# Patient Record
Sex: MALE | Race: WHITE | NOT HISPANIC OR LATINO | Employment: FULL TIME | URBAN - METROPOLITAN AREA
[De-identification: names, ages, dates, MRNs, and addresses within clinical notes are randomized per-mention and may not be internally consistent; named-entity substitution may affect disease eponyms.]

---

## 2021-05-29 ENCOUNTER — RECORDS - HEALTHEAST (OUTPATIENT)
Dept: ADMINISTRATIVE | Facility: CLINIC | Age: 66
End: 2021-05-29

## 2021-07-13 ENCOUNTER — TRANSCRIBE ORDERS (OUTPATIENT)
Dept: OTHER | Age: 66
End: 2021-07-13

## 2021-07-13 ENCOUNTER — DOCUMENTATION ONLY (OUTPATIENT)
Dept: ONCOLOGY | Facility: CLINIC | Age: 66
End: 2021-07-13

## 2021-07-13 DIAGNOSIS — C61 PROSTATE CANCER (H): Primary | ICD-10-CM

## 2021-07-13 NOTE — PROGRESS NOTES
Action    Action Taken 7/13/2021 1:20pm KEJULIÁN    Internal referral for prostate cancer - biopsy report available in Epic (7/1/2021)

## 2021-07-19 NOTE — TELEPHONE ENCOUNTER
RECORDS STATUS - ALL OTHER DIAGNOSIS      RECORDS RECEIVED FROM: [employer]   DATE RECEIVED:    NOTES STATUS DETAILS   OFFICE NOTE from referring provider  -  Dr. Surinder Lee: 7/8/21   OFFICE NOTE from medical oncologist     DISCHARGE SUMMARY from hospital NA    DISCHARGE REPORT from the ER NA    OPERATIVE REPORT [facility] 7/1/21: Prostate Bx  7/23/20: Colonoscopy  12/10/18, 11/17/16: EUS   MEDICATION LIST [facility]   CLINICAL TRIAL TREATMENTS TO DATE     LABS     PATHOLOGY REPORTS [facility], Report in CE, Slides received 7/22 7/1/21: [id number]   ANYTHING RELATED TO DIAGNOSIS Epic/CE 3/11/21   GENONOMIC TESTING     TYPE:     IMAGING (NEED IMAGES & REPORT)     CT SCANS     MRI PACS 4/13/21: HP   MAMMO     ULTRASOUND PACS 12/22/15:    PET

## 2021-07-22 ENCOUNTER — LAB (OUTPATIENT)
Dept: LAB | Facility: CLINIC | Age: 66
End: 2021-07-22
Payer: COMMERCIAL

## 2021-07-22 DIAGNOSIS — C61 PROSTATE CANCER (H): Primary | ICD-10-CM

## 2021-07-22 PROCEDURE — 88321 CONSLTJ&REPRT SLD PREP ELSWR: CPT | Performed by: PATHOLOGY

## 2021-07-28 LAB
PATH REPORT.COMMENTS IMP SPEC: NORMAL
PATH REPORT.FINAL DX SPEC: NORMAL
PATH REPORT.GROSS SPEC: NORMAL
PATH REPORT.MICROSCOPIC SPEC OTHER STN: NORMAL
PATH REPORT.RELEVANT HX SPEC: NORMAL
PATH REPORT.RELEVANT HX SPEC: NORMAL
PATH REPORT.SITE OF ORIGIN SPEC: NORMAL

## 2021-08-09 ENCOUNTER — PRE VISIT (OUTPATIENT)
Dept: ONCOLOGY | Facility: CLINIC | Age: 66
End: 2021-08-09

## 2021-08-09 ENCOUNTER — ONCOLOGY VISIT (OUTPATIENT)
Dept: ONCOLOGY | Facility: CLINIC | Age: 66
End: 2021-08-09
Attending: INTERNAL MEDICINE
Payer: COMMERCIAL

## 2021-08-09 VITALS
WEIGHT: 178 LBS | HEART RATE: 87 BPM | HEIGHT: 67 IN | OXYGEN SATURATION: 97 % | TEMPERATURE: 98.1 F | RESPIRATION RATE: 14 BRPM | SYSTOLIC BLOOD PRESSURE: 155 MMHG | BODY MASS INDEX: 27.94 KG/M2 | DIASTOLIC BLOOD PRESSURE: 79 MMHG

## 2021-08-09 DIAGNOSIS — C61 PROSTATE CANCER (H): ICD-10-CM

## 2021-08-09 DIAGNOSIS — C61 MALIGNANT NEOPLASM OF PROSTATE (H): Primary | ICD-10-CM

## 2021-08-09 PROCEDURE — G0463 HOSPITAL OUTPT CLINIC VISIT: HCPCS

## 2021-08-09 PROCEDURE — 99204 OFFICE O/P NEW MOD 45 MIN: CPT | Mod: GC | Performed by: INTERNAL MEDICINE

## 2021-08-09 ASSESSMENT — PAIN SCALES - GENERAL: PAINLEVEL: NO PAIN (0)

## 2021-08-09 ASSESSMENT — MIFFLIN-ST. JEOR: SCORE: 1546.03

## 2021-08-09 NOTE — PATIENT INSTRUCTIONS
Eloy,   It was a pleasure to meet you today.  As we discussed, your 3+3 vs 4+3 prostate cancer are still appropriate for active surveillance with your urologist at [facility].  Please follow-up with Dr. Lee in November with a PSA at [facility].  Please forward or send Dr. Zavala the result in [employer].  We will see you in 1 year with a PSA prior unless our thoughts on your plan would change based on your November PSA level.      Dr. Rogel and Dr. Zavala.

## 2021-08-09 NOTE — NURSING NOTE
"Oncology Rooming Note    August 9, 2021 9:18 AM   Eloy Fletcher is a 65 year old male who presents for:    Chief Complaint   Patient presents with     Oncology Clinic Visit     [facility] NEW - PROSTATE CANCER     Initial Vitals: BP (!) 155/79 (BP Location: Right arm, Patient Position: Chair, Cuff Size: Adult Regular)   Pulse 87   Temp 98.1  F (36.7  C)   Resp 14   Ht 1.702 m (5' 7\")   Wt 80.7 kg (178 lb)   SpO2 97%   BMI 27.88 kg/m   Estimated body mass index is 27.88 kg/m  as calculated from the following:    Height as of this encounter: 1.702 m (5' 7\").    Weight as of this encounter: 80.7 kg (178 lb). Body surface area is 1.95 meters squared.  No Pain (0) Comment: Data Unavailable   No LMP for male patient.  Allergies reviewed: Yes  Medications reviewed: Yes    Medications: Medication refills not needed today.  Pharmacy name entered into [employer]: [facility] - [place], [place] - [address]    Clinical concerns: No new concerns. Lucas was notified.      Jean Fernandez LPN            "

## 2021-08-09 NOTE — LETTER
8/9/2021         RE: Eloy Fletcher  [address]  [place] [address]        Dear Colleague,    Thank you for referring your patient, Eloy Fletcher, to the [facility]. Please see a copy of my visit note below.      [facility] Medical Oncology New Patient Consultation Note       Date of visit: August 9, 2021    CC:  Low risk prostate cancer referral from Dr. Lee at [facility].     HPI:  Previously had elevated PSA in 2007. due to prostate infection, found to have small areas of acinar proliferation only on biopsy at that time.  Followed with PSA by PCP with rising PSA in last few months.  He is otherwise having minimal urinary symptoms with 1-2 episodes of nocturia at night.  Stream is still stable, although not as strong as previously.  Biopsy from [facility] was done and he was expecting to have to have prostatectomy and was surprised with recommendation for active surveillance.  He has no fevers, chills, night sweats, CP, or SOB.  Remains active walking on treadmill 3 days a week and lifting weights 3-4 times per week without issues.  Appetite is stable.  Weight is stable.  No swelling of groin or lower extremities.      ONCOLOGY HISTORY:  -Hodgkins Lymphoma-late 1970s.  Treated with radiation and MOPP chemotherapy.      -L orchiectomy for testicular cancer, unknown subtype.    -Testicular cancer with abdominal LAD at recurrence, 1983.  Treated with radiation and likely BEP at [facility].  In remission since.      -Prostate cancer Diagnosed 4/2021  -MR with 49g prostate, category 4 lesion in R mid medial peripheral zone   -Biopsy 7/1/21-[facility]  -3/14 cores with prostate adenocarcinoma   -3+3=6 Elly with Grade Group 1, 10% involvement   -1 additional biopsy with small acinar proliferation    -Overread of pathology slides at [facility] notable for 1 site (2/3 cores) with 4+3 histology (Grade group 3)  "involving 50-70% of core.     -3 additional cores 3+3   -3 cores with atypical small acinar proliferation.     -8 biopsy sites with benign prostate tissue.      -Plan per primary urologist, Dr. Lee for active surveillance.      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   Rare EtOH  No recreational drugs.   No Herb/supplements.      MEDS    ASA 81  plavix   Pravastatin  ezetimbe  Albuterol  Metoprolol  Omeprazole     ROS  Remainder of 12 point ROS reviewed and negative except as in HPI.      PHYSICAL EXAM  Vital signs:  Temp: 98.1  F (36.7  C)   BP: (!) 155/79 Pulse: 87   Resp: 14 SpO2: 97 %     Height: 170.2 cm (5' 7\") Weight: 80.7 kg (178 lb)  Estimated body mass index is 27.88 kg/m  as calculated from the following:    Height as of this encounter: 1.702 m (5' 7\").    Weight as of this encounter: 80.7 kg (178 lb).      Exam:  Constitutional: healthy, alert and no distress  Head: Normocephalic. No masses, lesions, tenderness or abnormalities  Neck: Neck supple. Trachea midline   ENT: MMM  Cardiovascular: RRR, no m/r/g appreciated   Respiratory: CTAB, normal WOB on RA  Gastrointestinal: Abdomen soft, non-tender. BS normal.  : Deferred  Musculoskeletal: extremities normal- no gross deformities noted  Skin: no suspicious lesions or rashes on exposed areas of skin   Neurologic: Gait normal. CNII-XII grossly intact, normal speech  Psychiatric: mentation appears normal and affect normal/bright    LABS AND IMAGES    Prior PSA at [facility]  Prostatic Specific Antigen (ng/mL)   Date Value   03/11/2021 5.4 (H)   10/27/2020 4.0   02/11/2020 5.2 (H)   12/31/2019 4.4 (H)   11/05/2019 " 5.1 (H)   04/05/2018 3.8   11/30/2017 3.1   04/17/2017 3.4   12/29/2015 2.51   09/25/2014 2.96     Biopsy 7/1/21 [facility]  FINAL DIAGNOSIS     A.  Prostate, left apex, A, needle core biopsy:    Prostate tissue with no diagnostic abnormality     B.  Prostate, left apex lateral, B, needle core biopsy:    Prostate tissue with no diagnostic abnormality     C.  Prostate, left middle, C, needle core biopsy:    Prostate tissue with no diagnostic abnormality     D.  Prostate, left middle lateral, D, needle core biopsy:    Prostate tissue with no diagnostic abnormality     E.  Prostate, left base, E, needle core biopsy:    Prostate tissue with no diagnostic abnormality     F.  Prostate, left base lateral, F, needle core biopsy:    Atypical small acinar proliferation (ASAP)     G.  Prostate, right apex, G, needle core biopsy:    Prostate tissue with no diagnostic abnormality     H.  Prostate, right apex lateral, H, needle core biopsy:    Prostate tissue with no diagnostic abnormality     I.  Prostate, right middle, I, needle core biopsy:    Atypical small acinar proliferation (ASAP)     J.  Prostate, right middle lateral, J, needle core biopsy:    Prostatic adenocarcinoma, GRADE GROUP 1, [place] grade 3+3 (score 6)    1 of 1 needle cores are positive    10 % tissue involvement     K.  Prostate, right base, K, needle core biopsy:    Prostate tissue with no diagnostic abnormality     L.  Prostate, right base lateral, L, needle core biopsy:    Prostate tissue with no diagnostic abnormality     M.  Prostate, M.  Lesion 1 RIght Mid Medial Peripheral Zone , needle core biopsies:    Prostatic adenocarcinoma, GRADE GROUP 1, Elly grade 3+3 (score 6)    2 of 2 needle cores are positive    10 % tissue involvement     N.  Prostate, N.  Lesion 2 Left Base to Mid Peripheral Zone , needle core biopsies:    Prostatic adenocarcinoma, GRADE GROUP 1, [place] grade 3+3 (score 6)    2 of 3 needle cores are positive    10 % tissue  involvement        [facility] Overread-Pathology samples 7/22/21  Final Diagnosis   CASE FROM [place], [place]  ([id number], OBTAINED 07/01/2021):  F. Prostate, left base lateral, biopsy:  -Atypical small acinar proliferation  G. Prostate, right apex, biopsy:  -Atypical small acinar proliferation  I. Prostate, right middle, biopsy:  -Atypical small acinar proliferation  J. Prostate, right middle lateral, biopsy:  -Prostate adenocarcinoma, acinar type  -[place] score 6 (3+3)  -Grade group 1  -Extent: Involves 1/1 core (1 mm, 20%)  M. Prostate, right mid medial peripheral zone, lesion #1, biopsy:  -Prostate adenocarcinoma, acinar type  -Elly score 7 (4+3)  -Grade group 3  -Extent: Involves 2/3 cores (5 mm, 50% and 3 mm, 70%)  N. Prostate, left base to mid peripheral zone, lesion #2, biopsy:  -Prostate adenocarcinoma, acinar type  -Elly score 6 (3+3)  -Grade group 1  -Extent: Involves 2/3 cores (2 mm, 20% and 2 mm, 40%)  Prostate specimens A, B, C, D, E, H, K & L show benign prostatic tissue.       MR Prostate 4/9/21 [facility]  FINDINGS:   PERIPHERAL ZONE:   Lesion 1.   Right mid medial peripheral zone measuring 0.8 x 0.7 x 0.6 cm   T2 appearance: Circumscribed, homogenous hypointense focus/mass less than 1.5 cm.   Diffusion-Weighted Imaging: Focal markedly hypointense on ADC and markedly hyperintense on DWI.   Enhancement: Positive.   Prostate margin: No involvement.     Diffusion abnormality meets PI-RADS 4.   Region of interest created for biopsy and/or follow-up.     Lesion 2.   Left mid posterior peripheral zone and left base lateral peripheral zone measuring 1.9 x 0.6 x 0.9 cm   T2 appearance: Linear or wedge-shaped hypointensity.   Diffusion-Weighted Imaging: Linear/wedge shaped hypointense on ADC and/or linear/wedge shaped hyperintense on DWI.   Enhancement: Positive.   Prostate margin: No involvement.     Diffusion abnormality meets PI-RADS 2.   Region of interest created for  biopsy and/or follow-up.     TRANSITIONAL ZONE: No suspicious foci.     No seminal vesicle invasion.   No pelvic lymphadenopathy.   No lesions in the visualized bones.     PROSTATE GLAND VOLUME: 49 cc     IMPRESSION:   1.  Lesion 1 in the right mid medial peripheral zone is assigned PI-RADS CATEGORY 4: High. Clinically significant cancer is likely to be present.   2.  Lesion 2 in the left base to mid peripheral zone is assigned PI-RADS CATEGORY 2: Low. Clinically significant cancer is unlikely to be present.    IMPRESSION AND PLAN    Eloy Almanza is a 64 y/o M with PMH of Hodgkins Lymphoma (in remission), L sided testicular cancer (in remission), treated with chest and retroperitoneal radiation in the 1970/80s presenting for second opinion of newly diagnosed low risk prostate adenocarcinoma.      Intermediate risk, localized prostate adenocarcinoma.  Initially diagnosed 7/2021 with prostate biopsy. He is followed locally at [facility].  Plan was to initiate active surveillance with next scheduled PSA planned for 11/2021 (last 3/2021), which we agree with.  This initial decision was based on 3+3 [place] score, but noted to be 4+3 on [facility] pathologist review.  We had a lengthy discussion with Eloy and his wife regarding the role of active surveillance versus radical prostatectomy with his pathology results.  We discussed that there is no clear-cut number that directs treatment choice with his results at this time and it is reasonable to follow-up with a PSA level in November with Dr. Lee and then send Dr. Zavala the results as well.  He will likely need a RP at some point, however, waiting until repeat PSA in November is extremely unlikely to change his long-term outcomes or curability of his prostate cancer, while RP may have significant  side effects.      He and his wife had several questions which were answered during our visit.  They were agreeable to follow-up with Dr. Zavala in  1 year and [facility] in addition to sending PSA results to Dr. Zavala as well when they result in November.      -PSA as scheduled with Dr. Lee in November.    -Forward result to Dr. Zavala.   -RTC with Dr. Zavala in 1 year with PSA at that time.     Patient seen and discussed with Dr. Zavala.     Martín Rogel MD, PhD  Hematology/Oncology Fellow  Pager: [phone]          Attestation signed by Eugene Zavala MD at 8/11/2021  3:45 PM:  Physician Attestation   I, Eugene Zavala MD, saw this patient and agree with the findings and plan of care as documented in the note.      Items personally reviewed/procedural attestation: vitals, labs, and plan for active surveillance and local followup.    Eugene Zavala MD       Again, thank you for allowing me to participate in the care of your patient.        Sincerely,        Eugene Zavala MD

## 2021-08-09 NOTE — PROGRESS NOTES
[facility] Medical Oncology New Patient Consultation Note       Date of visit: August 9, 2021    CC:  Low risk prostate cancer referral from Dr. Lee at [facility].     HPI:  Previously had elevated PSA in 2007. due to prostate infection, found to have small areas of acinar proliferation only on biopsy at that time.  Followed with PSA by PCP with rising PSA in last few months.  He is otherwise having minimal urinary symptoms with 1-2 episodes of nocturia at night.  Stream is still stable, although not as strong as previously.  Biopsy from [facility] was done and he was expecting to have to have prostatectomy and was surprised with recommendation for active surveillance.  He has no fevers, chills, night sweats, CP, or SOB.  Remains active walking on treadmill 3 days a week and lifting weights 3-4 times per week without issues.  Appetite is stable.  Weight is stable.  No swelling of groin or lower extremities.      ONCOLOGY HISTORY:  -Hodgkins Lymphoma-late 1970s.  Treated with radiation and MOPP chemotherapy.      -L orchiectomy for testicular cancer, unknown subtype.    -Testicular cancer with abdominal LAD at recurrence, 1983.  Treated with radiation and likely BEP at [facility].  In remission since.      -Prostate cancer Diagnosed 4/2021  -MR with 49g prostate, category 4 lesion in R mid medial peripheral zone   -Biopsy 7/1/21-[facility]  -3/14 cores with prostate adenocarcinoma   -3+3=6 Elly with Grade Group 1, 10% involvement   -1 additional biopsy with small acinar proliferation    -Overread of pathology slides at [facility] notable for 1 site (2/3 cores) with 4+3 histology (Grade group 3) involving 50-70% of core.     -3 additional cores 3+3   -3 cores with atypical small acinar proliferation.     -8 biopsy sites with benign prostate tissue.      -Plan per primary urologist, Dr. Lee for active surveillance.      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   Rare EtOH  No recreational drugs.   No Herb/supplements.      MEDS    ASA 81  plavix   Pravastatin  ezetimbe  Albuterol  Metoprolol  Omeprazole     ROS  Remainder of 12 point ROS reviewed and negative except as in HPI.      PHYSICAL EXAM  Vital signs:  Temp: 98.1  F (36.7  C)   BP: (!) 155/79 Pulse: 87   Resp: 14 SpO2: 97 %     Height: 170.2 cm (5' 7\") Weight: 80.7 kg (178 lb)  Estimated body mass index is 27.88 kg/m  as calculated from the following:    Height as of this encounter: 1.702 m (5' 7\").    Weight as of this encounter: 80.7 kg (178 lb).      Exam:  Constitutional: healthy, alert and no distress  Head: Normocephalic. No masses, lesions, tenderness or abnormalities  Neck: Neck supple. Trachea midline   ENT: MMM  Cardiovascular: RRR, no m/r/g appreciated   Respiratory: CTAB, normal WOB on RA  Gastrointestinal: Abdomen soft, non-tender. BS normal.  : Deferred  Musculoskeletal: extremities normal- no gross deformities noted  Skin: no suspicious lesions or rashes on exposed areas of skin   Neurologic: Gait normal. CNII-XII grossly intact, normal speech  Psychiatric: mentation appears normal and affect normal/bright    LABS AND IMAGES    Prior PSA at [facility]  Prostatic Specific Antigen (ng/mL)   Date Value   03/11/2021 5.4 (H)   10/27/2020 4.0   02/11/2020 5.2 (H)   12/31/2019 4.4 (H)   11/05/2019 5.1 (H)   04/05/2018 3.8   11/30/2017 3.1   04/17/2017 3.4   12/29/2015 2.51   09/25/2014 2.96     Biopsy 7/1/21 [facility]  FINAL DIAGNOSIS     A.  Prostate, left apex, A, needle core biopsy:    Prostate tissue with no diagnostic abnormality     B.  Prostate, " left apex lateral, B, needle core biopsy:    Prostate tissue with no diagnostic abnormality     C.  Prostate, left middle, C, needle core biopsy:    Prostate tissue with no diagnostic abnormality     D.  Prostate, left middle lateral, D, needle core biopsy:    Prostate tissue with no diagnostic abnormality     E.  Prostate, left base, E, needle core biopsy:    Prostate tissue with no diagnostic abnormality     F.  Prostate, left base lateral, F, needle core biopsy:    Atypical small acinar proliferation (ASAP)     G.  Prostate, right apex, G, needle core biopsy:    Prostate tissue with no diagnostic abnormality     H.  Prostate, right apex lateral, H, needle core biopsy:    Prostate tissue with no diagnostic abnormality     I.  Prostate, right middle, I, needle core biopsy:    Atypical small acinar proliferation (ASAP)     J.  Prostate, right middle lateral, J, needle core biopsy:    Prostatic adenocarcinoma, GRADE GROUP 1, [place] grade 3+3 (score 6)    1 of 1 needle cores are positive    10 % tissue involvement     K.  Prostate, right base, K, needle core biopsy:    Prostate tissue with no diagnostic abnormality     L.  Prostate, right base lateral, L, needle core biopsy:    Prostate tissue with no diagnostic abnormality     M.  Prostate, M.  Lesion 1 RIght Mid Medial Peripheral Zone , needle core biopsies:    Prostatic adenocarcinoma, GRADE GROUP 1, [place] grade 3+3 (score 6)    2 of 2 needle cores are positive    10 % tissue involvement     N.  Prostate, N.  Lesion 2 Left Base to Mid Peripheral Zone , needle core biopsies:    Prostatic adenocarcinoma, GRADE GROUP 1, Elly grade 3+3 (score 6)    2 of 3 needle cores are positive    10 % tissue involvement        [facility] Overread-Pathology samples 7/22/21  Final Diagnosis   CASE FROM [place], [place]  ([id number], OBTAINED 07/01/2021):  F. Prostate, left base lateral, biopsy:  -Atypical small acinar proliferation  G. Prostate, right  apex, biopsy:  -Atypical small acinar proliferation  I. Prostate, right middle, biopsy:  -Atypical small acinar proliferation  J. Prostate, right middle lateral, biopsy:  -Prostate adenocarcinoma, acinar type  -[place] score 6 (3+3)  -Grade group 1  -Extent: Involves 1/1 core (1 mm, 20%)  M. Prostate, right mid medial peripheral zone, lesion #1, biopsy:  -Prostate adenocarcinoma, acinar type  -Elly score 7 (4+3)  -Grade group 3  -Extent: Involves 2/3 cores (5 mm, 50% and 3 mm, 70%)  N. Prostate, left base to mid peripheral zone, lesion #2, biopsy:  -Prostate adenocarcinoma, acinar type  -Elly score 6 (3+3)  -Grade group 1  -Extent: Involves 2/3 cores (2 mm, 20% and 2 mm, 40%)  Prostate specimens A, B, C, D, E, H, K & L show benign prostatic tissue.       MR Prostate 4/9/21 [facility]  FINDINGS:   PERIPHERAL ZONE:   Lesion 1.   Right mid medial peripheral zone measuring 0.8 x 0.7 x 0.6 cm   T2 appearance: Circumscribed, homogenous hypointense focus/mass less than 1.5 cm.   Diffusion-Weighted Imaging: Focal markedly hypointense on ADC and markedly hyperintense on DWI.   Enhancement: Positive.   Prostate margin: No involvement.     Diffusion abnormality meets PI-RADS 4.   Region of interest created for biopsy and/or follow-up.     Lesion 2.   Left mid posterior peripheral zone and left base lateral peripheral zone measuring 1.9 x 0.6 x 0.9 cm   T2 appearance: Linear or wedge-shaped hypointensity.   Diffusion-Weighted Imaging: Linear/wedge shaped hypointense on ADC and/or linear/wedge shaped hyperintense on DWI.   Enhancement: Positive.   Prostate margin: No involvement.     Diffusion abnormality meets PI-RADS 2.   Region of interest created for biopsy and/or follow-up.     TRANSITIONAL ZONE: No suspicious foci.     No seminal vesicle invasion.   No pelvic lymphadenopathy.   No lesions in the visualized bones.     PROSTATE GLAND VOLUME: 49 cc     IMPRESSION:   1.  Lesion 1 in the right mid medial peripheral zone is  assigned PI-RADS CATEGORY 4: High. Clinically significant cancer is likely to be present.   2.  Lesion 2 in the left base to mid peripheral zone is assigned PI-RADS CATEGORY 2: Low. Clinically significant cancer is unlikely to be present.    IMPRESSION AND PLAN    Eloy Almanza is a 66 y/o M with PMH of Hodgkins Lymphoma (in remission), L sided testicular cancer (in remission), treated with chest and retroperitoneal radiation in the 1970/80s presenting for second opinion of newly diagnosed low risk prostate adenocarcinoma.      Intermediate risk, localized prostate adenocarcinoma.  Initially diagnosed 7/2021 with prostate biopsy. He is followed locally at [facility].  Plan was to initiate active surveillance with next scheduled PSA planned for 11/2021 (last 3/2021), which we agree with.  This initial decision was based on 3+3 [place] score, but noted to be 4+3 on [facility] pathologist review.  We had a lengthy discussion with Eloy and his wife regarding the role of active surveillance versus radical prostatectomy with his pathology results.  We discussed that there is no clear-cut number that directs treatment choice with his results at this time and it is reasonable to follow-up with a PSA level in November with Dr. Lee and then send Dr. Zavala the results as well.  He will likely need a RP at some point, however, waiting until repeat PSA in November is extremely unlikely to change his long-term outcomes or curability of his prostate cancer, while RP may have significant  side effects.      He and his wife had several questions which were answered during our visit.  They were agreeable to follow-up with Dr. Zavala in 1 year and following with urology at [facility] in the interval in addition to sending PSA results to Dr. Zavala as well when they result in November.      -PSA as scheduled with Dr. Lee in November.    -Forward result to Dr. Zavala.   -RTC with Dr. Zavala in 1 year  with PSA at that time.     Patient seen and discussed with Dr. Zavala.     Martín Rogel MD, PhD  Hematology/Oncology Fellow  Pager: [phone]

## 2021-08-10 ENCOUNTER — HOSPITAL ENCOUNTER (OUTPATIENT)
Dept: MRI IMAGING | Facility: CLINIC | Age: 66
Discharge: HOME | End: 2021-08-10
Attending: UROLOGY | Admitting: UROLOGY
Payer: COMMERCIAL

## 2021-08-10 DIAGNOSIS — M54.2 NECK PAIN: ICD-10-CM

## 2021-08-10 PROCEDURE — 72141 MRI NECK SPINE W/O DYE: CPT

## 2021-08-21 ENCOUNTER — HEALTH MAINTENANCE LETTER (OUTPATIENT)
Age: 66
End: 2021-08-21

## 2021-09-08 ENCOUNTER — PATIENT OUTREACH (OUTPATIENT)
Dept: ONCOLOGY | Facility: CLINIC | Age: 66
End: 2021-09-08

## 2021-09-08 NOTE — PROGRESS NOTES
TC to pt returning his call regarding getting more details from his visit with Dr. Zavala. Pt stated he was able to access the full visit notes in My Chart since the time he left message for writer. Pt stated he's concerned about the difference in path readings and that he may seek a third opinion. Told pt that Dr. Caba is starting a practice here and that this would be a great opportunity to get another opinion given that he is a prostate cancer specialist. Pt stated understanding of all and is agreeable to plan to have another PSA checked and a visit in 11/2021 but will call back to discuss getting scheduled with Dr. Caba.

## 2022-01-01 ENCOUNTER — APPOINTMENT (OUTPATIENT)
Dept: GENERAL RADIOLOGY | Facility: CLINIC | Age: 67
DRG: 314 | End: 2022-01-01
Attending: STUDENT IN AN ORGANIZED HEALTH CARE EDUCATION/TRAINING PROGRAM
Payer: COMMERCIAL

## 2022-01-01 ENCOUNTER — APPOINTMENT (OUTPATIENT)
Dept: EDUCATION SERVICES | Facility: CLINIC | Age: 67
DRG: 314 | End: 2022-01-01
Attending: INTERNAL MEDICINE
Payer: COMMERCIAL

## 2022-01-01 ENCOUNTER — APPOINTMENT (OUTPATIENT)
Dept: SPEECH THERAPY | Facility: CLINIC | Age: 67
DRG: 314 | End: 2022-01-01
Attending: INTERNAL MEDICINE
Payer: COMMERCIAL

## 2022-01-01 ENCOUNTER — APPOINTMENT (OUTPATIENT)
Dept: MRI IMAGING | Facility: CLINIC | Age: 67
DRG: 314 | End: 2022-01-01
Attending: STUDENT IN AN ORGANIZED HEALTH CARE EDUCATION/TRAINING PROGRAM
Payer: COMMERCIAL

## 2022-01-01 ENCOUNTER — APPOINTMENT (OUTPATIENT)
Dept: OCCUPATIONAL THERAPY | Facility: CLINIC | Age: 67
DRG: 314 | End: 2022-01-01
Attending: INTERNAL MEDICINE
Payer: COMMERCIAL

## 2022-01-01 ENCOUNTER — APPOINTMENT (OUTPATIENT)
Dept: CARDIOLOGY | Facility: CLINIC | Age: 67
DRG: 314 | End: 2022-01-01
Attending: STUDENT IN AN ORGANIZED HEALTH CARE EDUCATION/TRAINING PROGRAM
Payer: COMMERCIAL

## 2022-01-01 ENCOUNTER — ANESTHESIA EVENT (OUTPATIENT)
Dept: EMERGENCY MEDICINE | Facility: CLINIC | Age: 67
End: 2022-01-01
Payer: COMMERCIAL

## 2022-01-01 ENCOUNTER — APPOINTMENT (OUTPATIENT)
Dept: OCCUPATIONAL THERAPY | Facility: CLINIC | Age: 67
DRG: 314 | End: 2022-01-01
Payer: COMMERCIAL

## 2022-01-01 ENCOUNTER — APPOINTMENT (OUTPATIENT)
Dept: NEUROLOGY | Facility: CLINIC | Age: 67
DRG: 314 | End: 2022-01-01
Attending: NURSE PRACTITIONER
Payer: COMMERCIAL

## 2022-01-01 ENCOUNTER — APPOINTMENT (OUTPATIENT)
Dept: GENERAL RADIOLOGY | Facility: CLINIC | Age: 67
DRG: 314 | End: 2022-01-01
Attending: NURSE PRACTITIONER
Payer: COMMERCIAL

## 2022-01-01 ENCOUNTER — HEALTH MAINTENANCE LETTER (OUTPATIENT)
Age: 67
End: 2022-01-01

## 2022-01-01 ENCOUNTER — APPOINTMENT (OUTPATIENT)
Dept: CT IMAGING | Facility: CLINIC | Age: 67
DRG: 314 | End: 2022-01-01
Attending: STUDENT IN AN ORGANIZED HEALTH CARE EDUCATION/TRAINING PROGRAM
Payer: COMMERCIAL

## 2022-01-01 ENCOUNTER — APPOINTMENT (OUTPATIENT)
Dept: MRI IMAGING | Facility: CLINIC | Age: 67
End: 2022-01-01
Attending: STUDENT IN AN ORGANIZED HEALTH CARE EDUCATION/TRAINING PROGRAM
Payer: COMMERCIAL

## 2022-01-01 ENCOUNTER — APPOINTMENT (OUTPATIENT)
Dept: CARDIOLOGY | Facility: CLINIC | Age: 67
DRG: 314 | End: 2022-01-01
Payer: COMMERCIAL

## 2022-01-01 ENCOUNTER — APPOINTMENT (OUTPATIENT)
Dept: PHYSICAL THERAPY | Facility: CLINIC | Age: 67
DRG: 314 | End: 2022-01-01
Attending: INTERNAL MEDICINE
Payer: COMMERCIAL

## 2022-01-01 ENCOUNTER — APPOINTMENT (OUTPATIENT)
Dept: SPEECH THERAPY | Facility: CLINIC | Age: 67
DRG: 314 | End: 2022-01-01
Payer: COMMERCIAL

## 2022-01-01 ENCOUNTER — APPOINTMENT (OUTPATIENT)
Dept: CT IMAGING | Facility: CLINIC | Age: 67
DRG: 314 | End: 2022-01-01
Attending: INTERNAL MEDICINE
Payer: COMMERCIAL

## 2022-01-01 ENCOUNTER — APPOINTMENT (OUTPATIENT)
Dept: CARDIOLOGY | Facility: CLINIC | Age: 67
DRG: 314 | End: 2022-01-01
Attending: NURSE PRACTITIONER
Payer: COMMERCIAL

## 2022-01-01 ENCOUNTER — APPOINTMENT (OUTPATIENT)
Dept: GENERAL RADIOLOGY | Facility: CLINIC | Age: 67
DRG: 314 | End: 2022-01-01
Attending: INTERNAL MEDICINE
Payer: COMMERCIAL

## 2022-01-01 ENCOUNTER — APPOINTMENT (OUTPATIENT)
Dept: CT IMAGING | Facility: CLINIC | Age: 67
End: 2022-01-01
Attending: EMERGENCY MEDICINE
Payer: COMMERCIAL

## 2022-01-01 ENCOUNTER — ANESTHESIA EVENT (OUTPATIENT)
Dept: INTENSIVE CARE | Facility: CLINIC | Age: 67
DRG: 314 | End: 2022-01-01
Payer: COMMERCIAL

## 2022-01-01 ENCOUNTER — APPOINTMENT (OUTPATIENT)
Dept: ULTRASOUND IMAGING | Facility: CLINIC | Age: 67
DRG: 314 | End: 2022-01-01
Attending: STUDENT IN AN ORGANIZED HEALTH CARE EDUCATION/TRAINING PROGRAM
Payer: COMMERCIAL

## 2022-01-01 ENCOUNTER — APPOINTMENT (OUTPATIENT)
Dept: GENERAL RADIOLOGY | Facility: CLINIC | Age: 67
DRG: 314 | End: 2022-01-01
Payer: COMMERCIAL

## 2022-01-01 ENCOUNTER — HOSPITAL ENCOUNTER (INPATIENT)
Facility: CLINIC | Age: 67
LOS: 19 days | DRG: 314 | End: 2022-12-23
Attending: INTERNAL MEDICINE | Admitting: INTERNAL MEDICINE
Payer: COMMERCIAL

## 2022-01-01 ENCOUNTER — HOSPITAL ENCOUNTER (EMERGENCY)
Facility: CLINIC | Age: 67
Discharge: ANOTHER HOSPITAL | End: 2022-12-04
Attending: EMERGENCY MEDICINE | Admitting: EMERGENCY MEDICINE
Payer: COMMERCIAL

## 2022-01-01 ENCOUNTER — ANESTHESIA (OUTPATIENT)
Dept: INTENSIVE CARE | Facility: CLINIC | Age: 67
DRG: 314 | End: 2022-01-01
Payer: COMMERCIAL

## 2022-01-01 ENCOUNTER — ANESTHESIA (OUTPATIENT)
Dept: EMERGENCY MEDICINE | Facility: CLINIC | Age: 67
End: 2022-01-01
Payer: COMMERCIAL

## 2022-01-01 ENCOUNTER — APPOINTMENT (OUTPATIENT)
Dept: PHYSICAL THERAPY | Facility: CLINIC | Age: 67
DRG: 314 | End: 2022-01-01
Payer: COMMERCIAL

## 2022-01-01 VITALS
WEIGHT: 178 LBS | HEART RATE: 137 BPM | RESPIRATION RATE: 38 BRPM | BODY MASS INDEX: 26.98 KG/M2 | OXYGEN SATURATION: 93 % | HEIGHT: 68 IN | DIASTOLIC BLOOD PRESSURE: 72 MMHG | SYSTOLIC BLOOD PRESSURE: 113 MMHG | TEMPERATURE: 103.8 F

## 2022-01-01 VITALS
OXYGEN SATURATION: 93 % | SYSTOLIC BLOOD PRESSURE: 106 MMHG | BODY MASS INDEX: 30.34 KG/M2 | TEMPERATURE: 99.1 F | RESPIRATION RATE: 27 BRPM | HEART RATE: 114 BPM | DIASTOLIC BLOOD PRESSURE: 62 MMHG | WEIGHT: 199.52 LBS

## 2022-01-01 DIAGNOSIS — I5A MYOCARDIAL INJURY: ICD-10-CM

## 2022-01-01 DIAGNOSIS — G93.41 SEPTIC ENCEPHALOPATHY: ICD-10-CM

## 2022-01-01 DIAGNOSIS — Q89.01 ASPLENIA: ICD-10-CM

## 2022-01-01 DIAGNOSIS — E87.20 LACTIC ACIDOSIS: ICD-10-CM

## 2022-01-01 DIAGNOSIS — I63.89 CEREBROVASCULAR ACCIDENT (CVA) DUE TO OTHER MECHANISM (H): ICD-10-CM

## 2022-01-01 DIAGNOSIS — I63.40 CEREBROVASCULAR ACCIDENT (CVA) DUE TO EMBOLISM OF CEREBRAL ARTERY (H): Primary | ICD-10-CM

## 2022-01-01 DIAGNOSIS — J81.0 ACUTE PULMONARY EDEMA (H): ICD-10-CM

## 2022-01-01 LAB
1,3 BETA GLUCAN SER-MCNC: <31 PG/ML
ABO/RH(D): NORMAL
ALBUMIN MFR UR ELPH: 62 MG/DL
ALBUMIN SERPL BCG-MCNC: 2.1 G/DL (ref 3.5–5.2)
ALBUMIN SERPL BCG-MCNC: 2.2 G/DL (ref 3.5–5.2)
ALBUMIN SERPL BCG-MCNC: 2.3 G/DL (ref 3.5–5.2)
ALBUMIN SERPL BCG-MCNC: 2.4 G/DL (ref 3.5–5.2)
ALBUMIN SERPL BCG-MCNC: 2.4 G/DL (ref 3.5–5.2)
ALBUMIN SERPL BCG-MCNC: 2.5 G/DL (ref 3.5–5.2)
ALBUMIN SERPL BCG-MCNC: 2.6 G/DL (ref 3.5–5.2)
ALBUMIN SERPL BCG-MCNC: 2.7 G/DL (ref 3.5–5.2)
ALBUMIN SERPL BCG-MCNC: 2.8 G/DL (ref 3.5–5.2)
ALBUMIN SERPL BCG-MCNC: 2.9 G/DL (ref 3.5–5.2)
ALBUMIN SERPL BCG-MCNC: 3 G/DL (ref 3.5–5.2)
ALBUMIN UR-MCNC: 100 MG/DL
ALBUMIN UR-MCNC: 20 MG/DL
ALBUMIN UR-MCNC: 20 MG/DL
ALBUMIN UR-MCNC: 70 MG/DL
ALLEN'S TEST: ABNORMAL
ALLEN'S TEST: NORMAL
ALLEN'S TEST: YES
ALLEN'S TEST: YES
ALP SERPL-CCNC: 125 U/L (ref 40–129)
ALP SERPL-CCNC: 150 U/L (ref 40–129)
ALP SERPL-CCNC: 157 U/L (ref 40–129)
ALP SERPL-CCNC: 164 U/L (ref 40–129)
ALP SERPL-CCNC: 167 U/L (ref 40–129)
ALP SERPL-CCNC: 175 U/L (ref 40–129)
ALP SERPL-CCNC: 175 U/L (ref 40–129)
ALP SERPL-CCNC: 178 U/L (ref 40–129)
ALP SERPL-CCNC: 186 U/L (ref 40–129)
ALP SERPL-CCNC: 190 U/L (ref 40–129)
ALP SERPL-CCNC: 194 U/L (ref 40–129)
ALP SERPL-CCNC: 195 U/L (ref 40–129)
ALP SERPL-CCNC: 195 U/L (ref 40–129)
ALP SERPL-CCNC: 199 U/L (ref 40–129)
ALP SERPL-CCNC: 202 U/L (ref 40–129)
ALP SERPL-CCNC: 212 U/L (ref 40–129)
ALP SERPL-CCNC: 215 U/L (ref 40–129)
ALP SERPL-CCNC: 237 U/L (ref 40–129)
ALP SERPL-CCNC: 241 U/L (ref 40–129)
ALP SERPL-CCNC: 242 U/L (ref 40–129)
ALP SERPL-CCNC: 253 U/L (ref 40–129)
ALP SERPL-CCNC: 260 U/L (ref 40–129)
ALP SERPL-CCNC: 267 U/L (ref 40–129)
ALP SERPL-CCNC: 281 U/L (ref 40–129)
ALP SERPL-CCNC: 52 U/L (ref 40–129)
ALP SERPL-CCNC: 58 U/L (ref 40–129)
ALP SERPL-CCNC: 92 U/L (ref 40–129)
ALT SERPL W P-5'-P-CCNC: 1103 U/L (ref 10–50)
ALT SERPL W P-5'-P-CCNC: 12 U/L (ref 10–50)
ALT SERPL W P-5'-P-CCNC: 12 U/L (ref 10–50)
ALT SERPL W P-5'-P-CCNC: 1314 U/L (ref 10–50)
ALT SERPL W P-5'-P-CCNC: 1364 U/L (ref 10–50)
ALT SERPL W P-5'-P-CCNC: 15 U/L (ref 10–50)
ALT SERPL W P-5'-P-CCNC: 16 U/L (ref 10–50)
ALT SERPL W P-5'-P-CCNC: 1637 U/L (ref 10–50)
ALT SERPL W P-5'-P-CCNC: 1706 U/L (ref 10–50)
ALT SERPL W P-5'-P-CCNC: 18 U/L (ref 10–50)
ALT SERPL W P-5'-P-CCNC: 2090 U/L (ref 10–50)
ALT SERPL W P-5'-P-CCNC: 2172 U/L (ref 10–50)
ALT SERPL W P-5'-P-CCNC: 22 U/L (ref 10–50)
ALT SERPL W P-5'-P-CCNC: 2204 U/L (ref 10–50)
ALT SERPL W P-5'-P-CCNC: 2373 U/L (ref 10–50)
ALT SERPL W P-5'-P-CCNC: 2373 U/L (ref 10–50)
ALT SERPL W P-5'-P-CCNC: 2464 U/L (ref 10–50)
ALT SERPL W P-5'-P-CCNC: 26 U/L (ref 10–50)
ALT SERPL W P-5'-P-CCNC: 28 U/L (ref 10–50)
ALT SERPL W P-5'-P-CCNC: 2817 U/L (ref 10–50)
ALT SERPL W P-5'-P-CCNC: 2846 U/L (ref 10–50)
ALT SERPL W P-5'-P-CCNC: 34 U/L (ref 10–50)
ALT SERPL W P-5'-P-CCNC: 38 U/L (ref 10–50)
ALT SERPL W P-5'-P-CCNC: 502 U/L (ref 10–50)
ALT SERPL W P-5'-P-CCNC: 61 U/L (ref 10–50)
ALT SERPL W P-5'-P-CCNC: 78 U/L (ref 10–50)
ALT SERPL W P-5'-P-CCNC: >7000 U/L (ref 10–50)
AMMONIA PLAS-SCNC: 22 UMOL/L (ref 16–60)
ANION GAP SERPL CALCULATED.3IONS-SCNC: 12 MMOL/L (ref 7–15)
ANION GAP SERPL CALCULATED.3IONS-SCNC: 13 MMOL/L (ref 7–15)
ANION GAP SERPL CALCULATED.3IONS-SCNC: 15 MMOL/L (ref 7–15)
ANION GAP SERPL CALCULATED.3IONS-SCNC: 16 MMOL/L (ref 7–15)
ANION GAP SERPL CALCULATED.3IONS-SCNC: 17 MMOL/L (ref 7–15)
ANION GAP SERPL CALCULATED.3IONS-SCNC: 18 MMOL/L (ref 7–15)
ANION GAP SERPL CALCULATED.3IONS-SCNC: 19 MMOL/L (ref 7–15)
ANION GAP SERPL CALCULATED.3IONS-SCNC: 20 MMOL/L (ref 7–15)
ANION GAP SERPL CALCULATED.3IONS-SCNC: 21 MMOL/L (ref 7–15)
ANION GAP SERPL CALCULATED.3IONS-SCNC: 22 MMOL/L (ref 7–15)
ANION GAP SERPL CALCULATED.3IONS-SCNC: 23 MMOL/L (ref 7–15)
ANION GAP SERPL CALCULATED.3IONS-SCNC: 25 MMOL/L (ref 7–15)
ANION GAP SERPL CALCULATED.3IONS-SCNC: 25 MMOL/L (ref 7–15)
ANION GAP SERPL CALCULATED.3IONS-SCNC: 26 MMOL/L (ref 7–15)
ANION GAP SERPL CALCULATED.3IONS-SCNC: 28 MMOL/L (ref 7–15)
ANION GAP SERPL CALCULATED.3IONS-SCNC: 30 MMOL/L (ref 7–15)
ANION GAP SERPL CALCULATED.3IONS-SCNC: 30 MMOL/L (ref 7–15)
ANION GAP SERPL CALCULATED.3IONS-SCNC: 33 MMOL/L (ref 7–15)
ANION GAP SERPL CALCULATED.3IONS-SCNC: 33 MMOL/L (ref 7–15)
ANION GAP SERPL CALCULATED.3IONS-SCNC: 35 MMOL/L (ref 7–15)
ANION GAP SERPL CALCULATED.3IONS-SCNC: 9 MMOL/L (ref 7–15)
ANTIBODY SCREEN: NEGATIVE
APPEARANCE CSF: ABNORMAL
APPEARANCE UR: ABNORMAL
APPEARANCE UR: ABNORMAL
APPEARANCE UR: CLEAR
APPEARANCE UR: CLEAR
APTT PPP: 41 SECONDS (ref 22–38)
APTT PPP: 42 SECONDS (ref 22–38)
AST SERPL W P-5'-P-CCNC: 109 U/L (ref 10–50)
AST SERPL W P-5'-P-CCNC: 131 U/L (ref 10–50)
AST SERPL W P-5'-P-CCNC: 149 U/L (ref 10–50)
AST SERPL W P-5'-P-CCNC: 1847 U/L (ref 10–50)
AST SERPL W P-5'-P-CCNC: 3813 U/L (ref 10–50)
AST SERPL W P-5'-P-CCNC: 4607 U/L (ref 10–50)
AST SERPL W P-5'-P-CCNC: 4844 U/L (ref 10–50)
AST SERPL W P-5'-P-CCNC: 4879 U/L (ref 10–50)
AST SERPL W P-5'-P-CCNC: 5043 U/L (ref 10–50)
AST SERPL W P-5'-P-CCNC: 5809 U/L (ref 10–50)
AST SERPL W P-5'-P-CCNC: 6019 U/L (ref 10–50)
AST SERPL W P-5'-P-CCNC: 6311 U/L (ref 10–50)
AST SERPL W P-5'-P-CCNC: 67 U/L (ref 10–50)
AST SERPL W P-5'-P-CCNC: 68 U/L (ref 10–50)
AST SERPL W P-5'-P-CCNC: 69 U/L (ref 10–50)
AST SERPL W P-5'-P-CCNC: 6998 U/L (ref 10–50)
AST SERPL W P-5'-P-CCNC: 70 U/L (ref 10–50)
AST SERPL W P-5'-P-CCNC: 72 U/L (ref 10–50)
AST SERPL W P-5'-P-CCNC: 76 U/L (ref 10–50)
AST SERPL W P-5'-P-CCNC: 8042 U/L (ref 10–50)
AST SERPL W P-5'-P-CCNC: 82 U/L (ref 10–50)
AST SERPL W P-5'-P-CCNC: 9158 U/L (ref 10–50)
AST SERPL W P-5'-P-CCNC: 92 U/L (ref 10–50)
AST SERPL W P-5'-P-CCNC: 93 U/L (ref 10–50)
AST SERPL W P-5'-P-CCNC: 9763 U/L (ref 10–50)
AST SERPL W P-5'-P-CCNC: ABNORMAL U/L (ref 10–50)
AST SERPL W P-5'-P-CCNC: ABNORMAL U/L (ref 10–50)
ATRIAL RATE - MUSE: 101 BPM
ATRIAL RATE - MUSE: 102 BPM
ATRIAL RATE - MUSE: 102 BPM
ATRIAL RATE - MUSE: 115 BPM
ATRIAL RATE - MUSE: 127 BPM
ATRIAL RATE - MUSE: 258 BPM
ATRIAL RATE - MUSE: 62 BPM
BACTERIA #/AREA URNS HPF: ABNORMAL /HPF
BACTERIA ASPIRATE CULT: ABNORMAL
BACTERIA BLD CULT: ABNORMAL
BACTERIA BLD CULT: NO GROWTH
BACTERIA CSF CULT: ABNORMAL
BACTERIA CSF CULT: NORMAL
BACTERIA SPT CULT: ABNORMAL
BACTERIA SPT CULT: ABNORMAL
BASE EXCESS BLDA CALC-SCNC: -0.9 MMOL/L (ref -9–1.8)
BASE EXCESS BLDA CALC-SCNC: -10.3 MMOL/L (ref -9–1.8)
BASE EXCESS BLDA CALC-SCNC: -10.4 MMOL/L (ref -9–1.8)
BASE EXCESS BLDA CALC-SCNC: -13.7 MMOL/L (ref -9–1.8)
BASE EXCESS BLDA CALC-SCNC: -14.1 MMOL/L (ref -9–1.8)
BASE EXCESS BLDA CALC-SCNC: -15.1 MMOL/L (ref -9–1.8)
BASE EXCESS BLDA CALC-SCNC: -15.7 MMOL/L (ref -9–1.8)
BASE EXCESS BLDA CALC-SCNC: -2.5 MMOL/L (ref -9–1.8)
BASE EXCESS BLDA CALC-SCNC: -5.4 MMOL/L (ref -9–1.8)
BASE EXCESS BLDA CALC-SCNC: -9.2 MMOL/L (ref -9–1.8)
BASE EXCESS BLDA CALC-SCNC: 0.8 MMOL/L (ref -9–1.8)
BASE EXCESS BLDA CALC-SCNC: 4.2 MMOL/L (ref -9–1.8)
BASE EXCESS BLDA CALC-SCNC: 5.8 MMOL/L (ref -9–1.8)
BASE EXCESS BLDV CALC-SCNC: -0.9 MMOL/L (ref -7.7–1.9)
BASE EXCESS BLDV CALC-SCNC: -1.2 MMOL/L (ref -7.7–1.9)
BASE EXCESS BLDV CALC-SCNC: -13.6 MMOL/L (ref -7.7–1.9)
BASE EXCESS BLDV CALC-SCNC: -14.7 MMOL/L (ref -7.7–1.9)
BASE EXCESS BLDV CALC-SCNC: -14.8 MMOL/L (ref -7.7–1.9)
BASE EXCESS BLDV CALC-SCNC: -2.4 MMOL/L (ref -7.7–1.9)
BASE EXCESS BLDV CALC-SCNC: -2.7 MMOL/L (ref -7.7–1.9)
BASE EXCESS BLDV CALC-SCNC: -3.2 MMOL/L (ref -7.7–1.9)
BASE EXCESS BLDV CALC-SCNC: -3.9 MMOL/L (ref -7.7–1.9)
BASE EXCESS BLDV CALC-SCNC: -4.9 MMOL/L (ref -7.7–1.9)
BASE EXCESS BLDV CALC-SCNC: -5.7 MMOL/L (ref -7.7–1.9)
BASE EXCESS BLDV CALC-SCNC: -8.8 MMOL/L (ref -7.7–1.9)
BASE EXCESS BLDV CALC-SCNC: -9.4 MMOL/L (ref -7.7–1.9)
BASE EXCESS BLDV CALC-SCNC: 0.6 MMOL/L (ref -7.7–1.9)
BASE EXCESS BLDV CALC-SCNC: 0.6 MMOL/L (ref -7.7–1.9)
BASE EXCESS BLDV CALC-SCNC: 2.4 MMOL/L (ref -7.7–1.9)
BASE EXCESS BLDV CALC-SCNC: 3.2 MMOL/L (ref -7.7–1.9)
BASE EXCESS BLDV CALC-SCNC: 6.7 MMOL/L (ref -7.7–1.9)
BASOPHILS # BLD AUTO: 0 10E3/UL (ref 0–0.2)
BASOPHILS # BLD MANUAL: 0 10E3/UL (ref 0–0.2)
BASOPHILS # BLD MANUAL: 0.2 10E3/UL (ref 0–0.2)
BASOPHILS # BLD MANUAL: 0.2 10E3/UL (ref 0–0.2)
BASOPHILS NFR BLD AUTO: 0 %
BASOPHILS NFR BLD MANUAL: 0 %
BASOPHILS NFR BLD MANUAL: 1 %
BASOPHILS NFR BLD MANUAL: 1 %
BILIRUB DIRECT SERPL-MCNC: 1 MG/DL (ref 0–0.3)
BILIRUB DIRECT SERPL-MCNC: 1.37 MG/DL (ref 0–0.3)
BILIRUB DIRECT SERPL-MCNC: 3.48 MG/DL (ref 0–0.3)
BILIRUB DIRECT SERPL-MCNC: 3.66 MG/DL (ref 0–0.3)
BILIRUB DIRECT SERPL-MCNC: 3.95 MG/DL (ref 0–0.3)
BILIRUB SERPL-MCNC: 1.1 MG/DL
BILIRUB SERPL-MCNC: 1.1 MG/DL
BILIRUB SERPL-MCNC: 1.2 MG/DL
BILIRUB SERPL-MCNC: 1.3 MG/DL
BILIRUB SERPL-MCNC: 1.4 MG/DL
BILIRUB SERPL-MCNC: 1.5 MG/DL
BILIRUB SERPL-MCNC: 1.6 MG/DL
BILIRUB SERPL-MCNC: 1.6 MG/DL
BILIRUB SERPL-MCNC: 1.7 MG/DL
BILIRUB SERPL-MCNC: 1.7 MG/DL
BILIRUB SERPL-MCNC: 1.8 MG/DL
BILIRUB SERPL-MCNC: 1.9 MG/DL
BILIRUB SERPL-MCNC: 2 MG/DL
BILIRUB SERPL-MCNC: 2 MG/DL
BILIRUB SERPL-MCNC: 2.6 MG/DL
BILIRUB SERPL-MCNC: 2.9 MG/DL
BILIRUB SERPL-MCNC: 3.7 MG/DL
BILIRUB SERPL-MCNC: 4 MG/DL
BILIRUB SERPL-MCNC: 4.1 MG/DL
BILIRUB SERPL-MCNC: 4.2 MG/DL
BILIRUB UR QL STRIP: NEGATIVE
BLD PROD TYP BPU: NORMAL
BLOOD COMPONENT TYPE: NORMAL
BUN SERPL-MCNC: 101 MG/DL (ref 8–23)
BUN SERPL-MCNC: 102 MG/DL (ref 8–23)
BUN SERPL-MCNC: 106 MG/DL (ref 8–23)
BUN SERPL-MCNC: 108 MG/DL (ref 8–23)
BUN SERPL-MCNC: 111 MG/DL (ref 8–23)
BUN SERPL-MCNC: 111 MG/DL (ref 8–23)
BUN SERPL-MCNC: 112 MG/DL (ref 8–23)
BUN SERPL-MCNC: 112 MG/DL (ref 8–23)
BUN SERPL-MCNC: 114 MG/DL (ref 8–23)
BUN SERPL-MCNC: 117 MG/DL (ref 8–23)
BUN SERPL-MCNC: 117 MG/DL (ref 8–23)
BUN SERPL-MCNC: 118 MG/DL (ref 8–23)
BUN SERPL-MCNC: 118 MG/DL (ref 8–23)
BUN SERPL-MCNC: 119 MG/DL (ref 8–23)
BUN SERPL-MCNC: 121 MG/DL (ref 8–23)
BUN SERPL-MCNC: 121 MG/DL (ref 8–23)
BUN SERPL-MCNC: 123 MG/DL (ref 8–23)
BUN SERPL-MCNC: 124 MG/DL (ref 8–23)
BUN SERPL-MCNC: 130 MG/DL (ref 8–23)
BUN SERPL-MCNC: 131 MG/DL (ref 8–23)
BUN SERPL-MCNC: 133 MG/DL (ref 8–23)
BUN SERPL-MCNC: 29.5 MG/DL (ref 8–23)
BUN SERPL-MCNC: 33.4 MG/DL (ref 8–23)
BUN SERPL-MCNC: 35.5 MG/DL (ref 8–23)
BUN SERPL-MCNC: 49.9 MG/DL (ref 8–23)
BUN SERPL-MCNC: 68.9 MG/DL (ref 8–23)
BUN SERPL-MCNC: 83 MG/DL (ref 8–23)
BUN SERPL-MCNC: 87.8 MG/DL (ref 8–23)
BUN SERPL-MCNC: 93 MG/DL (ref 8–23)
BUN SERPL-MCNC: 93.1 MG/DL (ref 8–23)
BUN SERPL-MCNC: 93.5 MG/DL (ref 8–23)
BUN SERPL-MCNC: 94.5 MG/DL (ref 8–23)
BUN SERPL-MCNC: 97.4 MG/DL (ref 8–23)
BUN SERPL-MCNC: 97.5 MG/DL (ref 8–23)
BUN SERPL-MCNC: 99.1 MG/DL (ref 8–23)
BURR CELLS BLD QL SMEAR: ABNORMAL
BURR CELLS BLD QL SMEAR: SLIGHT
BURR CELLS BLD QL SMEAR: SLIGHT
C DIFF TOX B STL QL: NEGATIVE
C GATTII+NEOFOR DNA CSF QL NAA+NON-PROBE: NEGATIVE
C3 SERPL-MCNC: 125 MG/DL (ref 81–157)
C4 SERPL-MCNC: 26 MG/DL (ref 13–39)
CA-I BLD-MCNC: 3.4 MG/DL (ref 4.4–5.2)
CA-I BLD-MCNC: 3.6 MG/DL (ref 4.4–5.2)
CA-I BLD-MCNC: 3.6 MG/DL (ref 4.4–5.2)
CALCIUM SERPL-MCNC: 6.7 MG/DL (ref 8.8–10.2)
CALCIUM SERPL-MCNC: 6.7 MG/DL (ref 8.8–10.2)
CALCIUM SERPL-MCNC: 6.8 MG/DL (ref 8.8–10.2)
CALCIUM SERPL-MCNC: 7.1 MG/DL (ref 8.8–10.2)
CALCIUM SERPL-MCNC: 7.2 MG/DL (ref 8.8–10.2)
CALCIUM SERPL-MCNC: 7.5 MG/DL (ref 8.8–10.2)
CALCIUM SERPL-MCNC: 7.6 MG/DL (ref 8.8–10.2)
CALCIUM SERPL-MCNC: 7.7 MG/DL (ref 8.8–10.2)
CALCIUM SERPL-MCNC: 7.8 MG/DL (ref 8.8–10.2)
CALCIUM SERPL-MCNC: 7.9 MG/DL (ref 8.8–10.2)
CALCIUM SERPL-MCNC: 8 MG/DL (ref 8.8–10.2)
CALCIUM SERPL-MCNC: 8 MG/DL (ref 8.8–10.2)
CALCIUM SERPL-MCNC: 8.1 MG/DL (ref 8.8–10.2)
CALCIUM SERPL-MCNC: 8.2 MG/DL (ref 8.8–10.2)
CALCIUM SERPL-MCNC: 8.2 MG/DL (ref 8.8–10.2)
CALCIUM SERPL-MCNC: 8.3 MG/DL (ref 8.8–10.2)
CALCIUM SERPL-MCNC: 8.4 MG/DL (ref 8.8–10.2)
CALCIUM SERPL-MCNC: 8.4 MG/DL (ref 8.8–10.2)
CALCIUM SERPL-MCNC: 8.6 MG/DL (ref 8.8–10.2)
CALCIUM SERPL-MCNC: 8.6 MG/DL (ref 8.8–10.2)
CALCIUM SERPL-MCNC: 8.7 MG/DL (ref 8.8–10.2)
CALCIUM SERPL-MCNC: 9 MG/DL (ref 8.8–10.2)
CHLORIDE SERPL-SCNC: 100 MMOL/L (ref 98–107)
CHLORIDE SERPL-SCNC: 101 MMOL/L (ref 98–107)
CHLORIDE SERPL-SCNC: 102 MMOL/L (ref 98–107)
CHLORIDE SERPL-SCNC: 102 MMOL/L (ref 98–107)
CHLORIDE SERPL-SCNC: 103 MMOL/L (ref 98–107)
CHLORIDE SERPL-SCNC: 104 MMOL/L (ref 98–107)
CHLORIDE SERPL-SCNC: 106 MMOL/L (ref 98–107)
CHLORIDE SERPL-SCNC: 106 MMOL/L (ref 98–107)
CHLORIDE SERPL-SCNC: 108 MMOL/L (ref 98–107)
CHLORIDE SERPL-SCNC: 95 MMOL/L (ref 98–107)
CHLORIDE SERPL-SCNC: 96 MMOL/L (ref 98–107)
CHLORIDE SERPL-SCNC: 97 MMOL/L (ref 98–107)
CHLORIDE SERPL-SCNC: 97 MMOL/L (ref 98–107)
CHLORIDE SERPL-SCNC: 98 MMOL/L (ref 98–107)
CHLORIDE SERPL-SCNC: 99 MMOL/L (ref 98–107)
CHOLEST SERPL-MCNC: 101 MG/DL
CMV DNA CSF QL NAA+NON-PROBE: NEGATIVE
CODING SYSTEM: NORMAL
COLOR CSF: COLORLESS
COLOR UR AUTO: ABNORMAL
COLOR UR AUTO: ABNORMAL
COLOR UR AUTO: YELLOW
COLOR UR AUTO: YELLOW
CREAT SERPL-MCNC: 1.5 MG/DL (ref 0.67–1.17)
CREAT SERPL-MCNC: 1.68 MG/DL (ref 0.67–1.17)
CREAT SERPL-MCNC: 1.88 MG/DL (ref 0.67–1.17)
CREAT SERPL-MCNC: 1.88 MG/DL (ref 0.67–1.17)
CREAT SERPL-MCNC: 1.95 MG/DL (ref 0.67–1.17)
CREAT SERPL-MCNC: 1.98 MG/DL (ref 0.67–1.17)
CREAT SERPL-MCNC: 2.03 MG/DL (ref 0.67–1.17)
CREAT SERPL-MCNC: 2.1 MG/DL (ref 0.67–1.17)
CREAT SERPL-MCNC: 2.24 MG/DL (ref 0.67–1.17)
CREAT SERPL-MCNC: 2.41 MG/DL (ref 0.67–1.17)
CREAT SERPL-MCNC: 2.57 MG/DL (ref 0.67–1.17)
CREAT SERPL-MCNC: 2.63 MG/DL (ref 0.67–1.17)
CREAT SERPL-MCNC: 2.63 MG/DL (ref 0.67–1.17)
CREAT SERPL-MCNC: 2.67 MG/DL (ref 0.67–1.17)
CREAT SERPL-MCNC: 2.68 MG/DL (ref 0.67–1.17)
CREAT SERPL-MCNC: 2.72 MG/DL (ref 0.67–1.17)
CREAT SERPL-MCNC: 2.73 MG/DL (ref 0.67–1.17)
CREAT SERPL-MCNC: 2.76 MG/DL (ref 0.67–1.17)
CREAT SERPL-MCNC: 2.79 MG/DL (ref 0.67–1.17)
CREAT SERPL-MCNC: 2.83 MG/DL (ref 0.67–1.17)
CREAT SERPL-MCNC: 2.85 MG/DL (ref 0.67–1.17)
CREAT SERPL-MCNC: 2.87 MG/DL (ref 0.67–1.17)
CREAT SERPL-MCNC: 2.88 MG/DL (ref 0.67–1.17)
CREAT SERPL-MCNC: 2.93 MG/DL (ref 0.67–1.17)
CREAT SERPL-MCNC: 2.98 MG/DL (ref 0.67–1.17)
CREAT SERPL-MCNC: 3.17 MG/DL (ref 0.67–1.17)
CREAT SERPL-MCNC: 3.18 MG/DL (ref 0.67–1.17)
CREAT SERPL-MCNC: 3.43 MG/DL (ref 0.67–1.17)
CREAT SERPL-MCNC: 3.46 MG/DL (ref 0.67–1.17)
CREAT SERPL-MCNC: 3.5 MG/DL (ref 0.67–1.17)
CREAT SERPL-MCNC: 3.59 MG/DL (ref 0.67–1.17)
CREAT SERPL-MCNC: 3.73 MG/DL (ref 0.67–1.17)
CREAT SERPL-MCNC: 3.84 MG/DL (ref 0.67–1.17)
CREAT SERPL-MCNC: 3.85 MG/DL (ref 0.67–1.17)
CREAT SERPL-MCNC: 3.85 MG/DL (ref 0.67–1.17)
CREAT SERPL-MCNC: 4.16 MG/DL (ref 0.67–1.17)
CREAT UR-MCNC: 66.2 MG/DL
CREAT UR-MCNC: 66.2 MG/DL
CROSSMATCH: NORMAL
CRP SERPL-MCNC: 102 MG/L
CRP SERPL-MCNC: 139 MG/L
CRP SERPL-MCNC: 179 MG/L
CRP SERPL-MCNC: 219 MG/L
CRP SERPL-MCNC: 220 MG/L
CRP SERPL-MCNC: 225 MG/L
CRP SERPL-MCNC: 233 MG/L
CRP SERPL-MCNC: 253 MG/L
CRP SERPL-MCNC: 254 MG/L
CRP SERPL-MCNC: 292 MG/L
CRP SERPL-MCNC: 299 MG/L
CRP SERPL-MCNC: 387 MG/L
DEPRECATED HCO3 PLAS-SCNC: 10 MMOL/L (ref 22–29)
DEPRECATED HCO3 PLAS-SCNC: 11 MMOL/L (ref 22–29)
DEPRECATED HCO3 PLAS-SCNC: 12 MMOL/L (ref 22–29)
DEPRECATED HCO3 PLAS-SCNC: 13 MMOL/L (ref 22–29)
DEPRECATED HCO3 PLAS-SCNC: 14 MMOL/L (ref 22–29)
DEPRECATED HCO3 PLAS-SCNC: 15 MMOL/L (ref 22–29)
DEPRECATED HCO3 PLAS-SCNC: 15 MMOL/L (ref 22–29)
DEPRECATED HCO3 PLAS-SCNC: 16 MMOL/L (ref 22–29)
DEPRECATED HCO3 PLAS-SCNC: 17 MMOL/L (ref 22–29)
DEPRECATED HCO3 PLAS-SCNC: 18 MMOL/L (ref 22–29)
DEPRECATED HCO3 PLAS-SCNC: 19 MMOL/L (ref 22–29)
DEPRECATED HCO3 PLAS-SCNC: 20 MMOL/L (ref 22–29)
DEPRECATED HCO3 PLAS-SCNC: 21 MMOL/L (ref 22–29)
DEPRECATED HCO3 PLAS-SCNC: 22 MMOL/L (ref 22–29)
DEPRECATED HCO3 PLAS-SCNC: 23 MMOL/L (ref 22–29)
DEPRECATED HCO3 PLAS-SCNC: 23 MMOL/L (ref 22–29)
DEPRECATED HCO3 PLAS-SCNC: 24 MMOL/L (ref 22–29)
DEPRECATED HCO3 PLAS-SCNC: 26 MMOL/L (ref 22–29)
DEPRECATED HCO3 PLAS-SCNC: 28 MMOL/L (ref 22–29)
DEPRECATED HCO3 PLAS-SCNC: 29 MMOL/L (ref 22–29)
DIASTOLIC BLOOD PRESSURE - MUSE: NORMAL MMHG
E COLI K1 AG CSF QL: NEGATIVE
ELLIPTOCYTES BLD QL SMEAR: SLIGHT
ELLIPTOCYTES BLD QL SMEAR: SLIGHT
ENTEROCOCCUS FAECALIS: NOT DETECTED
ENTEROCOCCUS FAECALIS: NOT DETECTED
ENTEROCOCCUS FAECIUM: NOT DETECTED
ENTEROCOCCUS FAECIUM: NOT DETECTED
EOSINOPHIL # BLD AUTO: 0 10E3/UL (ref 0–0.7)
EOSINOPHIL # BLD AUTO: 0.1 10E3/UL (ref 0–0.7)
EOSINOPHIL # BLD MANUAL: 0 10E3/UL (ref 0–0.7)
EOSINOPHIL # BLD MANUAL: 0.6 10E3/UL (ref 0–0.7)
EOSINOPHIL NFR BLD AUTO: 0 %
EOSINOPHIL NFR BLD AUTO: 1 %
EOSINOPHIL NFR BLD MANUAL: 0 %
EOSINOPHIL NFR BLD MANUAL: 3 %
ERYTHROCYTE [DISTWIDTH] IN BLOOD BY AUTOMATED COUNT: 15.1 % (ref 10–15)
ERYTHROCYTE [DISTWIDTH] IN BLOOD BY AUTOMATED COUNT: 15.9 % (ref 10–15)
ERYTHROCYTE [DISTWIDTH] IN BLOOD BY AUTOMATED COUNT: 15.9 % (ref 10–15)
ERYTHROCYTE [DISTWIDTH] IN BLOOD BY AUTOMATED COUNT: 16 % (ref 10–15)
ERYTHROCYTE [DISTWIDTH] IN BLOOD BY AUTOMATED COUNT: 16 % (ref 10–15)
ERYTHROCYTE [DISTWIDTH] IN BLOOD BY AUTOMATED COUNT: 16.1 % (ref 10–15)
ERYTHROCYTE [DISTWIDTH] IN BLOOD BY AUTOMATED COUNT: 16.2 % (ref 10–15)
ERYTHROCYTE [DISTWIDTH] IN BLOOD BY AUTOMATED COUNT: 16.3 % (ref 10–15)
ERYTHROCYTE [DISTWIDTH] IN BLOOD BY AUTOMATED COUNT: 16.3 % (ref 10–15)
ERYTHROCYTE [DISTWIDTH] IN BLOOD BY AUTOMATED COUNT: 16.5 % (ref 10–15)
ERYTHROCYTE [DISTWIDTH] IN BLOOD BY AUTOMATED COUNT: 16.5 % (ref 10–15)
ERYTHROCYTE [DISTWIDTH] IN BLOOD BY AUTOMATED COUNT: 16.7 % (ref 10–15)
ERYTHROCYTE [DISTWIDTH] IN BLOOD BY AUTOMATED COUNT: 16.8 % (ref 10–15)
ERYTHROCYTE [DISTWIDTH] IN BLOOD BY AUTOMATED COUNT: 16.8 % (ref 10–15)
ERYTHROCYTE [DISTWIDTH] IN BLOOD BY AUTOMATED COUNT: 17.2 % (ref 10–15)
ERYTHROCYTE [DISTWIDTH] IN BLOOD BY AUTOMATED COUNT: 17.2 % (ref 10–15)
ERYTHROCYTE [DISTWIDTH] IN BLOOD BY AUTOMATED COUNT: 17.5 % (ref 10–15)
ERYTHROCYTE [DISTWIDTH] IN BLOOD BY AUTOMATED COUNT: 18.1 % (ref 10–15)
ERYTHROCYTE [DISTWIDTH] IN BLOOD BY AUTOMATED COUNT: 18.6 % (ref 10–15)
ERYTHROCYTE [DISTWIDTH] IN BLOOD BY AUTOMATED COUNT: 19.3 % (ref 10–15)
ERYTHROCYTE [DISTWIDTH] IN BLOOD BY AUTOMATED COUNT: 19.3 % (ref 10–15)
ERYTHROCYTE [DISTWIDTH] IN BLOOD BY AUTOMATED COUNT: 19.9 % (ref 10–15)
EV RNA SPEC QL NAA+PROBE: NEGATIVE
FIBRINOGEN PPP-MCNC: 524 MG/DL (ref 170–490)
FLUAV RNA SPEC QL NAA+PROBE: NEGATIVE
FLUBV RNA RESP QL NAA+PROBE: NEGATIVE
FRACT EXCRET NA UR+SERPL-RTO: 1.1 %
FRAGMENTS BLD QL SMEAR: SLIGHT
FRAGMENTS BLD QL SMEAR: SLIGHT
GENTAMICIN SERPL-MCNC: 0.4 UG/ML
GFR SERPL CREATININE-BSD FRML MDRD: 15 ML/MIN/1.73M2
GFR SERPL CREATININE-BSD FRML MDRD: 16 ML/MIN/1.73M2
GFR SERPL CREATININE-BSD FRML MDRD: 16 ML/MIN/1.73M2
GFR SERPL CREATININE-BSD FRML MDRD: 17 ML/MIN/1.73M2
GFR SERPL CREATININE-BSD FRML MDRD: 18 ML/MIN/1.73M2
GFR SERPL CREATININE-BSD FRML MDRD: 18 ML/MIN/1.73M2
GFR SERPL CREATININE-BSD FRML MDRD: 19 ML/MIN/1.73M2
GFR SERPL CREATININE-BSD FRML MDRD: 19 ML/MIN/1.73M2
GFR SERPL CREATININE-BSD FRML MDRD: 20 ML/MIN/1.73M2
GFR SERPL CREATININE-BSD FRML MDRD: 21 ML/MIN/1.73M2
GFR SERPL CREATININE-BSD FRML MDRD: 22 ML/MIN/1.73M2
GFR SERPL CREATININE-BSD FRML MDRD: 23 ML/MIN/1.73M2
GFR SERPL CREATININE-BSD FRML MDRD: 24 ML/MIN/1.73M2
GFR SERPL CREATININE-BSD FRML MDRD: 25 ML/MIN/1.73M2
GFR SERPL CREATININE-BSD FRML MDRD: 26 ML/MIN/1.73M2
GFR SERPL CREATININE-BSD FRML MDRD: 29 ML/MIN/1.73M2
GFR SERPL CREATININE-BSD FRML MDRD: 31 ML/MIN/1.73M2
GFR SERPL CREATININE-BSD FRML MDRD: 34 ML/MIN/1.73M2
GFR SERPL CREATININE-BSD FRML MDRD: 35 ML/MIN/1.73M2
GFR SERPL CREATININE-BSD FRML MDRD: 36 ML/MIN/1.73M2
GFR SERPL CREATININE-BSD FRML MDRD: 37 ML/MIN/1.73M2
GFR SERPL CREATININE-BSD FRML MDRD: 38 ML/MIN/1.73M2
GFR SERPL CREATININE-BSD FRML MDRD: 39 ML/MIN/1.73M2
GFR SERPL CREATININE-BSD FRML MDRD: 44 ML/MIN/1.73M2
GFR SERPL CREATININE-BSD FRML MDRD: 51 ML/MIN/1.73M2
GLUCOSE BLDC GLUCOMTR-MCNC: 100 MG/DL (ref 70–99)
GLUCOSE BLDC GLUCOMTR-MCNC: 101 MG/DL (ref 70–99)
GLUCOSE BLDC GLUCOMTR-MCNC: 102 MG/DL (ref 70–99)
GLUCOSE BLDC GLUCOMTR-MCNC: 102 MG/DL (ref 70–99)
GLUCOSE BLDC GLUCOMTR-MCNC: 105 MG/DL (ref 70–99)
GLUCOSE BLDC GLUCOMTR-MCNC: 105 MG/DL (ref 70–99)
GLUCOSE BLDC GLUCOMTR-MCNC: 107 MG/DL (ref 70–99)
GLUCOSE BLDC GLUCOMTR-MCNC: 112 MG/DL (ref 70–99)
GLUCOSE BLDC GLUCOMTR-MCNC: 113 MG/DL (ref 70–99)
GLUCOSE BLDC GLUCOMTR-MCNC: 114 MG/DL (ref 70–99)
GLUCOSE BLDC GLUCOMTR-MCNC: 115 MG/DL (ref 70–99)
GLUCOSE BLDC GLUCOMTR-MCNC: 117 MG/DL (ref 70–99)
GLUCOSE BLDC GLUCOMTR-MCNC: 118 MG/DL (ref 70–99)
GLUCOSE BLDC GLUCOMTR-MCNC: 121 MG/DL (ref 70–99)
GLUCOSE BLDC GLUCOMTR-MCNC: 123 MG/DL (ref 70–99)
GLUCOSE BLDC GLUCOMTR-MCNC: 123 MG/DL (ref 70–99)
GLUCOSE BLDC GLUCOMTR-MCNC: 125 MG/DL (ref 70–99)
GLUCOSE BLDC GLUCOMTR-MCNC: 128 MG/DL (ref 70–99)
GLUCOSE BLDC GLUCOMTR-MCNC: 129 MG/DL (ref 70–99)
GLUCOSE BLDC GLUCOMTR-MCNC: 130 MG/DL (ref 70–99)
GLUCOSE BLDC GLUCOMTR-MCNC: 131 MG/DL (ref 70–99)
GLUCOSE BLDC GLUCOMTR-MCNC: 133 MG/DL (ref 70–99)
GLUCOSE BLDC GLUCOMTR-MCNC: 134 MG/DL (ref 70–99)
GLUCOSE BLDC GLUCOMTR-MCNC: 136 MG/DL (ref 70–99)
GLUCOSE BLDC GLUCOMTR-MCNC: 140 MG/DL (ref 70–99)
GLUCOSE BLDC GLUCOMTR-MCNC: 140 MG/DL (ref 70–99)
GLUCOSE BLDC GLUCOMTR-MCNC: 142 MG/DL (ref 70–99)
GLUCOSE BLDC GLUCOMTR-MCNC: 142 MG/DL (ref 70–99)
GLUCOSE BLDC GLUCOMTR-MCNC: 147 MG/DL (ref 70–99)
GLUCOSE BLDC GLUCOMTR-MCNC: 148 MG/DL (ref 70–99)
GLUCOSE BLDC GLUCOMTR-MCNC: 151 MG/DL (ref 70–99)
GLUCOSE BLDC GLUCOMTR-MCNC: 157 MG/DL (ref 70–99)
GLUCOSE BLDC GLUCOMTR-MCNC: 166 MG/DL (ref 70–99)
GLUCOSE BLDC GLUCOMTR-MCNC: 182 MG/DL (ref 70–99)
GLUCOSE BLDC GLUCOMTR-MCNC: 196 MG/DL (ref 70–99)
GLUCOSE BLDC GLUCOMTR-MCNC: 200 MG/DL (ref 70–99)
GLUCOSE BLDC GLUCOMTR-MCNC: 209 MG/DL (ref 70–99)
GLUCOSE BLDC GLUCOMTR-MCNC: 211 MG/DL (ref 70–99)
GLUCOSE BLDC GLUCOMTR-MCNC: 217 MG/DL (ref 70–99)
GLUCOSE BLDC GLUCOMTR-MCNC: 218 MG/DL (ref 70–99)
GLUCOSE BLDC GLUCOMTR-MCNC: 219 MG/DL (ref 70–99)
GLUCOSE BLDC GLUCOMTR-MCNC: 227 MG/DL (ref 70–99)
GLUCOSE BLDC GLUCOMTR-MCNC: 229 MG/DL (ref 70–99)
GLUCOSE BLDC GLUCOMTR-MCNC: 236 MG/DL (ref 70–99)
GLUCOSE BLDC GLUCOMTR-MCNC: 244 MG/DL (ref 70–99)
GLUCOSE BLDC GLUCOMTR-MCNC: 252 MG/DL (ref 70–99)
GLUCOSE BLDC GLUCOMTR-MCNC: 261 MG/DL (ref 70–99)
GLUCOSE BLDC GLUCOMTR-MCNC: 264 MG/DL (ref 70–99)
GLUCOSE BLDC GLUCOMTR-MCNC: 278 MG/DL (ref 70–99)
GLUCOSE BLDC GLUCOMTR-MCNC: 279 MG/DL (ref 70–99)
GLUCOSE BLDC GLUCOMTR-MCNC: 310 MG/DL (ref 70–99)
GLUCOSE BLDC GLUCOMTR-MCNC: 344 MG/DL (ref 70–99)
GLUCOSE BLDC GLUCOMTR-MCNC: 59 MG/DL (ref 70–99)
GLUCOSE BLDC GLUCOMTR-MCNC: 61 MG/DL (ref 70–99)
GLUCOSE BLDC GLUCOMTR-MCNC: 91 MG/DL (ref 70–99)
GLUCOSE BLDC GLUCOMTR-MCNC: 93 MG/DL (ref 70–99)
GLUCOSE BLDC GLUCOMTR-MCNC: 95 MG/DL (ref 70–99)
GLUCOSE BLDC GLUCOMTR-MCNC: 96 MG/DL (ref 70–99)
GLUCOSE BLDC GLUCOMTR-MCNC: 96 MG/DL (ref 70–99)
GLUCOSE BLDC GLUCOMTR-MCNC: 98 MG/DL (ref 70–99)
GLUCOSE CSF-MCNC: 82 MG/DL (ref 40–70)
GLUCOSE SERPL-MCNC: 110 MG/DL (ref 70–99)
GLUCOSE SERPL-MCNC: 111 MG/DL (ref 70–99)
GLUCOSE SERPL-MCNC: 113 MG/DL (ref 70–99)
GLUCOSE SERPL-MCNC: 114 MG/DL (ref 70–99)
GLUCOSE SERPL-MCNC: 114 MG/DL (ref 70–99)
GLUCOSE SERPL-MCNC: 115 MG/DL (ref 70–99)
GLUCOSE SERPL-MCNC: 118 MG/DL (ref 70–99)
GLUCOSE SERPL-MCNC: 119 MG/DL (ref 70–99)
GLUCOSE SERPL-MCNC: 122 MG/DL (ref 70–99)
GLUCOSE SERPL-MCNC: 128 MG/DL (ref 70–99)
GLUCOSE SERPL-MCNC: 129 MG/DL (ref 70–99)
GLUCOSE SERPL-MCNC: 130 MG/DL (ref 70–99)
GLUCOSE SERPL-MCNC: 134 MG/DL (ref 70–99)
GLUCOSE SERPL-MCNC: 137 MG/DL (ref 70–99)
GLUCOSE SERPL-MCNC: 140 MG/DL (ref 70–99)
GLUCOSE SERPL-MCNC: 142 MG/DL (ref 70–99)
GLUCOSE SERPL-MCNC: 148 MG/DL (ref 70–99)
GLUCOSE SERPL-MCNC: 154 MG/DL (ref 70–99)
GLUCOSE SERPL-MCNC: 155 MG/DL (ref 70–99)
GLUCOSE SERPL-MCNC: 159 MG/DL (ref 70–99)
GLUCOSE SERPL-MCNC: 162 MG/DL (ref 70–99)
GLUCOSE SERPL-MCNC: 172 MG/DL (ref 70–99)
GLUCOSE SERPL-MCNC: 174 MG/DL (ref 70–99)
GLUCOSE SERPL-MCNC: 176 MG/DL (ref 70–99)
GLUCOSE SERPL-MCNC: 178 MG/DL (ref 70–99)
GLUCOSE SERPL-MCNC: 185 MG/DL (ref 70–99)
GLUCOSE SERPL-MCNC: 193 MG/DL (ref 70–99)
GLUCOSE SERPL-MCNC: 199 MG/DL (ref 70–99)
GLUCOSE SERPL-MCNC: 237 MG/DL (ref 70–99)
GLUCOSE SERPL-MCNC: 246 MG/DL (ref 70–99)
GLUCOSE SERPL-MCNC: 261 MG/DL (ref 70–99)
GLUCOSE SERPL-MCNC: 308 MG/DL (ref 70–99)
GLUCOSE SERPL-MCNC: 329 MG/DL (ref 70–99)
GLUCOSE SERPL-MCNC: 89 MG/DL (ref 70–99)
GLUCOSE SERPL-MCNC: 99 MG/DL (ref 70–99)
GLUCOSE UR STRIP-MCNC: 100 MG/DL
GLUCOSE UR STRIP-MCNC: NEGATIVE MG/DL
GP B STREP DNA CSF QL NAA+NON-PROBE: NEGATIVE
GRAM STAIN RESULT: ABNORMAL
HAEM INFLU DNA CSF QL NAA+NON-PROBE: NEGATIVE
HAV IGM SERPL QL IA: NONREACTIVE
HBA1C MFR BLD: 6.6 %
HBV SURFACE AB SERPL IA-ACNC: 0.53 M[IU]/ML
HBV SURFACE AB SERPL IA-ACNC: NONREACTIVE M[IU]/ML
HBV SURFACE AG SERPL QL IA: NONREACTIVE
HCO3 BLD-SCNC: 12 MMOL/L (ref 21–28)
HCO3 BLD-SCNC: 13 MMOL/L (ref 21–28)
HCO3 BLD-SCNC: 14 MMOL/L (ref 21–28)
HCO3 BLD-SCNC: 14 MMOL/L (ref 21–28)
HCO3 BLD-SCNC: 16 MMOL/L (ref 21–28)
HCO3 BLD-SCNC: 17 MMOL/L (ref 21–28)
HCO3 BLD-SCNC: 18 MMOL/L (ref 21–28)
HCO3 BLD-SCNC: 20 MMOL/L (ref 21–28)
HCO3 BLD-SCNC: 23 MMOL/L (ref 21–28)
HCO3 BLD-SCNC: 23 MMOL/L (ref 21–28)
HCO3 BLD-SCNC: 26 MMOL/L (ref 21–28)
HCO3 BLD-SCNC: 27 MMOL/L (ref 21–28)
HCO3 BLD-SCNC: 29 MMOL/L (ref 21–28)
HCO3 BLDV-SCNC: 14 MMOL/L (ref 21–28)
HCO3 BLDV-SCNC: 14 MMOL/L (ref 21–28)
HCO3 BLDV-SCNC: 15 MMOL/L (ref 21–28)
HCO3 BLDV-SCNC: 18 MMOL/L (ref 21–28)
HCO3 BLDV-SCNC: 19 MMOL/L (ref 21–28)
HCO3 BLDV-SCNC: 20 MMOL/L (ref 21–28)
HCO3 BLDV-SCNC: 21 MMOL/L (ref 21–28)
HCO3 BLDV-SCNC: 21 MMOL/L (ref 21–28)
HCO3 BLDV-SCNC: 22 MMOL/L (ref 21–28)
HCO3 BLDV-SCNC: 23 MMOL/L (ref 21–28)
HCO3 BLDV-SCNC: 24 MMOL/L (ref 21–28)
HCO3 BLDV-SCNC: 25 MMOL/L (ref 21–28)
HCO3 BLDV-SCNC: 25 MMOL/L (ref 21–28)
HCO3 BLDV-SCNC: 26 MMOL/L (ref 21–28)
HCO3 BLDV-SCNC: 27 MMOL/L (ref 21–28)
HCO3 BLDV-SCNC: 29 MMOL/L (ref 21–28)
HCO3 BLDV-SCNC: 30 MMOL/L (ref 21–28)
HCO3 BLDV-SCNC: 31 MMOL/L (ref 21–28)
HCT VFR BLD AUTO: 20.6 % (ref 40–53)
HCT VFR BLD AUTO: 20.7 % (ref 40–53)
HCT VFR BLD AUTO: 21.6 % (ref 40–53)
HCT VFR BLD AUTO: 22.5 % (ref 40–53)
HCT VFR BLD AUTO: 22.8 % (ref 40–53)
HCT VFR BLD AUTO: 22.9 % (ref 40–53)
HCT VFR BLD AUTO: 23.3 % (ref 40–53)
HCT VFR BLD AUTO: 23.8 % (ref 40–53)
HCT VFR BLD AUTO: 24.6 % (ref 40–53)
HCT VFR BLD AUTO: 25.1 % (ref 40–53)
HCT VFR BLD AUTO: 25.6 % (ref 40–53)
HCT VFR BLD AUTO: 26.2 % (ref 40–53)
HCT VFR BLD AUTO: 26.5 % (ref 40–53)
HCT VFR BLD AUTO: 26.5 % (ref 40–53)
HCT VFR BLD AUTO: 27 % (ref 40–53)
HCT VFR BLD AUTO: 27.2 % (ref 40–53)
HCT VFR BLD AUTO: 27.9 % (ref 40–53)
HCT VFR BLD AUTO: 27.9 % (ref 40–53)
HCT VFR BLD AUTO: 30.4 % (ref 40–53)
HCT VFR BLD AUTO: 30.4 % (ref 40–53)
HCT VFR BLD AUTO: 30.8 % (ref 40–53)
HCT VFR BLD AUTO: 30.8 % (ref 40–53)
HCT VFR BLD AUTO: 31.8 % (ref 40–53)
HCT VFR BLD AUTO: 32 % (ref 40–53)
HCT VFR BLD AUTO: 35.5 % (ref 40–53)
HCT VFR BLD AUTO: 38.8 % (ref 40–53)
HCT VFR BLD AUTO: 38.9 % (ref 40–53)
HCT VFR BLD AUTO: 41 % (ref 40–53)
HCV AB SERPL QL IA: NONREACTIVE
HDLC SERPL-MCNC: 14 MG/DL
HGB BLD-MCNC: 10.1 G/DL (ref 13.3–17.7)
HGB BLD-MCNC: 10.1 G/DL (ref 13.3–17.7)
HGB BLD-MCNC: 10.5 G/DL (ref 13.3–17.7)
HGB BLD-MCNC: 10.8 G/DL (ref 13.3–17.7)
HGB BLD-MCNC: 10.8 G/DL (ref 13.3–17.7)
HGB BLD-MCNC: 11 G/DL (ref 13.3–17.7)
HGB BLD-MCNC: 11.3 G/DL (ref 13.3–17.7)
HGB BLD-MCNC: 11.7 G/DL (ref 13.3–17.7)
HGB BLD-MCNC: 12.7 G/DL (ref 13.3–17.7)
HGB BLD-MCNC: 12.8 G/DL (ref 13.3–17.7)
HGB BLD-MCNC: 13 G/DL (ref 13.3–17.7)
HGB BLD-MCNC: 6.9 G/DL (ref 13.3–17.7)
HGB BLD-MCNC: 6.9 G/DL (ref 13.3–17.7)
HGB BLD-MCNC: 7 G/DL (ref 13.3–17.7)
HGB BLD-MCNC: 7.2 G/DL (ref 13.3–17.7)
HGB BLD-MCNC: 7.3 G/DL (ref 13.3–17.7)
HGB BLD-MCNC: 7.7 G/DL (ref 13.3–17.7)
HGB BLD-MCNC: 7.9 G/DL (ref 13.3–17.7)
HGB BLD-MCNC: 8.1 G/DL (ref 13.3–17.7)
HGB BLD-MCNC: 8.1 G/DL (ref 13.3–17.7)
HGB BLD-MCNC: 8.4 G/DL (ref 13.3–17.7)
HGB BLD-MCNC: 8.8 G/DL (ref 13.3–17.7)
HGB BLD-MCNC: 9.1 G/DL (ref 13.3–17.7)
HGB BLD-MCNC: 9.1 G/DL (ref 13.3–17.7)
HGB BLD-MCNC: 9.4 G/DL (ref 13.3–17.7)
HGB BLD-MCNC: 9.4 G/DL (ref 13.3–17.7)
HGB BLD-MCNC: 9.5 G/DL (ref 13.3–17.7)
HGB BLD-MCNC: 9.7 G/DL (ref 13.3–17.7)
HGB UR QL STRIP: ABNORMAL
HHV6 DNA CSF QL NAA+NON-PROBE: NEGATIVE
HIV 1+2 AB+HIV1 P24 AG SERPL QL IA: NONREACTIVE
HOLD SPECIMEN HIT: NORMAL
HOLD SPECIMEN: NORMAL
HOWELL-JOLLY BOD BLD QL SMEAR: PRESENT
HSV1 DNA CSF QL NAA+NON-PROBE: NEGATIVE
HSV1 DNA CSF QL NAA+PROBE: NOT DETECTED
HSV2 DNA CSF QL NAA+NON-PROBE: NEGATIVE
HSV2 DNA CSF QL NAA+PROBE: NOT DETECTED
IMM GRANULOCYTES # BLD: 0 10E3/UL
IMM GRANULOCYTES # BLD: 0.1 10E3/UL
IMM GRANULOCYTES # BLD: 0.1 10E3/UL
IMM GRANULOCYTES # BLD: 0.2 10E3/UL
IMM GRANULOCYTES # BLD: 0.3 10E3/UL
IMM GRANULOCYTES # BLD: 0.4 10E3/UL
IMM GRANULOCYTES NFR BLD: 0 %
IMM GRANULOCYTES NFR BLD: 1 %
IMM GRANULOCYTES NFR BLD: 1 %
IMM GRANULOCYTES NFR BLD: 2 %
IMM GRANULOCYTES NFR BLD: 2 %
IMM GRANULOCYTES NFR BLD: 3 %
INR PPP: 1.23 (ref 0.85–1.15)
INR PPP: 2.23 (ref 0.85–1.15)
INTERPRETATION ECG - MUSE: NORMAL
ISSUE DATE AND TIME: NORMAL
KETONES UR STRIP-MCNC: 15 MG/DL
KETONES UR STRIP-MCNC: NEGATIVE MG/DL
L MONOCYTOG DNA CSF QL NAA+NON-PROBE: NEGATIVE
LACTATE SERPL-SCNC: 1.4 MMOL/L (ref 0.7–2)
LACTATE SERPL-SCNC: 1.4 MMOL/L (ref 0.7–2)
LACTATE SERPL-SCNC: 1.7 MMOL/L (ref 0.7–2)
LACTATE SERPL-SCNC: 1.7 MMOL/L (ref 0.7–2)
LACTATE SERPL-SCNC: 1.8 MMOL/L (ref 0.7–2)
LACTATE SERPL-SCNC: 1.8 MMOL/L (ref 0.7–2)
LACTATE SERPL-SCNC: 1.9 MMOL/L (ref 0.7–2)
LACTATE SERPL-SCNC: 10.1 MMOL/L (ref 0.7–2)
LACTATE SERPL-SCNC: 10.5 MMOL/L (ref 0.7–2)
LACTATE SERPL-SCNC: 10.6 MMOL/L (ref 0.7–2)
LACTATE SERPL-SCNC: 10.6 MMOL/L (ref 0.7–2)
LACTATE SERPL-SCNC: 10.9 MMOL/L (ref 0.7–2)
LACTATE SERPL-SCNC: 11.9 MMOL/L (ref 0.7–2)
LACTATE SERPL-SCNC: 16 MMOL/L (ref 0.7–2)
LACTATE SERPL-SCNC: 17 MMOL/L (ref 0.7–2)
LACTATE SERPL-SCNC: 17 MMOL/L (ref 0.7–2)
LACTATE SERPL-SCNC: 18 MMOL/L (ref 0.7–2)
LACTATE SERPL-SCNC: 19 MMOL/L (ref 0.7–2)
LACTATE SERPL-SCNC: 2.1 MMOL/L (ref 0.7–2)
LACTATE SERPL-SCNC: 2.2 MMOL/L (ref 0.7–2)
LACTATE SERPL-SCNC: 2.2 MMOL/L (ref 0.7–2)
LACTATE SERPL-SCNC: 2.3 MMOL/L (ref 0.7–2)
LACTATE SERPL-SCNC: 2.3 MMOL/L (ref 0.7–2)
LACTATE SERPL-SCNC: 2.4 MMOL/L (ref 0.7–2)
LACTATE SERPL-SCNC: 2.5 MMOL/L (ref 0.7–2)
LACTATE SERPL-SCNC: 2.6 MMOL/L (ref 0.7–2)
LACTATE SERPL-SCNC: 2.7 MMOL/L (ref 0.7–2)
LACTATE SERPL-SCNC: 21 MMOL/L (ref 0.7–2)
LACTATE SERPL-SCNC: 3 MMOL/L (ref 0.7–2)
LACTATE SERPL-SCNC: 3.1 MMOL/L (ref 0.7–2)
LACTATE SERPL-SCNC: 3.3 MMOL/L (ref 0.7–2)
LACTATE SERPL-SCNC: 3.6 MMOL/L (ref 0.7–2)
LACTATE SERPL-SCNC: 3.9 MMOL/L (ref 0.7–2)
LACTATE SERPL-SCNC: 4.2 MMOL/L (ref 0.7–2)
LACTATE SERPL-SCNC: 4.4 MMOL/L (ref 0.7–2)
LACTATE SERPL-SCNC: 4.5 MMOL/L (ref 0.7–2)
LACTATE SERPL-SCNC: 4.6 MMOL/L (ref 0.7–2)
LACTATE SERPL-SCNC: 4.6 MMOL/L (ref 0.7–2)
LACTATE SERPL-SCNC: 4.8 MMOL/L (ref 0.7–2)
LACTATE SERPL-SCNC: 5.1 MMOL/L (ref 0.7–2)
LACTATE SERPL-SCNC: 5.1 MMOL/L (ref 0.7–2)
LACTATE SERPL-SCNC: 5.2 MMOL/L (ref 0.7–2)
LACTATE SERPL-SCNC: 6.1 MMOL/L (ref 0.7–2)
LACTATE SERPL-SCNC: 6.2 MMOL/L (ref 0.7–2)
LACTATE SERPL-SCNC: 6.5 MMOL/L (ref 0.7–2)
LACTATE SERPL-SCNC: 6.6 MMOL/L (ref 0.7–2)
LACTATE SERPL-SCNC: 7.7 MMOL/L (ref 0.7–2)
LACTATE SERPL-SCNC: 8.5 MMOL/L (ref 0.7–2)
LACTATE SERPL-SCNC: 9.9 MMOL/L (ref 0.7–2)
LDH SERPL L TO P-CCNC: 468 U/L (ref 0–250)
LDLC SERPL CALC-MCNC: 35 MG/DL
LEUKOCYTE ESTERASE UR QL STRIP: ABNORMAL
LEUKOCYTE ESTERASE UR QL STRIP: ABNORMAL
LEUKOCYTE ESTERASE UR QL STRIP: NEGATIVE
LEUKOCYTE ESTERASE UR QL STRIP: NEGATIVE
LISTERIA SPECIES (DETECTED/NOT DETECTED): NOT DETECTED
LISTERIA SPECIES (DETECTED/NOT DETECTED): NOT DETECTED
LVEF ECHO: NORMAL
LYMPH ABN NFR CSF MANUAL: 1 %
LYMPHOCYTES # BLD AUTO: 0.5 10E3/UL (ref 0.8–5.3)
LYMPHOCYTES # BLD AUTO: 0.5 10E3/UL (ref 0.8–5.3)
LYMPHOCYTES # BLD AUTO: 0.7 10E3/UL (ref 0.8–5.3)
LYMPHOCYTES # BLD AUTO: 0.8 10E3/UL (ref 0.8–5.3)
LYMPHOCYTES # BLD AUTO: 0.9 10E3/UL (ref 0.8–5.3)
LYMPHOCYTES # BLD AUTO: 1.1 10E3/UL (ref 0.8–5.3)
LYMPHOCYTES # BLD MANUAL: 0.3 10E3/UL (ref 0.8–5.3)
LYMPHOCYTES # BLD MANUAL: 0.4 10E3/UL (ref 0.8–5.3)
LYMPHOCYTES # BLD MANUAL: 0.6 10E3/UL (ref 0.8–5.3)
LYMPHOCYTES # BLD MANUAL: 0.8 10E3/UL (ref 0.8–5.3)
LYMPHOCYTES # BLD MANUAL: 0.8 10E3/UL (ref 0.8–5.3)
LYMPHOCYTES # BLD MANUAL: 1.3 10E3/UL (ref 0.8–5.3)
LYMPHOCYTES # BLD MANUAL: 6.7 10E3/UL (ref 0.8–5.3)
LYMPHOCYTES NFR BLD AUTO: 10 %
LYMPHOCYTES NFR BLD AUTO: 4 %
LYMPHOCYTES NFR BLD AUTO: 5 %
LYMPHOCYTES NFR BLD AUTO: 6 %
LYMPHOCYTES NFR BLD AUTO: 6 %
LYMPHOCYTES NFR BLD AUTO: 7 %
LYMPHOCYTES NFR BLD MANUAL: 2 %
LYMPHOCYTES NFR BLD MANUAL: 3 %
LYMPHOCYTES NFR BLD MANUAL: 3 %
LYMPHOCYTES NFR BLD MANUAL: 31 %
LYMPHOCYTES NFR BLD MANUAL: 6 %
LYMPHOCYTES NFR BLD MANUAL: 6 %
LYMPHOCYTES NFR BLD MANUAL: 7 %
MAGNESIUM SERPL-MCNC: 1.8 MG/DL (ref 1.7–2.3)
MAGNESIUM SERPL-MCNC: 2 MG/DL (ref 1.7–2.3)
MAGNESIUM SERPL-MCNC: 2.1 MG/DL (ref 1.7–2.3)
MAGNESIUM SERPL-MCNC: 2.1 MG/DL (ref 1.7–2.3)
MAGNESIUM SERPL-MCNC: 2.2 MG/DL (ref 1.7–2.3)
MAGNESIUM SERPL-MCNC: 2.3 MG/DL (ref 1.7–2.3)
MAGNESIUM SERPL-MCNC: 2.3 MG/DL (ref 1.7–2.3)
MAGNESIUM SERPL-MCNC: 2.4 MG/DL (ref 1.7–2.3)
MAGNESIUM SERPL-MCNC: 2.5 MG/DL (ref 1.7–2.3)
MAGNESIUM SERPL-MCNC: 2.6 MG/DL (ref 1.7–2.3)
MAGNESIUM SERPL-MCNC: 2.7 MG/DL (ref 1.7–2.3)
MCH RBC QN AUTO: 28.8 PG (ref 26.5–33)
MCH RBC QN AUTO: 29 PG (ref 26.5–33)
MCH RBC QN AUTO: 29.1 PG (ref 26.5–33)
MCH RBC QN AUTO: 29.2 PG (ref 26.5–33)
MCH RBC QN AUTO: 29.3 PG (ref 26.5–33)
MCH RBC QN AUTO: 29.4 PG (ref 26.5–33)
MCH RBC QN AUTO: 29.5 PG (ref 26.5–33)
MCH RBC QN AUTO: 29.5 PG (ref 26.5–33)
MCH RBC QN AUTO: 29.6 PG (ref 26.5–33)
MCH RBC QN AUTO: 29.6 PG (ref 26.5–33)
MCH RBC QN AUTO: 29.7 PG (ref 26.5–33)
MCH RBC QN AUTO: 29.7 PG (ref 26.5–33)
MCH RBC QN AUTO: 29.8 PG (ref 26.5–33)
MCH RBC QN AUTO: 29.8 PG (ref 26.5–33)
MCH RBC QN AUTO: 29.9 PG (ref 26.5–33)
MCH RBC QN AUTO: 30 PG (ref 26.5–33)
MCH RBC QN AUTO: 30.1 PG (ref 26.5–33)
MCH RBC QN AUTO: 30.2 PG (ref 26.5–33)
MCH RBC QN AUTO: 30.2 PG (ref 26.5–33)
MCH RBC QN AUTO: 30.3 PG (ref 26.5–33)
MCH RBC QN AUTO: 30.4 PG (ref 26.5–33)
MCH RBC QN AUTO: 30.5 PG (ref 26.5–33)
MCH RBC QN AUTO: 30.5 PG (ref 26.5–33)
MCH RBC QN AUTO: 30.6 PG (ref 26.5–33)
MCHC RBC AUTO-ENTMCNC: 30.9 G/DL (ref 31.5–36.5)
MCHC RBC AUTO-ENTMCNC: 31 G/DL (ref 31.5–36.5)
MCHC RBC AUTO-ENTMCNC: 31.1 G/DL (ref 31.5–36.5)
MCHC RBC AUTO-ENTMCNC: 32.4 G/DL (ref 31.5–36.5)
MCHC RBC AUTO-ENTMCNC: 32.8 G/DL (ref 31.5–36.5)
MCHC RBC AUTO-ENTMCNC: 32.9 G/DL (ref 31.5–36.5)
MCHC RBC AUTO-ENTMCNC: 33 G/DL (ref 31.5–36.5)
MCHC RBC AUTO-ENTMCNC: 33 G/DL (ref 31.5–36.5)
MCHC RBC AUTO-ENTMCNC: 33.2 G/DL (ref 31.5–36.5)
MCHC RBC AUTO-ENTMCNC: 33.3 G/DL (ref 31.5–36.5)
MCHC RBC AUTO-ENTMCNC: 33.4 G/DL (ref 31.5–36.5)
MCHC RBC AUTO-ENTMCNC: 33.5 G/DL (ref 31.5–36.5)
MCHC RBC AUTO-ENTMCNC: 33.5 G/DL (ref 31.5–36.5)
MCHC RBC AUTO-ENTMCNC: 33.7 G/DL (ref 31.5–36.5)
MCHC RBC AUTO-ENTMCNC: 33.8 G/DL (ref 31.5–36.5)
MCHC RBC AUTO-ENTMCNC: 33.8 G/DL (ref 31.5–36.5)
MCHC RBC AUTO-ENTMCNC: 34.1 G/DL (ref 31.5–36.5)
MCHC RBC AUTO-ENTMCNC: 34.5 G/DL (ref 31.5–36.5)
MCHC RBC AUTO-ENTMCNC: 34.7 G/DL (ref 31.5–36.5)
MCHC RBC AUTO-ENTMCNC: 35.1 G/DL (ref 31.5–36.5)
MCHC RBC AUTO-ENTMCNC: 35.5 G/DL (ref 31.5–36.5)
MCHC RBC AUTO-ENTMCNC: 35.7 G/DL (ref 31.5–36.5)
MCHC RBC AUTO-ENTMCNC: 35.8 G/DL (ref 31.5–36.5)
MCHC RBC AUTO-ENTMCNC: 35.9 G/DL (ref 31.5–36.5)
MCHC RBC AUTO-ENTMCNC: 36.2 G/DL (ref 31.5–36.5)
MCV RBC AUTO: 83 FL (ref 78–100)
MCV RBC AUTO: 83 FL (ref 78–100)
MCV RBC AUTO: 84 FL (ref 78–100)
MCV RBC AUTO: 85 FL (ref 78–100)
MCV RBC AUTO: 86 FL (ref 78–100)
MCV RBC AUTO: 86 FL (ref 78–100)
MCV RBC AUTO: 87 FL (ref 78–100)
MCV RBC AUTO: 88 FL (ref 78–100)
MCV RBC AUTO: 88 FL (ref 78–100)
MCV RBC AUTO: 89 FL (ref 78–100)
MCV RBC AUTO: 90 FL (ref 78–100)
MCV RBC AUTO: 91 FL (ref 78–100)
MCV RBC AUTO: 92 FL (ref 78–100)
MCV RBC AUTO: 93 FL (ref 78–100)
MCV RBC AUTO: 94 FL (ref 78–100)
MCV RBC AUTO: 97 FL (ref 78–100)
MONOCYTES # BLD AUTO: 0.2 10E3/UL (ref 0–1.3)
MONOCYTES # BLD AUTO: 0.3 10E3/UL (ref 0–1.3)
MONOCYTES # BLD AUTO: 0.3 10E3/UL (ref 0–1.3)
MONOCYTES # BLD AUTO: 1.4 10E3/UL (ref 0–1.3)
MONOCYTES # BLD AUTO: 1.5 10E3/UL (ref 0–1.3)
MONOCYTES # BLD AUTO: 1.7 10E3/UL (ref 0–1.3)
MONOCYTES # BLD MANUAL: 0 10E3/UL (ref 0–1.3)
MONOCYTES # BLD MANUAL: 0.1 10E3/UL (ref 0–1.3)
MONOCYTES # BLD MANUAL: 0.2 10E3/UL (ref 0–1.3)
MONOCYTES # BLD MANUAL: 0.4 10E3/UL (ref 0–1.3)
MONOCYTES # BLD MANUAL: 0.9 10E3/UL (ref 0–1.3)
MONOCYTES # BLD MANUAL: 0.9 10E3/UL (ref 0–1.3)
MONOCYTES # BLD MANUAL: 1.1 10E3/UL (ref 0–1.3)
MONOCYTES NFR BLD AUTO: 11 %
MONOCYTES NFR BLD AUTO: 2 %
MONOCYTES NFR BLD AUTO: 2 %
MONOCYTES NFR BLD AUTO: 4 %
MONOCYTES NFR BLD MANUAL: 0 %
MONOCYTES NFR BLD MANUAL: 1 %
MONOCYTES NFR BLD MANUAL: 2 %
MONOCYTES NFR BLD MANUAL: 3 %
MONOCYTES NFR BLD MANUAL: 4 %
MONOCYTES NFR BLD MANUAL: 5 %
MONOCYTES NFR BLD MANUAL: 7 %
MONOS+MACROS NFR CSF MANUAL: 4 %
MUCOUS THREADS #/AREA URNS LPF: PRESENT /LPF
MUCOUS THREADS #/AREA URNS LPF: PRESENT /LPF
N MEN DNA CSF QL NAA+NON-PROBE: NEGATIVE
NEUTROPHILS # BLD AUTO: 10.4 10E3/UL (ref 1.6–8.3)
NEUTROPHILS # BLD AUTO: 10.8 10E3/UL (ref 1.6–8.3)
NEUTROPHILS # BLD AUTO: 11.3 10E3/UL (ref 1.6–8.3)
NEUTROPHILS # BLD AUTO: 12.1 10E3/UL (ref 1.6–8.3)
NEUTROPHILS # BLD AUTO: 8.3 10E3/UL (ref 1.6–8.3)
NEUTROPHILS # BLD AUTO: 8.4 10E3/UL (ref 1.6–8.3)
NEUTROPHILS # BLD MANUAL: 10.4 10E3/UL (ref 1.6–8.3)
NEUTROPHILS # BLD MANUAL: 11.1 10E3/UL (ref 1.6–8.3)
NEUTROPHILS # BLD MANUAL: 12.5 10E3/UL (ref 1.6–8.3)
NEUTROPHILS # BLD MANUAL: 13.2 10E3/UL (ref 1.6–8.3)
NEUTROPHILS # BLD MANUAL: 14.2 10E3/UL (ref 1.6–8.3)
NEUTROPHILS # BLD MANUAL: 19.5 10E3/UL (ref 1.6–8.3)
NEUTROPHILS # BLD MANUAL: 20 10E3/UL (ref 1.6–8.3)
NEUTROPHILS NFR BLD AUTO: 79 %
NEUTROPHILS NFR BLD AUTO: 81 %
NEUTROPHILS NFR BLD AUTO: 83 %
NEUTROPHILS NFR BLD AUTO: 87 %
NEUTROPHILS NFR BLD AUTO: 90 %
NEUTROPHILS NFR BLD AUTO: 91 %
NEUTROPHILS NFR BLD MANUAL: 61 %
NEUTROPHILS NFR BLD MANUAL: 90 %
NEUTROPHILS NFR BLD MANUAL: 91 %
NEUTROPHILS NFR BLD MANUAL: 94 %
NEUTROPHILS NFR BLD MANUAL: 97 %
NEUTROPHILS NFR CSF MANUAL: 86 %
NITRATE UR QL: NEGATIVE
NONHDLC SERPL-MCNC: 87 MG/DL
NRBC # BLD AUTO: 0.1 10E3/UL
NRBC # BLD AUTO: 0.2 10E3/UL
NRBC # BLD AUTO: 0.4 10E3/UL
NRBC # BLD AUTO: 0.6 10E3/UL
NRBC # BLD AUTO: 0.9 10E3/UL
NRBC BLD AUTO-RTO: 1 /100
NRBC BLD AUTO-RTO: 2 /100
NRBC BLD MANUAL-RTO: 3 %
NRBC BLD MANUAL-RTO: 3 %
NRBC BLD MANUAL-RTO: 4 %
NT-PROBNP SERPL-MCNC: ABNORMAL PG/ML (ref 0–900)
NT-PROBNP SERPL-MCNC: ABNORMAL PG/ML (ref 0–900)
O2/TOTAL GAS SETTING VFR VENT: 2 %
O2/TOTAL GAS SETTING VFR VENT: 21 %
O2/TOTAL GAS SETTING VFR VENT: 25 %
O2/TOTAL GAS SETTING VFR VENT: 30 %
O2/TOTAL GAS SETTING VFR VENT: 35 %
O2/TOTAL GAS SETTING VFR VENT: 40 %
O2/TOTAL GAS SETTING VFR VENT: 50 %
O2/TOTAL GAS SETTING VFR VENT: 70 %
O2/TOTAL GAS SETTING VFR VENT: 70 %
O2/TOTAL GAS SETTING VFR VENT: 90 %
OBSERVATION IMP: NEGATIVE
OTHER CELLS CSF: 9 %
OXYHGB MFR BLD: 90 % (ref 92–100)
OXYHGB MFR BLD: 91 % (ref 92–100)
OXYHGB MFR BLD: 93 % (ref 92–100)
OXYHGB MFR BLD: 96 % (ref 92–100)
OXYHGB MFR BLD: 96 % (ref 92–100)
OXYHGB MFR BLD: 97 % (ref 92–100)
OXYHGB MFR BLD: 98 % (ref 92–100)
OXYHGB MFR BLDV: 52 % (ref 70–75)
OXYHGB MFR BLDV: 68 % (ref 70–75)
OXYHGB MFR BLDV: 70 % (ref 70–75)
OXYHGB MFR BLDV: 79 % (ref 70–75)
OXYHGB MFR BLDV: 83 % (ref 70–75)
OXYHGB MFR BLDV: 84 % (ref 70–75)
P AXIS - MUSE: 2 DEGREES
P AXIS - MUSE: 53 DEGREES
P AXIS - MUSE: 61 DEGREES
P AXIS - MUSE: NORMAL DEGREES
PARECHOVIRUS A RNA CSF QL NAA+NON-PROBE: NEGATIVE
PATH REPORT.COMMENTS IMP SPEC: NORMAL
PATH REPORT.FINAL DX SPEC: NORMAL
PATH REPORT.FINAL DX SPEC: NORMAL
PATH REPORT.GROSS SPEC: NORMAL
PATH REPORT.MICROSCOPIC SPEC OTHER STN: NORMAL
PATH REPORT.RELEVANT HX SPEC: NORMAL
PATH REPORT.RELEVANT HX SPEC: NORMAL
PCO2 BLD: 34 MM HG (ref 35–45)
PCO2 BLD: 34 MM HG (ref 35–45)
PCO2 BLD: 36 MM HG (ref 35–45)
PCO2 BLD: 38 MM HG (ref 35–45)
PCO2 BLD: 38 MM HG (ref 35–45)
PCO2 BLD: 40 MM HG (ref 35–45)
PCO2 BLD: 41 MM HG (ref 35–45)
PCO2 BLD: 41 MM HG (ref 35–45)
PCO2 BLD: 42 MM HG (ref 35–45)
PCO2 BLD: 44 MM HG (ref 35–45)
PCO2 BLDV: 31 MM HG (ref 40–50)
PCO2 BLDV: 36 MM HG (ref 40–50)
PCO2 BLDV: 37 MM HG (ref 40–50)
PCO2 BLDV: 38 MM HG (ref 40–50)
PCO2 BLDV: 39 MM HG (ref 40–50)
PCO2 BLDV: 42 MM HG (ref 40–50)
PCO2 BLDV: 43 MM HG (ref 40–50)
PCO2 BLDV: 43 MM HG (ref 40–50)
PCO2 BLDV: 44 MM HG (ref 40–50)
PCO2 BLDV: 45 MM HG (ref 40–50)
PCO2 BLDV: 46 MM HG (ref 40–50)
PCO2 BLDV: 46 MM HG (ref 40–50)
PCO2 BLDV: 48 MM HG (ref 40–50)
PCO2 BLDV: 49 MM HG (ref 40–50)
PCO2 BLDV: 50 MM HG (ref 40–50)
PCO2 BLDV: 53 MM HG (ref 40–50)
PCO2 BLDV: 57 MM HG (ref 40–50)
PCO2 BLDV: 66 MM HG (ref 40–50)
PF4 HEPARIN CMPLX AB SER QL: NEGATIVE
PF4 HEPARIN CMPLX AB SER QL: NEGATIVE
PH BLD: 7.15 [PH] (ref 7.35–7.45)
PH BLD: 7.16 [PH] (ref 7.35–7.45)
PH BLD: 7.16 [PH] (ref 7.35–7.45)
PH BLD: 7.17 [PH] (ref 7.35–7.45)
PH BLD: 7.21 [PH] (ref 7.35–7.45)
PH BLD: 7.22 [PH] (ref 7.35–7.45)
PH BLD: 7.26 [PH] (ref 7.35–7.45)
PH BLD: 7.31 [PH] (ref 7.35–7.45)
PH BLD: 7.36 [PH] (ref 7.35–7.45)
PH BLD: 7.4 [PH] (ref 7.35–7.45)
PH BLD: 7.42 [PH] (ref 7.35–7.45)
PH BLD: 7.51 [PH] (ref 7.35–7.45)
PH BLD: 7.52 [PH] (ref 7.35–7.45)
PH BLDV: 7.11 [PH] (ref 7.32–7.43)
PH BLDV: 7.12 [PH] (ref 7.32–7.43)
PH BLDV: 7.12 [PH] (ref 7.32–7.43)
PH BLDV: 7.17 [PH] (ref 7.32–7.43)
PH BLDV: 7.19 [PH] (ref 7.32–7.43)
PH BLDV: 7.19 [PH] (ref 7.32–7.43)
PH BLDV: 7.32 [PH] (ref 7.32–7.43)
PH BLDV: 7.33 [PH] (ref 7.32–7.43)
PH BLDV: 7.34 [PH] (ref 7.32–7.43)
PH BLDV: 7.35 [PH] (ref 7.32–7.43)
PH BLDV: 7.35 [PH] (ref 7.32–7.43)
PH BLDV: 7.36 [PH] (ref 7.32–7.43)
PH BLDV: 7.37 [PH] (ref 7.32–7.43)
PH BLDV: 7.37 [PH] (ref 7.32–7.43)
PH BLDV: 7.38 [PH] (ref 7.32–7.43)
PH BLDV: 7.38 [PH] (ref 7.32–7.43)
PH BLDV: 7.44 [PH] (ref 7.32–7.43)
PH BLDV: 7.46 [PH] (ref 7.32–7.43)
PH UR STRIP: 5 [PH] (ref 5–7)
PH UR STRIP: 5 [PH] (ref 5–7)
PH UR STRIP: 5.5 [PH] (ref 5–7)
PH UR STRIP: 5.5 [PH] (ref 5–7)
PHOSPHATE SERPL-MCNC: 10.4 MG/DL (ref 2.5–4.5)
PHOSPHATE SERPL-MCNC: 2.4 MG/DL (ref 2.5–4.5)
PHOSPHATE SERPL-MCNC: 2.8 MG/DL (ref 2.5–4.5)
PHOSPHATE SERPL-MCNC: 4.1 MG/DL (ref 2.5–4.5)
PHOSPHATE SERPL-MCNC: 4.2 MG/DL (ref 2.5–4.5)
PHOSPHATE SERPL-MCNC: 4.5 MG/DL (ref 2.5–4.5)
PHOSPHATE SERPL-MCNC: 4.6 MG/DL (ref 2.5–4.5)
PHOSPHATE SERPL-MCNC: 4.8 MG/DL (ref 2.5–4.5)
PHOSPHATE SERPL-MCNC: 5.2 MG/DL (ref 2.5–4.5)
PHOSPHATE SERPL-MCNC: 5.3 MG/DL (ref 2.5–4.5)
PHOSPHATE SERPL-MCNC: 5.3 MG/DL (ref 2.5–4.5)
PHOSPHATE SERPL-MCNC: 5.4 MG/DL (ref 2.5–4.5)
PHOSPHATE SERPL-MCNC: 5.8 MG/DL (ref 2.5–4.5)
PHOSPHATE SERPL-MCNC: 5.8 MG/DL (ref 2.5–4.5)
PHOSPHATE SERPL-MCNC: 5.9 MG/DL (ref 2.5–4.5)
PHOSPHATE SERPL-MCNC: 7.5 MG/DL (ref 2.5–4.5)
PHOSPHATE SERPL-MCNC: 7.7 MG/DL (ref 2.5–4.5)
PHOSPHATE SERPL-MCNC: 8.1 MG/DL (ref 2.5–4.5)
PLAT MORPH BLD: ABNORMAL
PLATELET # BLD AUTO: 106 10E3/UL (ref 150–450)
PLATELET # BLD AUTO: 118 10E3/UL (ref 150–450)
PLATELET # BLD AUTO: 120 10E3/UL (ref 150–450)
PLATELET # BLD AUTO: 124 10E3/UL (ref 150–450)
PLATELET # BLD AUTO: 125 10E3/UL (ref 150–450)
PLATELET # BLD AUTO: 130 10E3/UL (ref 150–450)
PLATELET # BLD AUTO: 138 10E3/UL (ref 150–450)
PLATELET # BLD AUTO: 140 10E3/UL (ref 150–450)
PLATELET # BLD AUTO: 141 10E3/UL (ref 150–450)
PLATELET # BLD AUTO: 143 10E3/UL (ref 150–450)
PLATELET # BLD AUTO: 150 10E3/UL (ref 150–450)
PLATELET # BLD AUTO: 157 10E3/UL (ref 150–450)
PLATELET # BLD AUTO: 166 10E3/UL (ref 150–450)
PLATELET # BLD AUTO: 168 10E3/UL (ref 150–450)
PLATELET # BLD AUTO: 173 10E3/UL (ref 150–450)
PLATELET # BLD AUTO: 192 10E3/UL (ref 150–450)
PLATELET # BLD AUTO: 195 10E3/UL (ref 150–450)
PLATELET # BLD AUTO: 208 10E3/UL (ref 150–450)
PLATELET # BLD AUTO: 220 10E3/UL (ref 150–450)
PLATELET # BLD AUTO: 59 10E3/UL (ref 150–450)
PLATELET # BLD AUTO: 64 10E3/UL (ref 150–450)
PLATELET # BLD AUTO: 71 10E3/UL (ref 150–450)
PLATELET # BLD AUTO: 79 10E3/UL (ref 150–450)
PLATELET # BLD AUTO: 84 10E3/UL (ref 150–450)
PLATELET # BLD AUTO: 84 10E3/UL (ref 150–450)
PLATELET # BLD AUTO: 87 10E3/UL (ref 150–450)
PLATELET # BLD AUTO: 91 10E3/UL (ref 150–450)
PLATELET # BLD AUTO: 96 10E3/UL (ref 150–450)
PO2 BLD: 103 MM HG (ref 80–105)
PO2 BLD: 127 MM HG (ref 80–105)
PO2 BLD: 134 MM HG (ref 80–105)
PO2 BLD: 147 MM HG (ref 80–105)
PO2 BLD: 150 MM HG (ref 80–105)
PO2 BLD: 155 MM HG (ref 80–105)
PO2 BLD: 163 MM HG (ref 80–105)
PO2 BLD: 244 MM HG (ref 80–105)
PO2 BLD: 76 MM HG (ref 80–105)
PO2 BLD: 78 MM HG (ref 80–105)
PO2 BLD: 84 MM HG (ref 80–105)
PO2 BLD: 84 MM HG (ref 80–105)
PO2 BLD: 98 MM HG (ref 80–105)
PO2 BLDV: 30 MM HG (ref 25–47)
PO2 BLDV: 31 MM HG (ref 25–47)
PO2 BLDV: 33 MM HG (ref 25–47)
PO2 BLDV: 34 MM HG (ref 25–47)
PO2 BLDV: 38 MM HG (ref 25–47)
PO2 BLDV: 40 MM HG (ref 25–47)
PO2 BLDV: 43 MM HG (ref 25–47)
PO2 BLDV: 43 MM HG (ref 25–47)
PO2 BLDV: 46 MM HG (ref 25–47)
PO2 BLDV: 46 MM HG (ref 25–47)
PO2 BLDV: 48 MM HG (ref 25–47)
PO2 BLDV: 49 MM HG (ref 25–47)
PO2 BLDV: 50 MM HG (ref 25–47)
PO2 BLDV: 52 MM HG (ref 25–47)
PO2 BLDV: 52 MM HG (ref 25–47)
PO2 BLDV: 57 MM HG (ref 25–47)
PO2 BLDV: 61 MM HG (ref 25–47)
PO2 BLDV: 78 MM HG (ref 25–47)
POLYCHROMASIA BLD QL SMEAR: SLIGHT
POTASSIUM BLD-SCNC: 6.1 MMOL/L (ref 3.4–5.3)
POTASSIUM BLD-SCNC: 6.1 MMOL/L (ref 3.4–5.3)
POTASSIUM BLD-SCNC: 6.3 MMOL/L (ref 3.4–5.3)
POTASSIUM BLD-SCNC: 6.7 MMOL/L (ref 3.4–5.3)
POTASSIUM SERPL-SCNC: 2.8 MMOL/L (ref 3.4–5.3)
POTASSIUM SERPL-SCNC: 2.8 MMOL/L (ref 3.4–5.3)
POTASSIUM SERPL-SCNC: 3.3 MMOL/L (ref 3.4–5.3)
POTASSIUM SERPL-SCNC: 3.4 MMOL/L (ref 3.4–5.3)
POTASSIUM SERPL-SCNC: 3.5 MMOL/L (ref 3.4–5.3)
POTASSIUM SERPL-SCNC: 3.6 MMOL/L (ref 3.4–5.3)
POTASSIUM SERPL-SCNC: 3.7 MMOL/L (ref 3.4–5.3)
POTASSIUM SERPL-SCNC: 3.7 MMOL/L (ref 3.4–5.3)
POTASSIUM SERPL-SCNC: 3.8 MMOL/L (ref 3.4–5.3)
POTASSIUM SERPL-SCNC: 3.9 MMOL/L (ref 3.4–5.3)
POTASSIUM SERPL-SCNC: 4 MMOL/L (ref 3.4–5.3)
POTASSIUM SERPL-SCNC: 4.1 MMOL/L (ref 3.4–5.3)
POTASSIUM SERPL-SCNC: 4.2 MMOL/L (ref 3.4–5.3)
POTASSIUM SERPL-SCNC: 4.3 MMOL/L (ref 3.4–5.3)
POTASSIUM SERPL-SCNC: 4.4 MMOL/L (ref 3.4–5.3)
POTASSIUM SERPL-SCNC: 4.4 MMOL/L (ref 3.4–5.3)
POTASSIUM SERPL-SCNC: 4.5 MMOL/L (ref 3.4–5.3)
POTASSIUM SERPL-SCNC: 4.6 MMOL/L (ref 3.4–5.3)
POTASSIUM SERPL-SCNC: 4.6 MMOL/L (ref 3.4–5.3)
POTASSIUM SERPL-SCNC: 4.8 MMOL/L (ref 3.4–5.3)
POTASSIUM SERPL-SCNC: 5.1 MMOL/L (ref 3.4–5.3)
POTASSIUM SERPL-SCNC: 5.2 MMOL/L (ref 3.4–5.3)
POTASSIUM SERPL-SCNC: 5.3 MMOL/L (ref 3.4–5.3)
POTASSIUM SERPL-SCNC: 5.4 MMOL/L (ref 3.4–5.3)
POTASSIUM SERPL-SCNC: 5.4 MMOL/L (ref 3.4–5.3)
POTASSIUM SERPL-SCNC: 5.6 MMOL/L (ref 3.4–5.3)
POTASSIUM SERPL-SCNC: 5.7 MMOL/L (ref 3.4–5.3)
POTASSIUM SERPL-SCNC: 5.9 MMOL/L (ref 3.4–5.3)
POTASSIUM SERPL-SCNC: 5.9 MMOL/L (ref 3.4–5.3)
POTASSIUM SERPL-SCNC: 6 MMOL/L (ref 3.4–5.3)
POTASSIUM SERPL-SCNC: 6.1 MMOL/L (ref 3.4–5.3)
POTASSIUM SERPL-SCNC: 6.2 MMOL/L (ref 3.4–5.3)
POTASSIUM SERPL-SCNC: 6.3 MMOL/L (ref 3.4–5.3)
POTASSIUM SERPL-SCNC: 6.4 MMOL/L (ref 3.4–5.3)
POTASSIUM SERPL-SCNC: 6.5 MMOL/L (ref 3.4–5.3)
POTASSIUM SERPL-SCNC: 6.6 MMOL/L (ref 3.4–5.3)
PR INTERVAL - MUSE: 176 MS
PR INTERVAL - MUSE: 188 MS
PR INTERVAL - MUSE: 258 MS
PR INTERVAL - MUSE: NORMAL MS
PROCALCITONIN SERPL IA-MCNC: 11.5 NG/ML
PROCALCITONIN SERPL IA-MCNC: 12.8 NG/ML
PROCALCITONIN SERPL IA-MCNC: 2.45 NG/ML
PROCALCITONIN SERPL IA-MCNC: 24.8 NG/ML
PROCALCITONIN SERPL IA-MCNC: 5.2 NG/ML
PROCALCITONIN SERPL IA-MCNC: 54.2 NG/ML
PROCALCITONIN SERPL IA-MCNC: 8.84 NG/ML
PROT CSF-MCNC: 59.7 MG/DL (ref 15–45)
PROT SERPL-MCNC: 5 G/DL (ref 6.4–8.3)
PROT SERPL-MCNC: 5.1 G/DL (ref 6.4–8.3)
PROT SERPL-MCNC: 5.2 G/DL (ref 6.4–8.3)
PROT SERPL-MCNC: 5.2 G/DL (ref 6.4–8.3)
PROT SERPL-MCNC: 5.3 G/DL (ref 6.4–8.3)
PROT SERPL-MCNC: 5.4 G/DL (ref 6.4–8.3)
PROT SERPL-MCNC: 5.5 G/DL (ref 6.4–8.3)
PROT SERPL-MCNC: 5.5 G/DL (ref 6.4–8.3)
PROT SERPL-MCNC: 5.7 G/DL (ref 6.4–8.3)
PROT SERPL-MCNC: 5.7 G/DL (ref 6.4–8.3)
PROT SERPL-MCNC: 5.8 G/DL (ref 6.4–8.3)
PROT SERPL-MCNC: 5.8 G/DL (ref 6.4–8.3)
PROT SERPL-MCNC: 5.9 G/DL (ref 6.4–8.3)
PROT SERPL-MCNC: 6.1 G/DL (ref 6.4–8.3)
PROT SERPL-MCNC: 6.1 G/DL (ref 6.4–8.3)
PROT SERPL-MCNC: 6.2 G/DL (ref 6.4–8.3)
PROT SERPL-MCNC: 6.2 G/DL (ref 6.4–8.3)
PROT SERPL-MCNC: 6.3 G/DL (ref 6.4–8.3)
PROT SERPL-MCNC: 6.4 G/DL (ref 6.4–8.3)
PROT SERPL-MCNC: 6.4 G/DL (ref 6.4–8.3)
PROT/CREAT 24H UR: 0.94 MG/MG CR (ref 0–0.2)
QRS DURATION - MUSE: 114 MS
QRS DURATION - MUSE: 122 MS
QRS DURATION - MUSE: 122 MS
QRS DURATION - MUSE: 124 MS
QRS DURATION - MUSE: 132 MS
QRS DURATION - MUSE: 144 MS
QRS DURATION - MUSE: 144 MS
QT - MUSE: 322 MS
QT - MUSE: 382 MS
QT - MUSE: 386 MS
QT - MUSE: 386 MS
QT - MUSE: 400 MS
QT - MUSE: 442 MS
QT - MUSE: 472 MS
QTC - MUSE: 467 MS
QTC - MUSE: 479 MS
QTC - MUSE: 500 MS
QTC - MUSE: 519 MS
QTC - MUSE: 521 MS
QTC - MUSE: 551 MS
QTC - MUSE: 603 MS
R AXIS - MUSE: -47 DEGREES
R AXIS - MUSE: -51 DEGREES
R AXIS - MUSE: -55 DEGREES
R AXIS - MUSE: -58 DEGREES
R AXIS - MUSE: -67 DEGREES
R AXIS - MUSE: -75 DEGREES
R AXIS - MUSE: -79 DEGREES
RADIOLOGIST FLAGS: ABNORMAL
RADIOLOGIST FLAGS: ABNORMAL
RBC # BLD AUTO: 2.34 10E6/UL (ref 4.4–5.9)
RBC # BLD AUTO: 2.34 10E6/UL (ref 4.4–5.9)
RBC # BLD AUTO: 2.43 10E6/UL (ref 4.4–5.9)
RBC # BLD AUTO: 2.47 10E6/UL (ref 4.4–5.9)
RBC # BLD AUTO: 2.52 10E6/UL (ref 4.4–5.9)
RBC # BLD AUTO: 2.55 10E6/UL (ref 4.4–5.9)
RBC # BLD AUTO: 2.7 10E6/UL (ref 4.4–5.9)
RBC # BLD AUTO: 2.74 10E6/UL (ref 4.4–5.9)
RBC # BLD AUTO: 2.74 10E6/UL (ref 4.4–5.9)
RBC # BLD AUTO: 2.86 10E6/UL (ref 4.4–5.9)
RBC # BLD AUTO: 2.96 10E6/UL (ref 4.4–5.9)
RBC # BLD AUTO: 3.09 10E6/UL (ref 4.4–5.9)
RBC # BLD AUTO: 3.1 10E6/UL (ref 4.4–5.9)
RBC # BLD AUTO: 3.15 10E6/UL (ref 4.4–5.9)
RBC # BLD AUTO: 3.16 10E6/UL (ref 4.4–5.9)
RBC # BLD AUTO: 3.22 10E6/UL (ref 4.4–5.9)
RBC # BLD AUTO: 3.27 10E6/UL (ref 4.4–5.9)
RBC # BLD AUTO: 3.32 10E6/UL (ref 4.4–5.9)
RBC # BLD AUTO: 3.37 10E6/UL (ref 4.4–5.9)
RBC # BLD AUTO: 3.47 10E6/UL (ref 4.4–5.9)
RBC # BLD AUTO: 3.61 10E6/UL (ref 4.4–5.9)
RBC # BLD AUTO: 3.61 10E6/UL (ref 4.4–5.9)
RBC # BLD AUTO: 3.69 10E6/UL (ref 4.4–5.9)
RBC # BLD AUTO: 3.71 10E6/UL (ref 4.4–5.9)
RBC # BLD AUTO: 3.88 10E6/UL (ref 4.4–5.9)
RBC # BLD AUTO: 4.18 10E6/UL (ref 4.4–5.9)
RBC # BLD AUTO: 4.25 10E6/UL (ref 4.4–5.9)
RBC # BLD AUTO: 4.26 10E6/UL (ref 4.4–5.9)
RBC # CSF MANUAL: 78 /UL (ref 0–2)
RBC MORPH BLD: ABNORMAL
RBC URINE: 23 /HPF
RBC URINE: 4 /HPF
RBC URINE: 5 /HPF
RETICS # AUTO: 0.04 10E6/UL (ref 0.03–0.1)
RETICS/RBC NFR AUTO: 1.1 % (ref 0.5–2)
S PNEUM AG SPEC QL: NEGATIVE
S PNEUM DNA CSF QL NAA+NON-PROBE: NEGATIVE
SARS-COV-2 RNA RESP QL NAA+PROBE: NEGATIVE
SCANNED LAB RESULT: NORMAL
SODIUM SERPL-SCNC: 132 MMOL/L (ref 136–145)
SODIUM SERPL-SCNC: 134 MMOL/L (ref 136–145)
SODIUM SERPL-SCNC: 136 MMOL/L (ref 136–145)
SODIUM SERPL-SCNC: 137 MMOL/L (ref 136–145)
SODIUM SERPL-SCNC: 138 MMOL/L (ref 136–145)
SODIUM SERPL-SCNC: 139 MMOL/L (ref 136–145)
SODIUM SERPL-SCNC: 140 MMOL/L (ref 136–145)
SODIUM SERPL-SCNC: 141 MMOL/L (ref 136–145)
SODIUM SERPL-SCNC: 142 MMOL/L (ref 136–145)
SODIUM SERPL-SCNC: 143 MMOL/L (ref 136–145)
SODIUM SERPL-SCNC: 144 MMOL/L (ref 136–145)
SODIUM SERPL-SCNC: 145 MMOL/L (ref 136–145)
SODIUM SERPL-SCNC: 145 MMOL/L (ref 136–145)
SODIUM UR-SCNC: 26 MMOL/L
SP GR UR STRIP: 1.01 (ref 1–1.03)
SP GR UR STRIP: 1.01 (ref 1–1.03)
SP GR UR STRIP: 1.02 (ref 1–1.03)
SP GR UR STRIP: 1.02 (ref 1–1.03)
SPECIMEN EXPIRATION DATE: NORMAL
SQUAMOUS EPITHELIAL: <1 /HPF
STAPHYLOCOCCUS AUREUS: DETECTED
STAPHYLOCOCCUS AUREUS: DETECTED
STAPHYLOCOCCUS EPIDERMIDIS: NOT DETECTED
STAPHYLOCOCCUS EPIDERMIDIS: NOT DETECTED
STAPHYLOCOCCUS LUGDUNENSIS: NOT DETECTED
STAPHYLOCOCCUS LUGDUNENSIS: NOT DETECTED
STREPTOCOCCUS AGALACTIAE: NOT DETECTED
STREPTOCOCCUS AGALACTIAE: NOT DETECTED
STREPTOCOCCUS ANGINOSUS GROUP: NOT DETECTED
STREPTOCOCCUS ANGINOSUS GROUP: NOT DETECTED
STREPTOCOCCUS PNEUMONIAE: NOT DETECTED
STREPTOCOCCUS PNEUMONIAE: NOT DETECTED
STREPTOCOCCUS PYOGENES: NOT DETECTED
STREPTOCOCCUS PYOGENES: NOT DETECTED
STREPTOCOCCUS SPECIES: NOT DETECTED
STREPTOCOCCUS SPECIES: NOT DETECTED
SYSTOLIC BLOOD PRESSURE - MUSE: NORMAL MMHG
T AXIS - MUSE: -3 DEGREES
T AXIS - MUSE: -4 DEGREES
T AXIS - MUSE: 21 DEGREES
T AXIS - MUSE: 22 DEGREES
T AXIS - MUSE: 23 DEGREES
T AXIS - MUSE: 40 DEGREES
T AXIS - MUSE: 46 DEGREES
T PALLIDUM AB SER QL: NONREACTIVE
TARGETS BLD QL SMEAR: ABNORMAL
TARGETS BLD QL SMEAR: ABNORMAL
TARGETS BLD QL SMEAR: SLIGHT
TOXIC GRANULES BLD QL SMEAR: PRESENT
TRANSITIONAL EPI: <1 /HPF
TRIGL SERPL-MCNC: 260 MG/DL
TROPONIN T SERPL HS-MCNC: 1229 NG/L
TROPONIN T SERPL HS-MCNC: 1462 NG/L
TROPONIN T SERPL HS-MCNC: 1502 NG/L
TROPONIN T SERPL HS-MCNC: 1634 NG/L
TROPONIN T SERPL HS-MCNC: 1915 NG/L
TROPONIN T SERPL HS-MCNC: 2010 NG/L
TROPONIN T SERPL HS-MCNC: 2024 NG/L
TROPONIN T SERPL HS-MCNC: 217 NG/L
TROPONIN T SERPL HS-MCNC: 610 NG/L
TROPONIN T SERPL HS-MCNC: 983 NG/L
TUBE # CSF: 4
UNIT ABO/RH: NORMAL
UNIT NUMBER: NORMAL
UNIT STATUS: NORMAL
UNIT TYPE ISBT: 5100
UROBILINOGEN UR STRIP-MCNC: NORMAL MG/DL
VENTRICULAR RATE- MUSE: 101 BPM
VENTRICULAR RATE- MUSE: 102 BPM
VENTRICULAR RATE- MUSE: 109 BPM
VENTRICULAR RATE- MUSE: 112 BPM
VENTRICULAR RATE- MUSE: 125 BPM
VENTRICULAR RATE- MUSE: 127 BPM
VENTRICULAR RATE- MUSE: 62 BPM
VZV DNA CSF QL NAA+NON-PROBE: NEGATIVE
WBC # BLD AUTO: 10.8 10E3/UL (ref 4–11)
WBC # BLD AUTO: 10.9 10E3/UL (ref 4–11)
WBC # BLD AUTO: 11.4 10E3/UL (ref 4–11)
WBC # BLD AUTO: 11.5 10E3/UL (ref 4–11)
WBC # BLD AUTO: 11.6 10E3/UL (ref 4–11)
WBC # BLD AUTO: 12 10E3/UL (ref 4–11)
WBC # BLD AUTO: 12.2 10E3/UL (ref 4–11)
WBC # BLD AUTO: 12.3 10E3/UL (ref 4–11)
WBC # BLD AUTO: 12.8 10E3/UL (ref 4–11)
WBC # BLD AUTO: 12.9 10E3/UL (ref 4–11)
WBC # BLD AUTO: 13.5 10E3/UL (ref 4–11)
WBC # BLD AUTO: 13.6 10E3/UL (ref 4–11)
WBC # BLD AUTO: 13.7 10E3/UL (ref 4–11)
WBC # BLD AUTO: 13.7 10E3/UL (ref 4–11)
WBC # BLD AUTO: 14 10E3/UL (ref 4–11)
WBC # BLD AUTO: 14.6 10E3/UL (ref 4–11)
WBC # BLD AUTO: 15.4 10E3/UL (ref 4–11)
WBC # BLD AUTO: 15.7 10E3/UL (ref 4–11)
WBC # BLD AUTO: 15.8 10E3/UL (ref 4–11)
WBC # BLD AUTO: 21.3 10E3/UL (ref 4–11)
WBC # BLD AUTO: 21.4 10E3/UL (ref 4–11)
WBC # BLD AUTO: 21.6 10E3/UL (ref 4–11)
WBC # BLD AUTO: 21.6 10E3/UL (ref 4–11)
WBC # BLD AUTO: 24.5 10E3/UL (ref 4–11)
WBC # BLD AUTO: 24.7 10E3/UL (ref 4–11)
WBC # BLD AUTO: 25.8 10E3/UL (ref 4–11)
WBC # BLD AUTO: 8.7 10E3/UL (ref 4–11)
WBC # BLD AUTO: 9.1 10E3/UL (ref 4–11)
WBC # BLD AUTO: 9.6 10E3/UL (ref 4–11)
WBC # CSF MANUAL: 230 /UL (ref 0–5)
WBC URINE: 3 /HPF
WBC URINE: 6 /HPF
WBC URINE: 8 /HPF

## 2022-01-01 PROCEDURE — 97110 THERAPEUTIC EXERCISES: CPT | Mod: GP

## 2022-01-01 PROCEDURE — 999N000157 HC STATISTIC RCP TIME EA 10 MIN

## 2022-01-01 PROCEDURE — 250N000011 HC RX IP 250 OP 636: Performed by: STUDENT IN AN ORGANIZED HEALTH CARE EDUCATION/TRAINING PROGRAM

## 2022-01-01 PROCEDURE — 250N000013 HC RX MED GY IP 250 OP 250 PS 637: Performed by: STUDENT IN AN ORGANIZED HEALTH CARE EDUCATION/TRAINING PROGRAM

## 2022-01-01 PROCEDURE — 86140 C-REACTIVE PROTEIN: CPT | Performed by: INTERNAL MEDICINE

## 2022-01-01 PROCEDURE — 87385 HISTOPLASMA CAPSUL AG IA: CPT

## 2022-01-01 PROCEDURE — 85007 BL SMEAR W/DIFF WBC COUNT: CPT | Performed by: EMERGENCY MEDICINE

## 2022-01-01 PROCEDURE — 93308 TTE F-UP OR LMTD: CPT

## 2022-01-01 PROCEDURE — 76705 ECHO EXAM OF ABDOMEN: CPT | Mod: 26 | Performed by: STUDENT IN AN ORGANIZED HEALTH CARE EDUCATION/TRAINING PROGRAM

## 2022-01-01 PROCEDURE — 82803 BLOOD GASES ANY COMBINATION: CPT

## 2022-01-01 PROCEDURE — 83735 ASSAY OF MAGNESIUM: CPT | Performed by: INTERNAL MEDICINE

## 2022-01-01 PROCEDURE — 93010 ELECTROCARDIOGRAM REPORT: CPT | Performed by: INTERNAL MEDICINE

## 2022-01-01 PROCEDURE — 70498 CT ANGIOGRAPHY NECK: CPT

## 2022-01-01 PROCEDURE — 36415 COLL VENOUS BLD VENIPUNCTURE: CPT | Performed by: INTERNAL MEDICINE

## 2022-01-01 PROCEDURE — 97165 OT EVAL LOW COMPLEX 30 MIN: CPT | Mod: GO

## 2022-01-01 PROCEDURE — 99233 SBSQ HOSP IP/OBS HIGH 50: CPT | Mod: 25 | Performed by: INTERNAL MEDICINE

## 2022-01-01 PROCEDURE — 92526 ORAL FUNCTION THERAPY: CPT | Mod: GN | Performed by: SPEECH-LANGUAGE PATHOLOGIST

## 2022-01-01 PROCEDURE — 71045 X-RAY EXAM CHEST 1 VIEW: CPT

## 2022-01-01 PROCEDURE — 250N000011 HC RX IP 250 OP 636: Performed by: INTERNAL MEDICINE

## 2022-01-01 PROCEDURE — 88305 TISSUE EXAM BY PATHOLOGIST: CPT | Mod: TC | Performed by: STUDENT IN AN ORGANIZED HEALTH CARE EDUCATION/TRAINING PROGRAM

## 2022-01-01 PROCEDURE — 0B9K8ZZ DRAINAGE OF RIGHT LUNG, VIA NATURAL OR ARTIFICIAL OPENING ENDOSCOPIC: ICD-10-PCS | Performed by: INTERNAL MEDICINE

## 2022-01-01 PROCEDURE — 71250 CT THORAX DX C-: CPT | Mod: 26 | Performed by: RADIOLOGY

## 2022-01-01 PROCEDURE — 250N000011 HC RX IP 250 OP 636

## 2022-01-01 PROCEDURE — 80048 BASIC METABOLIC PNL TOTAL CA: CPT | Performed by: INTERNAL MEDICINE

## 2022-01-01 PROCEDURE — 80048 BASIC METABOLIC PNL TOTAL CA: CPT | Performed by: PHYSICIAN ASSISTANT

## 2022-01-01 PROCEDURE — 85018 HEMOGLOBIN: CPT | Performed by: STUDENT IN AN ORGANIZED HEALTH CARE EDUCATION/TRAINING PROGRAM

## 2022-01-01 PROCEDURE — 85730 THROMBOPLASTIN TIME PARTIAL: CPT | Performed by: EMERGENCY MEDICINE

## 2022-01-01 PROCEDURE — 94640 AIRWAY INHALATION TREATMENT: CPT

## 2022-01-01 PROCEDURE — 258N000003 HC RX IP 258 OP 636: Performed by: STUDENT IN AN ORGANIZED HEALTH CARE EDUCATION/TRAINING PROGRAM

## 2022-01-01 PROCEDURE — 71045 X-RAY EXAM CHEST 1 VIEW: CPT | Mod: 26 | Performed by: RADIOLOGY

## 2022-01-01 PROCEDURE — 93308 TTE F-UP OR LMTD: CPT | Mod: 26 | Performed by: INTERNAL MEDICINE

## 2022-01-01 PROCEDURE — 93320 DOPPLER ECHO COMPLETE: CPT | Mod: 26 | Performed by: INTERNAL MEDICINE

## 2022-01-01 PROCEDURE — 83735 ASSAY OF MAGNESIUM: CPT | Performed by: PHYSICIAN ASSISTANT

## 2022-01-01 PROCEDURE — 93010 ELECTROCARDIOGRAM REPORT: CPT | Performed by: EMERGENCY MEDICINE

## 2022-01-01 PROCEDURE — 250N000009 HC RX 250: Performed by: STUDENT IN AN ORGANIZED HEALTH CARE EDUCATION/TRAINING PROGRAM

## 2022-01-01 PROCEDURE — 250N000013 HC RX MED GY IP 250 OP 250 PS 637

## 2022-01-01 PROCEDURE — 93325 DOPPLER ECHO COLOR FLOW MAPG: CPT | Mod: 26 | Performed by: INTERNAL MEDICINE

## 2022-01-01 PROCEDURE — 99233 SBSQ HOSP IP/OBS HIGH 50: CPT | Performed by: INTERNAL MEDICINE

## 2022-01-01 PROCEDURE — 84484 ASSAY OF TROPONIN QUANT: CPT

## 2022-01-01 PROCEDURE — 250N000013 HC RX MED GY IP 250 OP 250 PS 637: Performed by: INTERNAL MEDICINE

## 2022-01-01 PROCEDURE — 258N000003 HC RX IP 258 OP 636

## 2022-01-01 PROCEDURE — 99233 SBSQ HOSP IP/OBS HIGH 50: CPT | Mod: GC | Performed by: INTERNAL MEDICINE

## 2022-01-01 PROCEDURE — 87529 HSV DNA AMP PROBE: CPT | Performed by: EMERGENCY MEDICINE

## 2022-01-01 PROCEDURE — 76770 US EXAM ABDO BACK WALL COMP: CPT

## 2022-01-01 PROCEDURE — 84450 TRANSFERASE (AST) (SGOT): CPT | Performed by: STUDENT IN AN ORGANIZED HEALTH CARE EDUCATION/TRAINING PROGRAM

## 2022-01-01 PROCEDURE — 250N000009 HC RX 250: Performed by: EMERGENCY MEDICINE

## 2022-01-01 PROCEDURE — 93010 ELECTROCARDIOGRAM REPORT: CPT | Mod: 76 | Performed by: INTERNAL MEDICINE

## 2022-01-01 PROCEDURE — 83605 ASSAY OF LACTIC ACID: CPT | Performed by: STUDENT IN AN ORGANIZED HEALTH CARE EDUCATION/TRAINING PROGRAM

## 2022-01-01 PROCEDURE — 250N000009 HC RX 250: Performed by: INTERNAL MEDICINE

## 2022-01-01 PROCEDURE — 84132 ASSAY OF SERUM POTASSIUM: CPT | Performed by: STUDENT IN AN ORGANIZED HEALTH CARE EDUCATION/TRAINING PROGRAM

## 2022-01-01 PROCEDURE — 999N000015 HC STATISTIC ARTERIAL MONITORING DAILY

## 2022-01-01 PROCEDURE — 84155 ASSAY OF PROTEIN SERUM: CPT | Performed by: INTERNAL MEDICINE

## 2022-01-01 PROCEDURE — 200N000002 HC R&B ICU UMMC

## 2022-01-01 PROCEDURE — 94660 CPAP INITIATION&MGMT: CPT

## 2022-01-01 PROCEDURE — 99233 SBSQ HOSP IP/OBS HIGH 50: CPT | Performed by: PSYCHIATRY & NEUROLOGY

## 2022-01-01 PROCEDURE — 96375 TX/PRO/DX INJ NEW DRUG ADDON: CPT | Mod: 59 | Performed by: EMERGENCY MEDICINE

## 2022-01-01 PROCEDURE — 71250 CT THORAX DX C-: CPT | Mod: MG

## 2022-01-01 PROCEDURE — 82805 BLOOD GASES W/O2 SATURATION: CPT | Performed by: NURSE PRACTITIONER

## 2022-01-01 PROCEDURE — 85610 PROTHROMBIN TIME: CPT | Performed by: STUDENT IN AN ORGANIZED HEALTH CARE EDUCATION/TRAINING PROGRAM

## 2022-01-01 PROCEDURE — 258N000001 HC RX 258: Performed by: STUDENT IN AN ORGANIZED HEALTH CARE EDUCATION/TRAINING PROGRAM

## 2022-01-01 PROCEDURE — 82805 BLOOD GASES W/O2 SATURATION: CPT | Performed by: STUDENT IN AN ORGANIZED HEALTH CARE EDUCATION/TRAINING PROGRAM

## 2022-01-01 PROCEDURE — 258N000003 HC RX IP 258 OP 636: Performed by: INTERNAL MEDICINE

## 2022-01-01 PROCEDURE — 36415 COLL VENOUS BLD VENIPUNCTURE: CPT

## 2022-01-01 PROCEDURE — 84157 ASSAY OF PROTEIN OTHER: CPT | Performed by: EMERGENCY MEDICINE

## 2022-01-01 PROCEDURE — 84100 ASSAY OF PHOSPHORUS: CPT | Performed by: INTERNAL MEDICINE

## 2022-01-01 PROCEDURE — 83735 ASSAY OF MAGNESIUM: CPT

## 2022-01-01 PROCEDURE — C9113 INJ PANTOPRAZOLE SODIUM, VIA: HCPCS | Performed by: STUDENT IN AN ORGANIZED HEALTH CARE EDUCATION/TRAINING PROGRAM

## 2022-01-01 PROCEDURE — 82565 ASSAY OF CREATININE: CPT | Performed by: STUDENT IN AN ORGANIZED HEALTH CARE EDUCATION/TRAINING PROGRAM

## 2022-01-01 PROCEDURE — 87449 NOS EACH ORGANISM AG IA: CPT

## 2022-01-01 PROCEDURE — 370N000003 HC ANESTHESIA WARD SERVICE

## 2022-01-01 PROCEDURE — 80053 COMPREHEN METABOLIC PANEL: CPT | Performed by: INTERNAL MEDICINE

## 2022-01-01 PROCEDURE — 85025 COMPLETE CBC W/AUTO DIFF WBC: CPT | Performed by: PHYSICIAN ASSISTANT

## 2022-01-01 PROCEDURE — 84100 ASSAY OF PHOSPHORUS: CPT | Performed by: STUDENT IN AN ORGANIZED HEALTH CARE EDUCATION/TRAINING PROGRAM

## 2022-01-01 PROCEDURE — 94640 AIRWAY INHALATION TREATMENT: CPT | Mod: 76

## 2022-01-01 PROCEDURE — 84132 ASSAY OF SERUM POTASSIUM: CPT | Performed by: INTERNAL MEDICINE

## 2022-01-01 PROCEDURE — 85027 COMPLETE CBC AUTOMATED: CPT

## 2022-01-01 PROCEDURE — 84450 TRANSFERASE (AST) (SGOT): CPT | Performed by: INTERNAL MEDICINE

## 2022-01-01 PROCEDURE — 84145 PROCALCITONIN (PCT): CPT | Performed by: INTERNAL MEDICINE

## 2022-01-01 PROCEDURE — 87040 BLOOD CULTURE FOR BACTERIA: CPT | Performed by: INTERNAL MEDICINE

## 2022-01-01 PROCEDURE — 999N000248 HC STATISTIC IV INSERT WITH US BY RN

## 2022-01-01 PROCEDURE — 87106 FUNGI IDENTIFICATION YEAST: CPT | Performed by: STUDENT IN AN ORGANIZED HEALTH CARE EDUCATION/TRAINING PROGRAM

## 2022-01-01 PROCEDURE — 84155 ASSAY OF PROTEIN SERUM: CPT | Performed by: STUDENT IN AN ORGANIZED HEALTH CARE EDUCATION/TRAINING PROGRAM

## 2022-01-01 PROCEDURE — 70496 CT ANGIOGRAPHY HEAD: CPT

## 2022-01-01 PROCEDURE — 93308 TTE F-UP OR LMTD: CPT | Mod: 26 | Performed by: EMERGENCY MEDICINE

## 2022-01-01 PROCEDURE — 82803 BLOOD GASES ANY COMBINATION: CPT | Performed by: STUDENT IN AN ORGANIZED HEALTH CARE EDUCATION/TRAINING PROGRAM

## 2022-01-01 PROCEDURE — 97535 SELF CARE MNGMENT TRAINING: CPT | Mod: GO

## 2022-01-01 PROCEDURE — 96365 THER/PROPH/DIAG IV INF INIT: CPT | Mod: 59 | Performed by: EMERGENCY MEDICINE

## 2022-01-01 PROCEDURE — 36415 COLL VENOUS BLD VENIPUNCTURE: CPT | Performed by: STUDENT IN AN ORGANIZED HEALTH CARE EDUCATION/TRAINING PROGRAM

## 2022-01-01 PROCEDURE — 36415 COLL VENOUS BLD VENIPUNCTURE: CPT | Performed by: EMERGENCY MEDICINE

## 2022-01-01 PROCEDURE — 99222 1ST HOSP IP/OBS MODERATE 55: CPT | Performed by: PHYSICIAN ASSISTANT

## 2022-01-01 PROCEDURE — C9803 HOPD COVID-19 SPEC COLLECT: HCPCS | Performed by: EMERGENCY MEDICINE

## 2022-01-01 PROCEDURE — 99232 SBSQ HOSP IP/OBS MODERATE 35: CPT | Performed by: PSYCHIATRY & NEUROLOGY

## 2022-01-01 PROCEDURE — 83605 ASSAY OF LACTIC ACID: CPT | Performed by: INTERNAL MEDICINE

## 2022-01-01 PROCEDURE — G1010 CDSM STANSON: HCPCS

## 2022-01-01 PROCEDURE — 120N000003 HC R&B IMCU UMMC

## 2022-01-01 PROCEDURE — 72148 MRI LUMBAR SPINE W/O DYE: CPT | Mod: MG

## 2022-01-01 PROCEDURE — 82248 BILIRUBIN DIRECT: CPT | Performed by: INTERNAL MEDICINE

## 2022-01-01 PROCEDURE — 87149 DNA/RNA DIRECT PROBE: CPT | Performed by: EMERGENCY MEDICINE

## 2022-01-01 PROCEDURE — 92610 EVALUATE SWALLOWING FUNCTION: CPT | Mod: GN | Performed by: SPEECH-LANGUAGE PATHOLOGIST

## 2022-01-01 PROCEDURE — 97530 THERAPEUTIC ACTIVITIES: CPT | Mod: GP

## 2022-01-01 PROCEDURE — 82805 BLOOD GASES W/O2 SATURATION: CPT | Performed by: INTERNAL MEDICINE

## 2022-01-01 PROCEDURE — 95711 VEEG 2-12 HR UNMONITORED: CPT

## 2022-01-01 PROCEDURE — 36415 COLL VENOUS BLD VENIPUNCTURE: CPT | Performed by: PHYSICIAN ASSISTANT

## 2022-01-01 PROCEDURE — 250N000009 HC RX 250

## 2022-01-01 PROCEDURE — 85014 HEMATOCRIT: CPT | Performed by: STUDENT IN AN ORGANIZED HEALTH CARE EDUCATION/TRAINING PROGRAM

## 2022-01-01 PROCEDURE — 93308 TTE F-UP OR LMTD: CPT | Performed by: EMERGENCY MEDICINE

## 2022-01-01 PROCEDURE — C9113 INJ PANTOPRAZOLE SODIUM, VIA: HCPCS

## 2022-01-01 PROCEDURE — G0463 HOSPITAL OUTPT CLINIC VISIT: HCPCS

## 2022-01-01 PROCEDURE — 84100 ASSAY OF PHOSPHORUS: CPT

## 2022-01-01 PROCEDURE — 85007 BL SMEAR W/DIFF WBC COUNT: CPT | Performed by: PHYSICIAN ASSISTANT

## 2022-01-01 PROCEDURE — 99207 PR SC NO CHARGE VISIT: CPT | Performed by: PHYSICIAN ASSISTANT

## 2022-01-01 PROCEDURE — 92526 ORAL FUNCTION THERAPY: CPT | Mod: GN

## 2022-01-01 PROCEDURE — 93325 DOPPLER ECHO COLOR FLOW MAPG: CPT

## 2022-01-01 PROCEDURE — 87077 CULTURE AEROBIC IDENTIFY: CPT | Performed by: PHYSICIAN ASSISTANT

## 2022-01-01 PROCEDURE — 258N000003 HC RX IP 258 OP 636: Performed by: NURSE PRACTITIONER

## 2022-01-01 PROCEDURE — 36415 COLL VENOUS BLD VENIPUNCTURE: CPT | Performed by: HOSPITALIST

## 2022-01-01 PROCEDURE — 85027 COMPLETE CBC AUTOMATED: CPT | Performed by: INTERNAL MEDICINE

## 2022-01-01 PROCEDURE — 86140 C-REACTIVE PROTEIN: CPT | Performed by: PHYSICIAN ASSISTANT

## 2022-01-01 PROCEDURE — 80053 COMPREHEN METABOLIC PANEL: CPT | Performed by: STUDENT IN AN ORGANIZED HEALTH CARE EDUCATION/TRAINING PROGRAM

## 2022-01-01 PROCEDURE — 87077 CULTURE AEROBIC IDENTIFY: CPT | Performed by: INTERNAL MEDICINE

## 2022-01-01 PROCEDURE — 97112 NEUROMUSCULAR REEDUCATION: CPT | Mod: GP

## 2022-01-01 PROCEDURE — 99207 PR SC NO CHARGE VISIT: CPT | Performed by: INTERNAL MEDICINE

## 2022-01-01 PROCEDURE — 999N000128 HC STATISTIC PERIPHERAL IV START W/O US GUIDANCE

## 2022-01-01 PROCEDURE — 82374 ASSAY BLOOD CARBON DIOXIDE: CPT | Performed by: STUDENT IN AN ORGANIZED HEALTH CARE EDUCATION/TRAINING PROGRAM

## 2022-01-01 PROCEDURE — 96366 THER/PROPH/DIAG IV INF ADDON: CPT | Mod: 59 | Performed by: EMERGENCY MEDICINE

## 2022-01-01 PROCEDURE — 85027 COMPLETE CBC AUTOMATED: CPT | Performed by: STUDENT IN AN ORGANIZED HEALTH CARE EDUCATION/TRAINING PROGRAM

## 2022-01-01 PROCEDURE — 70551 MRI BRAIN STEM W/O DYE: CPT

## 2022-01-01 PROCEDURE — 81001 URINALYSIS AUTO W/SCOPE: CPT

## 2022-01-01 PROCEDURE — 87070 CULTURE OTHR SPECIMN AEROBIC: CPT | Performed by: EMERGENCY MEDICINE

## 2022-01-01 PROCEDURE — 85025 COMPLETE CBC W/AUTO DIFF WBC: CPT

## 2022-01-01 PROCEDURE — 99222 1ST HOSP IP/OBS MODERATE 55: CPT | Performed by: PSYCHIATRY & NEUROLOGY

## 2022-01-01 PROCEDURE — 85007 BL SMEAR W/DIFF WBC COUNT: CPT | Performed by: STUDENT IN AN ORGANIZED HEALTH CARE EDUCATION/TRAINING PROGRAM

## 2022-01-01 PROCEDURE — 86850 RBC ANTIBODY SCREEN: CPT | Performed by: INTERNAL MEDICINE

## 2022-01-01 PROCEDURE — 250N000013 HC RX MED GY IP 250 OP 250 PS 637: Performed by: EMERGENCY MEDICINE

## 2022-01-01 PROCEDURE — 83605 ASSAY OF LACTIC ACID: CPT

## 2022-01-01 PROCEDURE — 85730 THROMBOPLASTIN TIME PARTIAL: CPT | Performed by: STUDENT IN AN ORGANIZED HEALTH CARE EDUCATION/TRAINING PROGRAM

## 2022-01-01 PROCEDURE — 83605 ASSAY OF LACTIC ACID: CPT | Performed by: EMERGENCY MEDICINE

## 2022-01-01 PROCEDURE — 80048 BASIC METABOLIC PNL TOTAL CA: CPT | Performed by: STUDENT IN AN ORGANIZED HEALTH CARE EDUCATION/TRAINING PROGRAM

## 2022-01-01 PROCEDURE — 86160 COMPLEMENT ANTIGEN: CPT | Performed by: STUDENT IN AN ORGANIZED HEALTH CARE EDUCATION/TRAINING PROGRAM

## 2022-01-01 PROCEDURE — 87340 HEPATITIS B SURFACE AG IA: CPT

## 2022-01-01 PROCEDURE — 83735 ASSAY OF MAGNESIUM: CPT | Performed by: STUDENT IN AN ORGANIZED HEALTH CARE EDUCATION/TRAINING PROGRAM

## 2022-01-01 PROCEDURE — 85045 AUTOMATED RETICULOCYTE COUNT: CPT | Performed by: STUDENT IN AN ORGANIZED HEALTH CARE EDUCATION/TRAINING PROGRAM

## 2022-01-01 PROCEDURE — 81001 URINALYSIS AUTO W/SCOPE: CPT | Performed by: STUDENT IN AN ORGANIZED HEALTH CARE EDUCATION/TRAINING PROGRAM

## 2022-01-01 PROCEDURE — 84145 PROCALCITONIN (PCT): CPT | Performed by: PHYSICIAN ASSISTANT

## 2022-01-01 PROCEDURE — 99356 PR PROLONGED SERV,INPATIENT,1ST HR: CPT | Mod: 25 | Performed by: INTERNAL MEDICINE

## 2022-01-01 PROCEDURE — 80061 LIPID PANEL: CPT

## 2022-01-01 PROCEDURE — 82248 BILIRUBIN DIRECT: CPT | Performed by: STUDENT IN AN ORGANIZED HEALTH CARE EDUCATION/TRAINING PROGRAM

## 2022-01-01 PROCEDURE — 84132 ASSAY OF SERUM POTASSIUM: CPT

## 2022-01-01 PROCEDURE — 93312 ECHO TRANSESOPHAGEAL: CPT | Mod: 26 | Performed by: INTERNAL MEDICINE

## 2022-01-01 PROCEDURE — 86923 COMPATIBILITY TEST ELECTRIC: CPT | Performed by: STUDENT IN AN ORGANIZED HEALTH CARE EDUCATION/TRAINING PROGRAM

## 2022-01-01 PROCEDURE — 84484 ASSAY OF TROPONIN QUANT: CPT | Performed by: EMERGENCY MEDICINE

## 2022-01-01 PROCEDURE — 250N000009 HC RX 250: Performed by: NURSE ANESTHETIST, CERTIFIED REGISTERED

## 2022-01-01 PROCEDURE — 87102 FUNGUS ISOLATION CULTURE: CPT | Performed by: STUDENT IN AN ORGANIZED HEALTH CARE EDUCATION/TRAINING PROGRAM

## 2022-01-01 PROCEDURE — 250N000013 HC RX MED GY IP 250 OP 250 PS 637: Performed by: HOSPITALIST

## 2022-01-01 PROCEDURE — 70450 CT HEAD/BRAIN W/O DYE: CPT | Mod: 26 | Performed by: RADIOLOGY

## 2022-01-01 PROCEDURE — 99223 1ST HOSP IP/OBS HIGH 75: CPT | Mod: GC | Performed by: INTERNAL MEDICINE

## 2022-01-01 PROCEDURE — 99223 1ST HOSP IP/OBS HIGH 75: CPT | Mod: 25 | Performed by: STUDENT IN AN ORGANIZED HEALTH CARE EDUCATION/TRAINING PROGRAM

## 2022-01-01 PROCEDURE — 250N000011 HC RX IP 250 OP 636: Performed by: EMERGENCY MEDICINE

## 2022-01-01 PROCEDURE — 95718 EEG PHYS/QHP 2-12 HR W/VEEG: CPT | Performed by: PSYCHIATRY & NEUROLOGY

## 2022-01-01 PROCEDURE — 62270 DX LMBR SPI PNXR: CPT | Performed by: EMERGENCY MEDICINE

## 2022-01-01 PROCEDURE — 84145 PROCALCITONIN (PCT): CPT

## 2022-01-01 PROCEDURE — 99291 CRITICAL CARE FIRST HOUR: CPT | Mod: 25 | Performed by: INTERNAL MEDICINE

## 2022-01-01 PROCEDURE — 83605 ASSAY OF LACTIC ACID: CPT | Performed by: HOSPITALIST

## 2022-01-01 PROCEDURE — 93321 DOPPLER ECHO F-UP/LMTD STD: CPT | Mod: 26 | Performed by: INTERNAL MEDICINE

## 2022-01-01 PROCEDURE — 99233 SBSQ HOSP IP/OBS HIGH 50: CPT | Mod: GC | Performed by: STUDENT IN AN ORGANIZED HEALTH CARE EDUCATION/TRAINING PROGRAM

## 2022-01-01 PROCEDURE — 94003 VENT MGMT INPAT SUBQ DAY: CPT

## 2022-01-01 PROCEDURE — 85007 BL SMEAR W/DIFF WBC COUNT: CPT

## 2022-01-01 PROCEDURE — 93005 ELECTROCARDIOGRAM TRACING: CPT

## 2022-01-01 PROCEDURE — 87077 CULTURE AEROBIC IDENTIFY: CPT | Mod: 59 | Performed by: EMERGENCY MEDICINE

## 2022-01-01 PROCEDURE — 87149 DNA/RNA DIRECT PROBE: CPT | Performed by: INTERNAL MEDICINE

## 2022-01-01 PROCEDURE — 93005 ELECTROCARDIOGRAM TRACING: CPT | Performed by: EMERGENCY MEDICINE

## 2022-01-01 PROCEDURE — 250N000011 HC RX IP 250 OP 636: Performed by: PHYSICIAN ASSISTANT

## 2022-01-01 PROCEDURE — 99233 SBSQ HOSP IP/OBS HIGH 50: CPT | Performed by: STUDENT IN AN ORGANIZED HEALTH CARE EDUCATION/TRAINING PROGRAM

## 2022-01-01 PROCEDURE — 84156 ASSAY OF PROTEIN URINE: CPT | Performed by: STUDENT IN AN ORGANIZED HEALTH CARE EDUCATION/TRAINING PROGRAM

## 2022-01-01 PROCEDURE — 99291 CRITICAL CARE FIRST HOUR: CPT | Mod: 25 | Performed by: EMERGENCY MEDICINE

## 2022-01-01 PROCEDURE — 258N000003 HC RX IP 258 OP 636: Performed by: EMERGENCY MEDICINE

## 2022-01-01 PROCEDURE — 74176 CT ABD & PELVIS W/O CONTRAST: CPT | Mod: 26 | Performed by: RADIOLOGY

## 2022-01-01 PROCEDURE — 999N000127 HC STATISTIC PERIPHERAL IV START W US GUIDANCE

## 2022-01-01 PROCEDURE — 86780 TREPONEMA PALLIDUM: CPT

## 2022-01-01 PROCEDURE — 72148 MRI LUMBAR SPINE W/O DYE: CPT | Mod: 26 | Performed by: RADIOLOGY

## 2022-01-01 PROCEDURE — 99152 MOD SED SAME PHYS/QHP 5/>YRS: CPT | Mod: GC | Performed by: INTERNAL MEDICINE

## 2022-01-01 PROCEDURE — P9045 ALBUMIN (HUMAN), 5%, 250 ML: HCPCS | Performed by: PHYSICIAN ASSISTANT

## 2022-01-01 PROCEDURE — 3E033XZ INTRODUCTION OF VASOPRESSOR INTO PERIPHERAL VEIN, PERCUTANEOUS APPROACH: ICD-10-PCS | Performed by: INTERNAL MEDICINE

## 2022-01-01 PROCEDURE — 86803 HEPATITIS C AB TEST: CPT

## 2022-01-01 PROCEDURE — 75572 CT HRT W/3D IMAGE: CPT | Mod: 26 | Performed by: INTERNAL MEDICINE

## 2022-01-01 PROCEDURE — 82330 ASSAY OF CALCIUM: CPT | Performed by: INTERNAL MEDICINE

## 2022-01-01 PROCEDURE — 999N000185 HC STATISTIC TRANSPORT TIME EA 15 MIN

## 2022-01-01 PROCEDURE — 258N000003 HC RX IP 258 OP 636: Performed by: HOSPITALIST

## 2022-01-01 PROCEDURE — 82803 BLOOD GASES ANY COMBINATION: CPT | Performed by: INTERNAL MEDICINE

## 2022-01-01 PROCEDURE — 84295 ASSAY OF SERUM SODIUM: CPT | Performed by: STUDENT IN AN ORGANIZED HEALTH CARE EDUCATION/TRAINING PROGRAM

## 2022-01-01 PROCEDURE — 80069 RENAL FUNCTION PANEL: CPT | Performed by: STUDENT IN AN ORGANIZED HEALTH CARE EDUCATION/TRAINING PROGRAM

## 2022-01-01 PROCEDURE — 250N000011 HC RX IP 250 OP 636: Performed by: NURSE PRACTITIONER

## 2022-01-01 PROCEDURE — 31645 BRNCHSC W/THER ASPIR 1ST: CPT | Performed by: INTERNAL MEDICINE

## 2022-01-01 PROCEDURE — 99292 CRITICAL CARE ADDL 30 MIN: CPT | Mod: 25 | Performed by: INTERNAL MEDICINE

## 2022-01-01 PROCEDURE — 87483 CNS DNA AMP PROBE TYPE 12-25: CPT | Performed by: EMERGENCY MEDICINE

## 2022-01-01 PROCEDURE — G1010 CDSM STANSON: HCPCS | Mod: GC | Performed by: RADIOLOGY

## 2022-01-01 PROCEDURE — 82330 ASSAY OF CALCIUM: CPT | Performed by: STUDENT IN AN ORGANIZED HEALTH CARE EDUCATION/TRAINING PROGRAM

## 2022-01-01 PROCEDURE — 84132 ASSAY OF SERUM POTASSIUM: CPT | Performed by: HOSPITALIST

## 2022-01-01 PROCEDURE — 76376 3D RENDER W/INTRP POSTPROCES: CPT | Mod: 26 | Performed by: INTERNAL MEDICINE

## 2022-01-01 PROCEDURE — 85025 COMPLETE CBC W/AUTO DIFF WBC: CPT | Performed by: STUDENT IN AN ORGANIZED HEALTH CARE EDUCATION/TRAINING PROGRAM

## 2022-01-01 PROCEDURE — 82435 ASSAY OF BLOOD CHLORIDE: CPT | Performed by: PHYSICIAN ASSISTANT

## 2022-01-01 PROCEDURE — 99239 HOSP IP/OBS DSCHRG MGMT >30: CPT | Mod: GC | Performed by: INTERNAL MEDICINE

## 2022-01-01 PROCEDURE — 87077 CULTURE AEROBIC IDENTIFY: CPT | Performed by: EMERGENCY MEDICINE

## 2022-01-01 PROCEDURE — G0425 INPT/ED TELECONSULT30: HCPCS | Mod: G0 | Performed by: STUDENT IN AN ORGANIZED HEALTH CARE EDUCATION/TRAINING PROGRAM

## 2022-01-01 PROCEDURE — 80053 COMPREHEN METABOLIC PANEL: CPT | Performed by: EMERGENCY MEDICINE

## 2022-01-01 PROCEDURE — 95714 VEEG EA 12-26 HR UNMNTR: CPT

## 2022-01-01 PROCEDURE — 70450 CT HEAD/BRAIN W/O DYE: CPT

## 2022-01-01 PROCEDURE — 80069 RENAL FUNCTION PANEL: CPT | Performed by: PHYSICIAN ASSISTANT

## 2022-01-01 PROCEDURE — 86022 PLATELET ANTIBODIES: CPT | Performed by: STUDENT IN AN ORGANIZED HEALTH CARE EDUCATION/TRAINING PROGRAM

## 2022-01-01 PROCEDURE — 94002 VENT MGMT INPAT INIT DAY: CPT

## 2022-01-01 PROCEDURE — 89050 BODY FLUID CELL COUNT: CPT | Performed by: EMERGENCY MEDICINE

## 2022-01-01 PROCEDURE — 258N000001 HC RX 258

## 2022-01-01 PROCEDURE — 99292 CRITICAL CARE ADDL 30 MIN: CPT | Mod: 25 | Performed by: EMERGENCY MEDICINE

## 2022-01-01 PROCEDURE — 85610 PROTHROMBIN TIME: CPT | Performed by: EMERGENCY MEDICINE

## 2022-01-01 PROCEDURE — 87389 HIV-1 AG W/HIV-1&-2 AB AG IA: CPT

## 2022-01-01 PROCEDURE — 36620 INSERTION CATHETER ARTERY: CPT | Performed by: INTERNAL MEDICINE

## 2022-01-01 PROCEDURE — 83615 LACTATE (LD) (LDH) ENZYME: CPT

## 2022-01-01 PROCEDURE — 85007 BL SMEAR W/DIFF WBC COUNT: CPT | Performed by: INTERNAL MEDICINE

## 2022-01-01 PROCEDURE — 85025 COMPLETE CBC W/AUTO DIFF WBC: CPT | Performed by: INTERNAL MEDICINE

## 2022-01-01 PROCEDURE — 87077 CULTURE AEROBIC IDENTIFY: CPT

## 2022-01-01 PROCEDURE — 93005 ELECTROCARDIOGRAM TRACING: CPT | Mod: 59,76 | Performed by: EMERGENCY MEDICINE

## 2022-01-01 PROCEDURE — 95720 EEG PHY/QHP EA INCR W/VEEG: CPT | Performed by: PSYCHIATRY & NEUROLOGY

## 2022-01-01 PROCEDURE — 81003 URINALYSIS AUTO W/O SCOPE: CPT | Performed by: STUDENT IN AN ORGANIZED HEALTH CARE EDUCATION/TRAINING PROGRAM

## 2022-01-01 PROCEDURE — G1010 CDSM STANSON: HCPCS | Performed by: INTERNAL MEDICINE

## 2022-01-01 PROCEDURE — 82140 ASSAY OF AMMONIA: CPT | Performed by: STUDENT IN AN ORGANIZED HEALTH CARE EDUCATION/TRAINING PROGRAM

## 2022-01-01 PROCEDURE — 99232 SBSQ HOSP IP/OBS MODERATE 35: CPT | Performed by: INTERNAL MEDICINE

## 2022-01-01 PROCEDURE — 11104 PUNCH BX SKIN SINGLE LESION: CPT | Mod: GC | Performed by: DERMATOLOGY

## 2022-01-01 PROCEDURE — 85014 HEMATOCRIT: CPT | Performed by: INTERNAL MEDICINE

## 2022-01-01 PROCEDURE — 84300 ASSAY OF URINE SODIUM: CPT | Performed by: STUDENT IN AN ORGANIZED HEALTH CARE EDUCATION/TRAINING PROGRAM

## 2022-01-01 PROCEDURE — 99233 SBSQ HOSP IP/OBS HIGH 50: CPT | Mod: GC | Performed by: PSYCHIATRY & NEUROLOGY

## 2022-01-01 PROCEDURE — 76705 ECHO EXAM OF ABDOMEN: CPT

## 2022-01-01 PROCEDURE — 99207 PR NO CHARGE LOS: CPT | Mod: 25 | Performed by: INTERNAL MEDICINE

## 2022-01-01 PROCEDURE — 74018 RADEX ABDOMEN 1 VIEW: CPT | Mod: 26 | Performed by: RADIOLOGY

## 2022-01-01 PROCEDURE — 85027 COMPLETE CBC AUTOMATED: CPT | Performed by: EMERGENCY MEDICINE

## 2022-01-01 PROCEDURE — 86901 BLOOD TYPING SEROLOGIC RH(D): CPT | Performed by: INTERNAL MEDICINE

## 2022-01-01 PROCEDURE — 83735 ASSAY OF MAGNESIUM: CPT | Performed by: HOSPITALIST

## 2022-01-01 PROCEDURE — 82805 BLOOD GASES W/O2 SATURATION: CPT

## 2022-01-01 PROCEDURE — 87636 SARSCOV2 & INF A&B AMP PRB: CPT | Performed by: EMERGENCY MEDICINE

## 2022-01-01 PROCEDURE — P9045 ALBUMIN (HUMAN), 5%, 250 ML: HCPCS | Performed by: STUDENT IN AN ORGANIZED HEALTH CARE EDUCATION/TRAINING PROGRAM

## 2022-01-01 PROCEDURE — 96361 HYDRATE IV INFUSION ADD-ON: CPT | Performed by: EMERGENCY MEDICINE

## 2022-01-01 PROCEDURE — 31624 DX BRONCHOSCOPE/LAVAGE: CPT

## 2022-01-01 PROCEDURE — 99233 SBSQ HOSP IP/OBS HIGH 50: CPT | Mod: GC | Performed by: DERMATOLOGY

## 2022-01-01 PROCEDURE — 72125 CT NECK SPINE W/O DYE: CPT | Mod: 26 | Performed by: STUDENT IN AN ORGANIZED HEALTH CARE EDUCATION/TRAINING PROGRAM

## 2022-01-01 PROCEDURE — 85060 BLOOD SMEAR INTERPRETATION: CPT | Mod: GC | Performed by: PATHOLOGY

## 2022-01-01 PROCEDURE — 84100 ASSAY OF PHOSPHORUS: CPT | Performed by: HOSPITALIST

## 2022-01-01 PROCEDURE — 76770 US EXAM ABDO BACK WALL COMP: CPT | Mod: 26 | Performed by: RADIOLOGY

## 2022-01-01 PROCEDURE — 70450 CT HEAD/BRAIN W/O DYE: CPT | Mod: 26 | Performed by: STUDENT IN AN ORGANIZED HEALTH CARE EDUCATION/TRAINING PROGRAM

## 2022-01-01 PROCEDURE — 86706 HEP B SURFACE ANTIBODY: CPT

## 2022-01-01 PROCEDURE — 80053 COMPREHEN METABOLIC PANEL: CPT

## 2022-01-01 PROCEDURE — 99232 SBSQ HOSP IP/OBS MODERATE 35: CPT | Mod: GC | Performed by: STUDENT IN AN ORGANIZED HEALTH CARE EDUCATION/TRAINING PROGRAM

## 2022-01-01 PROCEDURE — 84100 ASSAY OF PHOSPHORUS: CPT | Performed by: PHYSICIAN ASSISTANT

## 2022-01-01 PROCEDURE — 250N000013 HC RX MED GY IP 250 OP 250 PS 637: Performed by: NURSE PRACTITIONER

## 2022-01-01 PROCEDURE — 76376 3D RENDER W/INTRP POSTPROCES: CPT

## 2022-01-01 PROCEDURE — 87075 CULTR BACTERIA EXCEPT BLOOD: CPT | Performed by: STUDENT IN AN ORGANIZED HEALTH CARE EDUCATION/TRAINING PROGRAM

## 2022-01-01 PROCEDURE — 85027 COMPLETE CBC AUTOMATED: CPT | Performed by: PHYSICIAN ASSISTANT

## 2022-01-01 PROCEDURE — 87040 BLOOD CULTURE FOR BACTERIA: CPT | Performed by: PHYSICIAN ASSISTANT

## 2022-01-01 PROCEDURE — 81001 URINALYSIS AUTO W/SCOPE: CPT | Performed by: EMERGENCY MEDICINE

## 2022-01-01 PROCEDURE — 80170 ASSAY OF GENTAMICIN: CPT

## 2022-01-01 PROCEDURE — 85014 HEMATOCRIT: CPT | Performed by: PHYSICIAN ASSISTANT

## 2022-01-01 PROCEDURE — 99223 1ST HOSP IP/OBS HIGH 75: CPT | Mod: AI | Performed by: INTERNAL MEDICINE

## 2022-01-01 PROCEDURE — 83880 ASSAY OF NATRIURETIC PEPTIDE: CPT | Performed by: EMERGENCY MEDICINE

## 2022-01-01 PROCEDURE — 80069 RENAL FUNCTION PANEL: CPT | Performed by: EMERGENCY MEDICINE

## 2022-01-01 PROCEDURE — 84460 ALANINE AMINO (ALT) (SGPT): CPT | Performed by: INTERNAL MEDICINE

## 2022-01-01 PROCEDURE — 87205 SMEAR GRAM STAIN: CPT | Performed by: EMERGENCY MEDICINE

## 2022-01-01 PROCEDURE — 99207 EEG VIDEO 12-26 HR UNMONITORED: CPT | Performed by: PSYCHIATRY & NEUROLOGY

## 2022-01-01 PROCEDURE — 83036 HEMOGLOBIN GLYCOSYLATED A1C: CPT

## 2022-01-01 PROCEDURE — 999N000065 XR CHEST PORT 1 VIEW

## 2022-01-01 PROCEDURE — 84075 ASSAY ALKALINE PHOSPHATASE: CPT | Performed by: INTERNAL MEDICINE

## 2022-01-01 PROCEDURE — 82945 GLUCOSE OTHER FLUID: CPT | Performed by: EMERGENCY MEDICINE

## 2022-01-01 PROCEDURE — 87899 AGENT NOS ASSAY W/OPTIC: CPT | Performed by: EMERGENCY MEDICINE

## 2022-01-01 PROCEDURE — 85384 FIBRINOGEN ACTIVITY: CPT | Performed by: STUDENT IN AN ORGANIZED HEALTH CARE EDUCATION/TRAINING PROGRAM

## 2022-01-01 PROCEDURE — 36556 INSERT NON-TUNNEL CV CATH: CPT | Performed by: INTERNAL MEDICINE

## 2022-01-01 PROCEDURE — G1010 CDSM STANSON: HCPCS | Mod: GC | Performed by: STUDENT IN AN ORGANIZED HEALTH CARE EDUCATION/TRAINING PROGRAM

## 2022-01-01 PROCEDURE — 71250 CT THORAX DX C-: CPT

## 2022-01-01 PROCEDURE — 83880 ASSAY OF NATRIURETIC PEPTIDE: CPT

## 2022-01-01 PROCEDURE — 86709 HEPATITIS A IGM ANTIBODY: CPT

## 2022-01-01 PROCEDURE — P9045 ALBUMIN (HUMAN), 5%, 250 ML: HCPCS

## 2022-01-01 PROCEDURE — 999N000065 XR ABDOMEN PORT 1 VIEW

## 2022-01-01 PROCEDURE — 99221 1ST HOSP IP/OBS SF/LOW 40: CPT | Mod: 25 | Performed by: DERMATOLOGY

## 2022-01-01 PROCEDURE — 97161 PT EVAL LOW COMPLEX 20 MIN: CPT | Mod: GP

## 2022-01-01 PROCEDURE — 99232 SBSQ HOSP IP/OBS MODERATE 35: CPT | Mod: GC | Performed by: INTERNAL MEDICINE

## 2022-01-01 PROCEDURE — 87493 C DIFF AMPLIFIED PROBE: CPT

## 2022-01-01 PROCEDURE — 87075 CULTR BACTERIA EXCEPT BLOOD: CPT | Mod: XS | Performed by: EMERGENCY MEDICINE

## 2022-01-01 PROCEDURE — 93010 ELECTROCARDIOGRAM REPORT: CPT | Mod: 59 | Performed by: EMERGENCY MEDICINE

## 2022-01-01 PROCEDURE — 87205 SMEAR GRAM STAIN: CPT

## 2022-01-01 PROCEDURE — 5A1945Z RESPIRATORY VENTILATION, 24-96 CONSECUTIVE HOURS: ICD-10-PCS | Performed by: PSYCHIATRY & NEUROLOGY

## 2022-01-01 PROCEDURE — P9016 RBC LEUKOCYTES REDUCED: HCPCS | Performed by: STUDENT IN AN ORGANIZED HEALTH CARE EDUCATION/TRAINING PROGRAM

## 2022-01-01 PROCEDURE — 88305 TISSUE EXAM BY PATHOLOGIST: CPT | Mod: 26 | Performed by: DERMATOLOGY

## 2022-01-01 PROCEDURE — 97110 THERAPEUTIC EXERCISES: CPT | Mod: GO

## 2022-01-01 PROCEDURE — 87103 BLOOD FUNGUS CULTURE: CPT | Performed by: INTERNAL MEDICINE

## 2022-01-01 PROCEDURE — 82040 ASSAY OF SERUM ALBUMIN: CPT | Performed by: PHYSICIAN ASSISTANT

## 2022-01-01 RX ORDER — ACETAMINOPHEN 650 MG/1
650 SUPPOSITORY RECTAL EVERY 4 HOURS PRN
Status: DISCONTINUED | OUTPATIENT
Start: 2022-01-01 | End: 2022-01-01

## 2022-01-01 RX ORDER — CALCIUM GLUCONATE 94 MG/ML
1 INJECTION, SOLUTION INTRAVENOUS ONCE
Status: DISCONTINUED | OUTPATIENT
Start: 2022-01-01 | End: 2022-01-01

## 2022-01-01 RX ORDER — VANCOMYCIN HYDROCHLORIDE 1 G/200ML
1000 INJECTION, SOLUTION INTRAVENOUS
Status: DISCONTINUED | OUTPATIENT
Start: 2022-01-01 | End: 2022-01-01

## 2022-01-01 RX ORDER — POTASSIUM CHLORIDE 750 MG/1
10 TABLET, EXTENDED RELEASE ORAL ONCE
Status: COMPLETED | OUTPATIENT
Start: 2022-01-01 | End: 2022-01-01

## 2022-01-01 RX ORDER — HEPARIN SODIUM 5000 [USP'U]/.5ML
5000 INJECTION, SOLUTION INTRAVENOUS; SUBCUTANEOUS EVERY 12 HOURS
Status: DISCONTINUED | OUTPATIENT
Start: 2022-01-01 | End: 2022-01-01

## 2022-01-01 RX ORDER — NICOTINE POLACRILEX 4 MG
15-30 LOZENGE BUCCAL
Status: DISCONTINUED | OUTPATIENT
Start: 2022-01-01 | End: 2022-01-01

## 2022-01-01 RX ORDER — TOBRAMYCIN INHALATION SOLUTION 300 MG/5ML
300 INHALANT RESPIRATORY (INHALATION)
Status: COMPLETED | OUTPATIENT
Start: 2022-01-01 | End: 2022-01-01

## 2022-01-01 RX ORDER — ALBUTEROL SULFATE 5 MG/ML
10 SOLUTION RESPIRATORY (INHALATION) ONCE
Status: COMPLETED | OUTPATIENT
Start: 2022-01-01 | End: 2022-01-01

## 2022-01-01 RX ORDER — MIDAZOLAM HCL IN 0.9 % NACL/PF 1 MG/ML
1-8 PLASTIC BAG, INJECTION (ML) INTRAVENOUS CONTINUOUS
Status: DISCONTINUED | OUTPATIENT
Start: 2022-01-01 | End: 2022-01-01 | Stop reason: HOSPADM

## 2022-01-01 RX ORDER — PROCHLORPERAZINE MALEATE 5 MG
5 TABLET ORAL EVERY 6 HOURS PRN
Status: CANCELLED | OUTPATIENT
Start: 2022-01-01

## 2022-01-01 RX ORDER — NALOXONE HYDROCHLORIDE 0.4 MG/ML
0.2 INJECTION, SOLUTION INTRAMUSCULAR; INTRAVENOUS; SUBCUTANEOUS
Status: DISCONTINUED | OUTPATIENT
Start: 2022-01-01 | End: 2022-01-01 | Stop reason: HOSPADM

## 2022-01-01 RX ORDER — POTASSIUM CHLORIDE 7.45 MG/ML
10 INJECTION INTRAVENOUS
Status: COMPLETED | OUTPATIENT
Start: 2022-01-01 | End: 2022-01-01

## 2022-01-01 RX ORDER — GUAIFENESIN 600 MG/1
15 TABLET, EXTENDED RELEASE ORAL DAILY
Status: DISCONTINUED | OUTPATIENT
Start: 2022-01-01 | End: 2022-01-01 | Stop reason: HOSPADM

## 2022-01-01 RX ORDER — FUROSEMIDE 10 MG/ML
INJECTION INTRAMUSCULAR; INTRAVENOUS
Status: DISCONTINUED
Start: 2022-01-01 | End: 2022-01-01 | Stop reason: HOSPADM

## 2022-01-01 RX ORDER — LINEZOLID 2 MG/ML
600 INJECTION, SOLUTION INTRAVENOUS EVERY 12 HOURS
Status: DISCONTINUED | OUTPATIENT
Start: 2022-01-01 | End: 2022-01-01 | Stop reason: HOSPADM

## 2022-01-01 RX ORDER — FUROSEMIDE 10 MG/ML
20 INJECTION INTRAMUSCULAR; INTRAVENOUS ONCE
Status: COMPLETED | OUTPATIENT
Start: 2022-01-01 | End: 2022-01-01

## 2022-01-01 RX ORDER — POLYETHYLENE GLYCOL 3350 17 G/17G
17 POWDER, FOR SOLUTION ORAL DAILY PRN
Status: CANCELLED | OUTPATIENT
Start: 2022-01-01

## 2022-01-01 RX ORDER — CEFAZOLIN SODIUM 2 G/100ML
2 INJECTION, SOLUTION INTRAVENOUS EVERY 8 HOURS
Status: DISCONTINUED | OUTPATIENT
Start: 2022-01-01 | End: 2022-01-01

## 2022-01-01 RX ORDER — CALCIUM GLUCONATE 20 MG/ML
2 INJECTION, SOLUTION INTRAVENOUS ONCE
Status: COMPLETED | OUTPATIENT
Start: 2022-01-01 | End: 2022-01-01

## 2022-01-01 RX ORDER — ACETAMINOPHEN 650 MG/1
650 SUPPOSITORY RECTAL EVERY 6 HOURS PRN
Status: DISCONTINUED | OUTPATIENT
Start: 2022-01-01 | End: 2022-01-01 | Stop reason: HOSPADM

## 2022-01-01 RX ORDER — LIDOCAINE HYDROCHLORIDE 10 MG/ML
INJECTION, SOLUTION EPIDURAL; INFILTRATION; INTRACAUDAL; PERINEURAL PRN
Status: DISCONTINUED | OUTPATIENT
Start: 2022-01-01 | End: 2022-01-01

## 2022-01-01 RX ORDER — MEROPENEM 1 G/1
1000 INJECTION, POWDER, FOR SOLUTION INTRAVENOUS EVERY 12 HOURS
Status: COMPLETED | OUTPATIENT
Start: 2022-01-01 | End: 2022-01-01

## 2022-01-01 RX ORDER — POTASSIUM CHLORIDE 1.5 G/1.58G
20 POWDER, FOR SOLUTION ORAL ONCE
Status: COMPLETED | OUTPATIENT
Start: 2022-01-01 | End: 2022-01-01

## 2022-01-01 RX ORDER — PROCHLORPERAZINE MALEATE 5 MG
5 TABLET ORAL EVERY 6 HOURS PRN
Status: DISCONTINUED | OUTPATIENT
Start: 2022-01-01 | End: 2022-01-01 | Stop reason: HOSPADM

## 2022-01-01 RX ORDER — ADENOSINE 3 MG/ML
12 INJECTION, SOLUTION INTRAVENOUS ONCE
Status: DISCONTINUED | OUTPATIENT
Start: 2022-01-01 | End: 2022-01-01 | Stop reason: HOSPADM

## 2022-01-01 RX ORDER — DEXTROSE MONOHYDRATE 25 G/50ML
25-50 INJECTION, SOLUTION INTRAVENOUS
Status: DISCONTINUED | OUTPATIENT
Start: 2022-01-01 | End: 2022-01-01

## 2022-01-01 RX ORDER — DEXTROSE MONOHYDRATE 25 G/50ML
25 INJECTION, SOLUTION INTRAVENOUS ONCE
Status: COMPLETED | OUTPATIENT
Start: 2022-01-01 | End: 2022-01-01

## 2022-01-01 RX ORDER — ACETYLCYSTEINE 200 MG/ML
2 SOLUTION ORAL; RESPIRATORY (INHALATION) 4 TIMES DAILY PRN
Status: DISCONTINUED | OUTPATIENT
Start: 2022-01-01 | End: 2022-01-01 | Stop reason: HOSPADM

## 2022-01-01 RX ORDER — ALBUTEROL SULFATE 90 UG/1
2-4 AEROSOL, METERED RESPIRATORY (INHALATION) EVERY 4 HOURS PRN
Status: DISCONTINUED | OUTPATIENT
Start: 2022-01-01 | End: 2022-01-01 | Stop reason: HOSPADM

## 2022-01-01 RX ORDER — ACYCLOVIR 200 MG/1
9.5 CAPSULE ORAL
Status: DISCONTINUED | OUTPATIENT
Start: 2022-01-01 | End: 2022-01-01 | Stop reason: HOSPADM

## 2022-01-01 RX ORDER — ALBUTEROL SULFATE 0.83 MG/ML
2.5 SOLUTION RESPIRATORY (INHALATION)
Status: DISCONTINUED | OUTPATIENT
Start: 2022-01-01 | End: 2022-01-01

## 2022-01-01 RX ORDER — PROCHLORPERAZINE 25 MG
12.5 SUPPOSITORY, RECTAL RECTAL EVERY 12 HOURS PRN
Status: CANCELLED | OUTPATIENT
Start: 2022-01-01

## 2022-01-01 RX ORDER — POTASSIUM CHLORIDE 20MEQ/15ML
20 LIQUID (ML) ORAL ONCE
Status: COMPLETED | OUTPATIENT
Start: 2022-01-01 | End: 2022-01-01

## 2022-01-01 RX ORDER — ETOMIDATE 2 MG/ML
INJECTION INTRAVENOUS
Status: COMPLETED | OUTPATIENT
Start: 2022-01-01 | End: 2022-01-01

## 2022-01-01 RX ORDER — PANTOPRAZOLE SODIUM 40 MG/1
40 TABLET, DELAYED RELEASE ORAL
Status: DISCONTINUED | OUTPATIENT
Start: 2022-01-01 | End: 2022-01-01

## 2022-01-01 RX ORDER — FUROSEMIDE 10 MG/ML
40 INJECTION INTRAMUSCULAR; INTRAVENOUS ONCE
Status: COMPLETED | OUTPATIENT
Start: 2022-01-01 | End: 2022-01-01

## 2022-01-01 RX ORDER — AMPICILLIN 2 G/1
2 INJECTION, POWDER, FOR SOLUTION INTRAVENOUS EVERY 4 HOURS
Status: DISCONTINUED | OUTPATIENT
Start: 2022-01-01 | End: 2022-01-01

## 2022-01-01 RX ORDER — LIDOCAINE HYDROCHLORIDE 20 MG/ML
15 SOLUTION OROPHARYNGEAL ONCE
Status: COMPLETED | OUTPATIENT
Start: 2022-01-01 | End: 2022-01-01

## 2022-01-01 RX ORDER — POTASSIUM CHLORIDE 750 MG/1
40 TABLET, EXTENDED RELEASE ORAL ONCE
Status: COMPLETED | OUTPATIENT
Start: 2022-01-01 | End: 2022-01-01

## 2022-01-01 RX ORDER — NALOXONE HYDROCHLORIDE 0.4 MG/ML
0.4 INJECTION, SOLUTION INTRAMUSCULAR; INTRAVENOUS; SUBCUTANEOUS
Status: DISCONTINUED | OUTPATIENT
Start: 2022-01-01 | End: 2022-01-01 | Stop reason: HOSPADM

## 2022-01-01 RX ORDER — PROCHLORPERAZINE 25 MG
12.5 SUPPOSITORY, RECTAL RECTAL EVERY 12 HOURS PRN
Status: DISCONTINUED | OUTPATIENT
Start: 2022-01-01 | End: 2022-01-01 | Stop reason: HOSPADM

## 2022-01-01 RX ORDER — NICOTINE POLACRILEX 4 MG
15-30 LOZENGE BUCCAL
Status: DISCONTINUED | OUTPATIENT
Start: 2022-01-01 | End: 2022-01-01 | Stop reason: HOSPADM

## 2022-01-01 RX ORDER — IOPAMIDOL 755 MG/ML
77 INJECTION, SOLUTION INTRAVASCULAR ONCE
Status: DISCONTINUED | OUTPATIENT
Start: 2022-01-01 | End: 2022-01-01 | Stop reason: HOSPADM

## 2022-01-01 RX ORDER — LIDOCAINE 40 MG/G
CREAM TOPICAL
Status: DISCONTINUED | OUTPATIENT
Start: 2022-01-01 | End: 2022-01-01 | Stop reason: HOSPADM

## 2022-01-01 RX ORDER — TRIAMCINOLONE ACETONIDE 1 MG/G
OINTMENT TOPICAL 2 TIMES DAILY
Status: DISCONTINUED | OUTPATIENT
Start: 2022-01-01 | End: 2022-01-01 | Stop reason: HOSPADM

## 2022-01-01 RX ORDER — CALCIUM GLUCONATE 20 MG/ML
1 INJECTION, SOLUTION INTRAVENOUS ONCE
Status: COMPLETED | OUTPATIENT
Start: 2022-01-01 | End: 2022-01-01

## 2022-01-01 RX ORDER — CARBOXYMETHYLCELLULOSE SODIUM 5 MG/ML
1 SOLUTION/ DROPS OPHTHALMIC
Status: DISCONTINUED | OUTPATIENT
Start: 2022-01-01 | End: 2022-01-01 | Stop reason: HOSPADM

## 2022-01-01 RX ORDER — IPRATROPIUM BROMIDE AND ALBUTEROL SULFATE 2.5; .5 MG/3ML; MG/3ML
3 SOLUTION RESPIRATORY (INHALATION)
Status: DISCONTINUED | OUTPATIENT
Start: 2022-01-01 | End: 2022-01-01 | Stop reason: HOSPADM

## 2022-01-01 RX ORDER — MAGNESIUM OXIDE 400 MG/1
400 TABLET ORAL EVERY 4 HOURS
Status: COMPLETED | OUTPATIENT
Start: 2022-01-01 | End: 2022-01-01

## 2022-01-01 RX ORDER — NALOXONE HYDROCHLORIDE 0.4 MG/ML
0.2 INJECTION, SOLUTION INTRAMUSCULAR; INTRAVENOUS; SUBCUTANEOUS
Status: ACTIVE | OUTPATIENT
Start: 2022-01-01 | End: 2022-01-01

## 2022-01-01 RX ORDER — ADENOSINE 3 MG/ML
INJECTION, SOLUTION INTRAVENOUS
Status: COMPLETED
Start: 2022-01-01 | End: 2022-01-01

## 2022-01-01 RX ORDER — AMOXICILLIN 250 MG
2 CAPSULE ORAL 2 TIMES DAILY PRN
Status: CANCELLED | OUTPATIENT
Start: 2022-01-01

## 2022-01-01 RX ORDER — ONDANSETRON 2 MG/ML
4 INJECTION INTRAMUSCULAR; INTRAVENOUS EVERY 6 HOURS PRN
Status: DISCONTINUED | OUTPATIENT
Start: 2022-01-01 | End: 2022-01-01 | Stop reason: HOSPADM

## 2022-01-01 RX ORDER — ONDANSETRON 4 MG/1
4 TABLET, ORALLY DISINTEGRATING ORAL EVERY 6 HOURS PRN
Status: CANCELLED | OUTPATIENT
Start: 2022-01-01

## 2022-01-01 RX ORDER — DEXTROSE MONOHYDRATE 25 G/50ML
25-50 INJECTION, SOLUTION INTRAVENOUS
Status: DISCONTINUED | OUTPATIENT
Start: 2022-01-01 | End: 2022-01-01 | Stop reason: CLARIF

## 2022-01-01 RX ORDER — OLANZAPINE 5 MG/1
5 TABLET, ORALLY DISINTEGRATING ORAL AT BEDTIME
Status: COMPLETED | OUTPATIENT
Start: 2022-01-01 | End: 2022-01-01

## 2022-01-01 RX ORDER — ATROPINE SULFATE 10 MG/ML
1 SOLUTION/ DROPS OPHTHALMIC
Status: DISCONTINUED | OUTPATIENT
Start: 2022-01-01 | End: 2022-01-01 | Stop reason: HOSPADM

## 2022-01-01 RX ORDER — HYDROMORPHONE HYDROCHLORIDE 1 MG/ML
0.5 INJECTION, SOLUTION INTRAMUSCULAR; INTRAVENOUS; SUBCUTANEOUS ONCE
Status: COMPLETED | OUTPATIENT
Start: 2022-01-01 | End: 2022-01-01

## 2022-01-01 RX ORDER — AMOXICILLIN 250 MG
2 CAPSULE ORAL 2 TIMES DAILY PRN
Status: DISCONTINUED | OUTPATIENT
Start: 2022-01-01 | End: 2022-01-01 | Stop reason: HOSPADM

## 2022-01-01 RX ORDER — POTASSIUM CHLORIDE 750 MG/1
20 TABLET, EXTENDED RELEASE ORAL ONCE
Status: COMPLETED | OUTPATIENT
Start: 2022-01-01 | End: 2022-01-01

## 2022-01-01 RX ORDER — NICOTINE POLACRILEX 4 MG
15-30 LOZENGE BUCCAL
Status: CANCELLED | OUTPATIENT
Start: 2022-01-01

## 2022-01-01 RX ORDER — ACETYLCYSTEINE 100 MG/ML
4 SOLUTION ORAL; RESPIRATORY (INHALATION) EVERY 4 HOURS PRN
Status: DISCONTINUED | OUTPATIENT
Start: 2022-01-01 | End: 2022-01-01

## 2022-01-01 RX ORDER — PANTOPRAZOLE SODIUM 40 MG/1
40 TABLET, DELAYED RELEASE ORAL DAILY
COMMUNITY
Start: 2022-01-01

## 2022-01-01 RX ORDER — CEFAZOLIN SODIUM 2 G/100ML
2 INJECTION, SOLUTION INTRAVENOUS EVERY 12 HOURS
Status: DISCONTINUED | OUTPATIENT
Start: 2022-01-01 | End: 2022-01-01

## 2022-01-01 RX ORDER — CEFAZOLIN SODIUM 1 G/50ML
1250 SOLUTION INTRAVENOUS EVERY 24 HOURS
Status: DISCONTINUED | OUTPATIENT
Start: 2022-01-01 | End: 2022-01-01 | Stop reason: HOSPADM

## 2022-01-01 RX ORDER — ACETYLCYSTEINE 200 MG/ML
2 SOLUTION ORAL; RESPIRATORY (INHALATION) EVERY 4 HOURS
Status: DISCONTINUED | OUTPATIENT
Start: 2022-01-01 | End: 2022-01-01

## 2022-01-01 RX ORDER — NALOXONE HYDROCHLORIDE 0.4 MG/ML
0.4 INJECTION, SOLUTION INTRAMUSCULAR; INTRAVENOUS; SUBCUTANEOUS
Status: ACTIVE | OUTPATIENT
Start: 2022-01-01 | End: 2022-01-01

## 2022-01-01 RX ORDER — CEFTRIAXONE 2 G/1
2 INJECTION, POWDER, FOR SOLUTION INTRAMUSCULAR; INTRAVENOUS EVERY 24 HOURS
Status: DISCONTINUED | OUTPATIENT
Start: 2022-01-01 | End: 2022-01-01

## 2022-01-01 RX ORDER — ONDANSETRON 2 MG/ML
4 INJECTION INTRAMUSCULAR; INTRAVENOUS EVERY 6 HOURS PRN
Status: CANCELLED | OUTPATIENT
Start: 2022-01-01

## 2022-01-01 RX ORDER — GLYCOPYRROLATE 0.2 MG/ML
0.2 INJECTION, SOLUTION INTRAMUSCULAR; INTRAVENOUS ONCE
Status: COMPLETED | OUTPATIENT
Start: 2022-01-01 | End: 2022-01-01

## 2022-01-01 RX ORDER — POTASSIUM CHLORIDE 20MEQ/15ML
40 LIQUID (ML) ORAL ONCE
Status: COMPLETED | OUTPATIENT
Start: 2022-01-01 | End: 2022-01-01

## 2022-01-01 RX ORDER — ONDANSETRON 4 MG/1
4 TABLET, ORALLY DISINTEGRATING ORAL EVERY 6 HOURS PRN
Status: DISCONTINUED | OUTPATIENT
Start: 2022-01-01 | End: 2022-01-01 | Stop reason: HOSPADM

## 2022-01-01 RX ORDER — IOPAMIDOL 755 MG/ML
100 INJECTION, SOLUTION INTRAVASCULAR ONCE
Status: COMPLETED | OUTPATIENT
Start: 2022-01-01 | End: 2022-01-01

## 2022-01-01 RX ORDER — IPRATROPIUM BROMIDE AND ALBUTEROL SULFATE 2.5; .5 MG/3ML; MG/3ML
3 SOLUTION RESPIRATORY (INHALATION) EVERY 4 HOURS PRN
Status: DISCONTINUED | OUTPATIENT
Start: 2022-01-01 | End: 2022-01-01

## 2022-01-01 RX ORDER — FUROSEMIDE 10 MG/ML
60 INJECTION INTRAMUSCULAR; INTRAVENOUS ONCE
Status: COMPLETED | OUTPATIENT
Start: 2022-01-01 | End: 2022-01-01

## 2022-01-01 RX ORDER — ACETYLCYSTEINE 200 MG/ML
2 SOLUTION ORAL; RESPIRATORY (INHALATION) 2 TIMES DAILY
Status: DISCONTINUED | OUTPATIENT
Start: 2022-01-01 | End: 2022-01-01

## 2022-01-01 RX ORDER — CEFTRIAXONE 2 G/1
2 INJECTION, POWDER, FOR SOLUTION INTRAMUSCULAR; INTRAVENOUS EVERY 12 HOURS
Status: DISCONTINUED | OUTPATIENT
Start: 2022-01-01 | End: 2022-01-01

## 2022-01-01 RX ORDER — DEXTROSE MONOHYDRATE 25 G/50ML
25-50 INJECTION, SOLUTION INTRAVENOUS
Status: DISCONTINUED | OUTPATIENT
Start: 2022-01-01 | End: 2022-01-01 | Stop reason: HOSPADM

## 2022-01-01 RX ORDER — POLYETHYLENE GLYCOL 3350 17 G/17G
17 POWDER, FOR SOLUTION ORAL DAILY PRN
Status: DISCONTINUED | OUTPATIENT
Start: 2022-01-01 | End: 2022-01-01 | Stop reason: HOSPADM

## 2022-01-01 RX ORDER — NAFCILLIN SODIUM 2 G/8ML
2 INJECTION, POWDER, FOR SOLUTION INTRAMUSCULAR; INTRAVENOUS EVERY 4 HOURS
Status: DISCONTINUED | OUTPATIENT
Start: 2022-01-01 | End: 2022-01-01

## 2022-01-01 RX ORDER — DEXTROSE MONOHYDRATE 25 G/50ML
25-50 INJECTION, SOLUTION INTRAVENOUS
Status: CANCELLED | OUTPATIENT
Start: 2022-01-01

## 2022-01-01 RX ORDER — ACETAMINOPHEN 500 MG
500 TABLET ORAL EVERY 6 HOURS PRN
Status: DISCONTINUED | OUTPATIENT
Start: 2022-01-01 | End: 2022-01-01 | Stop reason: HOSPADM

## 2022-01-01 RX ORDER — HYDROMORPHONE HYDROCHLORIDE 1 MG/ML
0.5 INJECTION, SOLUTION INTRAMUSCULAR; INTRAVENOUS; SUBCUTANEOUS
Status: DISCONTINUED | OUTPATIENT
Start: 2022-01-01 | End: 2022-01-01

## 2022-01-01 RX ORDER — AMOXICILLIN 250 MG
1 CAPSULE ORAL 2 TIMES DAILY PRN
Status: DISCONTINUED | OUTPATIENT
Start: 2022-01-01 | End: 2022-01-01 | Stop reason: HOSPADM

## 2022-01-01 RX ORDER — MEROPENEM 1 G/1
1 INJECTION, POWDER, FOR SOLUTION INTRAVENOUS EVERY 12 HOURS
Status: DISCONTINUED | OUTPATIENT
Start: 2022-01-01 | End: 2022-01-01

## 2022-01-01 RX ORDER — ACYCLOVIR 200 MG/1
12 CAPSULE ORAL ONCE
Status: DISCONTINUED | OUTPATIENT
Start: 2022-01-01 | End: 2022-01-01

## 2022-01-01 RX ORDER — ROPIVACAINE IN 0.9% SOD CHL/PF 0.1 %
.03-.125 PLASTIC BAG, INJECTION (ML) EPIDURAL CONTINUOUS
Status: DISCONTINUED | OUTPATIENT
Start: 2022-01-01 | End: 2022-01-01 | Stop reason: HOSPADM

## 2022-01-01 RX ORDER — NALOXONE HYDROCHLORIDE 0.4 MG/ML
0.4 INJECTION, SOLUTION INTRAMUSCULAR; INTRAVENOUS; SUBCUTANEOUS
Status: DISPENSED | OUTPATIENT
Start: 2022-01-01 | End: 2022-01-01

## 2022-01-01 RX ORDER — IOPAMIDOL 755 MG/ML
75 INJECTION, SOLUTION INTRAVASCULAR ONCE
Status: COMPLETED | OUTPATIENT
Start: 2022-01-01 | End: 2022-01-01

## 2022-01-01 RX ORDER — FENTANYL CITRATE 50 UG/ML
25 INJECTION, SOLUTION INTRAMUSCULAR; INTRAVENOUS
Status: DISCONTINUED | OUTPATIENT
Start: 2022-01-01 | End: 2022-01-01

## 2022-01-01 RX ORDER — NOREPINEPHRINE BITARTRATE 0.06 MG/ML
.01-.6 INJECTION, SOLUTION INTRAVENOUS CONTINUOUS
Status: DISCONTINUED | OUTPATIENT
Start: 2022-01-01 | End: 2022-01-01 | Stop reason: HOSPADM

## 2022-01-01 RX ORDER — FENTANYL CITRATE 50 UG/ML
0.5 INJECTION, SOLUTION INTRAMUSCULAR; INTRAVENOUS ONCE
Status: COMPLETED | OUTPATIENT
Start: 2022-01-01 | End: 2022-01-01

## 2022-01-01 RX ORDER — FLUMAZENIL 0.1 MG/ML
0.2 INJECTION, SOLUTION INTRAVENOUS
Status: ACTIVE | OUTPATIENT
Start: 2022-01-01 | End: 2022-01-01

## 2022-01-01 RX ORDER — SODIUM CHLORIDE 9 MG/ML
INJECTION, SOLUTION INTRAVENOUS CONTINUOUS PRN
Status: DISCONTINUED | OUTPATIENT
Start: 2022-01-01 | End: 2022-01-01 | Stop reason: HOSPADM

## 2022-01-01 RX ORDER — NICOTINE POLACRILEX 4 MG
15-30 LOZENGE BUCCAL
Status: DISCONTINUED | OUTPATIENT
Start: 2022-01-01 | End: 2022-01-01 | Stop reason: CLARIF

## 2022-01-01 RX ORDER — CIPROFLOXACIN 2 MG/ML
400 INJECTION, SOLUTION INTRAVENOUS EVERY 24 HOURS
Status: COMPLETED | OUTPATIENT
Start: 2022-01-01 | End: 2022-01-01

## 2022-01-01 RX ORDER — METOPROLOL TARTRATE 1 MG/ML
5 INJECTION, SOLUTION INTRAVENOUS ONCE
Status: COMPLETED | OUTPATIENT
Start: 2022-01-01 | End: 2022-01-01

## 2022-01-01 RX ORDER — AMOXICILLIN 250 MG
1 CAPSULE ORAL 2 TIMES DAILY PRN
Status: CANCELLED | OUTPATIENT
Start: 2022-01-01

## 2022-01-01 RX ORDER — DEXTROSE MONOHYDRATE 100 MG/ML
INJECTION, SOLUTION INTRAVENOUS CONTINUOUS PRN
Status: DISCONTINUED | OUTPATIENT
Start: 2022-01-01 | End: 2022-01-01 | Stop reason: HOSPADM

## 2022-01-01 RX ORDER — METRONIDAZOLE 500 MG/100ML
500 INJECTION, SOLUTION INTRAVENOUS EVERY 12 HOURS
Status: DISCONTINUED | OUTPATIENT
Start: 2022-01-01 | End: 2022-01-01

## 2022-01-01 RX ORDER — GUAIFENESIN 200 MG/10ML
15 LIQUID ORAL 4 TIMES DAILY
Status: DISCONTINUED | OUTPATIENT
Start: 2022-01-01 | End: 2022-01-01

## 2022-01-01 RX ORDER — GUAIFENESIN 600 MG/1
600 TABLET, EXTENDED RELEASE ORAL 2 TIMES DAILY
Status: DISCONTINUED | OUTPATIENT
Start: 2022-01-01 | End: 2022-01-01 | Stop reason: ALTCHOICE

## 2022-01-01 RX ORDER — ALIROCUMAB 150 MG/ML
150 INJECTION, SOLUTION SUBCUTANEOUS
COMMUNITY
Start: 2022-01-01

## 2022-01-01 RX ADMIN — Medication 0.03 MCG/KG/MIN: at 22:09

## 2022-01-01 RX ADMIN — DEXTROSE MONOHYDRATE 25 G: 25 INJECTION, SOLUTION INTRAVENOUS at 13:39

## 2022-01-01 RX ADMIN — SODIUM CHLORIDE, POTASSIUM CHLORIDE, SODIUM LACTATE AND CALCIUM CHLORIDE 1000 ML: 600; 310; 30; 20 INJECTION, SOLUTION INTRAVENOUS at 15:16

## 2022-01-01 RX ADMIN — NAFCILLIN 2 G: 2 POWDER, FOR SOLUTION INTRAMUSCULAR; INTRAVENOUS at 20:25

## 2022-01-01 RX ADMIN — SODIUM CHLORIDE, POTASSIUM CHLORIDE, SODIUM LACTATE AND CALCIUM CHLORIDE 500 ML: 600; 310; 30; 20 INJECTION, SOLUTION INTRAVENOUS at 19:51

## 2022-01-01 RX ADMIN — DICLOFENAC SODIUM 2 G: 10 GEL TOPICAL at 17:06

## 2022-01-01 RX ADMIN — ONDANSETRON 4 MG: 2 INJECTION INTRAMUSCULAR; INTRAVENOUS at 00:35

## 2022-01-01 RX ADMIN — LINEZOLID 600 MG: 600 INJECTION, SOLUTION INTRAVENOUS at 04:04

## 2022-01-01 RX ADMIN — HEPARIN SODIUM 5000 UNITS: 5000 INJECTION, SOLUTION INTRAVENOUS; SUBCUTANEOUS at 01:51

## 2022-01-01 RX ADMIN — TRIAMCINOLONE ACETONIDE: 1 OINTMENT TOPICAL at 08:38

## 2022-01-01 RX ADMIN — POTASSIUM CHLORIDE 10 MEQ: 7.46 INJECTION, SOLUTION INTRAVENOUS at 10:26

## 2022-01-01 RX ADMIN — SODIUM CHLORIDE, POTASSIUM CHLORIDE, SODIUM LACTATE AND CALCIUM CHLORIDE 500 ML: 600; 310; 30; 20 INJECTION, SOLUTION INTRAVENOUS at 10:15

## 2022-01-01 RX ADMIN — ACETYLCYSTEINE 2 ML: 200 SOLUTION ORAL; RESPIRATORY (INHALATION) at 09:18

## 2022-01-01 RX ADMIN — GUAIFENESIN 15 ML: 200 SOLUTION ORAL at 15:48

## 2022-01-01 RX ADMIN — HEPARIN SODIUM 5000 UNITS: 5000 INJECTION, SOLUTION INTRAVENOUS; SUBCUTANEOUS at 13:34

## 2022-01-01 RX ADMIN — FUROSEMIDE 40 MG: 10 INJECTION, SOLUTION INTRAVENOUS at 06:27

## 2022-01-01 RX ADMIN — MIDAZOLAM 2 MG: 1 INJECTION INTRAMUSCULAR; INTRAVENOUS at 04:18

## 2022-01-01 RX ADMIN — SODIUM BICARBONATE: 84 INJECTION, SOLUTION INTRAVENOUS at 18:33

## 2022-01-01 RX ADMIN — ACYCLOVIR SODIUM 800 MG: 1000 INJECTION, SOLUTION INTRAVENOUS at 23:33

## 2022-01-01 RX ADMIN — HYDROCORTISONE SODIUM SUCCINATE 50 MG: 100 INJECTION, POWDER, FOR SOLUTION INTRAMUSCULAR; INTRAVENOUS at 21:28

## 2022-01-01 RX ADMIN — NAFCILLIN 2 G: 2 POWDER, FOR SOLUTION INTRAMUSCULAR; INTRAVENOUS at 15:30

## 2022-01-01 RX ADMIN — SODIUM BICARBONATE 50 MEQ: 84 INJECTION INTRAVENOUS at 23:34

## 2022-01-01 RX ADMIN — POTASSIUM CHLORIDE 10 MEQ: 7.46 INJECTION, SOLUTION INTRAVENOUS at 16:04

## 2022-01-01 RX ADMIN — TOPICAL ANESTHETIC 0.5 ML: 200 SPRAY DENTAL; PERIODONTAL at 13:08

## 2022-01-01 RX ADMIN — VANCOMYCIN HYDROCHLORIDE 1500 MG: 10 INJECTION, POWDER, LYOPHILIZED, FOR SOLUTION INTRAVENOUS at 16:04

## 2022-01-01 RX ADMIN — TOBRAMYCIN INHALATION SOLUTION 300 MG: 300 INHALANT RESPIRATORY (INHALATION) at 08:39

## 2022-01-01 RX ADMIN — SODIUM CHLORIDE 150 ML/HR: 9 INJECTION, SOLUTION INTRAVENOUS at 13:30

## 2022-01-01 RX ADMIN — FUROSEMIDE 40 MG: 10 INJECTION, SOLUTION INTRAVENOUS at 12:30

## 2022-01-01 RX ADMIN — HEPARIN SODIUM 5000 UNITS: 5000 INJECTION, SOLUTION INTRAVENOUS; SUBCUTANEOUS at 11:34

## 2022-01-01 RX ADMIN — SODIUM CHLORIDE, POTASSIUM CHLORIDE, SODIUM LACTATE AND CALCIUM CHLORIDE 500 ML: 600; 310; 30; 20 INJECTION, SOLUTION INTRAVENOUS at 10:06

## 2022-01-01 RX ADMIN — TOBRAMYCIN INHALATION SOLUTION 300 MG: 300 INHALANT RESPIRATORY (INHALATION) at 21:22

## 2022-01-01 RX ADMIN — ALBUTEROL SULFATE 4 PUFF: 90 AEROSOL, METERED RESPIRATORY (INHALATION) at 23:56

## 2022-01-01 RX ADMIN — ACETAMINOPHEN 500 MG: 500 TABLET ORAL at 18:23

## 2022-01-01 RX ADMIN — PANTOPRAZOLE SODIUM 40 MG: 40 TABLET, DELAYED RELEASE ORAL at 08:34

## 2022-01-01 RX ADMIN — DEXTROSE MONOHYDRATE 25 G: 25 INJECTION, SOLUTION INTRAVENOUS at 05:38

## 2022-01-01 RX ADMIN — DICLOFENAC SODIUM 2 G: 10 GEL TOPICAL at 08:46

## 2022-01-01 RX ADMIN — LINEZOLID 600 MG: 600 INJECTION, SOLUTION INTRAVENOUS at 16:44

## 2022-01-01 RX ADMIN — HEPARIN SODIUM 5000 UNITS: 5000 INJECTION, SOLUTION INTRAVENOUS; SUBCUTANEOUS at 21:46

## 2022-01-01 RX ADMIN — DEXTROSE MONOHYDRATE 300 ML: 100 INJECTION, SOLUTION INTRAVENOUS at 09:36

## 2022-01-01 RX ADMIN — MIDAZOLAM HYDROCHLORIDE 2 MG/HR: 1 INJECTION, SOLUTION INTRAVENOUS at 13:33

## 2022-01-01 RX ADMIN — SODIUM CHLORIDE 500 ML: 9 INJECTION, SOLUTION INTRAVENOUS at 16:30

## 2022-01-01 RX ADMIN — MEROPENEM 1 G: 1 INJECTION, POWDER, FOR SOLUTION INTRAVENOUS at 14:15

## 2022-01-01 RX ADMIN — ACETAMINOPHEN 500 MG: 500 TABLET ORAL at 07:26

## 2022-01-01 RX ADMIN — HYDROCORTISONE SODIUM SUCCINATE 50 MG: 100 INJECTION, POWDER, FOR SOLUTION INTRAMUSCULAR; INTRAVENOUS at 03:52

## 2022-01-01 RX ADMIN — ACETAMINOPHEN 500 MG: 500 TABLET ORAL at 14:38

## 2022-01-01 RX ADMIN — NAFCILLIN 2 G: 2 POWDER, FOR SOLUTION INTRAMUSCULAR; INTRAVENOUS at 04:45

## 2022-01-01 RX ADMIN — LINEZOLID 600 MG: 600 INJECTION, SOLUTION INTRAVENOUS at 16:29

## 2022-01-01 RX ADMIN — Medication 4 UNITS/HR: at 23:06

## 2022-01-01 RX ADMIN — FUROSEMIDE 40 MG: 10 INJECTION, SOLUTION INTRAVENOUS at 12:50

## 2022-01-01 RX ADMIN — ACETAMINOPHEN 500 MG: 500 TABLET ORAL at 17:11

## 2022-01-01 RX ADMIN — PANTOPRAZOLE SODIUM 40 MG: 40 TABLET, DELAYED RELEASE ORAL at 07:53

## 2022-01-01 RX ADMIN — NAFCILLIN 2 G: 2 POWDER, FOR SOLUTION INTRAMUSCULAR; INTRAVENOUS at 11:18

## 2022-01-01 RX ADMIN — CALCIUM GLUCONATE 1 G: 20 INJECTION, SOLUTION INTRAVENOUS at 09:06

## 2022-01-01 RX ADMIN — IPRATROPIUM BROMIDE AND ALBUTEROL SULFATE 3 ML: 2.5; .5 SOLUTION RESPIRATORY (INHALATION) at 16:33

## 2022-01-01 RX ADMIN — ACETAMINOPHEN 650 MG: 650 SUPPOSITORY RECTAL at 15:59

## 2022-01-01 RX ADMIN — POTASSIUM CHLORIDE 40 MEQ: 40 SOLUTION ORAL at 10:28

## 2022-01-01 RX ADMIN — ACETAMINOPHEN 500 MG: 500 TABLET ORAL at 01:35

## 2022-01-01 RX ADMIN — FUROSEMIDE 20 MG: 10 INJECTION, SOLUTION INTRAVENOUS at 14:18

## 2022-01-01 RX ADMIN — HUMAN INSULIN 8.11 UNITS: 100 INJECTION, SOLUTION SUBCUTANEOUS at 01:54

## 2022-01-01 RX ADMIN — SODIUM CHLORIDE, POTASSIUM CHLORIDE, SODIUM LACTATE AND CALCIUM CHLORIDE 2000 ML: 600; 310; 30; 20 INJECTION, SOLUTION INTRAVENOUS at 17:18

## 2022-01-01 RX ADMIN — ACETAMINOPHEN 500 MG: 500 TABLET ORAL at 04:15

## 2022-01-01 RX ADMIN — POTASSIUM CHLORIDE 10 MEQ: 750 TABLET, EXTENDED RELEASE ORAL at 16:17

## 2022-01-01 RX ADMIN — POTASSIUM CHLORIDE 10 MEQ: 7.46 INJECTION, SOLUTION INTRAVENOUS at 10:44

## 2022-01-01 RX ADMIN — NAFCILLIN 2 G: 2 POWDER, FOR SOLUTION INTRAMUSCULAR; INTRAVENOUS at 15:04

## 2022-01-01 RX ADMIN — ALBUTEROL SULFATE 2.5 MG: 2.5 SOLUTION RESPIRATORY (INHALATION) at 03:37

## 2022-01-01 RX ADMIN — GUAIFENESIN 15 ML: 200 SOLUTION ORAL at 13:16

## 2022-01-01 RX ADMIN — POTASSIUM CHLORIDE 10 MEQ: 7.46 INJECTION, SOLUTION INTRAVENOUS at 20:05

## 2022-01-01 RX ADMIN — DEXTROSE MONOHYDRATE 300 ML: 100 INJECTION, SOLUTION INTRAVENOUS at 13:48

## 2022-01-01 RX ADMIN — EPINEPHRINE 0.1 MCG/KG/MIN: 1 INJECTION INTRAMUSCULAR; INTRAVENOUS; SUBCUTANEOUS at 14:26

## 2022-01-01 RX ADMIN — LINEZOLID 600 MG: 600 INJECTION, SOLUTION INTRAVENOUS at 16:01

## 2022-01-01 RX ADMIN — FUROSEMIDE 40 MG: 10 INJECTION, SOLUTION INTRAVENOUS at 09:53

## 2022-01-01 RX ADMIN — POTASSIUM CHLORIDE 20 MEQ: 1.5 POWDER, FOR SOLUTION ORAL at 14:14

## 2022-01-01 RX ADMIN — TRIAMCINOLONE ACETONIDE: 1 OINTMENT TOPICAL at 20:00

## 2022-01-01 RX ADMIN — TAZOBACTAM SODIUM AND PIPERACILLIN SODIUM 4.5 G: 500; 4 INJECTION, SOLUTION INTRAVENOUS at 15:57

## 2022-01-01 RX ADMIN — IPRATROPIUM BROMIDE AND ALBUTEROL SULFATE 3 ML: 2.5; .5 SOLUTION RESPIRATORY (INHALATION) at 08:00

## 2022-01-01 RX ADMIN — TRIAMCINOLONE ACETONIDE: 1 OINTMENT TOPICAL at 07:56

## 2022-01-01 RX ADMIN — AMPICILLIN SODIUM 2 G: 2 INJECTION, POWDER, FOR SOLUTION INTRAMUSCULAR; INTRAVENOUS at 03:11

## 2022-01-01 RX ADMIN — CALCIUM GLUCONATE 1 G: 20 INJECTION, SOLUTION INTRAVENOUS at 23:18

## 2022-01-01 RX ADMIN — POTASSIUM CHLORIDE 10 MEQ: 7.46 INJECTION, SOLUTION INTRAVENOUS at 14:42

## 2022-01-01 RX ADMIN — ACETAMINOPHEN 500 MG: 500 TABLET ORAL at 07:53

## 2022-01-01 RX ADMIN — SODIUM CHLORIDE 100 ML: 9 INJECTION, SOLUTION INTRAVENOUS at 13:41

## 2022-01-01 RX ADMIN — Medication 4.8 UNITS/HR: at 02:40

## 2022-01-01 RX ADMIN — FUROSEMIDE 20 MG: 10 INJECTION, SOLUTION INTRAVENOUS at 12:07

## 2022-01-01 RX ADMIN — ACETAMINOPHEN 500 MG: 500 TABLET ORAL at 21:03

## 2022-01-01 RX ADMIN — DEXTROSE MONOHYDRATE 25 G: 25 INJECTION, SOLUTION INTRAVENOUS at 23:29

## 2022-01-01 RX ADMIN — MEROPENEM 1 G: 1 INJECTION, POWDER, FOR SOLUTION INTRAVENOUS at 02:11

## 2022-01-01 RX ADMIN — GUAIFENESIN 15 ML: 200 SOLUTION ORAL at 19:41

## 2022-01-01 RX ADMIN — Medication 4.8 UNITS/HR: at 21:19

## 2022-01-01 RX ADMIN — GUAIFENESIN 15 ML: 200 SOLUTION ORAL at 12:12

## 2022-01-01 RX ADMIN — Medication 25 MCG: at 14:16

## 2022-01-01 RX ADMIN — HEPARIN SODIUM 5000 UNITS: 5000 INJECTION, SOLUTION INTRAVENOUS; SUBCUTANEOUS at 00:59

## 2022-01-01 RX ADMIN — POTASSIUM CHLORIDE 10 MEQ: 7.46 INJECTION, SOLUTION INTRAVENOUS at 06:35

## 2022-01-01 RX ADMIN — Medication 0.35 MCG/KG/MIN: at 19:59

## 2022-01-01 RX ADMIN — HEPARIN SODIUM 5000 UNITS: 5000 INJECTION, SOLUTION INTRAVENOUS; SUBCUTANEOUS at 14:03

## 2022-01-01 RX ADMIN — IPRATROPIUM BROMIDE AND ALBUTEROL SULFATE 3 ML: 2.5; .5 SOLUTION RESPIRATORY (INHALATION) at 20:02

## 2022-01-01 RX ADMIN — LINEZOLID 600 MG: 600 INJECTION, SOLUTION INTRAVENOUS at 16:41

## 2022-01-01 RX ADMIN — MEROPENEM 1 G: 1 INJECTION, POWDER, FOR SOLUTION INTRAVENOUS at 14:49

## 2022-01-01 RX ADMIN — ACYCLOVIR SODIUM 800 MG: 1000 INJECTION, SOLUTION INTRAVENOUS at 06:32

## 2022-01-01 RX ADMIN — LINEZOLID 600 MG: 600 INJECTION, SOLUTION INTRAVENOUS at 03:33

## 2022-01-01 RX ADMIN — HYDROCORTISONE SODIUM SUCCINATE 50 MG: 100 INJECTION, POWDER, FOR SOLUTION INTRAMUSCULAR; INTRAVENOUS at 22:20

## 2022-01-01 RX ADMIN — ACETYLCYSTEINE 2 ML: 200 SOLUTION ORAL; RESPIRATORY (INHALATION) at 08:23

## 2022-01-01 RX ADMIN — LINEZOLID 600 MG: 600 INJECTION, SOLUTION INTRAVENOUS at 04:48

## 2022-01-01 RX ADMIN — HYDROCORTISONE SODIUM SUCCINATE 50 MG: 100 INJECTION, POWDER, FOR SOLUTION INTRAMUSCULAR; INTRAVENOUS at 21:53

## 2022-01-01 RX ADMIN — SODIUM CHLORIDE, POTASSIUM CHLORIDE, SODIUM LACTATE AND CALCIUM CHLORIDE 500 ML: 600; 310; 30; 20 INJECTION, SOLUTION INTRAVENOUS at 14:26

## 2022-01-01 RX ADMIN — PANTOPRAZOLE SODIUM 40 MG: 40 INJECTION, POWDER, FOR SOLUTION INTRAVENOUS at 08:33

## 2022-01-01 RX ADMIN — HEPARIN SODIUM 5000 UNITS: 5000 INJECTION, SOLUTION INTRAVENOUS; SUBCUTANEOUS at 12:13

## 2022-01-01 RX ADMIN — LINEZOLID 600 MG: 600 INJECTION, SOLUTION INTRAVENOUS at 15:53

## 2022-01-01 RX ADMIN — NAFCILLIN 2 G: 2 POWDER, FOR SOLUTION INTRAMUSCULAR; INTRAVENOUS at 23:44

## 2022-01-01 RX ADMIN — ACETAMINOPHEN 500 MG: 500 TABLET ORAL at 09:11

## 2022-01-01 RX ADMIN — ALBUTEROL SULFATE 2.5 MG: 2.5 SOLUTION RESPIRATORY (INHALATION) at 10:18

## 2022-01-01 RX ADMIN — DEXTROSE MONOHYDRATE 25 G: 25 INJECTION, SOLUTION INTRAVENOUS at 09:06

## 2022-01-01 RX ADMIN — GUAIFENESIN 15 ML: 200 SOLUTION ORAL at 08:22

## 2022-01-01 RX ADMIN — MIDAZOLAM 1 MG: 1 INJECTION INTRAMUSCULAR; INTRAVENOUS at 13:17

## 2022-01-01 RX ADMIN — SODIUM BICARBONATE 50 MEQ: 84 INJECTION INTRAVENOUS at 22:57

## 2022-01-01 RX ADMIN — HYDROCORTISONE SODIUM SUCCINATE 50 MG: 100 INJECTION, POWDER, FOR SOLUTION INTRAMUSCULAR; INTRAVENOUS at 15:53

## 2022-01-01 RX ADMIN — LINEZOLID 600 MG: 600 INJECTION, SOLUTION INTRAVENOUS at 04:10

## 2022-01-01 RX ADMIN — PANTOPRAZOLE SODIUM 40 MG: 40 INJECTION, POWDER, FOR SOLUTION INTRAVENOUS at 07:58

## 2022-01-01 RX ADMIN — POTASSIUM CHLORIDE 10 MEQ: 750 TABLET, EXTENDED RELEASE ORAL at 09:50

## 2022-01-01 RX ADMIN — LIDOCAINE HYDROCHLORIDE 10 ML: 10 INJECTION, SOLUTION EPIDURAL; INFILTRATION; INTRACAUDAL; PERINEURAL at 17:25

## 2022-01-01 RX ADMIN — MEROPENEM 1 G: 1 INJECTION, POWDER, FOR SOLUTION INTRAVENOUS at 13:43

## 2022-01-01 RX ADMIN — SODIUM CHLORIDE, POTASSIUM CHLORIDE, SODIUM LACTATE AND CALCIUM CHLORIDE 500 ML: 600; 310; 30; 20 INJECTION, SOLUTION INTRAVENOUS at 09:21

## 2022-01-01 RX ADMIN — MICAFUNGIN 100 MG: 10 INJECTION, POWDER, LYOPHILIZED, FOR SOLUTION INTRAVENOUS at 13:00

## 2022-01-01 RX ADMIN — ACETAMINOPHEN 500 MG: 500 TABLET ORAL at 23:58

## 2022-01-01 RX ADMIN — CEFTRIAXONE SODIUM 2 G: 2 INJECTION, POWDER, FOR SOLUTION INTRAMUSCULAR; INTRAVENOUS at 22:48

## 2022-01-01 RX ADMIN — TRIAMCINOLONE ACETONIDE: 1 OINTMENT TOPICAL at 19:55

## 2022-01-01 RX ADMIN — FUROSEMIDE 40 MG: 10 INJECTION, SOLUTION INTRAVENOUS at 11:34

## 2022-01-01 RX ADMIN — NAFCILLIN 2 G: 2 POWDER, FOR SOLUTION INTRAMUSCULAR; INTRAVENOUS at 19:43

## 2022-01-01 RX ADMIN — ACETYLCYSTEINE 2 ML: 200 SOLUTION ORAL; RESPIRATORY (INHALATION) at 08:00

## 2022-01-01 RX ADMIN — HEPARIN SODIUM 5000 UNITS: 5000 INJECTION, SOLUTION INTRAVENOUS; SUBCUTANEOUS at 00:55

## 2022-01-01 RX ADMIN — HEPARIN SODIUM 5000 UNITS: 5000 INJECTION, SOLUTION INTRAVENOUS; SUBCUTANEOUS at 00:34

## 2022-01-01 RX ADMIN — TRIAMCINOLONE ACETONIDE: 1 OINTMENT TOPICAL at 21:29

## 2022-01-01 RX ADMIN — TRIAMCINOLONE ACETONIDE: 1 OINTMENT TOPICAL at 21:03

## 2022-01-01 RX ADMIN — AMPICILLIN SODIUM 2 G: 2 INJECTION, POWDER, FOR SOLUTION INTRAMUSCULAR; INTRAVENOUS at 06:14

## 2022-01-01 RX ADMIN — HEPARIN SODIUM 5000 UNITS: 5000 INJECTION, SOLUTION INTRAVENOUS; SUBCUTANEOUS at 21:52

## 2022-01-01 RX ADMIN — PANTOPRAZOLE SODIUM 40 MG: 40 INJECTION, POWDER, FOR SOLUTION INTRAVENOUS at 08:53

## 2022-01-01 RX ADMIN — ALBUMIN (HUMAN) 12.5 G: 12.5 INJECTION, SOLUTION INTRAVENOUS at 17:25

## 2022-01-01 RX ADMIN — NAFCILLIN 2 G: 2 POWDER, FOR SOLUTION INTRAMUSCULAR; INTRAVENOUS at 19:19

## 2022-01-01 RX ADMIN — PANTOPRAZOLE SODIUM 40 MG: 40 TABLET, DELAYED RELEASE ORAL at 07:26

## 2022-01-01 RX ADMIN — CEFAZOLIN SODIUM 2 G: 2 INJECTION, SOLUTION INTRAVENOUS at 15:35

## 2022-01-01 RX ADMIN — NAFCILLIN 2 G: 2 POWDER, FOR SOLUTION INTRAMUSCULAR; INTRAVENOUS at 07:37

## 2022-01-01 RX ADMIN — HEPARIN SODIUM 5000 UNITS: 5000 INJECTION, SOLUTION INTRAVENOUS; SUBCUTANEOUS at 23:14

## 2022-01-01 RX ADMIN — GUAIFENESIN 600 MG: 600 TABLET ORAL at 19:50

## 2022-01-01 RX ADMIN — HEPARIN SODIUM 5000 UNITS: 5000 INJECTION, SOLUTION INTRAVENOUS; SUBCUTANEOUS at 22:50

## 2022-01-01 RX ADMIN — MEROPENEM 1 G: 1 INJECTION, POWDER, FOR SOLUTION INTRAVENOUS at 03:14

## 2022-01-01 RX ADMIN — TOBRAMYCIN INHALATION SOLUTION 300 MG: 300 INHALANT RESPIRATORY (INHALATION) at 07:59

## 2022-01-01 RX ADMIN — POTASSIUM CHLORIDE 20 MEQ: 750 TABLET, EXTENDED RELEASE ORAL at 04:45

## 2022-01-01 RX ADMIN — POTASSIUM PHOSPHATE, MONOBASIC POTASSIUM PHOSPHATE, DIBASIC 15 MMOL: 224; 236 INJECTION, SOLUTION, CONCENTRATE INTRAVENOUS at 16:56

## 2022-01-01 RX ADMIN — PANTOPRAZOLE SODIUM 40 MG: 40 INJECTION, POWDER, FOR SOLUTION INTRAVENOUS at 22:50

## 2022-01-01 RX ADMIN — ACETAMINOPHEN 500 MG: 500 TABLET ORAL at 00:31

## 2022-01-01 RX ADMIN — LINEZOLID 600 MG: 600 INJECTION, SOLUTION INTRAVENOUS at 15:41

## 2022-01-01 RX ADMIN — LINEZOLID 600 MG: 600 INJECTION, SOLUTION INTRAVENOUS at 04:02

## 2022-01-01 RX ADMIN — HEPARIN SODIUM 5000 UNITS: 5000 INJECTION, SOLUTION INTRAVENOUS; SUBCUTANEOUS at 02:12

## 2022-01-01 RX ADMIN — ALBUTEROL SULFATE 10 MG: 2.5 SOLUTION RESPIRATORY (INHALATION) at 06:03

## 2022-01-01 RX ADMIN — VASOPRESSIN 4.8 UNITS/HR: 20 INJECTION, SOLUTION INTRAMUSCULAR; SUBCUTANEOUS at 17:06

## 2022-01-01 RX ADMIN — FUROSEMIDE 40 MG: 10 INJECTION, SOLUTION INTRAVENOUS at 00:24

## 2022-01-01 RX ADMIN — HEPARIN SODIUM 5000 UNITS: 5000 INJECTION, SOLUTION INTRAVENOUS; SUBCUTANEOUS at 12:50

## 2022-01-01 RX ADMIN — GUAIFENESIN 15 ML: 200 SOLUTION ORAL at 08:53

## 2022-01-01 RX ADMIN — ALBUTEROL SULFATE 10 MG: 2.5 SOLUTION RESPIRATORY (INHALATION) at 02:43

## 2022-01-01 RX ADMIN — LINEZOLID 600 MG: 600 INJECTION, SOLUTION INTRAVENOUS at 16:58

## 2022-01-01 RX ADMIN — HUMAN INSULIN 8.11 UNITS: 100 INJECTION, SOLUTION SUBCUTANEOUS at 05:38

## 2022-01-01 RX ADMIN — Medication 50 MEQ: at 17:20

## 2022-01-01 RX ADMIN — SODIUM CHLORIDE, POTASSIUM CHLORIDE, SODIUM LACTATE AND CALCIUM CHLORIDE 1000 ML: 600; 310; 30; 20 INJECTION, SOLUTION INTRAVENOUS at 22:29

## 2022-01-01 RX ADMIN — TOBRAMYCIN INHALATION SOLUTION 300 MG: 300 INHALANT RESPIRATORY (INHALATION) at 21:47

## 2022-01-01 RX ADMIN — ADENOSINE 6 MG: 3 INJECTION INTRAVENOUS at 14:28

## 2022-01-01 RX ADMIN — POTASSIUM CHLORIDE 20 MEQ: 1.5 POWDER, FOR SOLUTION ORAL at 03:12

## 2022-01-01 RX ADMIN — Medication 0.34 MCG/KG/MIN: at 04:51

## 2022-01-01 RX ADMIN — CALCIUM GLUCONATE 2 G: 20 INJECTION, SOLUTION INTRAVENOUS at 20:17

## 2022-01-01 RX ADMIN — NAFCILLIN 2 G: 2 POWDER, FOR SOLUTION INTRAMUSCULAR; INTRAVENOUS at 03:36

## 2022-01-01 RX ADMIN — PANTOPRAZOLE SODIUM 40 MG: 40 TABLET, DELAYED RELEASE ORAL at 07:56

## 2022-01-01 RX ADMIN — SODIUM CHLORIDE 500 ML: 9 INJECTION, SOLUTION INTRAVENOUS at 21:46

## 2022-01-01 RX ADMIN — NAFCILLIN 2 G: 2 POWDER, FOR SOLUTION INTRAMUSCULAR; INTRAVENOUS at 12:04

## 2022-01-01 RX ADMIN — ALBUTEROL SULFATE 2.5 MG: 2.5 SOLUTION RESPIRATORY (INHALATION) at 20:37

## 2022-01-01 RX ADMIN — HUMAN INSULIN 3 UNITS/HR: 100 INJECTION, SOLUTION SUBCUTANEOUS at 06:12

## 2022-01-01 RX ADMIN — NALOXONE HYDROCHLORIDE 0.4 MG: 0.4 INJECTION, SOLUTION INTRAMUSCULAR; INTRAVENOUS; SUBCUTANEOUS at 01:37

## 2022-01-01 RX ADMIN — NAFCILLIN 2 G: 2 POWDER, FOR SOLUTION INTRAMUSCULAR; INTRAVENOUS at 23:55

## 2022-01-01 RX ADMIN — FENTANYL CITRATE 50 MCG: 50 INJECTION, SOLUTION INTRAMUSCULAR; INTRAVENOUS at 13:17

## 2022-01-01 RX ADMIN — FUROSEMIDE 20 MG: 10 INJECTION, SOLUTION INTRAVENOUS at 07:44

## 2022-01-01 RX ADMIN — ALBUTEROL SULFATE 10 MG: 2.5 SOLUTION RESPIRATORY (INHALATION) at 00:02

## 2022-01-01 RX ADMIN — CEFAZOLIN SODIUM 2 G: 2 INJECTION, SOLUTION INTRAVENOUS at 05:05

## 2022-01-01 RX ADMIN — NAFCILLIN 2 G: 2 POWDER, FOR SOLUTION INTRAMUSCULAR; INTRAVENOUS at 09:11

## 2022-01-01 RX ADMIN — Medication 4 UNITS/HR: at 14:00

## 2022-01-01 RX ADMIN — Medication 0.03 MCG/KG/MIN: at 21:36

## 2022-01-01 RX ADMIN — ACETAMINOPHEN 650 MG: 650 SUPPOSITORY RECTAL at 07:52

## 2022-01-01 RX ADMIN — NAFCILLIN 2 G: 2 POWDER, FOR SOLUTION INTRAMUSCULAR; INTRAVENOUS at 08:28

## 2022-01-01 RX ADMIN — HEPARIN SODIUM 5000 UNITS: 5000 INJECTION, SOLUTION INTRAVENOUS; SUBCUTANEOUS at 12:31

## 2022-01-01 RX ADMIN — HEPARIN SODIUM 5000 UNITS: 5000 INJECTION, SOLUTION INTRAVENOUS; SUBCUTANEOUS at 13:33

## 2022-01-01 RX ADMIN — LINEZOLID 600 MG: 600 INJECTION, SOLUTION INTRAVENOUS at 17:16

## 2022-01-01 RX ADMIN — SODIUM CHLORIDE 250 ML: 9 INJECTION, SOLUTION INTRAVENOUS at 08:49

## 2022-01-01 RX ADMIN — HEPARIN SODIUM 5000 UNITS: 5000 INJECTION, SOLUTION INTRAVENOUS; SUBCUTANEOUS at 10:06

## 2022-01-01 RX ADMIN — NAFCILLIN 2 G: 2 POWDER, FOR SOLUTION INTRAMUSCULAR; INTRAVENOUS at 16:23

## 2022-01-01 RX ADMIN — ACETAMINOPHEN 500 MG: 500 TABLET ORAL at 17:47

## 2022-01-01 RX ADMIN — SODIUM BICARBONATE 50 MEQ: 84 INJECTION INTRAVENOUS at 21:29

## 2022-01-01 RX ADMIN — NAFCILLIN 2 G: 2 POWDER, FOR SOLUTION INTRAMUSCULAR; INTRAVENOUS at 03:51

## 2022-01-01 RX ADMIN — SODIUM CHLORIDE, POTASSIUM CHLORIDE, SODIUM LACTATE AND CALCIUM CHLORIDE 1000 ML: 600; 310; 30; 20 INJECTION, SOLUTION INTRAVENOUS at 16:34

## 2022-01-01 RX ADMIN — HEPARIN SODIUM 5000 UNITS: 5000 INJECTION, SOLUTION INTRAVENOUS; SUBCUTANEOUS at 23:44

## 2022-01-01 RX ADMIN — ACETAMINOPHEN 500 MG: 500 TABLET ORAL at 02:08

## 2022-01-01 RX ADMIN — TRIAMCINOLONE ACETONIDE: 1 OINTMENT TOPICAL at 21:30

## 2022-01-01 RX ADMIN — NAFCILLIN 2 G: 2 POWDER, FOR SOLUTION INTRAMUSCULAR; INTRAVENOUS at 03:12

## 2022-01-01 RX ADMIN — SODIUM CHLORIDE, POTASSIUM CHLORIDE, SODIUM LACTATE AND CALCIUM CHLORIDE 1000 ML: 600; 310; 30; 20 INJECTION, SOLUTION INTRAVENOUS at 21:25

## 2022-01-01 RX ADMIN — IPRATROPIUM BROMIDE AND ALBUTEROL SULFATE 3 ML: 2.5; .5 SOLUTION RESPIRATORY (INHALATION) at 20:45

## 2022-01-01 RX ADMIN — ACETYLCYSTEINE 2 ML: 200 SOLUTION ORAL; RESPIRATORY (INHALATION) at 20:45

## 2022-01-01 RX ADMIN — HUMAN INSULIN 8 UNITS: 100 INJECTION, SOLUTION SUBCUTANEOUS at 23:30

## 2022-01-01 RX ADMIN — DEXTROSE MONOHYDRATE 300 ML: 100 INJECTION, SOLUTION INTRAVENOUS at 01:56

## 2022-01-01 RX ADMIN — PANTOPRAZOLE SODIUM 40 MG: 40 TABLET, DELAYED RELEASE ORAL at 08:10

## 2022-01-01 RX ADMIN — LINEZOLID 600 MG: 600 INJECTION, SOLUTION INTRAVENOUS at 04:00

## 2022-01-01 RX ADMIN — TRIAMCINOLONE ACETONIDE: 1 OINTMENT TOPICAL at 09:22

## 2022-01-01 RX ADMIN — HEPARIN SODIUM 5000 UNITS: 5000 INJECTION, SOLUTION INTRAVENOUS; SUBCUTANEOUS at 00:13

## 2022-01-01 RX ADMIN — HYDROCORTISONE SODIUM SUCCINATE 50 MG: 100 INJECTION, POWDER, FOR SOLUTION INTRAMUSCULAR; INTRAVENOUS at 16:30

## 2022-01-01 RX ADMIN — CEFTRIAXONE SODIUM 2 G: 2 INJECTION, POWDER, FOR SOLUTION INTRAMUSCULAR; INTRAVENOUS at 21:57

## 2022-01-01 RX ADMIN — GUAIFENESIN 15 ML: 200 SOLUTION ORAL at 07:56

## 2022-01-01 RX ADMIN — HYDROCORTISONE SODIUM SUCCINATE 50 MG: 100 INJECTION, POWDER, FOR SOLUTION INTRAMUSCULAR; INTRAVENOUS at 10:17

## 2022-01-01 RX ADMIN — PANTOPRAZOLE SODIUM 40 MG: 40 INJECTION, POWDER, FOR SOLUTION INTRAVENOUS at 07:59

## 2022-01-01 RX ADMIN — IOPAMIDOL 75 ML: 755 INJECTION, SOLUTION INTRAVENOUS at 13:41

## 2022-01-01 RX ADMIN — IPRATROPIUM BROMIDE AND ALBUTEROL SULFATE 3 ML: .5; 3 SOLUTION RESPIRATORY (INHALATION) at 09:26

## 2022-01-01 RX ADMIN — HUMAN INSULIN 8.1 UNITS: 100 INJECTION, SOLUTION SUBCUTANEOUS at 13:35

## 2022-01-01 RX ADMIN — POTASSIUM CHLORIDE 10 MEQ: 7.46 INJECTION, SOLUTION INTRAVENOUS at 18:58

## 2022-01-01 RX ADMIN — LIDOCAINE HYDROCHLORIDE 30 ML: 20 SOLUTION ORAL; TOPICAL at 13:04

## 2022-01-01 RX ADMIN — NAFCILLIN 2 G: 2 POWDER, FOR SOLUTION INTRAMUSCULAR; INTRAVENOUS at 12:44

## 2022-01-01 RX ADMIN — ACETAMINOPHEN 500 MG: 500 TABLET ORAL at 08:54

## 2022-01-01 RX ADMIN — POTASSIUM CHLORIDE 10 MEQ: 7.46 INJECTION, SOLUTION INTRAVENOUS at 09:12

## 2022-01-01 RX ADMIN — TRIAMCINOLONE ACETONIDE: 1 OINTMENT TOPICAL at 20:30

## 2022-01-01 RX ADMIN — TRIAMCINOLONE ACETONIDE: 1 OINTMENT TOPICAL at 11:35

## 2022-01-01 RX ADMIN — SODIUM CHLORIDE 1000 ML: 9 INJECTION, SOLUTION INTRAVENOUS at 16:05

## 2022-01-01 RX ADMIN — DEXTROSE MONOHYDRATE 300 ML: 100 INJECTION, SOLUTION INTRAVENOUS at 23:18

## 2022-01-01 RX ADMIN — TRIAMCINOLONE ACETONIDE: 1 OINTMENT TOPICAL at 08:46

## 2022-01-01 RX ADMIN — GUAIFENESIN 15 ML: 200 SOLUTION ORAL at 10:24

## 2022-01-01 RX ADMIN — Medication 50 MCG/HR: at 13:34

## 2022-01-01 RX ADMIN — HYDROCORTISONE SODIUM SUCCINATE 50 MG: 100 INJECTION, POWDER, FOR SOLUTION INTRAMUSCULAR; INTRAVENOUS at 09:47

## 2022-01-01 RX ADMIN — IPRATROPIUM BROMIDE AND ALBUTEROL SULFATE 3 ML: 2.5; .5 SOLUTION RESPIRATORY (INHALATION) at 21:43

## 2022-01-01 RX ADMIN — POTASSIUM CHLORIDE 10 MEQ: 7.46 INJECTION, SOLUTION INTRAVENOUS at 07:45

## 2022-01-01 RX ADMIN — Medication 25 MCG: at 05:15

## 2022-01-01 RX ADMIN — LINEZOLID 600 MG: 600 INJECTION, SOLUTION INTRAVENOUS at 04:24

## 2022-01-01 RX ADMIN — IPRATROPIUM BROMIDE AND ALBUTEROL SULFATE 3 ML: .5; 3 SOLUTION RESPIRATORY (INHALATION) at 00:52

## 2022-01-01 RX ADMIN — IPRATROPIUM BROMIDE AND ALBUTEROL SULFATE 3 ML: 2.5; .5 SOLUTION RESPIRATORY (INHALATION) at 21:47

## 2022-01-01 RX ADMIN — MEROPENEM 1 G: 1 INJECTION, POWDER, FOR SOLUTION INTRAVENOUS at 13:28

## 2022-01-01 RX ADMIN — PANTOPRAZOLE SODIUM 40 MG: 40 TABLET, DELAYED RELEASE ORAL at 08:27

## 2022-01-01 RX ADMIN — IOPAMIDOL 100 ML: 755 INJECTION, SOLUTION INTRAVENOUS at 13:26

## 2022-01-01 RX ADMIN — ROCURONIUM BROMIDE 100 MG: 50 INJECTION, SOLUTION INTRAVENOUS at 13:25

## 2022-01-01 RX ADMIN — ACETYLCYSTEINE 4 ML: 100 SOLUTION ORAL; RESPIRATORY (INHALATION) at 00:38

## 2022-01-01 RX ADMIN — LINEZOLID 600 MG: 600 INJECTION, SOLUTION INTRAVENOUS at 05:00

## 2022-01-01 RX ADMIN — CALCIUM GLUCONATE 2 G: 20 INJECTION, SOLUTION INTRAVENOUS at 23:06

## 2022-01-01 RX ADMIN — Medication 3 UNITS/HR: at 23:54

## 2022-01-01 RX ADMIN — GENTAMICIN SULFATE 220 MG: 40 INJECTION, SOLUTION INTRAMUSCULAR; INTRAVENOUS at 09:04

## 2022-01-01 RX ADMIN — PANTOPRAZOLE SODIUM 40 MG: 40 TABLET, DELAYED RELEASE ORAL at 08:47

## 2022-01-01 RX ADMIN — DEXTROSE MONOHYDRATE 25 ML: 25 INJECTION, SOLUTION INTRAVENOUS at 23:14

## 2022-01-01 RX ADMIN — LINEZOLID 600 MG: 600 INJECTION, SOLUTION INTRAVENOUS at 05:41

## 2022-01-01 RX ADMIN — MEROPENEM 1 G: 1 INJECTION, POWDER, FOR SOLUTION INTRAVENOUS at 01:46

## 2022-01-01 RX ADMIN — ACETAMINOPHEN 500 MG: 500 TABLET ORAL at 10:58

## 2022-01-01 RX ADMIN — TRIAMCINOLONE ACETONIDE: 1 OINTMENT TOPICAL at 19:50

## 2022-01-01 RX ADMIN — PANTOPRAZOLE SODIUM 40 MG: 40 INJECTION, POWDER, FOR SOLUTION INTRAVENOUS at 08:21

## 2022-01-01 RX ADMIN — HYDROCORTISONE SODIUM SUCCINATE 50 MG: 100 INJECTION, POWDER, FOR SOLUTION INTRAMUSCULAR; INTRAVENOUS at 16:18

## 2022-01-01 RX ADMIN — MEROPENEM 1000 MG: 1 INJECTION, POWDER, FOR SOLUTION INTRAVENOUS at 03:52

## 2022-01-01 RX ADMIN — TRIAMCINOLONE ACETONIDE: 1 OINTMENT TOPICAL at 19:42

## 2022-01-01 RX ADMIN — ACETAMINOPHEN 500 MG: 500 TABLET ORAL at 08:27

## 2022-01-01 RX ADMIN — LINEZOLID 600 MG: 600 INJECTION, SOLUTION INTRAVENOUS at 04:50

## 2022-01-01 RX ADMIN — Medication 4.8 UNITS/HR: at 06:48

## 2022-01-01 RX ADMIN — IPRATROPIUM BROMIDE AND ALBUTEROL SULFATE 3 ML: 2.5; .5 SOLUTION RESPIRATORY (INHALATION) at 09:18

## 2022-01-01 RX ADMIN — HYDROMORPHONE HYDROCHLORIDE 0.5 MG: 1 INJECTION, SOLUTION INTRAMUSCULAR; INTRAVENOUS; SUBCUTANEOUS at 23:40

## 2022-01-01 RX ADMIN — SODIUM CHLORIDE 500 ML: 9 INJECTION, SOLUTION INTRAVENOUS at 02:58

## 2022-01-01 RX ADMIN — ACETAMINOPHEN 500 MG: 500 TABLET ORAL at 15:19

## 2022-01-01 RX ADMIN — CEFAZOLIN SODIUM 2 G: 2 INJECTION, SOLUTION INTRAVENOUS at 10:31

## 2022-01-01 RX ADMIN — SODIUM CHLORIDE 1000 ML: 9 INJECTION, SOLUTION INTRAVENOUS at 23:45

## 2022-01-01 RX ADMIN — POTASSIUM CHLORIDE 20 MEQ: 750 TABLET, EXTENDED RELEASE ORAL at 11:02

## 2022-01-01 RX ADMIN — GUAIFENESIN 600 MG: 600 TABLET ORAL at 08:10

## 2022-01-01 RX ADMIN — POTASSIUM CHLORIDE 20 MEQ: 1.5 POWDER, FOR SOLUTION ORAL at 08:27

## 2022-01-01 RX ADMIN — GUAIFENESIN 15 ML: 200 SOLUTION ORAL at 15:59

## 2022-01-01 RX ADMIN — ACETYLCYSTEINE 2 ML: 200 SOLUTION ORAL; RESPIRATORY (INHALATION) at 03:37

## 2022-01-01 RX ADMIN — TRIAMCINOLONE ACETONIDE: 1 OINTMENT TOPICAL at 08:19

## 2022-01-01 RX ADMIN — CIPROFLOXACIN 400 MG: 2 INJECTION, SOLUTION INTRAVENOUS at 12:35

## 2022-01-01 RX ADMIN — DICLOFENAC SODIUM 2 G: 10 GEL TOPICAL at 17:33

## 2022-01-01 RX ADMIN — CALCIUM GLUCONATE 2 G: 20 INJECTION, SOLUTION INTRAVENOUS at 22:24

## 2022-01-01 RX ADMIN — CEFAZOLIN SODIUM 2 G: 2 INJECTION, SOLUTION INTRAVENOUS at 03:12

## 2022-01-01 RX ADMIN — HUMAN INSULIN 8 UNITS: 100 INJECTION, SOLUTION SUBCUTANEOUS at 22:57

## 2022-01-01 RX ADMIN — LINEZOLID 600 MG: 600 INJECTION, SOLUTION INTRAVENOUS at 15:48

## 2022-01-01 RX ADMIN — EPINEPHRINE 0.18 MCG/KG/MIN: 1 INJECTION INTRAMUSCULAR; INTRAVENOUS; SUBCUTANEOUS at 11:21

## 2022-01-01 RX ADMIN — VASOPRESSIN 2.4 UNITS/HR: 20 INJECTION, SOLUTION INTRAMUSCULAR; SUBCUTANEOUS at 11:38

## 2022-01-01 RX ADMIN — HEPARIN SODIUM 5000 UNITS: 5000 INJECTION, SOLUTION INTRAVENOUS; SUBCUTANEOUS at 13:28

## 2022-01-01 RX ADMIN — HUMAN INSULIN 10 UNITS: 100 INJECTION, SOLUTION SUBCUTANEOUS at 09:05

## 2022-01-01 RX ADMIN — LINEZOLID 600 MG: 600 INJECTION, SOLUTION INTRAVENOUS at 04:03

## 2022-01-01 RX ADMIN — NAFCILLIN 2 G: 2 POWDER, FOR SOLUTION INTRAMUSCULAR; INTRAVENOUS at 16:24

## 2022-01-01 RX ADMIN — CEFTRIAXONE SODIUM 2 G: 2 INJECTION, POWDER, FOR SOLUTION INTRAMUSCULAR; INTRAVENOUS at 11:20

## 2022-01-01 RX ADMIN — HEPARIN SODIUM 5000 UNITS: 5000 INJECTION, SOLUTION INTRAVENOUS; SUBCUTANEOUS at 12:35

## 2022-01-01 RX ADMIN — ACETAMINOPHEN 500 MG: 500 TABLET ORAL at 15:36

## 2022-01-01 RX ADMIN — DEXTROSE MONOHYDRATE 25 G: 25 INJECTION, SOLUTION INTRAVENOUS at 22:57

## 2022-01-01 RX ADMIN — SODIUM CHLORIDE, POTASSIUM CHLORIDE, SODIUM LACTATE AND CALCIUM CHLORIDE 250 ML: 600; 310; 30; 20 INJECTION, SOLUTION INTRAVENOUS at 06:16

## 2022-01-01 RX ADMIN — HYDROCORTISONE SODIUM SUCCINATE 50 MG: 100 INJECTION, POWDER, FOR SOLUTION INTRAMUSCULAR; INTRAVENOUS at 03:58

## 2022-01-01 RX ADMIN — ACETAMINOPHEN 500 MG: 500 TABLET ORAL at 16:13

## 2022-01-01 RX ADMIN — METOPROLOL TARTRATE 5 MG: 1 INJECTION, SOLUTION INTRAVENOUS at 16:44

## 2022-01-01 RX ADMIN — SODIUM CHLORIDE, POTASSIUM CHLORIDE, SODIUM LACTATE AND CALCIUM CHLORIDE 1000 ML: 600; 310; 30; 20 INJECTION, SOLUTION INTRAVENOUS at 17:57

## 2022-01-01 RX ADMIN — SODIUM BICARBONATE: 84 INJECTION, SOLUTION INTRAVENOUS at 01:27

## 2022-01-01 RX ADMIN — ACETYLCYSTEINE 2 ML: 200 SOLUTION ORAL; RESPIRATORY (INHALATION) at 20:37

## 2022-01-01 RX ADMIN — IPRATROPIUM BROMIDE AND ALBUTEROL SULFATE 3 ML: .5; 3 SOLUTION RESPIRATORY (INHALATION) at 00:38

## 2022-01-01 RX ADMIN — EPINEPHRINE 0.03 MCG/KG/MIN: 1 INJECTION INTRAMUSCULAR; INTRAVENOUS; SUBCUTANEOUS at 13:23

## 2022-01-01 RX ADMIN — ACETAMINOPHEN 500 MG: 500 TABLET ORAL at 16:08

## 2022-01-01 RX ADMIN — MEROPENEM 1 G: 1 INJECTION, POWDER, FOR SOLUTION INTRAVENOUS at 13:56

## 2022-01-01 RX ADMIN — DEXTROSE MONOHYDRATE 300 ML: 100 INJECTION, SOLUTION INTRAVENOUS at 05:30

## 2022-01-01 RX ADMIN — NAFCILLIN 2 G: 2 POWDER, FOR SOLUTION INTRAMUSCULAR; INTRAVENOUS at 03:32

## 2022-01-01 RX ADMIN — Medication 25 MCG: at 15:22

## 2022-01-01 RX ADMIN — DICLOFENAC SODIUM 2 G: 10 GEL TOPICAL at 03:29

## 2022-01-01 RX ADMIN — DEXTROSE MONOHYDRATE 300 ML: 100 INJECTION, SOLUTION INTRAVENOUS at 23:57

## 2022-01-01 RX ADMIN — ACETAMINOPHEN 500 MG: 500 TABLET ORAL at 21:36

## 2022-01-01 RX ADMIN — CEFAZOLIN SODIUM 2 G: 2 INJECTION, SOLUTION INTRAVENOUS at 22:06

## 2022-01-01 RX ADMIN — IPRATROPIUM BROMIDE AND ALBUTEROL SULFATE 3 ML: 2.5; .5 SOLUTION RESPIRATORY (INHALATION) at 08:23

## 2022-01-01 RX ADMIN — SODIUM CHLORIDE 500 ML: 9 INJECTION, SOLUTION INTRAVENOUS at 23:28

## 2022-01-01 RX ADMIN — MEROPENEM 1 G: 1 INJECTION, POWDER, FOR SOLUTION INTRAVENOUS at 14:12

## 2022-01-01 RX ADMIN — HEPARIN SODIUM 5000 UNITS: 5000 INJECTION, SOLUTION INTRAVENOUS; SUBCUTANEOUS at 13:21

## 2022-01-01 RX ADMIN — CALCIUM GLUCONATE 2 G: 20 INJECTION, SOLUTION INTRAVENOUS at 11:28

## 2022-01-01 RX ADMIN — LINEZOLID 600 MG: 600 INJECTION, SOLUTION INTRAVENOUS at 03:59

## 2022-01-01 RX ADMIN — ALBUMIN (HUMAN) 12.5 G: 12.5 INJECTION, SOLUTION INTRAVENOUS at 18:29

## 2022-01-01 RX ADMIN — ONDANSETRON 4 MG: 2 INJECTION INTRAMUSCULAR; INTRAVENOUS at 07:59

## 2022-01-01 RX ADMIN — MAGNESIUM OXIDE TAB 400 MG (241.3 MG ELEMENTAL MG) 400 MG: 400 (241.3 MG) TAB at 09:51

## 2022-01-01 RX ADMIN — METRONIDAZOLE 500 MG: 500 INJECTION, SOLUTION INTRAVENOUS at 07:00

## 2022-01-01 RX ADMIN — SODIUM BICARBONATE 50 MEQ: 84 INJECTION INTRAVENOUS at 17:20

## 2022-01-01 RX ADMIN — SODIUM CHLORIDE, POTASSIUM CHLORIDE, SODIUM LACTATE AND CALCIUM CHLORIDE 1000 ML: 600; 310; 30; 20 INJECTION, SOLUTION INTRAVENOUS at 14:26

## 2022-01-01 RX ADMIN — CALCIUM GLUCONATE 1 G: 20 INJECTION, SOLUTION INTRAVENOUS at 01:48

## 2022-01-01 RX ADMIN — MEROPENEM 1000 MG: 1 INJECTION, POWDER, FOR SOLUTION INTRAVENOUS at 16:20

## 2022-01-01 RX ADMIN — FUROSEMIDE 40 MG: 10 INJECTION, SOLUTION INTRAVENOUS at 10:23

## 2022-01-01 RX ADMIN — TOBRAMYCIN INHALATION SOLUTION 300 MG: 300 INHALANT RESPIRATORY (INHALATION) at 13:05

## 2022-01-01 RX ADMIN — HYDROMORPHONE HYDROCHLORIDE 0.5 MG: 1 INJECTION, SOLUTION INTRAMUSCULAR; INTRAVENOUS; SUBCUTANEOUS at 07:11

## 2022-01-01 RX ADMIN — AMPICILLIN SODIUM 2 G: 2 INJECTION, POWDER, FOR SOLUTION INTRAMUSCULAR; INTRAVENOUS at 23:20

## 2022-01-01 RX ADMIN — FUROSEMIDE 60 MG: 10 INJECTION, SOLUTION INTRAVENOUS at 05:29

## 2022-01-01 RX ADMIN — MICAFUNGIN 100 MG: 10 INJECTION, POWDER, LYOPHILIZED, FOR SOLUTION INTRAVENOUS at 13:25

## 2022-01-01 RX ADMIN — NAFCILLIN 2 G: 2 POWDER, FOR SOLUTION INTRAMUSCULAR; INTRAVENOUS at 15:58

## 2022-01-01 RX ADMIN — LINEZOLID 600 MG: 600 INJECTION, SOLUTION INTRAVENOUS at 16:17

## 2022-01-01 RX ADMIN — GUAIFENESIN 15 ML: 200 SOLUTION ORAL at 12:22

## 2022-01-01 RX ADMIN — SODIUM BICARBONATE 50 MEQ: 84 INJECTION INTRAVENOUS at 21:21

## 2022-01-01 RX ADMIN — POTASSIUM CHLORIDE 20 MEQ: 750 TABLET, EXTENDED RELEASE ORAL at 21:36

## 2022-01-01 RX ADMIN — SODIUM BICARBONATE: 84 INJECTION, SOLUTION INTRAVENOUS at 20:18

## 2022-01-01 RX ADMIN — CEFAZOLIN SODIUM 2 G: 2 INJECTION, SOLUTION INTRAVENOUS at 04:07

## 2022-01-01 RX ADMIN — LINEZOLID 600 MG: 600 INJECTION, SOLUTION INTRAVENOUS at 15:57

## 2022-01-01 RX ADMIN — NAFCILLIN 2 G: 2 POWDER, FOR SOLUTION INTRAMUSCULAR; INTRAVENOUS at 20:05

## 2022-01-01 RX ADMIN — CEFAZOLIN SODIUM 2 G: 2 INJECTION, SOLUTION INTRAVENOUS at 12:08

## 2022-01-01 RX ADMIN — LINEZOLID 600 MG: 600 INJECTION, SOLUTION INTRAVENOUS at 04:55

## 2022-01-01 RX ADMIN — GUAIFENESIN 15 ML: 200 SOLUTION ORAL at 12:31

## 2022-01-01 RX ADMIN — PANTOPRAZOLE SODIUM 40 MG: 40 TABLET, DELAYED RELEASE ORAL at 09:11

## 2022-01-01 RX ADMIN — NALOXONE HYDROCHLORIDE 0.4 MG: 0.4 INJECTION, SOLUTION INTRAMUSCULAR; INTRAVENOUS; SUBCUTANEOUS at 03:51

## 2022-01-01 RX ADMIN — HEPARIN SODIUM 5000 UNITS: 5000 INJECTION, SOLUTION INTRAVENOUS; SUBCUTANEOUS at 00:18

## 2022-01-01 RX ADMIN — FENTANYL CITRATE 40.5 MCG: 50 INJECTION, SOLUTION INTRAMUSCULAR; INTRAVENOUS at 04:17

## 2022-01-01 RX ADMIN — GUAIFENESIN 15 ML: 200 SOLUTION ORAL at 21:03

## 2022-01-01 RX ADMIN — MEROPENEM 1 G: 1 INJECTION, POWDER, FOR SOLUTION INTRAVENOUS at 01:36

## 2022-01-01 RX ADMIN — FUROSEMIDE 40 MG: 10 INJECTION, SOLUTION INTRAVENOUS at 09:30

## 2022-01-01 RX ADMIN — TRIAMCINOLONE ACETONIDE: 1 OINTMENT TOPICAL at 07:27

## 2022-01-01 RX ADMIN — METRONIDAZOLE 500 MG: 500 INJECTION, SOLUTION INTRAVENOUS at 20:49

## 2022-01-01 RX ADMIN — NAFCILLIN 2 G: 2 POWDER, FOR SOLUTION INTRAMUSCULAR; INTRAVENOUS at 23:40

## 2022-01-01 RX ADMIN — ACETAMINOPHEN 500 MG: 500 TABLET ORAL at 12:57

## 2022-01-01 RX ADMIN — NAFCILLIN 2 G: 2 POWDER, FOR SOLUTION INTRAMUSCULAR; INTRAVENOUS at 11:45

## 2022-01-01 RX ADMIN — HEPARIN SODIUM 5000 UNITS: 5000 INJECTION, SOLUTION INTRAVENOUS; SUBCUTANEOUS at 10:16

## 2022-01-01 RX ADMIN — LINEZOLID 600 MG: 600 INJECTION, SOLUTION INTRAVENOUS at 16:34

## 2022-01-01 RX ADMIN — NAFCILLIN 2 G: 2 POWDER, FOR SOLUTION INTRAMUSCULAR; INTRAVENOUS at 07:57

## 2022-01-01 RX ADMIN — ATROPINE SULFATE 1 DROP: 10 SOLUTION/ DROPS OPHTHALMIC at 04:34

## 2022-01-01 RX ADMIN — NAFCILLIN 2 G: 2 POWDER, FOR SOLUTION INTRAMUSCULAR; INTRAVENOUS at 23:56

## 2022-01-01 RX ADMIN — CEFAZOLIN SODIUM 2 G: 2 INJECTION, SOLUTION INTRAVENOUS at 20:34

## 2022-01-01 RX ADMIN — ALBUTEROL SULFATE 2.5 MG: 2.5 SOLUTION RESPIRATORY (INHALATION) at 06:52

## 2022-01-01 RX ADMIN — ACETYLCYSTEINE 2 ML: 200 SOLUTION ORAL; RESPIRATORY (INHALATION) at 08:33

## 2022-01-01 RX ADMIN — CEFTRIAXONE SODIUM 2 G: 2 INJECTION, POWDER, FOR SOLUTION INTRAMUSCULAR; INTRAVENOUS at 10:41

## 2022-01-01 RX ADMIN — MEROPENEM 1 G: 1 INJECTION, POWDER, FOR SOLUTION INTRAVENOUS at 02:15

## 2022-01-01 RX ADMIN — SODIUM BICARBONATE 50 MEQ: 84 INJECTION INTRAVENOUS at 00:53

## 2022-01-01 RX ADMIN — SODIUM BICARBONATE 50 MEQ: 84 INJECTION INTRAVENOUS at 01:09

## 2022-01-01 RX ADMIN — Medication 100 MCG/HR: at 04:07

## 2022-01-01 RX ADMIN — SODIUM BICARBONATE: 84 INJECTION, SOLUTION INTRAVENOUS at 08:07

## 2022-01-01 RX ADMIN — MULTIVITAMIN 15 ML: LIQUID ORAL at 16:31

## 2022-01-01 RX ADMIN — MEROPENEM 1 G: 1 INJECTION, POWDER, FOR SOLUTION INTRAVENOUS at 14:09

## 2022-01-01 RX ADMIN — GUAIFENESIN 15 ML: 200 SOLUTION ORAL at 08:20

## 2022-01-01 RX ADMIN — MEROPENEM 1 G: 1 INJECTION, POWDER, FOR SOLUTION INTRAVENOUS at 03:22

## 2022-01-01 RX ADMIN — TRIAMCINOLONE ACETONIDE: 1 OINTMENT TOPICAL at 08:36

## 2022-01-01 RX ADMIN — POTASSIUM CHLORIDE 10 MEQ: 7.46 INJECTION, SOLUTION INTRAVENOUS at 09:03

## 2022-01-01 RX ADMIN — NAFCILLIN 2 G: 2 POWDER, FOR SOLUTION INTRAMUSCULAR; INTRAVENOUS at 08:38

## 2022-01-01 RX ADMIN — CIPROFLOXACIN 400 MG: 2 INJECTION, SOLUTION INTRAVENOUS at 12:46

## 2022-01-01 RX ADMIN — FUROSEMIDE 20 MG: 10 INJECTION, SOLUTION INTRAVENOUS at 10:56

## 2022-01-01 RX ADMIN — EPINEPHRINE 0.2 MCG/KG/MIN: 1 INJECTION INTRAMUSCULAR; INTRAVENOUS; SUBCUTANEOUS at 19:31

## 2022-01-01 RX ADMIN — TRIAMCINOLONE ACETONIDE: 1 OINTMENT TOPICAL at 20:43

## 2022-01-01 RX ADMIN — PANTOPRAZOLE SODIUM 40 MG: 40 INJECTION, POWDER, FOR SOLUTION INTRAVENOUS at 07:37

## 2022-01-01 RX ADMIN — ACETAMINOPHEN 500 MG: 500 TABLET ORAL at 12:13

## 2022-01-01 RX ADMIN — DEXTROSE MONOHYDRATE 25 G: 25 INJECTION, SOLUTION INTRAVENOUS at 01:54

## 2022-01-01 RX ADMIN — NAFCILLIN 2 G: 2 POWDER, FOR SOLUTION INTRAMUSCULAR; INTRAVENOUS at 23:15

## 2022-01-01 RX ADMIN — ALBUMIN (HUMAN) 12.5 G: 12.5 INJECTION, SOLUTION INTRAVENOUS at 05:47

## 2022-01-01 RX ADMIN — OLANZAPINE 5 MG: 5 TABLET, ORALLY DISINTEGRATING ORAL at 21:50

## 2022-01-01 RX ADMIN — TOBRAMYCIN INHALATION SOLUTION 300 MG: 300 INHALANT RESPIRATORY (INHALATION) at 20:37

## 2022-01-01 RX ADMIN — HEPARIN SODIUM 5000 UNITS: 5000 INJECTION, SOLUTION INTRAVENOUS; SUBCUTANEOUS at 02:16

## 2022-01-01 RX ADMIN — GUAIFENESIN 600 MG: 600 TABLET ORAL at 03:28

## 2022-01-01 RX ADMIN — IPRATROPIUM BROMIDE AND ALBUTEROL SULFATE 3 ML: 2.5; .5 SOLUTION RESPIRATORY (INHALATION) at 08:33

## 2022-01-01 RX ADMIN — ETOMIDATE 20 MG: 40 INJECTION, SOLUTION INTRAVENOUS at 13:25

## 2022-01-01 RX ADMIN — SODIUM CHLORIDE 500 ML: 9 INJECTION, SOLUTION INTRAVENOUS at 14:59

## 2022-01-01 RX ADMIN — SODIUM CHLORIDE 500 ML: 9 INJECTION, SOLUTION INTRAVENOUS at 05:10

## 2022-01-01 RX ADMIN — CIPROFLOXACIN 400 MG: 2 INJECTION, SOLUTION INTRAVENOUS at 13:22

## 2022-01-01 RX ADMIN — GUAIFENESIN 15 ML: 200 SOLUTION ORAL at 20:10

## 2022-01-01 RX ADMIN — GUAIFENESIN 15 ML: 200 SOLUTION ORAL at 07:26

## 2022-01-01 RX ADMIN — ALBUTEROL SULFATE 2 PUFF: 90 AEROSOL, METERED RESPIRATORY (INHALATION) at 20:20

## 2022-01-01 RX ADMIN — ACETYLCYSTEINE 2 ML: 200 SOLUTION ORAL; RESPIRATORY (INHALATION) at 20:02

## 2022-01-01 RX ADMIN — HUMAN INSULIN 2 UNITS/HR: 100 INJECTION, SOLUTION SUBCUTANEOUS at 14:11

## 2022-01-01 RX ADMIN — IPRATROPIUM BROMIDE AND ALBUTEROL SULFATE 3 ML: .5; 3 SOLUTION RESPIRATORY (INHALATION) at 11:22

## 2022-01-01 RX ADMIN — ACETAMINOPHEN 650 MG: 325 SUPPOSITORY RECTAL at 15:35

## 2022-01-01 RX ADMIN — ACETAMINOPHEN 650 MG: 325 SUPPOSITORY RECTAL at 20:54

## 2022-01-01 RX ADMIN — GLYCOPYRROLATE 0.2 MG: 0.2 INJECTION INTRAMUSCULAR; INTRAVENOUS at 02:50

## 2022-01-01 RX ADMIN — HEPARIN SODIUM 5000 UNITS: 5000 INJECTION, SOLUTION INTRAVENOUS; SUBCUTANEOUS at 00:02

## 2022-01-01 RX ADMIN — NAFCILLIN 2 G: 2 POWDER, FOR SOLUTION INTRAMUSCULAR; INTRAVENOUS at 20:32

## 2022-01-01 RX ADMIN — PANTOPRAZOLE SODIUM 40 MG: 40 INJECTION, POWDER, FOR SOLUTION INTRAVENOUS at 08:35

## 2022-01-01 RX ADMIN — GUAIFENESIN 15 ML: 200 SOLUTION ORAL at 20:18

## 2022-01-01 ASSESSMENT — ACTIVITIES OF DAILY LIVING (ADL)
ADLS_ACUITY_SCORE: 45
ADLS_ACUITY_SCORE: 45
ADLS_ACUITY_SCORE: 47
ADLS_ACUITY_SCORE: 49
DOING_ERRANDS_INDEPENDENTLY_DIFFICULTY: OTHER (SEE COMMENTS)
ADLS_ACUITY_SCORE: 42
ADLS_ACUITY_SCORE: 53
ADLS_ACUITY_SCORE: 45
ADLS_ACUITY_SCORE: 45
ADLS_ACUITY_SCORE: 49
ADLS_ACUITY_SCORE: 53
ADLS_ACUITY_SCORE: 47
ADLS_ACUITY_SCORE: 49
ADLS_ACUITY_SCORE: 42
ADLS_ACUITY_SCORE: 53
ADLS_ACUITY_SCORE: 43
ADLS_ACUITY_SCORE: 42
EQUIPMENT_CURRENTLY_USED_AT_HOME: OTHER (SEE COMMENTS)
ADLS_ACUITY_SCORE: 53
ADLS_ACUITY_SCORE: 43
ADLS_ACUITY_SCORE: 47
ADLS_ACUITY_SCORE: 53
ADLS_ACUITY_SCORE: 49
ADLS_ACUITY_SCORE: 43
ADLS_ACUITY_SCORE: 49
ADLS_ACUITY_SCORE: 45
ADLS_ACUITY_SCORE: 47
DRESSING/BATHING_DIFFICULTY: OTHER (SEE COMMENTS)
ADLS_ACUITY_SCORE: 35
ADLS_ACUITY_SCORE: 45
ADLS_ACUITY_SCORE: 49
ADLS_ACUITY_SCORE: 45
ADLS_ACUITY_SCORE: 49
ADLS_ACUITY_SCORE: 43
ADLS_ACUITY_SCORE: 45
ADLS_ACUITY_SCORE: 49
CONCENTRATING,_REMEMBERING_OR_MAKING_DECISIONS_DIFFICULTY: OTHER (SEE COMMENTS)
ADLS_ACUITY_SCORE: 49
ADLS_ACUITY_SCORE: 42
ADLS_ACUITY_SCORE: 45
ADLS_ACUITY_SCORE: 42
ADLS_ACUITY_SCORE: 53
ADLS_ACUITY_SCORE: 53
ADLS_ACUITY_SCORE: 42
ADLS_ACUITY_SCORE: 45
ADLS_ACUITY_SCORE: 49
ADLS_ACUITY_SCORE: 43
ADLS_ACUITY_SCORE: 45
ADLS_ACUITY_SCORE: 49
ADLS_ACUITY_SCORE: 42
ADLS_ACUITY_SCORE: 47
ADLS_ACUITY_SCORE: 43
ADLS_ACUITY_SCORE: 42
ADLS_ACUITY_SCORE: 43
ADLS_ACUITY_SCORE: 46
ADLS_ACUITY_SCORE: 43
DIFFICULTY_EATING/SWALLOWING: OTHER (SEE COMMENTS)
ADLS_ACUITY_SCORE: 47
ADLS_ACUITY_SCORE: 49
ADLS_ACUITY_SCORE: 53
ADLS_ACUITY_SCORE: 47
ADLS_ACUITY_SCORE: 35
ADLS_ACUITY_SCORE: 47
ADLS_ACUITY_SCORE: 49
ADLS_ACUITY_SCORE: 45
ADLS_ACUITY_SCORE: 45
ADLS_ACUITY_SCORE: 53
ADLS_ACUITY_SCORE: 46
ADLS_ACUITY_SCORE: 49
ADLS_ACUITY_SCORE: 49
ADLS_ACUITY_SCORE: 53
ADLS_ACUITY_SCORE: 45
ADLS_ACUITY_SCORE: 45
ADLS_ACUITY_SCORE: 49
ADLS_ACUITY_SCORE: 49
CHANGE_IN_FUNCTIONAL_STATUS_SINCE_ONSET_OF_CURRENT_ILLNESS/INJURY: NO
ADLS_ACUITY_SCORE: 46
ADLS_ACUITY_SCORE: 45
ADLS_ACUITY_SCORE: 42
ADLS_ACUITY_SCORE: 49
ADLS_ACUITY_SCORE: 49
ADLS_ACUITY_SCORE: 53
ADLS_ACUITY_SCORE: 49
ADLS_ACUITY_SCORE: 45
ADLS_ACUITY_SCORE: 47
ADLS_ACUITY_SCORE: 53
ADLS_ACUITY_SCORE: 45
ADLS_ACUITY_SCORE: 43
ADLS_ACUITY_SCORE: 43
ADLS_ACUITY_SCORE: 45
ADLS_ACUITY_SCORE: 47
ADLS_ACUITY_SCORE: 43
ADLS_ACUITY_SCORE: 45
ADLS_ACUITY_SCORE: 45
ADLS_ACUITY_SCORE: 49
ADLS_ACUITY_SCORE: 43
DEPENDENT_IADLS:: INDEPENDENT
ADLS_ACUITY_SCORE: 53
ADLS_ACUITY_SCORE: 47
ADLS_ACUITY_SCORE: 49
ADLS_ACUITY_SCORE: 53
ADLS_ACUITY_SCORE: 45
ADLS_ACUITY_SCORE: 43
ADLS_ACUITY_SCORE: 42
ADLS_ACUITY_SCORE: 45
ADLS_ACUITY_SCORE: 42
ADLS_ACUITY_SCORE: 43
ADLS_ACUITY_SCORE: 45
ADLS_ACUITY_SCORE: 49
ADLS_ACUITY_SCORE: 46
ADLS_ACUITY_SCORE: 47
ADLS_ACUITY_SCORE: 49
ADLS_ACUITY_SCORE: 53
ADLS_ACUITY_SCORE: 42
ADLS_ACUITY_SCORE: 45
ADLS_ACUITY_SCORE: 42
ADLS_ACUITY_SCORE: 53
ADLS_ACUITY_SCORE: 42
ADLS_ACUITY_SCORE: 43
ADLS_ACUITY_SCORE: 49
ADLS_ACUITY_SCORE: 53
ADLS_ACUITY_SCORE: 45
ADLS_ACUITY_SCORE: 49
ADLS_ACUITY_SCORE: 53
ADLS_ACUITY_SCORE: 35
ADLS_ACUITY_SCORE: 45
ADLS_ACUITY_SCORE: 43
ADLS_ACUITY_SCORE: 46
ADLS_ACUITY_SCORE: 45
ADLS_ACUITY_SCORE: 47
ADLS_ACUITY_SCORE: 43
ADLS_ACUITY_SCORE: 53
ADLS_ACUITY_SCORE: 47
ADLS_ACUITY_SCORE: 49
ADLS_ACUITY_SCORE: 45
ADLS_ACUITY_SCORE: 53
ADLS_ACUITY_SCORE: 49
ADLS_ACUITY_SCORE: 53
ADLS_ACUITY_SCORE: 53
ADLS_ACUITY_SCORE: 42
ADLS_ACUITY_SCORE: 49
ADLS_ACUITY_SCORE: 53
ADLS_ACUITY_SCORE: 43
ADLS_ACUITY_SCORE: 43
ADLS_ACUITY_SCORE: 47
ADLS_ACUITY_SCORE: 42
ADLS_ACUITY_SCORE: 43
ADLS_ACUITY_SCORE: 43
ADLS_ACUITY_SCORE: 42
ADLS_ACUITY_SCORE: 42
ADLS_ACUITY_SCORE: 49
ADLS_ACUITY_SCORE: 49
ADLS_ACUITY_SCORE: 42
ADLS_ACUITY_SCORE: 49
ADLS_ACUITY_SCORE: 49
ADLS_ACUITY_SCORE: 45
ADLS_ACUITY_SCORE: 43
ADLS_ACUITY_SCORE: 43
ADLS_ACUITY_SCORE: 46
ADLS_ACUITY_SCORE: 43
ADLS_ACUITY_SCORE: 49
ADLS_ACUITY_SCORE: 45
ADLS_ACUITY_SCORE: 45
ADLS_ACUITY_SCORE: 49
ADLS_ACUITY_SCORE: 49
ADLS_ACUITY_SCORE: 42
ADLS_ACUITY_SCORE: 47
ADLS_ACUITY_SCORE: 43
ADLS_ACUITY_SCORE: 35
ADLS_ACUITY_SCORE: 42
ADLS_ACUITY_SCORE: 45
ADLS_ACUITY_SCORE: 45
ADLS_ACUITY_SCORE: 47
ADLS_ACUITY_SCORE: 49
WEAR_GLASSES_OR_BLIND: OTHER (SEE COMMENTS)
ADLS_ACUITY_SCORE: 49
ADLS_ACUITY_SCORE: 42
WALKING_OR_CLIMBING_STAIRS_DIFFICULTY: OTHER (SEE COMMENTS)
ADLS_ACUITY_SCORE: 45
ADLS_ACUITY_SCORE: 53
ADLS_ACUITY_SCORE: 45
ADLS_ACUITY_SCORE: 53
ADLS_ACUITY_SCORE: 49
ADLS_ACUITY_SCORE: 47
ADLS_ACUITY_SCORE: 43
ADLS_ACUITY_SCORE: 43
ADLS_ACUITY_SCORE: 45
ADLS_ACUITY_SCORE: 49
ADLS_ACUITY_SCORE: 42
ADLS_ACUITY_SCORE: 49
ADLS_ACUITY_SCORE: 43
ADLS_ACUITY_SCORE: 45
ADLS_ACUITY_SCORE: 49
ADLS_ACUITY_SCORE: 45
ADLS_ACUITY_SCORE: 47
ADLS_ACUITY_SCORE: 43
ADLS_ACUITY_SCORE: 49
ADLS_ACUITY_SCORE: 49
ADLS_ACUITY_SCORE: 46
ADLS_ACUITY_SCORE: 45
ADLS_ACUITY_SCORE: 43
ADLS_ACUITY_SCORE: 45
ADLS_ACUITY_SCORE: 49
ADLS_ACUITY_SCORE: 45
ADLS_ACUITY_SCORE: 43
ADLS_ACUITY_SCORE: 49
ADLS_ACUITY_SCORE: 43
ADLS_ACUITY_SCORE: 42
ADLS_ACUITY_SCORE: 45
ADLS_ACUITY_SCORE: 49
ADLS_ACUITY_SCORE: 53
ADLS_ACUITY_SCORE: 43
ADLS_ACUITY_SCORE: 47
ADLS_ACUITY_SCORE: 45
ADLS_ACUITY_SCORE: 49
ADLS_ACUITY_SCORE: 46
FALL_HISTORY_WITHIN_LAST_SIX_MONTHS: NO
ADLS_ACUITY_SCORE: 47
TOILETING_ISSUES: OTHER (SEE COMMENTS)
ADLS_ACUITY_SCORE: 53
ADLS_ACUITY_SCORE: 49
ADLS_ACUITY_SCORE: 49

## 2022-12-04 PROBLEM — I65.23 ATHEROSCLEROSIS OF BOTH CAROTID ARTERIES: Status: ACTIVE | Noted: 2020-07-08

## 2022-12-04 PROBLEM — T45.1X5A CHEMOTHERAPY-INDUCED PERIPHERAL NEUROPATHY (H): Status: ACTIVE | Noted: 2020-11-16

## 2022-12-04 PROBLEM — I38 ENDOCARDITIS: Status: ACTIVE | Noted: 2022-01-01

## 2022-12-04 PROBLEM — G62.0 CHEMOTHERAPY-INDUCED PERIPHERAL NEUROPATHY (H): Status: ACTIVE | Noted: 2020-11-16

## 2022-12-04 PROBLEM — C61 PROSTATE CANCER (H): Status: ACTIVE | Noted: 2021-07-15

## 2022-12-04 PROBLEM — I10 ESSENTIAL HYPERTENSION: Status: ACTIVE | Noted: 2018-08-02

## 2022-12-04 NOTE — ED TRIAGE NOTES
Triage Assessment     Row Name 12/04/22 6782       Triage Assessment (Adult)    Airway WDL WDL       Cognitive/Neuro/Behavioral WDL    Cognitive/Neuro/Behavioral WDL X;motor response;speech

## 2022-12-04 NOTE — ED PROVIDER NOTES
"  History     Chief Complaint   Patient presents with     Cerebrovascular Accident     HPI  Eloy Fletcher is a 66 year old male with history of hypertension, hyperlipidemia, CAD s/p PCI, acquired asplenia, s/p aortic valve replacement, mitral stenosis w/ upcoming valve replacement, testicular cancer, hodgkins disease, who was brought in by ambulance with concern for stroke per EMS.  Last known well approximately 12:15 PM.  Patient with acute alteration in mental status.  Left gaze preference and not following commands.  Patient met at the door and on brief examination he did have right-sided neglect but weakness in right and left arms.  Gaze preference was leftward.  Tier 1 stroke code activated and patient sent directly to CT.    Patient unable to supply any meaningful historical information due to encephalopathy.  His significant other reports that he started feeling unwell on Friday.  Reported being bloated and then developed a fever with nausea and vomiting.  Also developed unproductive cough.  She said he had cold sweats on Saturday morning.  Was able to take a shower and get dressed they had soup for dinner.  This morning he was still continued not to feel very well and shortly after lunchtime was acting very differently and then she called 911.        The patient's PMHx, Surgical Hx, Allergies, and Medications were all reviewed.    Allergies:  Allergies   Allergen Reactions     Crestor [Rosuvastatin] Other (See Comments)     \"Funny feeling in both legs\" all statins       Problem List:    Patient Active Problem List    Diagnosis Date Noted     Prostate cancer (H) 07/15/2021     Priority: Medium     Chemotherapy-induced peripheral neuropathy (H) 11/16/2020     Priority: Medium     Atherosclerosis of both carotid arteries 07/08/2020     Priority: Medium     Formatting of this note might be different from the original.  Careplan:  Background:  7/8/2020: Carotid ultrasound:                  IMPRESSION:  1.  " Moderate calcified atheromatous plaque in the carotid arteries.  2.  No significant stenosis on either side.    Goals/Recommendations:  7/8/2020:    Hi Eloy,   Your carotid ultrasound shows moderate calcifications but no significant stenosis. Please continue your current medications. Continue to keep your blood pressure under good control. I recommend that we recheck this ultrasound again in 2 years for follow up.       Essential hypertension 08/02/2018     Priority: Medium     Formatting of this note is different from the original.  Careplan:  Background:    12/2/2020: Eloy denies complaints. Eloy is taking her medications as directed and denies side effects. He states that his blood pressure is normal at home.   BP Readings from Last 3 Encounters:   11/30/20 (!) 145/76   11/16/20 134/76   11/09/20 125/63   6/23/2020, 8/2/2018: currently taking metoprolol 50 mg bid  BP Readings from Last 3 Encounters:   02/13/20 139/79   01/06/20 113/58   11/06/19 101/58       Goals/Recommendations:    12/2/2020: Please recheck your blood pressure with your [employer] blood pressure cuff in 2 weeks and please let me know what you find.   6/23/2020: Please stop by lab today for: -     Basic Metabolic Panel; Future  8/2/2018: recheck blood pressure at nurse only visit. -     Hypertension Follow Up ([id number])       Acquired asplenia 12/22/2014     Priority: Medium     Formatting of this note might be different from the original.  splenectomy 1979 as part of hodkings laparotomy       Aortic valve replaced 12/15/2014     Priority: Medium     Formatting of this note might be different from the original.  4/6/2018: followed by [facility] cardiologist Dr. Peng  Aortic valve replaced 11/17/2014 w organic material (not mechanical)       Stented coronary artery 10/22/2012     Priority: Medium     Formatting of this note might be different from the original.  6/23/2020, 4/6/2018: [facility] by [facility] cardiologist   "Danish    Patient is on Clopidogrel (Plavix) following stent placement.  Date of Intervention:  10/22/2012   Type of Stent:  BAIRON  Patient is expected to continue taking Clopidogrel (Plavix) for one year (until 10/22/13) unless otherwise advised due to subsequent stent placement or continued bleeding.       Hyperlipidemia 06/19/2008     Priority: Medium     Formatting of this note might be different from the original.  4/6/2018: recommended increase of atorvastatin to 10 mg daily.       History of Hodgkin's disease 12/06/2004     Priority: Medium     Formatting of this note might be different from the original.  1979,  age 27  Epic       History of testicular cancer 12/06/2004     Priority: Medium     Formatting of this note might be different from the original.  age 19s          Past Medical History:    No past medical history on file.    Past Surgical History:    No past surgical history on file.    Family History:    No family history on file.    Social History:  Marital Status:   [4]  Social History     Tobacco Use     Smoking status: Never     Smokeless tobacco: Never   Substance Use Topics     Alcohol use: Not Currently     Drug use: Never        Medications:    clopidogrel (PLAVIX) 75 mg tablet  metoprolol (LOPRESSOR) 50 MG tablet  pantoprazole (PROTONIX) 40 MG EC tablet  PRALUENT 150 MG/ML injectable pen  acetaminophen (TYLENOL) 500 MG tablet  albuterol (PROVENTIL HFA;VENTOLIN HFA) 90 mcg/actuation inhaler  aspirin 81 mg chewable tablet  calcium-vitamin D 500 mg(1,250mg) -200 unit per tablet          Review of Systems   Unable to perform ROS: Acuity of condition         Physical Exam   BP: (!) 179/97  Pulse: (!) 142  Temp: 97.8  F (36.6  C)  Resp: 26  Height: 172.7 cm (5' 8\")  Weight: 80.7 kg (178 lb)  SpO2: (!) 89 %    Physical Exam  GEN: awake and responds to voice. Appears unwell. Confused.   HENT: MMM. External ears and nose normal bilaterally. Atraumatic.   EYES: Conjunctive are clear.  Gaze " deviated to the left and upward.  No RAPD.  NECK: Symmetric, freely mobile.  Nontender  CV : Tachycardic rate.  Regular rhythm.  PULM: Increased respiratory rate and effort.  No wheezing, rales or rhonchi.  ABD: Firm and nondistended.  No localized tenderness.  No involuntary guarding or rebound tenderness.  NEURO:  GCS 13.  Eyes open and deviated to the left and upward.  Unable to hold arms up independently.  Does respond to painful stimuli in all 4 extremities.  Able to squeeze my hand on command and right and left upper extremities.  EXT: No gross deformity. Warm and well perfused.  INT: Warm and dry.       ED Course        Procedures  Results for orders placed during the hospital encounter of 12/04/22    POC US ECHO LIMITED    Impression  [facility] Procedure Note    Limited Bedside ED Cardiac Ultrasound:    PROCEDURE: PERFORMED BY: Dr. Hiro Torres MD  INDICATIONS/SYMPTOM:  Encephalopathy, multiple infarcts  PROBE: Cardiac phased array probe  BODY LOCATION: Chest  FINDINGS:  The ultrasound was performed utilizing the parasternal long axis and apical 4 chamber views. Poor acoustic windows limited images.  Cardiac contractility:  Present  Gross estimation of cardiac kinesis: hyperkinetic  Pericardial Effusion:  None  RV:LV ratio: Equal  Pulm: B-lines in left apex and right apex to a lesser extent.    INTERPRETATION:  Poor acoustic windows limit evaluation and only parasternal long axis and apical four-chamber views obtained.  Although views are limited it appears that the mitral valve has limited mobility which would fit with his stenosis.  Also concern for potential vegetations on mitral valve however difficult to know on point of care ultrasound.  No pericardial effusion.  B-lines suggestive of pulmonary edema.    IMAGE DOCUMENTATION: Images were archived to PACs system.         EKG: Interpreted by myself.  Sinus rhythm with rate 140 bpm.  Right bundle branch block.  Left anterior fascicle block.   No ectopy.  No ST segment elevations.  No prior EKG for comparison.  Impression: Sinus tachycardia with bifascicular block.    EKG #2.  Continual twelve-lead recorded while given adenosine temporary rhythm of sinus without signs of atrial flutter at rate of 60 beats per minutes which shortly reverted back to sinus tachycardia.    Critical Care time:  was 150 minutes for this patient excluding procedures.     The patient has stroke symptoms:         ED Stroke specific documentation           NIHSS PDF     Patient last known well time: 1215  ED Provider first to bedside at: 1338  CT Results received at: 1:58 PM  Stroke Neuro: 1326    Thrombolytics:   Not given due to:   - concern for hemorrhage    If treating with thrombolytics: Ensure SBP<180 and DBP<105 prior to treatment with thrombolytics.  Administering thrombolytics after treatment with IV labetalol, hydralazine, or nicardipine is reasonable once BP control is established.    Endovascular Retrieval:  Not initiated due to absence of proximal vessel occlusion    National [employer] Stroke Scale (Baseline)  Time Performed: 1338     Score    Level of consciousness: (1)   Not alert; arousable w/ minor stim to obey/answer/respond    LOC questions: (2)   Answers neither question correctly    LOC commands: (2)   Performs neither task correctly    Best gaze: (1)   Partial gaze palsy    Visual: (0)   No visual loss    Facial palsy: (0)   Normal symmetrical movements    Motor arm (left): (3)   No effort against gravity    Motor arm (right): (3)   No effort against gravity    Motor leg (left): (3)   No effort against gravity    Motor leg (right): (3)   No effort against gravity    Limb ataxia: (0)   Absent    Sensory: (0)   Normal- no sensory loss    Best language: (2)   Severe aphasia    Dysarthria: (1)   Mild to moderate dysarthria    Extinction and inattention: (1)   Visual, tacile, auditory, spatial, person inattention        Total Score:  22        Stroke  Mimics were considered (including migraine headache, seizure disorder, hypoglycemia (or hyperglycemia), head or spinal trauma, CNS infection, Toxin ingestion and shock state (e.g. sepsis) .                 Results for orders placed or performed during the hospital encounter of 12/04/22 (from the past 24 hour(s))   CT Head w/o Contrast    Narrative    EXAM: CT HEAD W/O CONTRAST, CTA HEAD NECK W CONTRAST  LOCATION: [facility]  DATE/TIME: 12/4/2022 1:32 PM    INDICATION: Stroke alert. Last known well 1215. Right sided neglect with left gaze preference. Weakness of extremities  COMPARISON: 07/20/2012  CONTRAST: 75 mL Isovue 370  TECHNIQUE: Head and neck CT angiogram with IV contrast. Noncontrast head CT followed by axial helical CT images of the head and neck vessels obtained during the arterial phase of intravenous contrast administration. Axial 2D reconstructed images and   multiplanar 3D MIP reconstructed images of the head and neck vessels were performed by the technologist. Dose reduction techniques were used. All stenosis measurements made according to NASCET criteria unless otherwise specified.    FINDINGS:   NONCONTRAST HEAD CT:   INTRACRANIAL CONTENTS: No intracranial hemorrhage, extraaxial collection, or mass effect.  No CT evidence of acute infarct. Normal parenchymal attenuation. Normal ventricles and sulci.     VISUALIZED ORBITS/SINUSES/MASTOIDS: No intraorbital abnormality. No paranasal sinus mucosal disease. No middle ear or mastoid effusion.    BONES/SOFT TISSUES: No acute abnormality.    HEAD CTA:  ANTERIOR CIRCULATION: No stenosis/occlusion, aneurysm, or high flow vascular malformation. Developmentally hypoplastic right A1 anterior cerebral artery segment.Fetal origin of the right posterior cerebral artery from the anterior circulation.    POSTERIOR CIRCULATION: No stenosis/occlusion, aneurysm, or high flow vascular malformation. Balanced vertebral arteries supply a normal  basilar artery.     DURAL VENOUS SINUSES: Expected enhancement of the major dural venous sinuses.    NECK CTA:  RIGHT CAROTID: No measurable stenosis or dissection. There is nonstenotic atherosclerotic calcification at the bifurcation.    LEFT CAROTID: No measurable stenosis or dissection. There is nonstenotic atherosclerotic calcification at the bifurcation.    VERTEBRAL ARTERIES: No focal stenosis or dissection. Balanced vertebral arteries.    AORTIC ARCH: Classic aortic arch anatomy with no significant stenosis at the origin of the great vessels.    NONVASCULAR STRUCTURES: Unremarkable.      Impression    IMPRESSION:   HEAD CT:  1.  No acute intracranial process.    HEAD CTA:   1.  No significant stenosis, aneurysm, or high flow vascular malformation identified.  2.  Variant [place] of Khan anatomy as above.    NECK CTA:  1.  No hemodynamically significant stenosis in the neck vessels.   2.  No evidence for dissection.    3.  Dr. Mccoy discussed the above findings by telephone with Dr. Torres at 1:59 PM 12/04/2022.   CBC with Platelets & Differential    Narrative    The following orders were created for panel order CBC with Platelets & Differential.  Procedure                               Abnormality         Status                     ---------                               -----------         ------                     CBC with platelets and d...[[id number]]  Abnormal            Final result               Manual Differential[[id number]]          Abnormal            Final result                 Please view results for these tests on the individual orders.   Basic metabolic panel   Result Value Ref Range    Sodium 132 (L) 136 - 145 mmol/L    Potassium 4.1 3.4 - 5.3 mmol/L    Chloride 95 (L) 98 - 107 mmol/L    Carbon Dioxide (CO2) 22 22 - 29 mmol/L    Anion Gap 15 7 - 15 mmol/L    Urea Nitrogen 29.5 (H) 8.0 - 23.0 mg/dL    Creatinine 1.50 (H) 0.67 - 1.17 mg/dL    Calcium 8.7 (L) 8.8 - 10.2 mg/dL    Glucose 155  (H) 70 - 99 mg/dL    GFR Estimate 51 (L) >60 mL/min/1.73m2   INR   Result Value Ref Range    INR 1.23 (H) 0.85 - 1.15   Partial thromboplastin time   Result Value Ref Range    aPTT 41 (H) 22 - 38 Seconds   Troponin T, High Sensitivity   Result Value Ref Range    Troponin T, High Sensitivity 217 (HH) <=22 ng/L   Abrams Draw    Narrative    The following orders were created for panel order Abrams Draw.  Procedure                               Abnormality         Status                     ---------                               -----------         ------                     Extra Red Top Tube[[id number]]                               Final result                 Please view results for these tests on the individual orders.   CBC with platelets and differential   Result Value Ref Range    WBC Count 13.7 (H) 4.0 - 11.0 10e3/uL    RBC Count 4.26 (L) 4.40 - 5.90 10e6/uL    Hemoglobin 13.0 (L) 13.3 - 17.7 g/dL    Hematocrit 38.9 (L) 40.0 - 53.0 %    MCV 91 78 - 100 fL    MCH 30.5 26.5 - 33.0 pg    MCHC 33.4 31.5 - 36.5 g/dL    RDW 15.1 (H) 10.0 - 15.0 %    Platelet Count 168 150 - 450 10e3/uL   Extra Red Top Tube   Result Value Ref Range    Hold Specimen JI    Manual Differential   Result Value Ref Range    % Neutrophils 91 %    % Lymphocytes 6 %    % Monocytes 3 %    % Eosinophils 0 %    % Basophils 0 %    Absolute Neutrophils 12.5 (H) 1.6 - 8.3 10e3/uL    Absolute Lymphocytes 0.8 0.8 - 5.3 10e3/uL    Absolute Monocytes 0.4 0.0 - 1.3 10e3/uL    Absolute Eosinophils 0.0 0.0 - 0.7 10e3/uL    Absolute Basophils 0.0 0.0 - 0.2 10e3/uL    RBC Morphology Confirmed RBC Indices     Platelet Assessment  Automated Count Confirmed. Platelet morphology is normal.     Automated Count Confirmed. Platelet morphology is normal.    [place] Cells Slight (A) None Seen    RBC Fragments Slight (A) None Seen    Toxic Neutrophils Present (A) None Seen   CTA Head Neck with Contrast    Narrative    EXAM: CT HEAD W/O CONTRAST, CTA HEAD NECK W  CONTRAST  LOCATION: [facility]  DATE/TIME: 12/4/2022 1:32 PM    INDICATION: Stroke alert. Last known well 1215. Right sided neglect with left gaze preference. Weakness of extremities  COMPARISON: 07/20/2012  CONTRAST: 75 mL Isovue 370  TECHNIQUE: Head and neck CT angiogram with IV contrast. Noncontrast head CT followed by axial helical CT images of the head and neck vessels obtained during the arterial phase of intravenous contrast administration. Axial 2D reconstructed images and   multiplanar 3D MIP reconstructed images of the head and neck vessels were performed by the technologist. Dose reduction techniques were used. All stenosis measurements made according to NASCET criteria unless otherwise specified.    FINDINGS:   NONCONTRAST HEAD CT:   INTRACRANIAL CONTENTS: No intracranial hemorrhage, extraaxial collection, or mass effect.  No CT evidence of acute infarct. Normal parenchymal attenuation. Normal ventricles and sulci.     VISUALIZED ORBITS/SINUSES/MASTOIDS: No intraorbital abnormality. No paranasal sinus mucosal disease. No middle ear or mastoid effusion.    BONES/SOFT TISSUES: No acute abnormality.    HEAD CTA:  ANTERIOR CIRCULATION: No stenosis/occlusion, aneurysm, or high flow vascular malformation. Developmentally hypoplastic right A1 anterior cerebral artery segment.Fetal origin of the right posterior cerebral artery from the anterior circulation.    POSTERIOR CIRCULATION: No stenosis/occlusion, aneurysm, or high flow vascular malformation. Balanced vertebral arteries supply a normal basilar artery.     DURAL VENOUS SINUSES: Expected enhancement of the major dural venous sinuses.    NECK CTA:  RIGHT CAROTID: No measurable stenosis or dissection. There is nonstenotic atherosclerotic calcification at the bifurcation.    LEFT CAROTID: No measurable stenosis or dissection. There is nonstenotic atherosclerotic calcification at the bifurcation.    VERTEBRAL ARTERIES: No focal  stenosis or dissection. Balanced vertebral arteries.    AORTIC ARCH: Classic aortic arch anatomy with no significant stenosis at the origin of the great vessels.    NONVASCULAR STRUCTURES: Unremarkable.      Impression    IMPRESSION:   HEAD CT:  1.  No acute intracranial process.    HEAD CTA:   1.  No significant stenosis, aneurysm, or high flow vascular malformation identified.  2.  Variant [place] of Khan anatomy as above.    NECK CTA:  1.  No hemodynamically significant stenosis in the neck vessels.   2.  No evidence for dissection.    3.  Dr. Mccoy discussed the above findings by telephone with Dr. Torres at 1:59 PM 12/04/2022.   Symptomatic; Unknown Influenza A/B & SARS-CoV2 (COVID-19) Virus PCR Multiplex Nose    Specimen: Nose; Swab   Result Value Ref Range    Influenza A PCR Negative Negative    Influenza B PCR Negative Negative    SARS CoV2 PCR Negative Negative    Narrative    Testing was performed using the yma SARS-CoV-2 & Influenza A/B Assay on the [employer] System. This test should be ordered for the detection of SARS-CoV-2 and influenza viruses in individuals who meet clinical and/or epidemiological criteria. Test performance is unknown in asymptomatic patients. This test is for in vitro diagnostic use under the FDA EUA for laboratories certified under CLIA to perform moderate and/or high complexity testing. This test has not been FDA cleared or approved. A negative result does not rule out the presence of PCR inhibitors in the specimen or target RNA in concentration below the limit of detection for the assay. If only one viral target is positive but coinfection with multiple targets is suspected, the sample should be re-tested with another FDA cleared, approved or authorized test, if coinfection would change clinical management. [facility] are certified under the Clinical Laboratory Improvement Amendments of 1988 (CLIA-88) as qualified to perform moderate and/or high  complexity laboratory testing.   MR Brain w/o Contrast    Narrative    EXAM: MR BRAIN W/O CONTRAST  LOCATION: [facility]  DATE/TIME: 12/4/2022 2:57 PM    INDICATION: HYPERACUTE MR STROKE PROTOCOL, stroke alert, right-sided neglect with left gaze preference, weakness of extremities.  COMPARISON: CT head 12/04/2022  TECHNIQUE: Routine multiplanar multisequence head MRI without intravenous contrast.    FINDINGS:  INTRACRANIAL CONTENTS: There are 5 small foci of restricted diffusion representing acute lacunar infarcts. The largest focus is at the left posterior frontal centrum semiovale measuring 10 x 5 mm. Additional foci are noted at the bilateral posterior   frontal gyri, left periatrial white matter, and right cerebellar hemisphere. No mass, acute hemorrhage, or extra-axial fluid collections. Normal ventricles and sulci. Normal position of the cerebellar tonsils.     SELLA: No abnormality accounting for technique.    OSSEOUS STRUCTURES/SOFT TISSUES: Normal marrow signal. The major intracranial vascular flow voids are maintained.     ORBITS: No abnormality accounting for technique.     SINUSES/MASTOIDS: No paranasal sinus mucosal disease. No middle ear or mastoid effusion.       Impression    IMPRESSION:  1.  5 scattered foci of acute ischemia involving the bilateral anterior and posterior cerebral artery distributions. Constellation of findings is suspicious for central embolic phenomena.   Lactic acid whole blood   Result Value Ref Range    Lactic Acid 3.9 (H) 0.7 - 2.0 mmol/L   POC US ECHO LIMITED    Impression    [facility] Procedure Note      Limited Bedside ED Cardiac Ultrasound:    PROCEDURE: PERFORMED BY: Dr. Hiro Torres MD  INDICATIONS/SYMPTOM:  Encephalopathy, multiple infarcts  PROBE: Cardiac phased array probe  BODY LOCATION: Chest  FINDINGS:   The ultrasound was performed utilizing the parasternal long axis and apical 4 chamber views. Poor acoustic windows  limited images.   Cardiac contractility:  Present  Gross estimation of cardiac kinesis: hyperkinetic  Pericardial Effusion:  None  RV:LV ratio: Equal  Pulm: B-lines in left apex and right apex to a lesser extent.     INTERPRETATION:  Poor acoustic windows limit evaluation and only parasternal long axis and apical four-chamber views obtained.  Although views are limited it appears that the mitral valve has limited mobility which would fit with his stenosis.  Also concern for potential vegetations on mitral valve however difficult to know on point of care ultrasound.  No pericardial effusion.  B-lines suggestive of pulmonary edema.    IMAGE DOCUMENTATION: Images were archived to PACs system.         UA with Microscopic reflex to Culture    Specimen: Urine, Catheter   Result Value Ref Range    Color Urine Light Yellow Colorless, Straw, Light Yellow, Yellow    Appearance Urine Slightly Cloudy (A) Clear    Glucose Urine 100 (A) Negative mg/dL    Bilirubin Urine Negative Negative    Ketones Urine 15 (A) Negative mg/dL    Specific Gravity Urine 1.025 1.003 - 1.035    Blood Urine Moderate (A) Negative    pH Urine 5.0 5.0 - 7.0    Protein Albumin Urine 100 (A) Negative mg/dL    Urobilinogen Urine Normal Normal, 2.0 mg/dL    Nitrite Urine Negative Negative    Leukocyte Esterase Urine Negative Negative    Bacteria Urine Few (A) None Seen /HPF    Mucus Urine Present (A) None Seen /LPF    RBC Urine 4 (H) <=2 /HPF    WBC Urine 3 <=5 /HPF    Squamous Epithelials Urine <1 <=1 /HPF    Narrative    Urine Culture not indicated   Glucose CSF:   Result Value Ref Range    Glucose CSF 82 (H) 40 - 70 mg/dL    Narrative    CSF glucose concentrations are about 60 percent of normal plasma glucose.   Protein total CSF:   Result Value Ref Range    Protein total CSF 59.7 (H) 15.0 - 45.0 mg/dL   CSF Cell Count with Differential:    Narrative    The following orders were created for panel order CSF Cell Count with Differential:.  Procedure                                Abnormality         Status                     ---------                               -----------         ------                     Cell Count CSF[[id number]]               Abnormal            Final result               Differential CSF[[id number]]                                 In process                   Please view results for these tests on the individual orders.   Cell Count CSF   Result Value Ref Range    Tube Number 4     Color Colorless Colorless    Clarity Hazy (A) Clear    Total Nucleated Cells 230 (H) 0 - 5 /uL    RBC Count 78 (H) 0 - 2 /uL   Lactic acid whole blood   Result Value Ref Range    Lactic Acid 4.6 (HH) 0.7 - 2.0 mmol/L   NT pro BNP   Result Value Ref Range    N terminal Pro BNP Inpatient 10,855 (H) 0 - 900 pg/mL   Troponin T, High Sensitivity   Result Value Ref Range    Troponin T, High Sensitivity 610 (HH) <=22 ng/L   CT Chest Abdomen Pelvis w/o Contrast    Narrative    EXAM: CT CHEST ABDOMEN PELVIS W/O CONTRAST  LOCATION: [facility]  DATE/TIME: 12/4/2022 6:17 PM    INDICATION: sepsis w out source in patient with encephalopathy, asplenia, and multiple new infarcts on MR brain. fever and tachypnea  COMPARISON: PET/CT 9/3/2010  TECHNIQUE: CT scan of the chest, abdomen, and pelvis was performed without IV contrast. Multiplanar reformats were obtained. Dose reduction techniques were used.   CONTRAST: None.    FINDINGS:   LUNGS AND PLEURA: Trace left pleural effusion. Respiratory motion limits evaluation of the lungs. There is mild interstitial edema. Cluster of tree-in-bud nodularity in the left upper lobe. Patchy groundglass opacities at the left lung apex and in the   superior segment left lower lobe.    MEDIASTINUM/AXILLAE: Aortic valve replacement. No lymphadenopathy.    CORONARY ARTERY CALCIFICATION: Previous intervention (stents or CABG).    HEPATOBILIARY: Normal.    PANCREAS: Normal.    SPLEEN: Splenectomy    ADRENAL GLANDS:  Normal.    KIDNEYS/BLADDER: No significant mass, stones, or hydronephrosis. There are simple or benign cysts. No follow up is needed.    BOWEL: No obstruction or inflammatory change. Scattered surgical clips throughout the abdomen and pelvis.    LYMPH NODES: Normal.    VASCULATURE: Aortobiiliac calcific atherosclerosis. No abdominal aortic aneurysm.    PELVIC ORGANS: Normal.    MUSCULOSKELETAL: Old ununited left clavicle fracture. Median sternotomy. No suspicious osseous lesions.      Impression    IMPRESSION:  1.  Patchy groundglass opacities in the left upper and lower lobes, and cluster of tree-in-bud nodularity in the left upper lobe, likely infectious or inflammatory.    2.  Interstitial pulmonary edema.    3.  Trace left pleural effusion.    4.  No acute findings in the abdomen or pelvis.       Medications   adenosine (ADENOCARD) injection 12 mg (12 mg Intravenous Not Given 12/4/22 1530)   vancomycin (VANCOCIN) 1,250 mg in sodium chloride 0.9 % 250 mL intermittent infusion (has no administration in time range)   iopamidol (ISOVUE-370) solution 77 mL (0 mLs Intravenous Not Given 12/4/22 1751)   sodium chloride 0.9 % bag 500mL for CT scan flush use (0 mLs Intravenous Not Given 12/4/22 1751)   iopamidol (ISOVUE-370) solution 75 mL (75 mLs Intravenous Given 12/4/22 1341)   sodium chloride 0.9 % bag 500mL for CT scan flush use (100 mLs Intravenous Given 12/4/22 1341)   adenosine (ADENOCARD) 6 MG/2ML injection (6 mg  Given 12/4/22 1428)   lactated ringers BOLUS 1,000 mL (0 mLs Intravenous Stopped 12/4/22 1604)   0.9% sodium chloride BOLUS (0 mLs Intravenous Stopped 12/4/22 1737)   piperacillin-tazobactam (ZOSYN) intermittent infusion 4.5 g (0 g Intravenous Stopped 12/4/22 1737)   acetaminophen (TYLENOL) Suppository 650 mg (650 mg Rectal Given 12/4/22 1535)   vancomycin (VANCOCIN) 1,500 mg in sodium chloride 0.9 % 250 mL intermittent infusion (0 mg Intravenous Stopped 12/4/22 1737)   metoprolol (LOPRESSOR) injection  5 mg (5 mg Intravenous Given 12/4/22 4006)       Assessments & Plan (with Medical Decision Making)   66 year old male with past medical history notable for aortic valve replacement, mitral valve stenosis with upcoming procedure, hypertension, hyperlipidemia, coronary disease status post PCI, asplenia, with remote history of Hodgkin's lymphoma and testicular cancer who was brought in by ambulance for altered mental status and concern for stroke.  Stroke evaluation requested in the field.  After brief evaluation at the door patient sent emergently to CT.    I spoke with Dr. Campbell w/ stroke neurology and CT head without contrast and CT of head and neck were unremarkable.  He evaluated patient via video monitor and plan for MRI.    Patient's heart rate in the 140s and blood pressure with systolics in the 160s.  Initial oral temperature 97.8 and SPO2 89% on room air. Improved to low 90's w/out supplemental oxygen.  GCS 13.  Patient able to follow commands in all extremities in response to pain however generalized weakness is profound but not localized.  NIH stroke scale 22.  MR of brain with 5 scattered foci of acute ischemia involving bilateral anterior posterior cerebellar artery distributions.  Consolation of findings suspicious for central embolic phenomena.  Results of this were discussed with Dr. Agosto with stroke neurology who recommends against giving lytics or anticoagulates is concern for possible hemorrhagic transformation.  Findings on MRI not likely due to reduce his current encephalopathy and clinical presentation.    EKG which appears to be sinus tachycardia with rate of 145 bpm.  There is a bifascicular block.  No prior EKG for comparison.  Concern this could be atrial flutter with 2-1 conduction block which would put him at increased risk for cardioembolic event.  EKG reviewed with Dr. Mckeon with cardiology and plan to give kathi blocker to slow down rate to see if this unmask's a flutter.  He was  given 6 mg IV adenosine and continue with twelve-lead was recorded.  This unmasked sinus tachycardia.  Patient is on 75 mg metoprolol twice daily at home.  Significant other says he is not taking any medications last night or this morning.  Prolonged sinus tachycardia with severe mitral stenosis gnosis could cause pulmonary edema.  She would like rate better controlled.  Recommended giving him metoprolol which she takes at home and has not missed.    Additional information from significant other and these events were preceded by a likely infectious illness of unclear etiology.  Additional concern due to his asplenia.  Rectal temperature obtained and elevated to 103.7  F.  Initial lactic acid elevated at 3.9.  CBC with leukocytosis of 13.7 with left shift.  Also goyo cells, RBC cell fragments and toxic granulation cells present.  Basic metabolic panel with BUN of 29.5 and creatinine of 1.5, glucose 155.  INR 1.23 and PTT 41.  Urinalysis without evidence of acute infection.  SARS-CoV-2 and influenza testing was negative.    MR findings concerning for septic emboli and given his history of valve replacement and mitral stenosis concern for infective endocarditis.  3 blood cultures were obtained.  I consulted with Dr. Ayala with teleintensivist service to discuss further management.  Per our conversation would also like to cover for possible meningitis and obtain an LP..  Patient initially given vancomycin and Zosyn.  Vancomycin appropriate coverage.  She does recommend changing Zosyn to meropenem or cefepime plus ampicillin on next dosing.  Patient will need ICU level cares.    Repeat lactic acid of 4.5.  Patient did have increased respiratory rate and work of breathing.  Repeat rectal temperature 104.4.  Heart rate now in the 120s to 130s and he has received 2 L of IV fluids.  Point-of-care ultrasound shows probable pulmonary edema without effusion.  Given concern for pulmonary edema related to severe mitral stenosis  do not want to give any more fluids.  Do not want to give him ibuprofen for fever control with his current illness and creatinine of 1.5.  Patient was covered with multiple blankets which were removed for external cooling (passive).  A significant portion of his tachycardia would be related to his current temperature elevation.  NT proBNP obtained and resulted over 10,000.  Repeat cardiac troponin 610.  Again, my suspicion is this is a type II MI related to his critical illness.    CT chest abdomen pelvis after further differentiate possible source of infection.  Results notable for patchy groundglass opacities in the left upper and lower lobes and the clustered tree-in-bud nodularity left upper lobe likely infectious versus inflammatory cause.  There is also interstitial pulm edema noted and a trace left pleural effusion.  No acute findings in the abdomen or pelvis.  Given these findings and further supports not wanting to give him more IV fluids at this point.    Effort there is ED course was seeming less alert and concerned that his trajectory may require intubation for airway protection for transport.  And multiple reevaluations he was becoming more alert.  Eyes are open spontaneously and would track with eyes when spoken to.  His repositioning himself on cart to try to find position of comfort.  Given this I think he is appropriate to transfer without definitive airway management.    Case discussed with Dr. Friend with intensivist service at the [facility] who accepted transfer.    Patient's significant other and his Ex-wife were present at bedside and all questions answered to the best of my ability.        I have reviewed the nursing notes.         New Prescriptions    No medications on file       Final diagnoses:   Septic encephalopathy   Cerebrovascular accident (CVA) due to other mechanism (H) - multiple infarcts likely 2/2 cardioembolic    Asplenia   Myocardial injury   Lactic acidosis    Acute pulmonary edema (H)     Hiro Torres MD    12/4/2022   [facility] EMERGENCY DEPT    Disclaimer: This note consists of words and symbols derived from keyboarding and dictation using voice recognition software.  As a result, there may be errors that have gone undetected.  Please consider this when interpreting information found in this note.             Hiro Torres MD  12/04/22 2018

## 2022-12-04 NOTE — PHARMACY-VANCOMYCIN DOSING SERVICE
"Pharmacy Vancomycin Initial Note  Date of Service 2022  Patient's  1955  66 year old, male    Indication: Sepsis    Current estimated CrCl = Estimated Creatinine Clearance: 54.5 mL/min (A) (based on SCr of 1.5 mg/dL (H)).    Creatinine for last 3 days  2022:  1:41 PM Creatinine 1.50 mg/dL    Recent Vancomycin Level(s) for last 3 days  No results found for requested labs within last 72 hours.      Vancomycin IV Administrations (past 72 hours)      No vancomycin orders with administrations in past 72 hours.                Nephrotoxins and other renal medications (From now, onward)    Start     Dose/Rate Route Frequency Ordered Stop    22 1600  vancomycin (VANCOCIN) 1,250 mg in sodium chloride 0.9 % 250 mL intermittent infusion         1,250 mg  over 90 Minutes Intravenous EVERY 24 HOURS 22 1545      22 1545  vancomycin (VANCOCIN) 1,500 mg in sodium chloride 0.9 % 250 mL intermittent infusion         1,500 mg  over 90 Minutes Intravenous ONCE 22 1544      22 1525  piperacillin-tazobactam (ZOSYN) intermittent infusion 4.5 g        Note to Pharmacy: For [facility], [facility] and [facility]: For Zosyn-naive patients, use the \"Zosyn initial dose + extended infusion\" order panel.    4.5 g  200 mL/hr over 30 Minutes Intravenous ONCE 22 1523            Contrast Orders - past 72 hours (72h ago, onward)    Start     Dose/Rate Route Frequency Stop    22 1325  iopamidol (ISOVUE-370) solution 75 mL         75 mL Intravenous ONCE 22 1341          Insight[employer] Prediction of Planned Initial Vancomycin Regimen  Loading dose: 1500 mg at 15:41 2022.  Regimen: 1250 mg IV every 24 hours.  Start time: 15:44 on 2022  Exposure target: AUC24 (range)400-600 mg/L.hr   AUC24,ss: 513 mg/L.hr  Probability of AUC24 > 400: 77 %  Ctrough,ss: 15.7 mg/L  Probability of Ctrough,ss > 20: 28 %  Probability of nephrotoxicity (Lodise KALI ): 11 %          Plan:  1. Start vancomycin 1500 mg IV " ONCE followed by vancomycin 1250 mg IV every 24 hours.   2. Vancomycin monitoring method: AUC  3. Vancomycin therapeutic monitoring goal: 400-600 mg*h/L  4. Pharmacy will check vancomycin levels as appropriate in 1-3 Days.    5. Serum creatinine levels will be ordered daily for the first week of therapy and at least twice weekly for subsequent weeks.      Felicia Dobbins, [facility]

## 2022-12-04 NOTE — ANESTHESIA PREPROCEDURE EVALUATION
"Anesthesia Pre-Procedure Evaluation    Patient: Eloy Fletcher   MRN: [id number] : 1955        Procedure : * No procedures listed *          No past medical history on file.   No past surgical history on file.   Allergies   Allergen Reactions     Crestor [Rosuvastatin] Other (See Comments)     \"Funny feeling in both legs\" all statins      Social History     Tobacco Use     Smoking status: Never     Smokeless tobacco: Never   Substance Use Topics     Alcohol use: Not Currently      Wt Readings from Last 1 Encounters:   22 80.7 kg (178 lb)              OUTSIDE LABS:  CBC:   Lab Results   Component Value Date    WBC 13.7 (H) 2022    WBC 10.2 10/12/2010    HGB 13.0 (L) 2022    HGB 11.1 (L) 10/12/2010    HCT 38.9 (L) 2022    HCT 34.4 (L) 10/12/2010     2022     10/12/2010     BMP:   Lab Results   Component Value Date     (L) 2022     10/12/2010    POTASSIUM 4.1 2022    POTASSIUM 4.0 10/12/2010    CHLORIDE 95 (L) 2022    CHLORIDE 106 10/12/2010    CO2 22 2022    CO2 28 10/12/2010    BUN 29.5 (H) 2022    BUN 19 10/12/2010    CR 1.50 (H) 2022    CR 0.98 10/12/2010     (H) 2022    GLC 88 10/12/2010     COAGS:   Lab Results   Component Value Date    PTT 41 (H) 2022    INR 1.23 (H) 2022     POC: No results found for: BGM, HCG, HCGS  HEPATIC: No results found for: ALBUMIN, PROTTOTAL, ALT, AST, GGT, ALKPHOS, BILITOTAL, BILIDIRECT, HARJINDER  OTHER:   Lab Results   Component Value Date    LACT 3.9 (H) 2022    YUNIOR 8.7 (L) 2022       Anesthesia Plan    ASA Status:  4, emergent    - Procedure: Procedure only, no anesthetic delivered   NPO Status:  ELEVATED Aspiration Risk/Unknown    Anesthesia Type: Spinal.              Consents    Anesthesia Plan(s) and associated risks, benefits, and realistic alternatives discussed. Questions answered and patient/representative(s) expressed understanding.    - " Discussed:     - Discussed with:  Implied consent/emergency         Postoperative Care            Comments:    Other Comments: Patient mental status is obtunded and he is unable to sign consent. I discussed with Dr. Evans and procedure is deemed emergent.            JOSE Mehta CRNA

## 2022-12-04 NOTE — CONSULTS
"[facility]    Stroke Consult Note    Reason for Consult: Stroke Code     Chief Complaint: Cerebrovascular Accident      HPI  Eloy Fletcher is a 66 year old male with PMH of HTN, HLD, CAD s/p stent, Hodgkin's disease, prostate cancer who presented with left gaze preference, right sided neglect and generalized weakness. According to his friend, he has been having intermittent fevers since Friday afternoon and thought it was related to food poisoning. Today, he did not up from bed the whole morning and was not interactive and stopped talking around 11 AM.     Imaging Findings  CT head unremarkable for acute changes  CTA Head/Neck showed no occlusion/dissection or hemodynamically significant stenosis    Intravenous Thrombolysis  Not given due to:   - stroke mimic: non-focal generalized symptomatology. Unclear cause    Endovascular Treatment  Not initiated due to absence of proximal vessel occlusion    Impression   Possible acute ischemic stroke    Recommendations  Stat MRI Brain wo contrast    ADDENDUM:   MRI Brain revealed small multifocal infarcts.   These infarcts are too small to cause such severe encephalopathy  Temp is 103.4 F, he is tachycardia (141) and tachypneic (32)  Talking to his friend again, symptom onset time is not very clear  Given high suspicion for infective endocarditis and unclear symptom onset, I will defer thrombolysis at this time    Thank you for this consult.      Micky Agosto MD       Department of Neurology       Securely message with the Vocera Web Console (learn more here)   To page me or covering stroke neurology team member, click here: AMCOM  Choose \"On Call\" tab at top, then search dropdown box for \"Neurology Adult\", select location, press Enter, then look for stroke/neuro ICU/telestroke.     ______________________________________________________       # Hyponatremia: Lowest Na = 132 mmol/L (Ref range: 136-145) in last 2 " days, will monitor as appropriate       # Coagulation Defect: INR = 1.23 (Ref range: 0.85 - 1.15) and/or PTT = 41 Seconds (Ref range: 22 - 38 Seconds), will monitor for bleeding  # Drug Induced Platelet Defect: home medication list includes an antiplatelet medication       Past Medical History   No past medical history on file.  Past Surgical History   No past surgical history on file.  Medications   Home Meds  Prior to Admission medications    Medication Sig Start Date End Date Taking? Authorizing Provider   acetaminophen (TYLENOL) 500 MG tablet [ACETAMINOPHEN (TYLENOL) 500 MG TABLET] Take 500 mg by mouth every 6 (six) hours as needed for pain (2 tabs). 12/23/14   Provider, Historical   albuterol (PROVENTIL HFA;VENTOLIN HFA) 90 mcg/actuation inhaler [ALBUTEROL (PROVENTIL HFA;VENTOLIN HFA) 90 MCG/ACTUATION INHALER] Inhale 2-4 puffs every 4 (four) hours as needed for wheezing.  Patient not taking: Reported on 8/9/2021 12/23/14   Provider, Historical   aspirin 81 mg chewable tablet [ASPIRIN 81 MG CHEWABLE TABLET] Chew 81 mg daily. 12/23/14   Provider, Historical   calcium-vitamin D 500 mg(1,250mg) -200 unit per tablet [CALCIUM-VITAMIN D 500 MG(1,250MG) -200 UNIT PER TABLET] Take 1 tablet by mouth 2 (two) times a day with meals. 12/30/14   Provider, Historical   clopidogrel (PLAVIX) 75 mg tablet [CLOPIDOGREL (PLAVIX) 75 MG TABLET] Take 75 mg by mouth daily. 12/23/14   Provider, Historical   fluticasone (FLONASE) 50 mcg/actuation nasal spray [FLUTICASONE (FLONASE) 50 MCG/ACTUATION NASAL SPRAY] 2 sprays into each nostril daily as needed for rhinitis.  Patient not taking: Reported on 8/9/2021 12/23/14   Provider, Historical   metoprolol (LOPRESSOR) 50 MG tablet [METOPROLOL (LOPRESSOR) 50 MG TABLET] Take 75 mg by mouth 2 (two) times a day. 12/23/14   Provider, Historical   omeprazole (PRILOSEC) 20 MG capsule [OMEPRAZOLE (PRILOSEC) 20 MG CAPSULE] Take 20 mg by mouth 2 (two) times a day. 12/23/14   Provider, Historical  "  pravastatin (PRAVACHOL) 40 MG tablet [PRAVASTATIN (PRAVACHOL) 40 MG TABLET] Take 40 mg by mouth bedtime.  Patient not taking: Reported on 8/9/2021 12/23/14   Provider, Historical       Scheduled Meds      Infusion Meds      PRN Meds      Allergies   Allergies   Allergen Reactions     Crestor [Rosuvastatin] Other (See Comments)     \"Funny feeling in both legs\"     Family History   No family history on file.  Social History   Social History     Tobacco Use     Smoking status: Never     Smokeless tobacco: Never   Substance Use Topics     Alcohol use: Not Currently     Drug use: Never       Review of Systems   The 10 point Review of Systems is negative other than noted in the HPI or here.        PHYSICAL EXAMINATION  Temp:  [97.8  F (36.6  C)] 97.8  F (36.6  C)  Pulse:  [138-142] 141  Resp:  [24-49] 32  BP: (179)/(97) 179/97  SpO2:  [89 %-96 %] 95 %     Neuro Exam  Mental Status:  Not participatory on exam. Encephalopathic, does not answer questions or follow commands  Cranial Nerves:  Left gaze preference, can overcome midline. No facial asymmetry  Motor:  Minimal withdrawal to pain in all extremities  Reflexes:  unable to test (telestroke)  Sensory:  Unable to test  Coordination:  unable to test  Station/Gait:  unable to test due to telestroke    Dysphagia Screen  Dysarthria or facial droop present - Maintain NPO, consult SLP    Stroke Scales    NIHSS  1a. Level of Consciousness 1-->Not alert, but arousable by minor stimulation to obey, answer, or respond   1b. LOC Questions 2-->Answers neither question correctly   1c. LOC Commands 2-->Performs neither task correctly   2.   Best Gaze 1-->Partial gaze palsy, gaze is abnormal in one or both eyes, but forced deviation or total gaze paresis is not present   3.   Visual 0-->No visual loss   4.   Facial Palsy 3-->Complete paralysis of one or both sides (absence of facial movement in the upper and lower face)   5a. Motor Arm, Left 3-->No effort against gravity, limb falls "   5b. Motor Arm, Right 3-->No effort against gravity, limb falls   6a. Motor Leg, Left 3-->No effort against gravity, leg falls to bed immediately   6b. Motor Leg, right 3-->No effort against gravity, leg falls to bed immediately   7.   Limb Ataxia 0-->Absent   8.   Sensory 0-->Normal, no sensory loss   9.   Best Language 3-->Mute, global aphasia, no usable speech or auditory comprehension   10. Dysarthria 2-->Severe dysarthria, patients speech is so slurred as to be unintelligible in the absence of or out of proportion to any dysphasia, or is mute/anarthric   11. Extinction and Inattention  1-->Visual, tactile, auditory, spatial, or personal inattention or extinction to bilateral simultaneous stimulation in one of the sensory modalities   Total 24 (12/04/22 1350)       Modified [place] Score (Pre-morbid)  0-No deficits    Imaging  I personally reviewed all imaging; relevant findings per HPI.     Lab Results Data   CBC  Recent Labs   Lab 12/04/22  1341   WBC 13.7*   RBC 4.26*   HGB 13.0*   HCT 38.9*        Basic Metabolic Panel    Recent Labs   Lab 12/04/22  1341   *   POTASSIUM 4.1   CHLORIDE 95*   CO2 22   BUN 29.5*   CR 1.50*   *   YUNIOR 8.7*     Liver Panel  No results for input(s): PROTTOTAL, ALBUMIN, BILITOTAL, ALKPHOS, AST, ALT, BILIDIRECT in the last 168 hours.  INR    Recent Labs   Lab Test 12/04/22  1341   INR 1.23*      Lipid Profile  No lab results found.  A1C  No lab results found.  Troponin    Recent Labs   Lab 12/04/22  1341   CTROPT 217*          Stroke Code Data Data   Stroke Code Data  (for stroke code with tele)  Stroke code activated 12/04/22   1324   First stroke provider response 12/04/22   1326   Video start time 12/04/22   1350   Video end time 12/04/22   1359   Last known normal 12/04/22   1100   Time of discovery  (or onset of symptoms)  12/04/22   1100   Head CT read by Stroke Neuro Dr/Provider 12/04/22   1334   Was stroke code de-escalated? No               Telestroke  Service Details  Type of service telemedicine diagnostic assessment of acute neurological changes   Reason telemedicine is appropriate patient requires assessment with a specialist for diagnosis and treatment of neurological symptoms   Mode of transmission secure interactive audio and video communication per Mariangel   Originating site (patient location) [facility]    Distant site (provider location) Provider remote site       I personally examined and evaluated the patient today. At the time of my evaluation and management the patient was in critical condition today due to possible acute ischemic stroke. Key decisions made today included eligibility for thrombolysis. I spent a total of 35 minutes providing critical care services, evaluating the patient, directing care and reviewing laboratory values and radiologic reports.

## 2022-12-05 NOTE — PROGRESS NOTES
VEEG monitoring preliminary results:    VEEG has been running for over one hour.  EEG showed mild to moderate generalized slowing of the background activities. Findings are consistent with mild to moderate diffuse encephalopathy.  No epileptiform activities, no clinical or electrographic seizures were observed.    Juan Carlos Barrios MD  Neurology

## 2022-12-05 NOTE — CONSULTS
[facility]ULT[facility]     Patient:  Eloy Fletcher   YOB: 1955  Date of Visit:  12/05/2022  Date of Admission: 12/4/2022  Consult Requester:Lauri Friend MD     ID Problem List:  -Sepsis: Staphylococcus Aures bacteremia; MSSA on Verigen;  Probable endocarditis, with CNS emboli would be the probable unifying diagnosis.  Assume this is a widely disseminated embolic phenomena throughout the body.   -Encephalopathy; Acute embolic CVA;   -Aortic Stenosis s/p Biprosthetic AVR 2014  -Pneumonia - probable bacteremic seeding of lung  -Acute kidney injury     Recommendations:  Stop: vancomycin, acyclovir, ampicillin.  Start: cefazolin 2g IV Q8hr    - Add gentamicin 5mg/kg x1    (Above ordered  Continue: ceftriaxone, reassess tomorrow.   Testing:   - One blood culture daily adequate (ordered)    -Please obtain TTE, and if neg, then ILDEFONSO.  (F/U ILDEFONSO is neg for vegetations, which does not excluded endocarditis as the former vegetation may now have already embolized).     Future:    - Plan for Rifampin with bioprosthetic valvue, not urgent today.   - Continue Ceftriaxone for today, reassess tomorrow based on culture results.          *Rationale Below*        Assessment and Discussion:   Summary:  66 year-old male with PMHx notable for Aortic stenosis s/p bioprosthetic AVR 2014, CAD s/p stent 2012, moderate mitral regurgitation/stenosis on ILDEFONSO 11/2022, with prior chemotherapy/radiation with Hodgkin's Lymphoma 1979, testicular cancer and prostate cancer admitted overnight on 12/04 for sepsis, encephalopathy, and acute embolic CVA. Evaluated for possible endocarditis.    Sepsis: Staphylococcus aureus bacteremia: MSSA on Verigen;  Presumed endocarditis, with CNS emboli, possible meningitis    Pertinent findings include the patient has been febrile (TMax of 104.4F ), tachycardic, and tachypneic, WBC 9.1 (from 13.7 on arrival), Lactic Acid 4. (from 4.6). Notable Micro data,  preliminary  Blood and CSF cultures 12/4/22 demonstrate Gram positive cocci in clusters. Preliminary Verigene no evidence of MecA. Nucleic acid analysis not suggestive of other common viral/bacterial or fungal concomitant infectious etiologies. MRI of the brain, multifocal emboli scattered in the anterior/posterior cerebral circulation, suggested cardio embolic.     Overall, WBC and fever are relatively trending down, clinically his mental status is slightly improved. At this point, plan to transition to Cefazolin 2g q 8h, add on one dose of Gentamicin 3mg/kg x1. Will re-evaluate renal function in the setting of renal insufficiency.    Continue to follw blood/CSF cultures for strain sensitivities,   Plan to trend CBC, lactic acid and overall clinical response and plan for one blood culture daily.    Regarding possible pneumonia,  CT chest notable for ground glass opacities and interstitial pulmonary edema, if infectious etiology, likely seeding from endocarditis. Continue antimicrobial therapy as above.    Advised TTE, may necessitate ILDEFONSO if not diagnostic in the future. May also warrant Rifampin in the future, not urgent at this time.     Will continue to follow.        This patient was discussed with Dr. Lauri Branham, who agrees with the above assessment and plan    Thank you for the consult. ID will continue to follow with you.     Ifrah Weiner, MS4    Pager [phone]  12/05/2022     Reviewed and agree with the medical student's note  Moi Goetz MD  Internal Medicine PGY1  Pager [phone]       Rationale   In 25 minutes of discussion with PharmD and pharmacy, the rationale for this decision revolves around two factors:      1. Brain Tissue Penetration.  (Note that CSF is not Brain tissue). We are primarily treating an embolic infection to the brain -- although traumatic tap with copious RBCs in CSF could very well grow bacteria -- Gram Stain is negative.  Grzegorz PT, Nikki MCKEON, Danika RL, Christoph BRANNON. Penetration of  nafcillin, methicillin, and cefazolin into human brain tissue. Neurosurgery. 1983 ; 12(2):142-7.   In biopsy of brain tissue:   Nafcillin: 11 of 13 brain specimens contained detectable nafcillin concentrations between 0.36 and 11 mcg/gof tissue (mean, 2.7 mcg/g for all 13 specimens).   Cefazolin: 10 of 11 brain tissue specimens contained detectable cefazolin concentrations between 2.0 and 40 mcg/g of tissue (mean, 10.6 mcg/g for all 11 specimens).      2. Acute kidney injury is higher with nafcillin than cefazolin. In one study, this was 19% vs 2%, respectively; P < 0.0001.     Tigre L, Simeon NH, Veronica J, Jayne AJ. Retrospective Analysis of Adverse Drug Events Between Nafcillin Versus Cefazolin for Treatment of Methicillin-Susceptible Staphylococcus aureus Infections. [employer] Pharmacother. 2020 Jul;54(7):662-668.      This ID staff is aware of comparative data on cefazolin vs. IV cloxacillin in [place], where CSF levels were higher with cefazolin and less therapeutic failure occurred.      Le Joo P, Inocencio M, Claudia G, et al. Should we reconsider cefazolin for treating staphylococcal meningitis? A retrospective analysis of cefazolin and cloxacillin cerebrospinal fluid levels in patients treated for staphylococcal meningitis. Clin Microbiol Infect. 2020; 26(10): 1415.e1-1415.e4.         ________________________________________________________________    Consult Question: Patient with blood cultures positive 3/4 for GPC, suspecting endocarditis. Appreciate help with management and treatments      Admission Diagnosis: Endocarditis [I38]         History of Present Illness:   History obtained from review of chart and discussion with patient/nursing staff.     Eloy Fletcher is a 66 year old male who presented with new left lateral ocular gaze and right sided hemineglect, weakness and fevers onset 12/02/2022.  PMHx notable for Aortic Stenosis s/p bioprosthetic AVR 2014, CAD s/p PCI and stent 2012,  moderate mitral regurgitation/stenosis on ILDEFONSO 11/2022, with prior chemotherapy/radiation with Hodgkin's Lymphoma 1979, testicular cancer and prostate cancer admitted overnight on 12/04 for sepsis, encephalopathy, and acute embolic CVA. Evaluated for possible endocarditis.    Course: Initial presentation to [facility] ED with confusion and unresponsiveness 12/4, discussion with his ex-wife,  initial onset of symptoms 12/1/22 multiple episodes of vomiting with a fever. Improved overnight, recurred and worsened 12/3.  He presented with left gaze preference, right-sided neglect, weakness, and fevers. Initial findings significant for showing tachycardia, tachypnea, and fevers, initial eukocytosis of 11.4, lactic acid 4.2. At that time he was confused and not responding appropriately.      He was evaluated by neurology and found to have MRI findings with non-specific small multifocal CVA suspected embolic in origin. He was provided empiric antibiotics, Blood cultures were drawn.     Due to concern for CNS infection, a LP was performed at the OSH ED and was further transferred to [facility] ICU for work-up of endocarditis in the setting of hypotension. Initially provided   Acyclovir, Ampicillin, Ceftriaxone, Piperacillin Tazobactam and Vancomycin.   CT Chest also showing GGO in the MIGUEL ANGEL and LLL with tree-in-bud findings concerning for pneumonia (CAP vs. Aspiration). LP performed showing WBC of 230 with elevated protein at 59 and glucose at 82.        Today, the patient was seen and examined at bedside, mild aphasia/ difficult word finding on exam. Patient is able to answer yes/no questions. Interval changes,  appears improved since 12/4.  Associated symptoms at this time positive for cough, with green/yellow sputum. No shortness of breath. Denies CV complaints of chest pain, diaphoresis, no abdominal pain. Denies new rashes or lesions. Denies new numbness or subjective weakness.         Pertinent Cardiac History: Aortic  "stenosis S/P bioprosthetic AVR in 2014, and CAD s/p PCI   2012.  Of note, recently evaluated with Cardiology at [facility]. Possible planned early next year, \"redo median sternotomy and mitral valve replacement with a tissue valve. We may have to replace the ascending aorta at the same time. The patient has right bundle branch block and a left bob fascicular block\" per evaluation with Dr. Gurrola 11/16/2022.  Last ILDEFONSO 11/11/2022, with LVEF 65%, Moderate-severe mitral valve regurgitation with moderate mitral stenosis and severe annular mitral calcification, demonstrated normal aortic valve tissue prosthesis.   Pertinent Oncology History: Prostate cancer ([place] 4+3, TNM: cT1c) treated with chemo/radiation (last dose 3/30/2022), good response at that time. Past history in remission: Testicular cancer (30 years prior  s/p chemo & Left orchiectomy) and Hodgkins lymphoma (1970s treated with MOPP & radiation          Review of Systems:   10 point review of systems was obtained except for noted as above in HPI. *Patient able to answer yes/no questions, some aphasia.     ROS:  Constitutional: fever, no chills, no weight loss .   Neuro: no HA, no numbness, new findings as above  HEENT: no acute blurry vision, no earache, no ear discharge, no sore throat, no lymphadenopathy.   CVS: no chest pain, heart palpitation   Lungs: no dyspnea, no chest pain,   GI: no N/V, no diarrhea, no constipation, no abdominal pain  : no dysuria, no hesitancy, no incontinence  Skin: no rash, no Itching, no abscesses  Allergy/immunology: no allergic reaction   Musculoskeletal: no muscle pain, no joint pain, no joint swelling or limited ROM, reported strength as above            Past Medical History:   No past medical history on file. *Noted above inHPI         Past Surgical History:   No past surgical history on file.         Family History:   Reviewed and non-contributory.   No family history on file.         Social History:     Social " "History     Tobacco Use     Smoking status: Never     Smokeless tobacco: Never   Substance Use Topics     Alcohol use: Not Currently     History   Sexual Activity     Sexual activity: Not on file            Current Medications:       ceFAZolin  2 g Intravenous Q8H     cefTRIAXone  2 g Intravenous Q12H     heparin ANTICOAGULANT  5,000 Units Subcutaneous Q12H     pantoprazole  40 mg Intravenous Daily with breakfast     sodium chloride (PF)  3 mL Intracatheter Q8H     sodium chloride bacteriostatic  12 mL Intravenous Once            Allergies:     Allergies   Allergen Reactions     Crestor [Rosuvastatin] Other (See Comments)     \"Funny feeling in both legs\" all statins            Physical Exam:   Vitals were reviewed  Temp:  [97.6  F (36.4  C)-104.4  F (40.2  C)] 97.6  F (36.4  C)  Pulse:  [120-144] 124  Resp:  [9-142] 12  BP: ()/(43-87) 106/65  SpO2:  [90 %-99 %] 92 %    Vitals:    12/04/22 2200 12/05/22 0400   Weight: 81.1 kg (178 lb 12.7 oz) 81 kg (178 lb 9.2 oz)       Constitutional: Adult male seen and examined at bedside. Able to answer yes/no otherwise, follows a few commands.   HEENT: NC/AT,  sclera clear, conjunctiva normal, OP with MMM  Respiratory: No increased work of breathing, CTAB, no crackles or wheezing appreciated on exam.  Cardiovascular: Regular rhythm, tachycardic. No peripheral edema.  GI: Normal bowel sounds, soft, non-distended and non-tender.  Skin: Warm, dry, well-perfused. No bruising, bleeding, rashes, or lesions on limited exam.  Musculoskeletal: Extremities grossly normal, non-tender, no edema. Good strength and ROM in bed.   Neurologic:   Moves all extremities spontaneously. Able to track eyes and make eyecontact.   Vascular access:  12/4 LUE pIV RUE pIV, 12/5 Right UE pIV  CDI, non-tender, no surrounding erythema.         Laboratory Data:     Microbiology:    Summary: 12/5 preliminary Blood and CSF cultures 12/4/22 demonstrate Gram positive cocci in clusters. Preliminary Verigene " no evidence of MecA        Contains critical data Cerebrospinal fluid Aerobic Bacterial Culture Routine      Collected 12/4/2022  5:38 PM      Status: Preliminary result      Visible to patient: No (not released)     Specimen Information: Lumbar Puncture; Cerebrospinal fluid    1 Result Note  Culture   Lab   Culture in progress  [facility]      1+ Gram positive cocci Panic   [facility]          Gram Stain quantification of host cells and microbiological organisms was done on a cytocentrifuged preparation.              Gram Stain Result   Lab   No organisms seen [facility]      4+ WBC seen [facility]   Predominantly PMNs                 Specimen Collected: 12/04/22  5:38 PM Last Resulted: 12/05/22  2:18 PM               Blood Culture Line, venous      Collected 12/4/2022  3:34 PM      Status: Preliminary result         1 Result Note  Culture Positive on the 1st day of incubation Abnormal        Gram positive cocci in clusters Panic     2 of 2 bottles        Resulting Agency: [facility]           Specimen Collected: 12/04/22  3:34 PM             Verigene GP Panel      Line, venous; Blood    1 Result Note     Component Ref Range & Units 1 d ago     Staphylococcus aureus Not Detected Detected Abnormal     Comment: Positive for Staphylococcus aureus and negative for the mecA gene (not resistant to methicillin) by [employer] multiplex nucleic acid test. Final identification and antimicrobial susceptibility testing will be verified by standard methods.    Staphylococcus epidermidis Not Detected Not Detected     Staphylococcus lugdunensis Not Detected Not Detected     Enterococcus faecalis Not Detected Not Detected     Enterococcus faecium Not Detected Not Detected     Streptococcus species Not Detected Not Detected     Streptococcus agalactiae Not Detected Not Detected     Streptococcus anginosus group Not Detected Not Detected     Streptococcus pneumoniae Not Detected Not Detected     Streptococcus pyogenes Not Detected Not Detected     Listeria  species Not Detected Not Detected    Resulting Agency  [facility]           Narrative  Performed by: [facility]  Specimen tested with Verigene multiplex, gram-positive blood culture nucleic acid test for the following targets: Staphylococcus aureus, Staphylococcus epidermidis, Staphylococcus lugdunensis, other Staphylococcus species, Enterococcus faecalis, Enterococcus faecium, Streptococcus species, Streptococcus agalactiae, Streptococcus anginosus group, Streptococcus pneumoniae, Streptococcus pyogenes, Listeria species, mecA (methicillin resistance), and Isidro/vanB (vancomycin resistance).      Specimen Collected: 12/04/22  3:34 PM Last Resulted: 12/05/22  5:51 AM                 CSF Findings:   *High: RBC *78, total nucleated cells *230, Glucose CSF *82  Abnormal CSF appearance Hazy.   Otherwise, colorless      Meningitis/Encephalitis Panel Qual PCR CSF:  Order: [id number]   Status: Final result      Visible to patient: Yes (not seen)      1 Result Note    Component Ref Range & Units 1 d ago   (12/4/22) 1 d ago   (12/4/22)    Escherichia coli K1 Negative Negative      Haemophilus influenzae Negative Negative      Listeria monocytogenes Negative Negative      Neisseria meningitidis Negative Negative      Streptococcus agalactiae (GBS) Negative Negative      Streptococcus pneumoniae Negative Negative  Not Detected R     Cytomegalovirus Negative Negative      Enterovirus Negative Negative      Herpes simplex virus 1 Negative Negative     Comment: Recommend testing with another molecular method if clinical suspicion for infection is high.    Herpes simplex virus 2 Negative Negative     Comment: Recommend testing with another molecular method if clinical suspicion for infection is high.    Human Herpes Virus 6 Negative Negative      Human parechovirus Negative Negative      Varicella zoster virus Negative Negative      Cryptococcus neoformans/gattii Negative Negative     Comment: Recommend testing for Cryptococcal antigen and  fungal culture if clinical suspicion for infection is high.        Strep pneumo Agn Ur greater or equal to 13yrs or CSF any age (1 mL minimum)  Order: [id number]   Status: Final result      Visible to patient: Yes (not seen)     Specimen Information: Lumbar Puncture; Cerebrospinal fluid    1 Result Note   Component Ref Range & Units 1 d ago     Streptococcus pneumoniae antigen Negative Negative    Comment: A negative Streptococcus pneumoniae antigen result does not rule out infection with Streptococcus pneumoniae.            Inflammatory Markers    Procalcitonin: 54.20 (12/4/22)  Lactic Acid: 4.6 (12/4), 4.2 (12/4), 4.6(12/5)  Cardiac: Troponin: 610 (12/4), 983 (12/4), 1,229 (12/5)      Metabolic Studies       Recent Labs   Lab Test 12/05/22  1141 12/05/22  1025 12/05/22  1008 12/05/22  0902 12/05/22  0739 12/05/22  0408 12/05/22  0024 12/04/22  2353 12/04/22  1523 12/04/22  1341   NA  --   --  141  --   --   --   --  140  --  132*   POTASSIUM 3.8  --  3.7  --  6.7*  --   --  4.4  --  4.1   CHLORIDE  --   --  108*  --   --   --   --  104  --  95*   CO2  --   --  20*  --   --   --   --  11*  --  22   ANIONGAP  --   --  13  --   --   --   --  25*  --  15   BUN  --   --  35.5*  --   --   --   --  33.4*  --  29.5*   CR  --   --  1.88*  --   --   --   --  1.68*  --  1.50*   GFRESTIMATED  --   --  39*  --   --   --   --  44*  --  51*   GLC  --  133* 142* 131*  --  118* 148* 140*   < > 155*   YUNIOR  --   --  7.9*  --   --   --   --  8.2*  --  8.7*   PHOS  --   --  2.4*  --   --   --   --  2.8  --   --    MAG  --   --  2.1  --   --   --   --  1.8  --   --    LACT 4.4*  --   --   --  4.6*  --   --  4.2*   < >  --     < > = values in this interval not displayed.       Hepatic Studies    Recent Labs   Lab Test 12/05/22  1008 12/04/22  9505   BILITOTAL 1.1 1.3*   ALKPHOS 58 52   ALBUMIN 2.9* 3.0*   * 149*   ALT 61* 78*   *  --         Hematology Studies      Recent Labs   Lab Test 12/05/22  1141 12/04/22  3923  12/04/22  1341   WBC 9.1 11.4* 13.7*   ANEU  --  10.4* 12.5*   ALYM  --  0.8 0.8   LATONYA  --  0.2 0.4   AEOS  --  0.0 0.0   HGB 12.8* 12.7* 13.0*   HCT 38.8* 41.0 38.9*   PLT 96* 118* 168            Urine Studies     Recent Labs   Lab Test 12/05/22  0538 12/04/22  1534   URINEPH 5.5 5.0   NITRITE Negative Negative   LEUKEST Negative Negative   WBCU 8* 3        CSF testing     Recent Labs   Lab Test 12/04/22  1738   CWBC 230*   CRBC 78*   CGLU 82*   CTP 59.7*     Respiratory Virus Testing    Negative PCR 12/4/22:  SARS CoV2 PCR, Influenza A/B,             Imaging:   Imaging and reports reviewed     MRI of the brain 12/4/22: IMPRESSION: 5 scattered foci of acute ischemia involving the bilateral anterior and posterior cerebral artery distributions. Constellation of findings is suspicious for central embolic phenomena.        CT Abdomen and Pelvis: 12/4/22  IMPRESSION:  1.  Patchy groundglass opacities in the left upper and lower lobes, and cluster of tree-in-bud nodularity in the left upper lobe, likely infectious or inflammatory.  2.  Interstitial pulmonary edema.  3.  Trace left pleural effusion.  4.  No acute findings in the abdomen or pelvis.      CT head and CTA head/neck 12/4/22, were not suggestive of an acute abnormality, no significant stenosis of carotids or vertebral arteries.      ECHO: 11/11/22 ILDEFONSO @ [facility]:  Final Impressions  1. Severe annular mitral calcification with severe calcific mitral valve stenosis (diastolic valve area by 3D planimetry 1 cm?). Mean gradient 9 mm Hg (under sedation).  2. Moderate-severe mitral valve regurgitation , two regurgitant jets appreciated flanking A2/P2 on either side.  3. Normal aortic valve tissue prosthesis.  4. Normal left atrial appendage. There is a small (3 mm) mobile strand attached to the appendage wall just proximal to the 'chicken-wing' distal lobe (e.g. clips 71-78). This could, less likely, represent a tiny papillary fibroelastoma.  5. Estimated left  ventricular ejection fraction 65%.  6. Mild-moderate tricuspid valve regurgitation.  7. No right-to-left shunt at atrial level at rest or with Valsalva release. Tiny intrapulmonary shunt.  8. Moderate immobile (atheroma 3-5 mm thickness without ulceration) atherosclerosis of the descending thoracic aorta.        Attending Physician Attestation:  I, Lauri Branham MD have seen and evaluated Eloy Fletcher. I have discussed with ICU pharmacy, and I agree with the above documented findings and plan in this note. I have reviewed today's vital signs, medications, labs and imaging.  I have conducted a literature search.  Floor time: 75 minutes  Face-to-face time: 15 minutes  Total time: 90 minutes     Lauri Branham MD MPH  Professor of Medicine  Division of Infectious Diseases & International Medicine  Department of Medicine

## 2022-12-05 NOTE — CONSULTS
Neurocritical Care Consultation    Reason for critical care admission: Sepsis & Encephalopathy  Admitting Team: Medical ICU  Date of Service:  12/05/2022  Date of Admission:  12/4/2022  Hospital Day: 2    Assessment  66 year old male with PMH of HTN, HLD, CAD s/p PCI to LAD (2012), Aortic stenosis s/p AOV replacement (2014), Hodgkin's disease s/p radiation therapy, testicular & prostate cancer who presented with left gaze preference, right sided neglect and generalized weakness. Found to have embolic appearing punctate infarcts, encephalopathy, fever, nuchal rigidity, multiple punctate infarcts in the setting of encephalopathy and MSSA sepsis highly concerning for endocarditis. Additionally, neuro exam, Meningitis/encephalitis panel and lumbar puncture findings concerning for bacterial meningitis/encephalitis. Broad-spectrum antibiotics have been initiated and ID consulted. Embolic infarcts are unlikely responsible for his encephalopathy and previously document gaze deviation. Possibility of seizures must also be considered. Patient's exam has shown improvement following initial doses of antibiotics.     Recommendations  -Continuous vEEG x24 hours to rule-out seizures.  -ILDEFONSO to assess for Endocarditis  -Defer treatment & management of endocarditis & meningitis to ID and Primary teams.  -Low threshold for repeat head CT in case of acute neurologic exam change. Patient is at risk for development and ruptured mycotic aneurysms in setting of endocarditis. Do not give thrombolysis should additional stroke occur.    Neurology will continue to follow.    This patient was discussed with the attending faculty, Karolina Rivera DO  Neurology Resident PGY2  12/05/2022 3:17 PM        History of Present Illness:  Eloy Fletcher is a 66 year old male with PMH of HTN, HLD, CAD s/p stent, Hodgkin's disease, prostate cancer who presented with left gaze preference, right sided neglect and generalized weakness.  "According to his friend, he has been having intermittent fevers since Friday afternoon and thought it was related to food poisoning. Today, he did not up from bed the whole morning and was not interactive and stopped talking around 11 AM.      Brain MRI was completed with evidence of small multifocal infarcts in 3+ vascular territories.  Infarcts were thought to be very small also has not to contribute entirely to the degree of encephalopathy.  Patient also found to be febrile to 103.4.  Concern for primarily for endocarditis.     On my exam the patient continues to be encephalopathic.  He has normal extraocular movements, smile symmetric, moving for 4 extremity spontaneously.  However I do note that the patient has nuchal rigidity.      Allergies   Allergen Reactions     Crestor [Rosuvastatin] Other (See Comments)     \"Funny feeling in both legs\" all statins       PAST MEDICAL HISTORY: HTN, HLD, CAD, Radiation induced CM, Aortic stenosis, Hodgkin's disease, Testicular cancer, Prostate cancer    PAST SURGICAL HISTORY: PCI to LAD (2012), AOV replacement (2014)    FAMILY HISTORY: No significant family history    SOCIAL HISTORY:   Social History     Tobacco Use     Smoking status: Never     Smokeless tobacco: Never   Substance Use Topics     Alcohol use: Not Currently       ROS: The 10 point Review of Systems is negative other than noted in the HPI or here.     Current Medications:    cefTRIAXone  2 g Intravenous Q12H     heparin ANTICOAGULANT  5,000 Units Subcutaneous Q12H     nafcillin  2 g Intravenous Q4H     pantoprazole  40 mg Intravenous Daily with breakfast     sodium chloride (PF)  3 mL Intracatheter Q8H     sodium chloride bacteriostatic  12 mL Intravenous Once       PRN Medications:  acetaminophen, acetaminophen, acetylcysteine, albuterol, glucose **OR** dextrose **OR** glucagon, fentaNYL, flumazenil, ipratropium - albuterol 0.5 mg/2.5 mg/3 mL, lidocaine 4%, lidocaine (buffered or not buffered), midazolam, " naloxone, naloxone, naloxone, naloxone, ondansetron **OR** ondansetron, polyethylene glycol, prochlorperazine **OR** prochlorperazine **OR** prochlorperazine, senna-docusate **OR** senna-docusate, sodium chloride (PF), sodium chloride, sodium chloride bacteriostatic    Infusions:    sodium chloride 150 mL/hr (12/05/22 1330)       Physical Examination:  Vitals: /61   Pulse (!) 129   Temp 97.6  F (36.4  C) (Oral)   Resp 12   Wt 81 kg (178 lb 9.2 oz)   SpO2 100%   BMI 27.15 kg/m    General: Adult male patient, lying in bed, critically-ill  Neuro:  Mental status: Awake, alert, attentive. Mildly confused and requires repetition of commands/questions to generate response/cooperation. Oriented to self, year, month, location, and circumstance. Not oriented to date or day or week. Can appropriately identify ex-wife and daughters in the room. Language is fluent and coherent with intact comprehension of simple commands but needs coaching to complete complex commands. Intact naming and repetition despite occasional word-finding difficulty.  Cranial nerves: VFF, PERRL, conjugate gaze, EOMI, facial sensation intact, face symmetric, shoulder shrug strong, tongue midline, no dysarthria.   Motor: Normal bulk and tone. No abnormal movements. 5/5 strength in 4/4 extremities.   Sensory: Intact to light touch x 4 extremities  Coordination: Deferred.   Gait: RICHIE, deferred.    Labs:  Recent Labs   Lab 12/05/22  1141 12/05/22  1008 12/05/22  0739 12/04/22  2353 12/04/22  1341   NA  --  141  --  140 132*   POTASSIUM 3.8 3.7 6.7* 4.4 4.1   CHLORIDE  --  108*  --  104 95*   CO2  --  20*  --  11* 22   BUN  --  35.5*  --  33.4* 29.5*   CR  --  1.88*  --  1.68* 1.50*   YUNIOR  --  7.9*  --  8.2* 8.7*   BILITOTAL  --  1.1  --  1.3*  --    ALKPHOS  --  58  --  52  --    ALT  --  61*  --  78*  --    AST  --  131*  --  149*  --        Recent Labs   Lab 12/05/22  1141 12/04/22  2353 12/04/22  1341   WBC 9.1 11.4* 13.7*   HGB 12.8* 12.7*  13.0*   PLT 96* 118* 168       No results for input(s): PH, PCO2, PO2, HCO3 in the last 168 hours.    All cultures:  Recent Labs   Lab 12/04/22  1738 12/04/22  1601 12/04/22  1534   CULTURE Culture in progress  1+ Gram positive cocci* Positive on the 1st day of incubation*  Gram positive cocci in clusters* Positive on the 1st day of incubation*  Gram positive cocci in clusters*  Positive on the 1st day of incubation*  Gram positive cocci in clusters*       Imaging:    Stroke Evaluation Summarized    MRI/Head CT MRI Brain (12/4): 5 scattered foci of acute ischemia involving the bilateral anterior and posterior cerebral artery distributions. Constellation of findings is suspicious for central embolic phenomena.    HEAD CT (12/4): No acute intracranial process.   Intracranial Vasculature HEAD CTA (12/4):   1.  No significant stenosis, aneurysm, or high flow vascular malformation identified.  2.  Variant Hoh of Khan anatomy as above.   Cervical Vasculature NECK CTA  (12/4):  1.  No hemodynamically significant stenosis in the neck vessels.   2.  No evidence for dissection.     Echocardiogram TTE Limited (12/5):  Would consider a transesophageal echocardiogram if clinically indicated.  Technically difficult study.Extremely poor acoustic windows.  Limited information was obtained during study.  Global and regional left ventricular function is normal with an EF of 60-65%.  Right ventricular function, chamber size, wall motion, and thickness are  normal.  Severe mitral annular calcification is present.  Mitral mean gradient 11mmHg at heart rate 131 BPM.  H/O Bioprosthetic AVR in 2014. Valve not well visualized.   EKG/Telemetry EKG (12/4): Sinus  Tachycardia   -Right bundle branch block with left axis-bifascicular block.   Other Testing Not Applicable     LDL  No lab value available in past 30 days   A1C  No lab value available in past 90 days   Troponin 12/5/2022: 1,229 ng/L         All relevant imaging and  laboratory values personally reviewed.   97.9

## 2022-12-05 NOTE — PROGRESS NOTES
[facility]    Stroke Progress Note    [facility]  Blood cultures resulted GPC's.  Lumbar puncture completed with elevated protein, elevated cells, normal glucose.  Patient started on empiric antibiotic treatment including acyclovir, ceftriaxone, vancomycin.    HPI Summary  Eloy Fletcher is a 66 year old male with PMH of HTN, HLD, CAD s/p stent, Hodgkin's disease, prostate cancer who presented with left gaze preference, right sided neglect and generalized weakness. According to his friend, he has been having intermittent fevers since Friday afternoon and thought it was related to food poisoning. Today, he did not up from bed the whole morning and was not interactive and stopped talking around 11 AM.     Brain MRI was completed with evidence of small multifocal infarcts in 3+ vascular territories.  Infarcts were thought to be very small also has not to contribute entirely to the degree of encephalopathy.  Patient also found to be febrile to 103.4.  Concern for primarily for endocarditis.    On my exam the patient continues to be encephalopathic.  He has normal extraocular movements, smile symmetric, moving for 4 extremity spontaneously.  However I do note that the patient has nuchal rigidity.    Stroke Evaluation Summarized    MRI/Head CT  scattered foci of acute ischemia in the bilateral anterior and posterior cerebral arterial distributions   Intracranial Vasculature  no significant stenosis, aneurysm, or high flow vascular malformation identified   Cervical Vasculature  no hemodynamically significant stenosis in the neck vessels     Echocardiogram  pending   EKG/Telemetry  sinus tach with right bundle branch block   Other Testing Not Applicable      LDL  No lab value available in past 30 days   A1C  No lab value available in past 90 days   Troponin 12/4/2022: 983 ng/L       Impression   66 year old male with PMH of HTN, HLD,  CAD s/p stent, Hodgkin's disease, prostate cancer who presented with left gaze preference, right sided neglect and generalized weakness.  Found to have embolic appearing punctate infarcts, encephalopathy, fever and nuchal rigidity.    The patient's presentation of encephalopathy and punctate infarcts in the setting of encephalopathy and positive gram-positive cocci on blood culture is highly concerning for endocarditis.  Transthoracic, and subsequently transesophageal echo is most sensitive test to detect endocarditis.  Broad-spectrum antibiotics have been initiated.  Patient also noted to have nuchal rigidity, fever, elevated cells on lumbar puncture all concerning for meningitis.  Patient has been started on empiric treatment including ceftriaxone, vancomycin, acyclovir.  Meningitis encephalitis panel is also pending.  Patient's ongoing encephalopathy may be contributed to by CNS infection in addition to embolic infarcts.  Possibility of encephalitis must also be considered.    Low threshold should be maintained for head CT in the setting of a neurologic exam change.  Patient is at risk for development and ruptured mycotic aneurysms in setting of endocarditis.  Patient would not be a thrombolysis candidate should additional stroke occur.    Plan  -Transthoracic echo  -We will follow remaining lumbar puncture results  -Continue empiric treatment of bacterial meningitis  -Will follow blood cultures  -Low threshold for head CT in setting of exam change  -Every 2 hours neurochecks    Patient Follow-up    - final recommendation pending work-up    We will continue to follow.     Patient was discussed with [facility] staff Dr. Diaz.    Edyta Valencia MD  Neurology Resident  ______________________________________________________    Clinically Significant Risk Factors Present on Admission         # Hyponatremia: Lowest Na = 132 mmol/L (Ref range: 136-145) in last 2 days, will monitor as appropriate     # Anion Gap  Metabolic Acidosis: Highest Anion Gap = 25 mmol/L (Ref range: 7-15) in last 2 days, will monitor and treat as appropriate  # Hypoalbuminemia: Lowest albumin = 3 g/dL (Ref range: 3.5-5.2) at 12/4/2022 11:53 PM, will monitor as appropriate  # Coagulation Defect: INR = 1.23 (Ref range: 0.85 - 1.15) and/or PTT = 41 Seconds (Ref range: 22 - 38 Seconds), will monitor for bleeding  # Drug Induced Platelet Defect: home medication list includes an antiplatelet medication       Medications   Scheduled Meds    acyclovir  10 mg/kg Intravenous Q8H     ampicillin  2 g Intravenous Q4H     cefTRIAXone  2 g Intravenous Q12H     heparin ANTICOAGULANT  5,000 Units Subcutaneous Q12H     pantoprazole  40 mg Intravenous Daily with breakfast     vancomycin  1,000 mg Intravenous Q18H       PRN Meds  acetaminophen, acetaminophen, albuterol, glucose **OR** dextrose **OR** glucagon, ondansetron **OR** ondansetron, polyethylene glycol, prochlorperazine **OR** prochlorperazine **OR** prochlorperazine, senna-docusate **OR** senna-docusate       PHYSICAL EXAMINATION  Temp:  [97.8  F (36.6  C)-104.4  F (40.2  C)] 100.5  F (38.1  C)  Pulse:  [122-144] 131  Resp:  [] 14  BP: ()/(43-97) 104/69  SpO2:  [89 %-98 %] 95 %      Neurologic  Mental Status:  Intermittently follows commands.  Opens eyes spontaneously and tracks examiner.  Speaks nonsensically.  Unable to assess repetition  Cranial Nerves:  Blink to threat intact.  Extraocular movements intact.  Smile symmetric.  Facial sensation symmetric.  Motor:  strength 5/5 throughout upper and lower extremities  Reflexes:  toes down-going  Sensory:  light touch sensation intact and symmetric throughout upper and lower extremities  Coordination:  normal finger-to-nose and heel-to-shin bilaterally without dysmetria  Station/Gait:  deferred    Stroke Scales    NIHSS  1a. Level of Consciousness 1-->Not alert, but arousable by minor stimulation to obey, answer, or respond   1b. LOC Questions  2-->Answers neither question correctly   1c. LOC Commands 1-->Performs one task correctly   2.   Best Gaze 0-->Normal   3.   Visual 0-->No visual loss   4.   Facial Palsy 0-->Normal symmetrical movements   5a. Motor Arm, Left 0-->No drift, limb holds 90 (or 45) degrees for full 10 secs   5b. Motor Arm, Right 0-->No drift, limb holds 90 (or 45) degrees for full 10 secs   6a. Motor Leg, Left 0-->No drift, leg holds 30 degree position for full 5 secs   6b. Motor Leg, right 0-->No drift, leg holds 30 degree position for full 5 secs   7.   Limb Ataxia 0-->Absent   8.   Sensory 0-->Normal, no sensory loss   9.   Best Language 0-->No aphasia, normal   10. Dysarthria 1-->Mild-to-moderate dysarthria, patient slurs at least some words and, at worst, can be understood with some difficulty   11. Extinction and Inattention  0-->No abnormality   Total 5 (12/05/22 0632)       Modified Buffalo Score (Pre-morbid)    -      Imaging  I personally reviewed all imaging; relevant findings per HPI.     Lab Results Data   CBC  Recent Labs   Lab 12/04/22 2353 12/04/22  1341   WBC 11.4* 13.7*   RBC 4.25* 4.26*   HGB 12.7* 13.0*   HCT 41.0 38.9*   * 168     Basic Metabolic Panel    Recent Labs   Lab 12/05/22  0408 12/05/22  0024 12/04/22  2353 12/04/22  2146 12/04/22  1341   NA  --   --  140  --  132*   POTASSIUM  --   --  4.4  --  4.1   CHLORIDE  --   --  104  --  95*   CO2  --   --  11*  --  22   BUN  --   --  33.4*  --  29.5*   CR  --   --  1.68*  --  1.50*   * 148* 140*   < > 155*   YUNIOR  --   --  8.2*  --  8.7*    < > = values in this interval not displayed.     Liver Panel  Recent Labs   Lab 12/04/22  2353   PROTTOTAL 6.2*   ALBUMIN 3.0*   BILITOTAL 1.3*   ALKPHOS 52   *   ALT 78*     INR    Recent Labs   Lab Test 12/04/22  1341   INR 1.23*      Lipid Profile  No lab results found.  A1C  No lab results found.  Troponin    Recent Labs   Lab 12/04/22  2353 12/04/22  1810 12/04/22  1341   CTROPT 983* 610* 217*

## 2022-12-05 NOTE — ED NOTES
Called [facility] - Informed them patient just left and was coming Code 3 and Norepi was started by [employer] [id number].

## 2022-12-05 NOTE — PROGRESS NOTES
"  MEDICAL ICU H&P  12/05/2022    Date of Hospital Admission: 12/4/22  Date of ICU Admission: 12/4/22  Reason for Critical Care Admission: Sepsis and encephalopathy   Date of Service (when I saw the patient): 12/04/2022     ASSESSMENT: Eloy Fletcher is a 66-year-old male with a past medical history of HTN, HLD, Hodgkin's lymphoma (s/p radiation and MOPP chemo in remission), Testicular cancer (s/p left orchiectomy and chemo, in remission), prostate cancer ([place] 4+3, diagnosed in 4/2021 and s/p radiation and lupron), aortic stenosis S/P bioprosthetic AVR in 2014, and CAD s/p PCI to LAD in 2012 that is admitted to the ICU for sepsis and encephalopathy.     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 CT Head and CTA Head/Neck were performed and did not show any acute abnormality or significant stenosis. Neurology evaluated the patient and determined a NIHSS of 24 and no need for tPA. They then recommended performing a brain MRI which showed 5 scattered foci of acute ischemia involving the bilateral anterior and posterior cerebral artery distributions. Constellation of findings is suspicious for central embolic phenomena. Patient upon admission will respond to some questioning with \"yes\" but out of reflex and not within context. This encephalopathy is likely in the setting of infection and sepsis and known emboli strokes. Currently protecting airway. His mental status has improved since the night and he is more oriented to time place and person.    - " "Delirium precautions   - Neurology stroke team consulted, appreciate recommendations                               >Transthoracic echo                               >We will follow remaining lumbar puncture results                               >Continue empiric treatment of bacterial meningitis                               >Will follow blood cultures                               >Low threshold for head CT in setting of exam change   - Every 2 hours neurochecks   - Tylenol PRN for fevers   - SLP consulted in the setting of stroke and possible dysphagia  -  Lumber puncture - CSF specimen is positive for GPC      Pulmonary:  # Likely Bacterial Pneumonia (CAP vs aspiration)  Patient had a CT chest performed at the OSH ED that showed GGO in the MIGUEL ANGEL and LLL with tree-in-bud findings as well as interstitial pulmonary edema and a left pleural effusion. Currently the patient is saturating >90% on room air. Has not been hypoxic.    - ABX per ID section   - Sputum if able to obtain   - CTM, supplementary oxygen to keep the saturation >90%     Cardiovascular:  # Possible Endocarditis  # Gram positive blood culutre x2, MSSA detected   # Elevated Troponin  # Severe Calcific Mitral Stenosis  # Moderate Mitral Regurgitation  # Moderate Tricuspid Regurgitation  # Aortic Stenosis S/P Bioprosthetic AVR in 2014  # CAD s/p PCI to LAD in 2012  # Hx of Radiation-Induced heart disease  # HLD  # HTN  This patient follows with cardiology at [facility]. He was last seen by Dr. Gurrola on 11/16/22 with note as below as well as recent ILDEFONSO and coronary angiography:  \"Mr. Fletcher is a 66-year-old male with history of radiation-induced heart disease. He had mediastinal radiation for Hodgkin lymphoma in 1979. He had coronary artery disease with PCI to the left anterior descending coronary artery in 2012. Then in 2014 he underwent aortic valve replacement with a CE valve elsewhere. Now he has moderate symptoms of shortness of breath with " "activity and some chest compression. Evaluation revealed that he has a normal left ventricular ejection fraction. He has moderate to severe mitral regurgitation with moderate to severe mitral stenosis in the setting of mitral annular calcification. He has no significant coronary artery disease. The ascending aorta is also calcified. There is mild to moderate tricuspid regurgitation. I believe that this patient will benefit from redo median sternotomy and mitral valve replacement with a tissue valve. We may have to replace the ascending aorta at the same time. The patient has right bundle branch block and a left bob fascicular block. The patient understands and wishes to proceed with the surgery. He is thinking early February 2023.\"         CORONARY ANGIOGRAPHY, 11/14/2022  CORONARY DIAGNOSTIC SUMMARY  Coronary artery dominance is right.   The left main coronary artery is 10% obstructed by a discrete lesion.   The middle left anterior descending artery is 40% obstructed by a tubular lesion. The distal segment is normal size, diseased.   The distal left anterior descending artery is 30% obstructed by a tubular lesion.   The distal circumflex artery is 30% obstructed by a tubular lesion.   The proximal right coronary artery is 20% obstructed by a discrete lesion.   The middle right coronary artery is 30% obstructed by a discrete lesion.   The distal right coronary artery is 60% obstructed by a discrete lesion.   The right posterior descending artery is 30% obstructed by a discrete lesion. The distal segment is normal size, diseased.   Prior LAD stents are patent     Patient's presentation is very concerning for endocarditis, especially in the setting of above described replaced valves and current sepsis with a leukocytosis of 13.7, fevers, and emboli CVA's noted on brain MRI.  BNP elevated at 10,855. EKG showing known RBBB with left bifasicular block in the setting of sinus tachycardia.    - TTE and ILDEFONSO was done to " further evaluate   - Holding PTA plavix and ASA in the setting of hypotension and possible need for procedures while inpatient   - Currently not requiring pressor support in the setting of sepsis, may use levophed PRN to maintain MAP's >65   - Troponin elevated, currently trending q4hrly  -  Cardiology consulted and appreciate the recommendations      GI/Nutrition:  # Gi prophylaxis  - pantoprazole IV 40 mg daily     # Diet  - Awaiting recs from the SLP consult regarding the dysphagia   - Nutrition consulted for likely need for TF in the setting of ICU admission with encephalopathy.     Renal/Fluids/Electrolytes:  # Acute Kidney Injury  # Hyponatremia  # Lactate elevation without AG  Patient with creatinine of 1.50 from baseline of 0.9. Sodium also noted to be 132 at OSH ED. Likely JILLIAN in the setting of sepsis any pre-renal hypoperfusion.    - Repeat BMP upon admission to monitor creatinine and sodium   - Could consider additional IVF, received 2L at OSH  -  Gave him 500 ml in the day regarding rise in the lactate      Endocrine:  No active concerns at this time.      ID:  # Sepsis  # Possible Endocarditis vs. Encephalitis vs. Pneumonia  # Elevated Lactate  Patient with fever (TMax of 104.4), leukocytosis of 13.7, tachycardia, tachypnea, and multiple possible sources of infection. In the setting of acute encephalopathy and emboli stroke phenomenon, endocarditis is considered. Enchephalitis/Meningitis are also included in the differential due to the sepsis in the setting of encephalopathy. CT Chest also showing GGO in the MIGUEL ANGEL and LLL with tree-in-bud findings concerning for pneumonia (CAP vs. Aspiration). CT abdomen/pelvis did not show any sources of possible infection leading to this presentation. The patient was started on IV Vanc/Zosyn at outside hospital and obtain blood cultures x4 as well as a UA. - Blood cultures x4 obtained at OSH, will repeat culture if growth noted overnight   - Sputum culture if able to  "obtain sputum   - U/A with glucose, protein, and ketones, not concerning for infection    - Repeat CBC, procal, and lactate     Cultures:  Blood cultures 12/5: Growing GPC's    LP 12/5   - LP performed showing WBC of 230 with elevated protein at 59 and glucose at 82.    - LP cultures - growing GPC's     Abx   Vancomycin 12/5  Zosyn 12/5  Ampicillin 12/5  Acyclovir 12/5  Naffcillin 12/5 - P  Ceftriaxone 12/5       Hematology & Oncology:    # Prostate cancer ([place] 4+3, TNM: cT1c)  # Hx Testicular cancer (30 years ago s/p chemo & Left orchiectomy, in remission)  # Hx Hodgkins lymphoma (1970s treated with MOPP & radiation, in remission)  Last seen by Dr. Chester at [facility] on 6/10/22:  \"Mr. Fletcher then completed photon SABR to 40 Gy in 5 fractions completed on March 30th, 2022. He received a single four month Lupron injection on March 10th, 2022. He returns today for follow-up. PSA is 0.23 today, compared to 5.0 prior to treatment. Mr. Fletcher has had an excellent PSA response, and is doing well from a symptomatic perspective. We will get a mail-in PSA in six months, and I will see him back in one year.\"   - Continue outpatient follow-up with [facility] for prostate cancer surveillance.     Musculoskeletal:  PT & OT Consults while admitted to the ICU.     Skin:  No active concerns at this time.      General Cares/Prophylaxis:    DVT Prophylaxis: Heparin SQ  GI Prophylaxis: PPI  Restraints: None  Family Communication: Updated Estrella (Ex-wife) by phone. Daughter is out of town [facility] and she confirmed she is his current emergency contact for now.   Code Status: Full Code, confirmed by ex-wife, Estrella     Lines/tubes/drains:  - 2 pIV's (12/4/22)     Disposition:  - Medical ICU      Patient seen and findings/plan discussed with medical ICU staff, Dr. Salazar العلي  Internal Medicine Resident PGY1  [facility] "     -----------------------------------------------------------------------     Interim History:    I reviewed over night nursing notes and view signs for Mr Fletcher. His mental status has improved since his presentation to the ICU. He is oriented to time place and person. Will be getting a ILDEFONSO done today to look for endocarditis.      PHYSICAL EXAMINATION:  Temp:  [97.8  F (36.6  C)-104.4  F (40.2  C)] 104.4  F (40.2  C)  Pulse:  [122-144] 133  Resp:  [17-49] 29  BP: (107-179)/(71-97) 107/71  SpO2:  [89 %-96 %] 93 %  General: Wakes to voice, unable to respond appropriately. Not in acute distress.   HEENT: Atraumatic  Neuro: Unable to assess due to encephalopathy, purposeful movements of upper extremities  Pulm/Resp: Crackles noted in the bilateral bases, moving air without difficulty  CV: Tachycardia, normal rhythm, no bilateral lower extremity edema  Abdomen: Soft, non-distended, non-tender  Incisions/Skin: No visible contusions or rashes     LABS: Reviewed.   Arterial Blood Gases   No lab results found in last 7 days.  Complete Blood Count       Recent Labs   Lab 12/04/22  1341   WBC 13.7*   HGB 13.0*         Basic Metabolic Panel  Recent Labs   Lab 12/04/22  1341   *   POTASSIUM 4.1   CHLORIDE 95*   CO2 22   BUN 29.5*   CR 1.50*   *      Liver Function Tests      Recent Labs   Lab 12/04/22  1341   INR 1.23*      Coagulation Profile      Recent Labs   Lab 12/04/22  1341   INR 1.23*   PTT 41*         IMAGING:      Recent Results (from the past 24 hour(s))   CT Head w/o Contrast     Narrative     EXAM: CT HEAD W/O CONTRAST, CTA HEAD NECK W CONTRAST  LOCATION: [facility]  DATE/TIME: 12/4/2022 1:32 PM     INDICATION: Stroke alert. Last known well 1215. Right sided neglect with left gaze preference. Weakness of extremities  COMPARISON: 07/20/2012  CONTRAST: 75 mL Isovue 370  TECHNIQUE: Head and neck CT angiogram with IV contrast. Noncontrast head CT followed by axial  helical CT images of the head and neck vessels obtained during the arterial phase of intravenous contrast administration. Axial 2D reconstructed images and   multiplanar 3D MIP reconstructed images of the head and neck vessels were performed by the technologist. Dose reduction techniques were used. All stenosis measurements made according to NASCET criteria unless otherwise specified.     FINDINGS:   NONCONTRAST HEAD CT:   INTRACRANIAL CONTENTS: No intracranial hemorrhage, extraaxial collection, or mass effect.  No CT evidence of acute infarct. Normal parenchymal attenuation. Normal ventricles and sulci.      VISUALIZED ORBITS/SINUSES/MASTOIDS: No intraorbital abnormality. No paranasal sinus mucosal disease. No middle ear or mastoid effusion.     BONES/SOFT TISSUES: No acute abnormality.     HEAD CTA:  ANTERIOR CIRCULATION: No stenosis/occlusion, aneurysm, or high flow vascular malformation. Developmentally hypoplastic right A1 anterior cerebral artery segment.Fetal origin of the right posterior cerebral artery from the anterior circulation.     POSTERIOR CIRCULATION: No stenosis/occlusion, aneurysm, or high flow vascular malformation. Balanced vertebral arteries supply a normal basilar artery.      DURAL VENOUS SINUSES: Expected enhancement of the major dural venous sinuses.     NECK CTA:  RIGHT CAROTID: No measurable stenosis or dissection. There is nonstenotic atherosclerotic calcification at the bifurcation.     LEFT CAROTID: No measurable stenosis or dissection. There is nonstenotic atherosclerotic calcification at the bifurcation.     VERTEBRAL ARTERIES: No focal stenosis or dissection. Balanced vertebral arteries.     AORTIC ARCH: Classic aortic arch anatomy with no significant stenosis at the origin of the great vessels.     NONVASCULAR STRUCTURES: Unremarkable.        Impression     IMPRESSION:   HEAD CT:  1.  No acute intracranial process.     HEAD CTA:   1.  No significant stenosis, aneurysm, or high flow  vascular malformation identified.  2.  Variant [place] of [place] anatomy as above.     NECK CTA:  1.  No hemodynamically significant stenosis in the neck vessels.   2.  No evidence for dissection.     3.  Dr. Mccoy discussed the above findings by telephone with Dr. Torres at 1:59 PM 12/04/2022.   CTA Head Neck with Contrast     Narrative     EXAM: CT HEAD W/O CONTRAST, CTA HEAD NECK W CONTRAST  LOCATION: [facility]  DATE/TIME: 12/4/2022 1:32 PM     INDICATION: Stroke alert. Last known well 1215. Right sided neglect with left gaze preference. Weakness of extremities  COMPARISON: 07/20/2012  CONTRAST: 75 mL Isovue 370  TECHNIQUE: Head and neck CT angiogram with IV contrast. Noncontrast head CT followed by axial helical CT images of the head and neck vessels obtained during the arterial phase of intravenous contrast administration. Axial 2D reconstructed images and   multiplanar 3D MIP reconstructed images of the head and neck vessels were performed by the technologist. Dose reduction techniques were used. All stenosis measurements made according to NASCET criteria unless otherwise specified.     FINDINGS:   NONCONTRAST HEAD CT:   INTRACRANIAL CONTENTS: No intracranial hemorrhage, extraaxial collection, or mass effect.  No CT evidence of acute infarct. Normal parenchymal attenuation. Normal ventricles and sulci.      VISUALIZED ORBITS/SINUSES/MASTOIDS: No intraorbital abnormality. No paranasal sinus mucosal disease. No middle ear or mastoid effusion.     BONES/SOFT TISSUES: No acute abnormality.     HEAD CTA:  ANTERIOR CIRCULATION: No stenosis/occlusion, aneurysm, or high flow vascular malformation. Developmentally hypoplastic right A1 anterior cerebral artery segment.Fetal origin of the right posterior cerebral artery from the anterior circulation.     POSTERIOR CIRCULATION: No stenosis/occlusion, aneurysm, or high flow vascular malformation. Balanced vertebral arteries supply a normal  basilar artery.      DURAL VENOUS SINUSES: Expected enhancement of the major dural venous sinuses.     NECK CTA:  RIGHT CAROTID: No measurable stenosis or dissection. There is nonstenotic atherosclerotic calcification at the bifurcation.     LEFT CAROTID: No measurable stenosis or dissection. There is nonstenotic atherosclerotic calcification at the bifurcation.     VERTEBRAL ARTERIES: No focal stenosis or dissection. Balanced vertebral arteries.     AORTIC ARCH: Classic aortic arch anatomy with no significant stenosis at the origin of the great vessels.     NONVASCULAR STRUCTURES: Unremarkable.        Impression     IMPRESSION:   HEAD CT:  1.  No acute intracranial process.     HEAD CTA:   1.  No significant stenosis, aneurysm, or high flow vascular malformation identified.  2.  Variant [place] of [place] anatomy as above.     NECK CTA:  1.  No hemodynamically significant stenosis in the neck vessels.   2.  No evidence for dissection.     3.  Dr. Mccoy discussed the above findings by telephone with Dr. Torres at 1:59 PM 12/04/2022.   MR Brain w/o Contrast     Narrative     EXAM: MR BRAIN W/O CONTRAST  LOCATION: [facility]  DATE/TIME: 12/4/2022 2:57 PM     INDICATION: HYPERACUTE MR STROKE PROTOCOL, stroke alert, right-sided neglect with left gaze preference, weakness of extremities.  COMPARISON: CT head 12/04/2022  TECHNIQUE: Routine multiplanar multisequence head MRI without intravenous contrast.     FINDINGS:  INTRACRANIAL CONTENTS: There are 5 small foci of restricted diffusion representing acute lacunar infarcts. The largest focus is at the left posterior frontal centrum semiovale measuring 10 x 5 mm. Additional foci are noted at the bilateral posterior   frontal gyri, left periatrial white matter, and right cerebellar hemisphere. No mass, acute hemorrhage, or extra-axial fluid collections. Normal ventricles and sulci. Normal position of the cerebellar tonsils.      SELLA: No  abnormality accounting for technique.     OSSEOUS STRUCTURES/SOFT TISSUES: Normal marrow signal. The major intracranial vascular flow voids are maintained.      ORBITS: No abnormality accounting for technique.      SINUSES/MASTOIDS: No paranasal sinus mucosal disease. No middle ear or mastoid effusion.         Impression     IMPRESSION:  1.  5 scattered foci of acute ischemia involving the bilateral anterior and posterior cerebral artery distributions. Constellation of findings is suspicious for central embolic phenomena.   CT Chest Abdomen Pelvis w/o Contrast     Narrative     EXAM: CT CHEST ABDOMEN PELVIS W/O CONTRAST  LOCATION: [facility]  DATE/TIME: 12/4/2022 6:17 PM     INDICATION: sepsis w out source in patient with encephalopathy, asplenia, and multiple new infarcts on MR brain. fever and tachypnea  COMPARISON: PET/CT 9/3/2010  TECHNIQUE: CT scan of the chest, abdomen, and pelvis was performed without IV contrast. Multiplanar reformats were obtained. Dose reduction techniques were used.   CONTRAST: None.     FINDINGS:   LUNGS AND PLEURA: Trace left pleural effusion. Respiratory motion limits evaluation of the lungs. There is mild interstitial edema. Cluster of tree-in-bud nodularity in the left upper lobe. Patchy groundglass opacities at the left lung apex and in the   superior segment left lower lobe.     MEDIASTINUM/AXILLAE: Aortic valve replacement. No lymphadenopathy.     CORONARY ARTERY CALCIFICATION: Previous intervention (stents or CABG).     HEPATOBILIARY: Normal.     PANCREAS: Normal.     SPLEEN: Splenectomy     ADRENAL GLANDS: Normal.     KIDNEYS/BLADDER: No significant mass, stones, or hydronephrosis. There are simple or benign cysts. No follow up is needed.     BOWEL: No obstruction or inflammatory change. Scattered surgical clips throughout the abdomen and pelvis.     LYMPH NODES: Normal.     VASCULATURE: Aortobiiliac calcific atherosclerosis. No abdominal aortic  aneurysm.     PELVIC ORGANS: Normal.     MUSCULOSKELETAL: Old ununited left clavicle fracture. Median sternotomy. No suspicious osseous lesions.        Impression     IMPRESSION:  1.  Patchy groundglass opacities in the left upper and lower lobes, and cluster of tree-in-bud nodularity in the left upper lobe, likely infectious or inflammatory.     2.  Interstitial pulmonary edema.     3.  Trace left pleural effusion.     4.  No acute findings in the abdomen or pelvis.

## 2022-12-05 NOTE — CONSULTS
Cardiology Inpatient Consultation  December 5, 2022    Reason for Consult:  A cardiology consult was requested by Dr. Lerner from the medicine service to provide clinical guidance regarding concern for endocarditis.    Assessment and Recommendation:  Eloy Fletcher is a 66 year old male with a history of severe calcific mitral stenosis, bioprosthetic aortic valve replacement in 2014, CAD w/ prior PCI of the LAD in 2012, HTN, HLD, Hodgkin's lymphoma s/p chest radiation in 1979, acquired asplenia (from splenectomy in 1979 from Hodkin's laparotomy), testicular cancer and prostate cancer who is currently admitted for multifocal CVA involving the bilateral LIZZY and PCA territories in the setting of known valvular disease c/f possible endocarditis. However, ILDEFONSO does not show evidence of vegetations. He has been persistently febrile and tachycardic since admission; blood pressures have stabilized.     #. Fever  #. Gram positive blood cultures x2, MSSA detected  #. Multifocal CVA involving LIZZY and PCA territories  #. History of severe calcific mitral stenosis   #. History of bioprosthetic aortic valve replacement (1979)   On admission, there was high concern for endocarditis, given he meets 1 major and 3 minor [facility] criteria for infective endocarditis. However, ILDEFONSO from 12/05 shows no evidence of vegetations to suggest infectious endocarditis, which was consistent with his prior ILDEFONSO. Therefore, IE is highly unlikely to be the cause of his embolic stroke. Severe mitral calcifications can also lead to embolic stroke; however this would not explain his concurrent fevers.   - Continue to hold Plavix due to risk of ICH in the setting of acute ischemic stroke. Does not need to be restarted from Cardiology stand point since PCI was remote (in 2012)  - Unclear etiology of his multifocal CVA and fever at this time.     #. Severe mitral annular calcification  #. Mild to moderate mitral insufficiency  #. Severe mitral  stenosis  Findings above are consistent with known history of severe mitral valvular disease that will likely require surgical replacement in the future. Patient is following with cardiovascular surgery at the [facility] for this with plans for tentative surgery in Jan/Feb of 2023.   - Recommend outpatient follow up at [place]     Cardiology will sign off at this time.    Patient seen and discussed with Dr. Samuels, who agrees with above plan.    Thank you for consulting the cardiovascular services at the [facility]. Please do not hesitate to call us with any questions.     Duy Guo  MS4  [facility]    I, Jose Cox MD, saw this patient with the Med Student and agree with the findings and plan of care as  documented in the above note. I personally reviewed vital signs, labs, images (including EKG, echocardiogram, chest xray, telemetry).      HPI:   Eloy Fletcher is a 66 year old male with a history of severe calcific mitral stenosis, bioprosthetic AVR in 2014, CAD w/ prior PCI of the LAD in 2012, Hodgkin's lymphoma s/p chest radiation in 1979, testicular cancer, asplenia and prostate cancer admitted with left gaze preference, right sided neglect and generalized weakness on 12/04/2022. He was found to have 5 scattered foci of acute ischemia involving the bilateral anterior and posterior cerebral artery distributions c/f for embolic disease on MRI on 12/04/2022. Cardiology was consulted for workup of possible endocarditis and emboli. He is currently on cefazolin, ceftriaxone and gentamicin.     History is limited, as the patient intermittently is able to answer questions. He reports having neck pain with movement and denies having chest pain or discomfort, but he otherwise is unable to fully participate in the history. Per his girlfriend, he has been having intermittent fevers and vomiting since Friday, 12/02/2022 (2 days prior to admission). He did not wake up at his  usual time yesterday morning and was suddenly not interactive and stopped talking at 1100 on 12/04/2022. He had a subjective fever at that time as well, so his significant other called 911.     At the time of interview, the patient denies chest pain, dyspnea at rest or with exertion, orthopnea, PND, palpitations, lightheadedness, or syncope.     Review of Systems:  A comprehensive ROS was done and the details are included above in the HPI.      PMH:  No past medical history on file.  Active Problems:  Patient Active Problem List    Diagnosis Date Noted    Endocarditis 12/04/2022     Priority: Medium    Prostate cancer (H) 07/15/2021     Priority: Medium    Chemotherapy-induced peripheral neuropathy (H) 11/16/2020     Priority: Medium    Atherosclerosis of both carotid arteries 07/08/2020     Priority: Medium     Formatting of this note might be different from the original.  Careplan:  Background:  7/8/2020: Carotid ultrasound:                  IMPRESSION:  1.  Moderate calcified atheromatous plaque in the carotid arteries.  2.  No significant stenosis on either side.    Goals/Recommendations:  7/8/2020:    Hi Eloy,   Your carotid ultrasound shows moderate calcifications but no significant stenosis. Please continue your current medications. Continue to keep your blood pressure under good control. I recommend that we recheck this ultrasound again in 2 years for follow up.      Essential hypertension 08/02/2018     Priority: Medium     Formatting of this note is different from the original.  Careplan:  Background:    12/2/2020: Eloy denies complaints. Eloy is taking her medications as directed and denies side effects. He states that his blood pressure is normal at home.   BP Readings from Last 3 Encounters:   11/30/20 (!) 145/76   11/16/20 134/76   11/09/20 125/63   6/23/2020, 8/2/2018: currently taking metoprolol 50 mg bid  BP Readings from Last 3 Encounters:   02/13/20 139/79   01/06/20 113/58   11/06/19 101/58        Goals/Recommendations:    12/2/2020: Please recheck your blood pressure with your [employer] blood pressure cuff in 2 weeks and please let me know what you find.   6/23/2020: Please stop by lab today for: -     Basic Metabolic Panel; Future  8/2/2018: recheck blood pressure at nurse only visit. -     Hypertension Follow Up ([id number])      Acquired asplenia 12/22/2014     Priority: Medium     Formatting of this note might be different from the original.  splenectomy 1979 as part of hodkings laparotomy      Aortic valve replaced 12/15/2014     Priority: Medium     Formatting of this note might be different from the original.  4/6/2018: followed by [facility] cardiologist Dr. Peng  Aortic valve replaced 11/17/2014 w organic material (not mechanical)      Stented coronary artery 10/22/2012     Priority: Medium     Formatting of this note might be different from the original.  6/23/2020, 4/6/2018: followed by [facility] cardiologist Dr. Peng    Patient is on Clopidogrel (Plavix) following stent placement.  Date of Intervention:  10/22/2012   Type of Stent:  BAIRON  Patient is expected to continue taking Clopidogrel (Plavix) for one year (until 10/22/13) unless otherwise advised due to subsequent stent placement or continued bleeding.      Hyperlipidemia 06/19/2008     Priority: Medium     Formatting of this note might be different from the original.  4/6/2018: recommended increase of atorvastatin to 10 mg daily.      History of Hodgkin's disease 12/06/2004     Priority: Medium     Formatting of this note might be different from the original.  1979,  age 27  Epic      History of testicular cancer 12/06/2004     Priority: Medium     Formatting of this note might be different from the original.  age 19s       Social History:  Social History     Tobacco Use    Smoking status: Never    Smokeless tobacco: Never   Substance Use Topics    Alcohol use: Not Currently    Drug use: Never   Unable to obtain full social  history at this time. Significant other states he does not smoke and occasionally drinks alcohol. He lives at home alone.     Family History:  Unable to obtain family history due to aphasia.     Medications:  Current Outpatient Medications   Medication Instructions    acetaminophen (TYLENOL) 500 mg, EVERY 6 HOURS PRN    albuterol (PROVENTIL HFA;VENTOLIN HFA) 90 mcg/actuation inhaler 2-4 puffs, Inhalation, EVERY 4 HOURS PRN    aspirin (ASA) 81 mg, Oral, DAILY    calcium-vitamin D 500 mg(1,250mg) -200 unit per tablet 1 tablet, 2 TIMES DAILY WITH MEALS    clopidogrel (PLAVIX) 75 mg, Oral, DAILY    metoprolol tartrate (LOPRESSOR) 50 mg, Oral, 2 TIMES DAILY    pantoprazole (PROTONIX) 40 mg, Oral, DAILY    Praluent 150 mg, Subcutaneous, EVERY 14 DAYS     Physical Exam:  Temp:  [97.8  F (36.6  C)-104.4  F (40.2  C)] 101.4  F (38.6  C)  Pulse:  [122-144] 133  Resp:  [] 23  BP: ()/(43-97) 115/65  SpO2:  [89 %-99 %] 99 %    Intake/Output Summary (Last 24 hours) at 12/5/2022 0817  Last data filed at 12/5/2022 0700  Gross per 24 hour   Intake 1050 ml   Output 600 ml   Net 450 ml     GEN: Laying in bed, appears uncomfortable, intermittently makes eye contact but otherwise has difficulty answering questions  ENT: MMM  Eyes: no icterus   CV: Tachycardic, regular rhythm. Diastolic murmur+. Unable to assess JVP.    CHEST: clear to ausculation bilaterally, no rales or wheezing  ABD: soft, non-tender, normal active bowel sounds  EXTR: pulses 2+ bilaterally in radial and and DP/PT pulses. No clubbing, cyanosis or edema.   NEURO: alert. Not oriented to time. Unable to answer further orientation questions.   SKIN: Bilateral hands are in soft restraints; unable to perform full skin assessment.     Diagnostics:  All labs and imaging were reviewed, of note:    CMP  Recent Labs   Lab 12/05/22  0739 12/05/22  0408 12/05/22  0024 12/04/22  2353 12/04/22  2146 12/04/22  1341   NA  --   --   --  140  --  132*   POTASSIUM 6.7*  --    --  4.4  --  4.1   CHLORIDE  --   --   --  104  --  95*   CO2  --   --   --  11*  --  22   ANIONGAP  --   --   --  25*  --  15   GLC  --  118* 148* 140* 142* 155*   BUN  --   --   --  33.4*  --  29.5*   CR  --   --   --  1.68*  --  1.50*   GFRESTIMATED  --   --   --  44*  --  51*   YUNIOR  --   --   --  8.2*  --  8.7*   MAG  --   --   --  1.8  --   --    PHOS  --   --   --  2.8  --   --    PROTTOTAL  --   --   --  6.2*  --   --    ALBUMIN  --   --   --  3.0*  --   --    BILITOTAL  --   --   --  1.3*  --   --    ALKPHOS  --   --   --  52  --   --    AST  --   --   --  149*  --   --    ALT  --   --   --  78*  --   --      CBC  Recent Labs   Lab 22  2353 22  1341   WBC 11.4* 13.7*   RBC 4.25* 4.26*   HGB 12.7* 13.0*   HCT 41.0 38.9*   MCV 97 91   MCH 29.9 30.5   MCHC 31.0* 33.4   RDW 15.9* 15.1*   * 168     INR  Recent Labs   Lab 22  1341   INR 1.23*     Arterial Blood GasNo lab results found in last 7 days.    No results found for: TROPI, TROPONIN, TROPR, TROPN    EK2022  Rate 140  Sinus tachycardia  Right bundle branch block, present on ECG from 2022  No ST or T wave changes suggestive of ischemia.     ILDEFONSO:  2022, ILDEFONSO @ [facility]  PRE-SEDATION ASSESSMENT & CONSENT (performed immediately prior to the start of the procedure): The goals, risks and alternatives to moderate sedation and the transesophageal echo were explained to the patient, questions were answered and consent was   given to proceed. The physician reviewed the patient's history, medication list, allergies, and review of systems, and the findings as documented in the RN assessment and also performed a pertinent examination including a heart, airway and lung   assessment. Mallampati Assessment: Class II. Sedation plan: Transesophageal echo - moderate sedation. ASA physical status score: Class IV. The patient's identity and all needed equipment were confirmed and a final confirmatory pause was performed by the    team immediately prior to start. 3D imaging was performed to evaluate mitral valve which could not be adequately assessed by 2D imaging. PROCEDURAL ECHO FINDINGS: Transesophageal echocardiogram performed at the request of the primary .   Adult probe inserted without difficulty.  LEFT VENTRICLE:Normal left ventricular chamber size. Estimated left ventricular ejection fraction 65%. No regional wall motion abnormalities.  RIGHT VENTRICLE:Normal right ventricular chamber size. Borderline reduced right ventricular systolic function.  ATRIA:Enlarged left atrial size. Normal left atrial appendage. Filamentous strand measuring approximately 0.4 cm in length appreciated on superoposterior wall of left atrial appendage. Could represent PFE vs small mobile clot(although less likely).   Enlarged right atrial size.  CARDIAC VALVES:Status post 23 mm aortic valve Cordell-Hubbard PERIMOUNT Magna/Magna Ease pericardial prosthesis. Aortic valve systolic mean Doppler gradient 4 mmHg. Trivial aortic valve regurgitation. No paraprosthetic regurgitation. Severely   calcified mitral valve , severely restricted leaflet mobility resulting in severe MS. 3D planimetry of valve opening measured 1.02 cm2. Mitral valve diastolic mean Doppler gradient 9 mmHg (heart rate 73 BPM). Moderate-severe mitral valve regurgitation ,   two regurgitant jets appreciated flanking A2/P2 on either side. Mitral valve effective regurgitant orifice 0.31 cm2. Trivial pulmonary valve regurgitation. Thickened tricuspid valve. Mild-moderate tricuspid valve regurgitation.  OTHER ECHO FINDINGS:Normal ascending aorta diameter. Normal aortic arch. Moderate immobile (atheroma 3-5 mm thickness without ulceration) atherosclerosis of the descending thoracic aorta. Normally connected pulmonary veins. Pulmonary artery bifurcation   is normal. Normal superior vena cava. Agitated saline injection(s) performed during sedation. No right-to-left shunt at atrial  level at rest or with Valsalva release. No intracardiac mass or thrombus identified. No  pericardial effusion. PROCEDURE NOTES:   Procedure performed with appropriate level of sedation. A trained independent observer assisted with monitoring the patient's level of consciousness and physiologic status throughout the procedure, see nursing documentation. The patient tolerated the   sedation and procedure well and was released awake, alert, and in good condition. Transesophageal echocardiogram completed without complications. See Sedation Narrator or other pertinent record in Epic for additional procedure and sedation information.   Physician signature for procedural medications and patient discharge when DC criteria met.    Coronary angiogram @ [facility] on 11/14/2022  1.  CORONARY ANGIOGRAPHY   FINAL DIAGNOSIS   1.  Moderate coronary artery atherosclerosis   2.  Coronary calcification   PRE-PROCEDURE DIAGNOSIS   1.  Stenosis Mitral Not Rheumatic Acquired   2.  Preoperative Exam   3.  Atherosclerotic Heart Disease Of Native Coronary Artery Without Angina Pectoris   CORONARY DIAGNOSTIC SUMMARY   Coronary artery dominance is right.   The left main coronary artery is 10% obstructed by a discrete lesion.    The middle left anterior descending artery is 40% obstructed by a tubular lesion. The distal segment is normal size, diseased.   The distal left anterior descending artery is 30% obstructed by a tubular lesion.   The distal circumflex artery is 30% obstructed by a tubular lesion.   The proximal right coronary artery is 20% obstructed by a discrete lesion.   The middle right coronary artery is 30% obstructed by a discrete lesion.   The distal right coronary artery is 60% obstructed by a discrete lesion.   The right posterior descending artery is 30% obstructed by a discrete lesion. The distal segment is normal size, diseased.   Prior LAD stents are patent   RADIATION DOSE DATA   Procedure cumulative skin dose (mGy):  378.71   Procedure cumulative dose area product (Gy-cm**2): 16.97   Fluoro Time (Min): 7.28   CONTRAST DOSE DATA   IOHEXOL 350 MG IODINE/ML INTRAVENOUS SOLUTION: 60mL

## 2022-12-05 NOTE — PROGRESS NOTES
"Admitted/transferred from: [facility] ED  Reason for admission/transfer: Sepsis and encephalopathy  Patient status upon admission/transfer: Upward gaze, inconsistently following commands, responds \"yes\" to questions but not within context of conversation. RASS +1 Febrile 102.6; tachycardic; normotensive; tachypneic but satting well on RA.  Interventions: Antibiotics  Plan: ILDEFONSO and ECHO on day shift. Neuro consult  2 RN skin assessment: Canelo Walker  Result of skin assessment and interventions/actions: Left wrist bruise, small penile lesion/bruise, small bruise on right buttcheek; BL cracked feet.  Height, weight, drug calc weight: Done  Patient belongings (see Flowsheet - Adult Profile for details): No belongings came with patient.  "

## 2022-12-05 NOTE — PLAN OF CARE
ICU End of Shift Summary. See flowsheets for vital signs and detailed assessment.    Changes this shift: RASS -1 to +1/2, Continuous EEG in place, restless/confused and intermittently follows commands, mild/moderate aphasia, mitts in place for line pulling. -130's, T-Max 101.4 (axillary) - rectal tylenol given. 2L NC with good sats. PRN nebs ordered for thick mucous/wheezing. Loose BM x 3, zofran given for dry heaving, NPO - SLP will assess tomorrow. Primofit in place with no output - bladder scan for >600, straight cath for 700mL @ 1500, bladder scan again @ 1800 for 156mL. TTE and ILDEFONSO done. Total of 1.5L bolus given for tachycardia. Multiple critical lab values - shifted K+ from 6.7 to 3.8. Phos being replaced with AM re-check. Family at bedside and given updates.    Plan: Continue to trend troponin and lactic acid. Follow plan of care and notify team with changes.    Goal Outcome Evaluation:      Plan of Care Reviewed With: patient, family    Overall Patient Progress: no changeOverall Patient Progress: no change    Outcome Evaluation: TTE and ILDEFONSO done, multiple critical lab values.

## 2022-12-05 NOTE — SEDATION DOCUMENTATION
Pt arrived in ECHO department  for scheduled ILDEFONSO.   Procedure explained, questions answered and consent signed by next of kin earlier today. Discharge instructions discussed with patient.  Pt's throat sprayed at 1300, therefore pt will not be able to eat or drink until 2 hours after at 1500 .  Informed pt of this time and encouraged to start with warm fluids and soft foods.    Pt tolerated procedure well, and was given a total of 50 mcg IV fentanyl and 1 mg IV versed for conscious sedation.  Pt denied throat or chest pain after ILDEFONSO complete.   ILDEFONSO probe 62 used for procedure.  Pt denied throat pain after procedure and was transported via stretcher accompanied by float nurse after awake and VSS.   Report given to Addie HERMOSILLO RN.

## 2022-12-05 NOTE — CARE PLAN
SLP orders received and chart reviewed. Attempted to evaluate patient x2, however this AM present with another provider and out for testing, and patient unable to be made alert for participation in swallow evaluation this PM. SLP will reschedule for tomorrow and complete evaluation as able.

## 2022-12-05 NOTE — H&P
"  MEDICAL ICU H&P  12/04/2022    Date of Hospital Admission: 12/4/22  Date of ICU Admission: 12/4/22  Reason for Critical Care Admission: Sepsis and encephalopathy of unknown origin.  Date of Service (when I saw the patient): 12/04/2022     ASSESSMENT: Eloy Fletcher is a 66-year-old male with a past medical history of HTN, HLD, Hodgkin's lymphoma (s/p radiation and MOPP chemo in remission), Testicular cancer (s/p left orchiectomy and chemo, in remission), prostate cancer (Elly 4+3, diagnosed in 4/2021 and s/p radiation and lupron), aortic stenosis S/P bioprosthetic AVR in 2014, and CAD s/p PCI to LAD in 2012 that is admitted to the ICU for sepsis and encephalopathy.     PLAN:     Neuro:  # Acute Embolic Strokes  # Acute Encephalopathy  Patient presented to OSH ED with left gaze preference, right-sided neglect, weakness, and fevers since 12/2/22. CT Head and CTA Head/Neck were performed and did not show any acute abnormality or significant stenosis. Neurology evaluated the patient and determined a NIHSS of 24 and no need for tPA. They then recommended performing a brain MRI which showed 5 scattered foci of acute ischemia involving the bilateral anterior and posterior cerebral artery distributions. Constellation of findings is suspicious for central embolic phenomena. Patient upon admission will respond to some questioning with \"yes\" but out of reflex and not within context. This encephalopathy is likely in the setting of infection and sepsis and known emboli strokes. Currently protecting airway.   - Delirium precautions   - Neurology stroke team consulted, appreciate recommendations   - Tylenol PRN for fevers   - SLP consulted in the setting of stroke and possible dysphagia     Pulmonary:  # Likely Bacterial Pneumonia (CAP vs aspiration)  Patient had a CT chest performed at the OSH ED that showed GGO in the MIGUEL ANGEL and LLL with tree-in-bud findings as well as interstitial pulmonary edema and a left pleural " "effusion. Currently the patient is saturating >90% on room air. Has not been hypoxic.    - ABX per ID section   - Sputum if able to obtain   - CTM, oxygen PRN     Cardiovascular:  # Possible Endocarditis  # Elevated Troponin  # Severe Calcific Mitral Stenosis  # Moderate Mitral Regurgitation  # Moderate Tricuspid Regurgitation  # Aortic Stenosis S/P Bioprosthetic AVR in 2014  # CAD s/p PCI to LAD in 2012  # Hx of Radiation-Induced heart disease  # HLD  # HTN  This patient follows with cardiology at [facility]. He was last seen by Dr. Gurrola on 11/16/22 with note as below as well as recent ILDEFONSO and coronary angiography:  \"Mr. Fletcher is a 66-year-old male with history of radiation-induced heart disease. He had mediastinal radiation for Hodgkin lymphoma in 1979. He had coronary artery disease with PCI to the left anterior descending coronary artery in 2012. Then in 2014 he underwent aortic valve replacement with a CE valve elsewhere. Now he has moderate symptoms of shortness of breath with activity and some chest compression. Evaluation revealed that he has a normal left ventricular ejection fraction. He has moderate to severe mitral regurgitation with moderate to severe mitral stenosis in the setting of mitral annular calcification. He has no significant coronary artery disease. The ascending aorta is also calcified. There is mild to moderate tricuspid regurgitation. I believe that this patient will benefit from redo median sternotomy and mitral valve replacement with a tissue valve. We may have to replace the ascending aorta at the same time. The patient has right bundle branch block and a left bob fascicular block. The patient understands and wishes to proceed with the surgery. He is thinking early February 2023.\"     11/11/22 ILDEFONSO @ [facility]:  Final Impressions  1. Severe annular mitral calcification with severe calcific mitral valve stenosis (diastolic valve area by 3D planimetry 1 cm?). Mean gradient " 9 mm Hg (under sedation).  2. Moderate-severe mitral valve regurgitation , two regurgitant jets appreciated flanking A2/P2 on either side.  3. Normal aortic valve tissue prosthesis.  4. Normal left atrial appendage. There is a small (3 mm) mobile strand attached to the appendage wall just proximal to the 'chicken-wing' distal lobe (e.g. clips 71-78). This could, less likely, represent a tiny papillary fibroelastoma.  5. Estimated left ventricular ejection fraction 65%.  6. Mild-moderate tricuspid valve regurgitation.  7. No right-to-left shunt at atrial level at rest or with Valsalva release. Tiny intrapulmonary shunt.  8. Moderate immobile (atheroma 3-5 mm thickness without ulceration) atherosclerosis of the descending thoracic aorta.    CORONARY ANGIOGRAPHY, 11/14/2022  FINAL DIAGNOSIS  1.  Moderate coronary artery atherosclerosis  2.  Coronary calcification  PRE-PROCEDURE DIAGNOSIS  1.  Stenosis Mitral Not Rheumatic Acquired  2.  Preoperative Exam  3.  Atherosclerotic Heart Disease Of Native Coronary Artery Without Angina Pectoris  CORONARY DIAGNOSTIC SUMMARY  Coronary artery dominance is right.   The left main coronary artery is 10% obstructed by a discrete lesion.   The middle left anterior descending artery is 40% obstructed by a tubular lesion. The distal segment is normal size, diseased.   The distal left anterior descending artery is 30% obstructed by a tubular lesion.   The distal circumflex artery is 30% obstructed by a tubular lesion.   The proximal right coronary artery is 20% obstructed by a discrete lesion.   The middle right coronary artery is 30% obstructed by a discrete lesion.   The distal right coronary artery is 60% obstructed by a discrete lesion.   The right posterior descending artery is 30% obstructed by a discrete lesion. The distal segment is normal size, diseased.   Prior LAD stents are patent     Patient's presentation is very concerning for endocarditis, especially in the setting of  above described replaced valves and current sepsis with a leukocytosis of 13.7, fevers, and emboli CVA's noted on brain MRI. Troponin elevated (217 -> 610). BNP elevated at 10,855. EKG showing known RBBB with left bifasicular block in the setting of sinus tachycardia.    - TTE with bubble ordered to evaluate   > Will likely need ILDEFONSO to further visualize mitral valve   - Could consider cardiology consult based on these findings and further recommendations for TAVR?   - Trend troponin to peak   - Holding PTA plavix and ASA in the setting of hypotension and possible need for procedures while inpatient   - Currently not requiring pressor support in the setting of sepsis, may use levophed PRN to maintain MAP's >65     GI/Nutrition:  Nutrition consulted for likely need for TF in the setting of ICU admission with encephalopathy.     Renal/Fluids/Electrolytes:  # Acute Kidney Injury  # Hyponatremia  # Lactate elevation without AG  Patient with creatinine of 1.50 from baseline of 0.9. Sodium also noted to be 132 at OSH ED. Likely JILLIAN in the setting of sepsis any pre-renal hypoperfusion.    - Repeat BMP upon admission to monitor creatinine and sodium   - Could consider additional IVF, received 2L at OSH     Endocrine:  No active concerns at this time.      ID:  # Sepsis  # Possible Endocarditis vs. Encephalitis vs. Pneumonia  # Elevated Lactate  Patient with fever (TMax of 104.4), leukocytosis of 13.7, tachycardia, tachypnea, and multiple possible sources of infection. In the setting of acute encephalopathy and emboli stroke phenomenon, endocarditis is considered. Enchephalitis/Meningitis are also included in the differential due to the sepsis in the setting of encephalopathy. CT Chest also showing GGO in the MIGUEL ANGEL and LLL with tree-in-bud findings concerning for pneumonia (CAP vs. Aspiration). CT abdomen/pelvis did not show any sources of possible infection leading to this presentation. The patient was started on IV Vanc/Zosyn  "at outside hospital and obtain blood cultures x4 as well as a UA. LP performed showing WBC of 230 with elevated protein at 59 and glucose at 82.    - Starting IV Vancomycin, Zosyn, Ampicillin, and Acyclovir in the setting of possible meningitis for empiric coverage   - Blood cultures x4 obtained at OSH, will repeat culture if growth noted overnight   - Sputum culture pending if able to obtain sputum   - U/A with glucose, protein, and ketones, not concerning for infection    - Repeat CBC, procal, and lactate pending   - LP cultures and PCR pending      Hematology & Oncology:    # Prostate cancer (Elly 4+3, TNM: cT1c)  # Hx Testicular cancer (30 years ago s/p chemo & Left orchiectomy, in remission)  # Hx Hodgkins lymphoma (1970s treated with MOPP & radiation, in remission)  Last seen by Dr. Chester at [facility] on 6/10/22:  \"Mr. Fletcher then completed photon SABR to 40 Gy in 5 fractions completed on March 30th, 2022. He received a single four month Lupron injection on March 10th, 2022. He returns today for follow-up. PSA is 0.23 today, compared to 5.0 prior to treatment. Mr. Fletcher has had an excellent PSA response, and is doing well from a symptomatic perspective. We will get a mail-in PSA in six months, and I will see him back in one year.\"   - Continue outpatient follow-up with [facility] for prostate cancer surveillance.     Musculoskeletal:  PT & OT Consults while admitted to the ICU.     Skin:  No active concerns at this time.      General Cares/Prophylaxis:    DVT Prophylaxis: Heparin SQ  GI Prophylaxis: PPI  Restraints: None  Family Communication: Updated Estrella (Ex-wife) by phone. Daughter is out of town [facility] and she confirmed she is his current emergency contact for now.   Code Status: Full Code, confirmed by ex-wife, Estrella     Lines/tubes/drains:  - 2 pIV's (12/4/22)     Disposition:  - Medical ICU      Patient seen and findings/plan discussed with medical ICU staff, Dr. Friend.     David Lerner, " MD  Internal Medicine Resident, PGY-2  Pager: [phone]  -----------------------------------------------------------------------     HISTORY PRESENTING ILLNESS:   Eloy Fletcher is a 66-year-old male with a past medical history of HTN, HLD, Hodgkin's lymphoma (s/p radiation and MOPP chemo in remission), Testicular cancer (s/p left orchiectomy and chemo, in remission), prostate cancer (Elly 4+3, diagnosed in 4/2021 and s/p radiation and lupron), aortic stenosis S/P bioprosthetic AVR in 2014, and CAD s/p PCI to LAD in 2012 that presented to OS ED with confusion and unresponsiveness. He presented with left gaze preference, right-sided neglect, weakness, and fevers since 12/2/22. Speaking with patient's ex-wife, Estrella, over the phone it seems that starting Thursday, 12/1/22 the patient had multiple episodes of vomiting with a fever. This subsided overnight and was feeling better on 12/2/22 but then proceeded to worsen on 12/3 into the current presentation. ED did a workup showing leukocytosis, tachycardia, tachypnea, and fevers. He has been confused and not responding appropriately.     He was evaluated by neurology and found to have 5 embolic strokes on a brain MRI. Due to concern for CNS infection, a LP was performed at the OS ED and was further transferred to [facility] ICU for further cares and work-up for endocarditis in the setting of hypotension and possible need for intubation.         REVIEW OF SYSTEMS:   10-point ROS otherwise not noted in the above HPI. Unable to ask patient due to encephalopathy.      PAST MEDICAL HISTORY:   Past Medical History   No past medical history on file.     SURGICAL HISTORY:  Past Surgical History   No past surgical history on file.     SOCIAL HISTORY:  Social History           Socioeconomic History     Marital status:    Tobacco Use     Smoking status: Never     Smokeless tobacco: Never   Substance and Sexual Activity     Alcohol use: Not Currently     Drug  "use: Never      FAMILY HISTORY:   Family History   No family history on file.     ALLERGIES:         Allergies   Allergen Reactions     Crestor [Rosuvastatin] Other (See Comments)       \"Funny feeling in both legs\" all statins      MEDICATIONS:  No current facility-administered medications on file prior to encounter.  clopidogrel (PLAVIX) 75 mg tablet, Take 75 mg by mouth daily  metoprolol (LOPRESSOR) 50 MG tablet, Take 50 mg by mouth 2 times daily  pantoprazole (PROTONIX) 40 MG EC tablet, Take 40 mg by mouth daily  PRALUENT 150 MG/ML injectable pen, Inject 150 mg Subcutaneous every 14 days  acetaminophen (TYLENOL) 500 MG tablet, [ACETAMINOPHEN (TYLENOL) 500 MG TABLET] Take 500 mg by mouth every 6 (six) hours as needed for pain (2 tabs).  albuterol (PROVENTIL HFA;VENTOLIN HFA) 90 mcg/actuation inhaler, Inhale 2-4 puffs into the lungs every 4 hours as needed (cough)  aspirin 81 mg chewable tablet, Take 81 mg by mouth daily  calcium-vitamin D 500 mg(1,250mg) -200 unit per tablet, [CALCIUM-VITAMIN D 500 MG(1,250MG) -200 UNIT PER TABLET] Take 1 tablet by mouth 2 (two) times a day with meals.     PHYSICAL EXAMINATION:  Temp:  [97.8  F (36.6  C)-104.4  F (40.2  C)] 104.4  F (40.2  C)  Pulse:  [122-144] 133  Resp:  [17-49] 29  BP: (107-179)/(71-97) 107/71  SpO2:  [89 %-96 %] 93 %  General: Wakes to voice, unable to respond appropriately. Not in acute distress.   HEENT: Atraumatic  Neuro: Unable to assess due to encephalopathy, purposeful movements of upper extremities  Pulm/Resp: Crackles noted in the bilateral bases, moving air without difficulty  CV: Tachycardia, normal rhythm, no bilateral lower extremity edema  Abdomen: Soft, non-distended, non-tender  Incisions/Skin: No visible contusions or rashes     LABS: Reviewed.   Arterial Blood Gases   No lab results found in last 7 days.  Complete Blood Count       Recent Labs   Lab 12/04/22  1341   WBC 13.7*   HGB 13.0*         Basic Metabolic Panel  Recent Labs   Lab " 12/04/22  1341   *   POTASSIUM 4.1   CHLORIDE 95*   CO2 22   BUN 29.5*   CR 1.50*   *      Liver Function Tests      Recent Labs   Lab 12/04/22  1341   INR 1.23*      Coagulation Profile      Recent Labs   Lab 12/04/22  1341   INR 1.23*   PTT 41*         IMAGING:      Recent Results (from the past 24 hour(s))   CT Head w/o Contrast     Narrative     EXAM: CT HEAD W/O CONTRAST, CTA HEAD NECK W CONTRAST  LOCATION: [facility]  DATE/TIME: 12/4/2022 1:32 PM     INDICATION: Stroke alert. Last known well 1215. Right sided neglect with left gaze preference. Weakness of extremities  COMPARISON: 07/20/2012  CONTRAST: 75 mL Isovue 370  TECHNIQUE: Head and neck CT angiogram with IV contrast. Noncontrast head CT followed by axial helical CT images of the head and neck vessels obtained during the arterial phase of intravenous contrast administration. Axial 2D reconstructed images and   multiplanar 3D MIP reconstructed images of the head and neck vessels were performed by the technologist. Dose reduction techniques were used. All stenosis measurements made according to NASCET criteria unless otherwise specified.     FINDINGS:   NONCONTRAST HEAD CT:   INTRACRANIAL CONTENTS: No intracranial hemorrhage, extraaxial collection, or mass effect.  No CT evidence of acute infarct. Normal parenchymal attenuation. Normal ventricles and sulci.      VISUALIZED ORBITS/SINUSES/MASTOIDS: No intraorbital abnormality. No paranasal sinus mucosal disease. No middle ear or mastoid effusion.     BONES/SOFT TISSUES: No acute abnormality.     HEAD CTA:  ANTERIOR CIRCULATION: No stenosis/occlusion, aneurysm, or high flow vascular malformation. Developmentally hypoplastic right A1 anterior cerebral artery segment.Fetal origin of the right posterior cerebral artery from the anterior circulation.     POSTERIOR CIRCULATION: No stenosis/occlusion, aneurysm, or high flow vascular malformation. Balanced vertebral arteries  supply a normal basilar artery.      DURAL VENOUS SINUSES: Expected enhancement of the major dural venous sinuses.     NECK CTA:  RIGHT CAROTID: No measurable stenosis or dissection. There is nonstenotic atherosclerotic calcification at the bifurcation.     LEFT CAROTID: No measurable stenosis or dissection. There is nonstenotic atherosclerotic calcification at the bifurcation.     VERTEBRAL ARTERIES: No focal stenosis or dissection. Balanced vertebral arteries.     AORTIC ARCH: Classic aortic arch anatomy with no significant stenosis at the origin of the great vessels.     NONVASCULAR STRUCTURES: Unremarkable.        Impression     IMPRESSION:   HEAD CT:  1.  No acute intracranial process.     HEAD CTA:   1.  No significant stenosis, aneurysm, or high flow vascular malformation identified.  2.  Variant [place] of Khan anatomy as above.     NECK CTA:  1.  No hemodynamically significant stenosis in the neck vessels.   2.  No evidence for dissection.     3.  Dr. Mccoy discussed the above findings by telephone with Dr. Torres at 1:59 PM 12/04/2022.   CTA Head Neck with Contrast     Narrative     EXAM: CT HEAD W/O CONTRAST, CTA HEAD NECK W CONTRAST  LOCATION: [facility]  DATE/TIME: 12/4/2022 1:32 PM     INDICATION: Stroke alert. Last known well 1215. Right sided neglect with left gaze preference. Weakness of extremities  COMPARISON: 07/20/2012  CONTRAST: 75 mL Isovue 370  TECHNIQUE: Head and neck CT angiogram with IV contrast. Noncontrast head CT followed by axial helical CT images of the head and neck vessels obtained during the arterial phase of intravenous contrast administration. Axial 2D reconstructed images and   multiplanar 3D MIP reconstructed images of the head and neck vessels were performed by the technologist. Dose reduction techniques were used. All stenosis measurements made according to NASCET criteria unless otherwise specified.     FINDINGS:   NONCONTRAST HEAD CT:    INTRACRANIAL CONTENTS: No intracranial hemorrhage, extraaxial collection, or mass effect.  No CT evidence of acute infarct. Normal parenchymal attenuation. Normal ventricles and sulci.      VISUALIZED ORBITS/SINUSES/MASTOIDS: No intraorbital abnormality. No paranasal sinus mucosal disease. No middle ear or mastoid effusion.     BONES/SOFT TISSUES: No acute abnormality.     HEAD CTA:  ANTERIOR CIRCULATION: No stenosis/occlusion, aneurysm, or high flow vascular malformation. Developmentally hypoplastic right A1 anterior cerebral artery segment.Fetal origin of the right posterior cerebral artery from the anterior circulation.     POSTERIOR CIRCULATION: No stenosis/occlusion, aneurysm, or high flow vascular malformation. Balanced vertebral arteries supply a normal basilar artery.      DURAL VENOUS SINUSES: Expected enhancement of the major dural venous sinuses.     NECK CTA:  RIGHT CAROTID: No measurable stenosis or dissection. There is nonstenotic atherosclerotic calcification at the bifurcation.     LEFT CAROTID: No measurable stenosis or dissection. There is nonstenotic atherosclerotic calcification at the bifurcation.     VERTEBRAL ARTERIES: No focal stenosis or dissection. Balanced vertebral arteries.     AORTIC ARCH: Classic aortic arch anatomy with no significant stenosis at the origin of the great vessels.     NONVASCULAR STRUCTURES: Unremarkable.        Impression     IMPRESSION:   HEAD CT:  1.  No acute intracranial process.     HEAD CTA:   1.  No significant stenosis, aneurysm, or high flow vascular malformation identified.  2.  Variant [place] of Khan anatomy as above.     NECK CTA:  1.  No hemodynamically significant stenosis in the neck vessels.   2.  No evidence for dissection.     3.  Dr. Mccoy discussed the above findings by telephone with Dr. Torres at 1:59 PM 12/04/2022.   MR Brain w/o Contrast     Narrative     EXAM: MR BRAIN W/O CONTRAST  LOCATION: [facility]  DATE/TIME: 12/4/2022 2:57 PM     INDICATION: HYPERACUTE MR STROKE PROTOCOL, stroke alert, right-sided neglect with left gaze preference, weakness of extremities.  COMPARISON: CT head 12/04/2022  TECHNIQUE: Routine multiplanar multisequence head MRI without intravenous contrast.     FINDINGS:  INTRACRANIAL CONTENTS: There are 5 small foci of restricted diffusion representing acute lacunar infarcts. The largest focus is at the left posterior frontal centrum semiovale measuring 10 x 5 mm. Additional foci are noted at the bilateral posterior   frontal gyri, left periatrial white matter, and right cerebellar hemisphere. No mass, acute hemorrhage, or extra-axial fluid collections. Normal ventricles and sulci. Normal position of the cerebellar tonsils.      SELLA: No abnormality accounting for technique.     OSSEOUS STRUCTURES/SOFT TISSUES: Normal marrow signal. The major intracranial vascular flow voids are maintained.      ORBITS: No abnormality accounting for technique.      SINUSES/MASTOIDS: No paranasal sinus mucosal disease. No middle ear or mastoid effusion.         Impression     IMPRESSION:  1.  5 scattered foci of acute ischemia involving the bilateral anterior and posterior cerebral artery distributions. Constellation of findings is suspicious for central embolic phenomena.   CT Chest Abdomen Pelvis w/o Contrast     Narrative     EXAM: CT CHEST ABDOMEN PELVIS W/O CONTRAST  LOCATION: [facility]  DATE/TIME: 12/4/2022 6:17 PM     INDICATION: sepsis w out source in patient with encephalopathy, asplenia, and multiple new infarcts on MR brain. fever and tachypnea  COMPARISON: PET/CT 9/3/2010  TECHNIQUE: CT scan of the chest, abdomen, and pelvis was performed without IV contrast. Multiplanar reformats were obtained. Dose reduction techniques were used.   CONTRAST: None.     FINDINGS:   LUNGS AND PLEURA: Trace left pleural effusion. Respiratory motion limits evaluation of the lungs.  There is mild interstitial edema. Cluster of tree-in-bud nodularity in the left upper lobe. Patchy groundglass opacities at the left lung apex and in the   superior segment left lower lobe.     MEDIASTINUM/AXILLAE: Aortic valve replacement. No lymphadenopathy.     CORONARY ARTERY CALCIFICATION: Previous intervention (stents or CABG).     HEPATOBILIARY: Normal.     PANCREAS: Normal.     SPLEEN: Splenectomy     ADRENAL GLANDS: Normal.     KIDNEYS/BLADDER: No significant mass, stones, or hydronephrosis. There are simple or benign cysts. No follow up is needed.     BOWEL: No obstruction or inflammatory change. Scattered surgical clips throughout the abdomen and pelvis.     LYMPH NODES: Normal.     VASCULATURE: Aortobiiliac calcific atherosclerosis. No abdominal aortic aneurysm.     PELVIC ORGANS: Normal.     MUSCULOSKELETAL: Old ununited left clavicle fracture. Median sternotomy. No suspicious osseous lesions.        Impression     IMPRESSION:  1.  Patchy groundglass opacities in the left upper and lower lobes, and cluster of tree-in-bud nodularity in the left upper lobe, likely infectious or inflammatory.     2.  Interstitial pulmonary edema.     3.  Trace left pleural effusion.     4.  No acute findings in the abdomen or pelvis.

## 2022-12-05 NOTE — PROGRESS NOTES
CLINICAL NUTRITION SERVICES - ASSESSMENT NOTE     Nutrition Prescription    RECOMMENDATIONS FOR MDs/PROVIDERS TO ORDER:  --Advance diet as medically able per SLP recommendations.   --If unable to advance diet in the next 48 hrs, recommend placement of NGT and starting enteral nutrition. Please re-consult RD to order TF if EN becomes POC.     Malnutrition Status:    Unable to determine due to pt off unit and unable to perform NFPE    Recommendations already ordered by Registered Dietitian (RD):  None at this time.     Future/Additional Recommendations:  --If EN becomes POC:  --GOAL: Osmolite 1.5 Aubrey @ goal of 65ml/hr (1560ml/day) +1 Prosource TF20 packet will provide: 2420 kcals (30 kcal/kg), 117 g PRO (1.4 g/kg), 1188 ml free H20, 317 g CHO, and 0 g fiber daily.  --Start TF @ 15 ml/hr and advance by 10 ml q 8 hrs until goal rate.  --Do not start or advance TF rate unless K+ >3.0, Mg++ > 1.5,  and Phos > 1.9.  --Minimum 30 ml q 4 hrs water flushes for tube patency.  --If gastric enteral access: HOB > 30 degrees.  --MVI/minerals supplement if not already ordered.       REASON FOR ASSESSMENT  Eloy Fletcher is a/an 66 year old male assessed by the dietitian for Provider Order - Registered Dietitian to Assess and Order TF per Medical Nutrition Therapy Protocol    Discussed in rounds, no enteral access in place currently. Pt's mentation improved per MICU team, will have SLP evaluate for diet advancement prior to initiating EN.     NUTRITION HISTORY  PMH HTN, HLD, Hodgkin's lymphoma (s/p radiation and MOPP chemo in remission), Testicular cancer (s/p left orchiectomy and chemo, in remission), prostate cancer (Elly 4+3, diagnosed in 4/2021 and s/p radiation and lupron), aortic stenosis S/P bioprosthetic AVR in 2014, and CAD s/p PCI to LAD in 2012 that is admitted to the ICU for sepsis and encephalopathy.    CURRENT NUTRITION ORDERS  Diet: NPO    LABS  K+ 3.7 (WNL)  BUN 35.5 (H)  Cr 1.88 (H)  Phos 2.4 (L)  Lactic acid  "4.6 (H)    MEDICATIONS  Medications reviewed    ANTHROPOMETRICS  Height: 0 cm (Data Unavailable)   Ht Readings from Last 2 Encounters:   12/04/22 1.727 m (5' 8\")   08/09/21 1.702 m (5' 7\")     Most Recent Weight: 81 kg (178 lb 9.2 oz)    IBW: 70 kg (116% IBW)  BMI: Overweight BMI 25-29.9  Weight History:   Wt Readings from Last 30 Encounters:   12/05/22 81 kg (178 lb 9.2 oz)   12/04/22 80.7 kg (178 lb)   08/09/21 80.7 kg (178 lb)   04/02/15 79.8 kg (176 lb)   03/26/15 80.3 kg (177 lb)   03/12/15 78.5 kg (173 lb)   02/16/15 78.5 kg (173 lb)   02/05/15 79.4 kg (175 lb)   01/29/15 79.4 kg (175 lb)   01/22/15 78.9 kg (174 lb)   01/15/15 78.5 kg (173 lb)   01/06/15 78.9 kg (174 lb)   01/02/15 78.5 kg (173 lb)   12/30/14 78.5 kg (173 lb)   12/23/14 78.9 kg (174 lb)     Dosing Weight: 81 kg admission weight    ASSESSED NUTRITION NEEDS  Estimated Energy Needs: 0439-0762 kcals/day (25 - 30 kcals/kg)  Justification: Maintenance  Estimated Protein Needs: + grams protein/day (1.2 - 1.5+ grams of pro/kg)  Justification: Increased needs  Estimated Fluid Needs: (1 mL/kcal)   Justification: Maintenance and Per provider pending fluid status    PHYSICAL FINDINGS  See malnutrition section below.     MALNUTRITION  % Intake: Unable to assess  % Weight Loss: None noted  Subcutaneous Fat Loss: Unable to assess  Muscle Loss: Unable to assess  Fluid Accumulation/Edema: none documented  Malnutrition Diagnosis: Unable to determine due to pt off unit and unable to perform NFPE    NUTRITION DIAGNOSIS  Inadequate oral intake related to altered mentation as evidenced by NPO status x 1 day pending SLP evaluation.       INTERVENTIONS  Implementation  Collaboration with other providers - rounds  Enteral Nutrition - recs     Goals  Diet adv v nutrition support within 2-3 days.     Monitoring/Evaluation  Progress toward goals will be monitored and evaluated per protocol.    Tiffanie Whitehead MS, RD, LD, [facility] pager: [phone]  ASCOM: " [id number]

## 2022-12-05 NOTE — ANESTHESIA PROCEDURE NOTES
"Lumbar puncture Procedure Note    Pre-Procedure   Staff -        CRNA: Twin Sy APRN CRNA       Performed By: CRNA       Location: ED       Pre-Anesthestic Checklist: patient identified, IV checked, informed consent, monitors and equipment checked, pre-op evaluation, at physician/surgeon's request and post-op pain management  Timeout:       Correct Patient: Yes        Correct Procedure: Yes        Correct Site: Yes        Correct Position: Yes   Procedure Documentation  Procedure: lumbar puncture       Patient Position: LLD       Patient Prep/Sterile Barriers: sterile gloves, mask, patient draped       Skin prep: Chloraprep       Insertion Site: L2-3. (midline approach).       Needle Gauge: 22.        Needle Length (Inches): 3.5        Spinal Needle Type: Kim tipNo introducer used       # of attempts: 1 and  # of redirects:  0    Assessment/Narrative         CSF fluid: clear.     Comments:  Patient has decreased LOC , procedure is deemed emergent by Dr. Torres.      FOR [facility] ONLY:   Pain Team Contact information: please page the Pain Team Via [employer]. Search \"Pain\". During daytime hours, please page the attending first. At night please page the resident first.    "

## 2022-12-05 NOTE — PROGRESS NOTES
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 MSSA on Verigene.  Has bioprosthetic aoritc valve   Pneumonia - probable bacteremic seeding of lung    Stop vancomycin, acyclovir, ampicillin.  Start: cefazolin 2g IV Q8hr  Give gentamicin 5mg/kg x1  One blood culture daily adequate.   (all of the above ordered).    Please obtain TTE    Will need rifampin with bioprosthetic valvue, but not urgent today.  Continue ceftriaxone for today.    Full ID consult to follow.    Lauri Branham  [phone]      Rationale  In 25 minutes of discussion with PharmD and pharmacy, the rationale for this decision revolves around two factors:     1. Brain Tissue Penetration.  (Note that CSF is not Brain tissue). We are not treating meningitis, but an embolic infection to the brain.  Grzegorz PT, Nikki C, Danika RL, Christoph G. Penetration of nafcillin, methicillin, and cefazolin into human brain tissue. Neurosurgery. 1983 ; 12(2):142-7.   In biopsy of brain tissue:   Nafcillin: 11 of 13 brain specimens contained detectable nafcillin concentrations between 0.36 and 11 mcg/gof tissue (mean, 2.7 mcg/g for all 13 specimens).   Cefazolin: 10 of 11 brain tissue specimens contained detectable cefazolin concentrations between 2.0 and 40 mcg/g of tissue (mean, 10.6 mcg/g for all 11 specimens).     2. Acute kidney injury is higher with nafcillin than cefazolin. In one study, this was 19% vs 2%, respectively; P < 0.0001.    Tigre L, Simeon NH, Veronica J, Jayne AJ. Retrospective Analysis of Adverse Drug Events Between Nafcillin Versus Cefazolin for Treatment of Methicillin-Susceptible Staphylococcus aureus Infections. [employer] Pharmacother. 2020 Jul;54(7):662-668.     This ID staff is aware of comparative data on cefazolin vs. IV cloxacillin in [place], where CSF levels were higher with cefazolin and less therapeutic failure occurred.     Eli Ge P, Inocencio M, Claudia G, et al. Should we reconsider  cefazolin for treating staphylococcal meningitis? A retrospective analysis of cefazolin and cloxacillin cerebrospinal fluid levels in patients treated for staphylococcal meningitis. Clin Microbiol Infect. 2020; 26(10): 1415.e1-1415.e4.

## 2022-12-05 NOTE — PRE-PROCEDURE
GENERAL PRE-PROCEDURE:   Procedure:  Transesophageal echocardiogram  Date/Time:  12/5/2022 11:46 AM    Verbal consent obtained?: Yes    Written consent obtained?: Yes    Risks and benefits: Risks, benefits and alternatives were discussed    Consent given by:  Power of   Patient states understanding of procedure being performed: Yes    Patient's understanding of procedure matches consent: Yes    Procedure consent matches procedure scheduled: Yes    Expected level of sedation:  Moderate  Appropriately NPO:  Yes  ASA Class:  4  Mallampati  :  Grade 2- soft palate, base of uvula, tonsillar pillars, and portion of posterior pharyngeal wall visible  Lungs:  Crackles left base and crackles right base  Heart:  Systolic murmur, diastolic murmur and sinus tach  History & Physical reviewed:  History and physical reviewed and no updates needed  Statement of review:  I have reviewed the lab findings, diagnostic data, medications, and the plan for sedation    Ceasar Mcghee MD  Cardiology Fellow  Pager: [phone]

## 2022-12-05 NOTE — PROGRESS NOTES
Critical Care Services Progress Note:  I personally examined and evaluated the patient today. I formulated today s plan with the house staff team or resident(s) and agree with the findings and plan in the associated note (see separately attested resident note).   I have evaluated all laboratory values and imaging studies from the past 24 hours.  Summary of hospital course:  66M pmh of cad s/p pci, asplenia, bioprosthetic aortic valve (2015?), mitral stenosis who now presented to OSH with encephalopathy, cva-like findings and fever.  Workup there did show 5 scattered acute strokes concerning for emoblic events, also L lung pneumonia on CT.  An LP was performed at OSH which did have a wbc count of ~230.    Overnight events/pertinent findings today:  Encephalopathy limits my ability to take a history.  On my visit he responds tries to keep conversation with me but speech is rather slurred an responses do not seem appropriate to question asked.  He does make purposeful movements with his arms.  FH:  Unable due to encephalopathy  ROS:  Unable due to encephalopathy.  Data  Remains tachy to 130s.  Fever to 103.  Mild tachypnea to 20s.  satting acceptably on room air.  Awakens to voice and interactive, but speech is confused as noted above.  Makes purposeful movements with upper extremities.  perrl.  Protecting airway.  Labs (personally reviewed):  Lytes ok.  Creat 1.50.  Lactate 3.9--> 4.6.  UA with few bacteria but only 3 wbc, LE/nitrate negative.  Covid/flu negative.  Wbc 13.7 leukocytosis, hgb 13 chronic anemia c/w his baseline, pltls 168    Assessment/plan:  1.  Acute encephalopathy:  Likely multifactorial due to infection/sepsis and also acute cva's.  Seems to be protecting airway for now.  2.  Scattered acute cva's:  Concerning for embolic source; in this clinical setting very concerning for endocarditis.  Blood cultures sent from OSH.  TTE here with bubble; will likely eventually need ILDEFONSO as well, but non-emergent  for tonight.  Neuro stroke consult.  3.  Fever/sepsis/lactic acidosis:  Very concerning for endocarditis, but also has known pneumonia, and wbc on csf is somewhat elevated.  Will proceed with meningeal abx coverage for now (vanco/ctx/ampicillin/acyclovir) until more information to direct therapy is obtained.  This regimen should also reasonably cover most community acquired bugs which could cause pneumonia (low clinical suspicion for legionella) and endocarditis.  Further tailoring of therapy as more data becomes available.  Trend lactates.  4.  L lung infiltrates on CT:  txt with abx as above.  Sputum culture if produces.  5.  Elevated wbc count in csf:  Only modest elevation to 230.  Will empirically cover as above until cultures and hsv pcr are available.  6.  JILLIAN:  In setting of sepsis.  Trend creat and UOP.  Rest per resident note.     Lauri Friend

## 2022-12-05 NOTE — PHARMACY-VANCOMYCIN DOSING SERVICE
Pharmacy Vancomycin Initial Note  Date of Service 2022  Patient's  1955  66 year old, male    Indication: Sepsis (concern for meningitis)    Current estimated CrCl = Estimated Creatinine Clearance: 54.5 mL/min (A) (based on SCr of 1.5 mg/dL (H)).    Creatinine for last 3 days  2022:  1:41 PM Creatinine 1.50 mg/dL    Recent Vancomycin Level(s) for last 3 days  No results found for requested labs within last 72 hours.      Vancomycin IV Administrations (past 72 hours)                   vancomycin (VANCOCIN) 1,500 mg in sodium chloride 0.9 % 250 mL intermittent infusion (mg) 1,500 mg New Bag 22 1604                Nephrotoxins and other renal medications (From now, onward)    Start     Dose/Rate Route Frequency Ordered Stop    22 1000  vancomycin (VANCOCIN) 1000 mg in dextrose 5% 200 mL PREMIX         1,000 mg  200 mL/hr over 1 Hours Intravenous EVERY 18 HOURS 22 2204      22 2200  ampicillin (OMNIPEN) 2 g vial to attach to  mL bag         2 g  over 15 Minutes Intravenous EVERY 4 HOURS 22 2146      22 2200  acyclovir (ZOVIRAX) 800 mg in sodium chloride 0.9 % 266 mL intermittent infusion         10 mg/kg × 80.7 kg  266 mL/hr over 1 Hours Intravenous EVERY 8 HOURS 22 2146            Contrast Orders - past 72 hours (72h ago, onward)    None              Plan:   1. Start vancomycin  1000 mg IV q18h.  Patient received Vancomycin 1500 mg iv x 1 today @ 1604 at OSH  2. Vancomycin monitoring method: Trough (Method 2 = manual dose calculation)  3. Vancomycin therapeutic monitoring goal: 15-20 mg/L - closer to 20 mg/L  4. Pharmacy will check vancomycin levels as appropriate in 1-3 Days.    5. Serum creatinine levels will be ordered daily for the first week of therapy and at least twice weekly for subsequent weeks.      Kacy Ryan, PharmD

## 2022-12-05 NOTE — ED NOTES
DATE:  12/4/2022   TIME OF RECEIPT FROM LAB:  1823  LAB TEST:  Lactic acid  LAB VALUE:  4.6  RESULTS GIVEN WITH READ-BACK TO Dr. Torres  TIME LAB VALUE REPORTED TO PROVIDER:   1823

## 2022-12-05 NOTE — PROGRESS NOTES
Critical Care Services Attending Progress Note:  12/05/2022  Hosp Day:1    Patient Summary  66M  Hx of: Hodgkin's lymphoma s/p chemo/radiation, testicular cancer, prostate cancer. Radiation induced CM, hx of AORTIC STENOSIS, AOV replacement (2014)  Admitted 12/5 w/:  Sepsis, encephalopathy, endocarditis    Interval History  Febrile to 104  Encephalopathy improving mildly but still not at baseline      Data /65   Pulse (!) 133   Temp (!) 101.4  F (38.6  C) (Axillary)   Resp 23   Wt 81 kg (178 lb 9.2 oz)   SpO2 99%   BMI 27.15 kg/m    Temp:  [97.8  F (36.6  C)-104.4  F (40.2  C)] 101.4  F (38.6  C)  Pulse:  [122-144] 133  Resp:  [] 23  BP: ()/(43-97) 115/65  SpO2:  [89 %-99 %] 99 %  Resp: 23        Intake/Output Summary (Last 24 hours) at 12/5/2022 0850  Last data filed at 12/5/2022 0700  Gross per 24 hour   Intake 1050 ml   Output 600 ml   Net 450 ml       Assessment & Plan Active Problems       Encephalopathy  **MRI: multiple punctate lesions  AAOx1 (self). NCC consulted, likely seeding from endocarditis and unlikely primary meningitis   - neuro checks, Rx endocarditis (see below)     Endocarditis   Sepsis   ? Meningitis  Procal 53, hx of Bioprosthetic AVR 2014  BCx: GPC clusters, Verigene + Staph  LP: no growh, meningitis panel negative  TTE: EF 60-65%,   Hx of aortic valve replacement and PCI. Planned for MVR, replacement of ascending aorta.    - MRI spine?   - Cefaz, gent, ceftriaxone   - ILDEFONSO     Acute Hypoxic resp failure  Only needing 2L oxygen, likely 2/2 sepsis. GGO and multiple opacities on CT Chest (12/4),      JILLIAN   Hyperkalemia    Lactic acidosis  From sepsis, gentle fluid bolus.    - shifting   - fena   - 500cc LR, may give more   - trend      ---------------------------------------------------------------------  I have evaluated changes in critical data from the last 24 hours including medications, laboratory results, vital signs, active consults and radiograph results.     I,  "Martín Lincoln MD, personally managed the Mr. Fletcher's pain control and analgesia, fluid assessment & management, metabolic abnormalities, antimicrobial therapy and nutritional status    Significant changes in status since my last evaluation and the acute issues managed by me today and their respective supportive interventions ordered by me are listed above.     I formulated today s plan with the house staff team, resident(s), APPs and agree with the findings and plan in the associated note written by Dr. العلي.    I personally spent a total of 40  minutes (excluding procedures) including chart review, patient care, and documentation on 12/05/2022    Martín Lincoln MD  Pulmonary & Critical Care Instructor  Department of Medicine  Division of Pulmonary, Allergy, Critical Care and Sleep Medicine   [facility], [facility]  [phone] (Text Page)       ---------------------------------------------------------------------    Rest per resident/NOEMI note.    Clinically Significant Risk Factors Present on Admission        # Hyperkalemia: Highest K = 6.7 mmol/L (Ref range: 3.4-5.3) in last 2 days, will monitor as appropriate  # Hyponatremia: Lowest Na = 132 mmol/L (Ref range: 136-145) in last 2 days, will monitor as appropriate     # Anion Gap Metabolic Acidosis: Highest Anion Gap = 25 mmol/L (Ref range: 7-15) in last 2 days, will monitor and treat as appropriate  # Hypoalbuminemia: Lowest albumin = 3 g/dL (Ref range: 3.5-5.2) at 12/4/2022 11:53 PM, will monitor as appropriate   # Coagulation Defect: INR = 1.23 (Ref range: 0.85 - 1.15) and/or PTT = 41 Seconds (Ref range: 22 - 38 Seconds), will monitor for bleeding  # Drug Induced Platelet Defect: home medication list includes an antiplatelet medication   # Overweight: Estimated body mass index is 27.15 kg/m  as calculated from the following:    Height as of an earlier encounter on 12/4/22: 1.727 m (5' 8\").    Weight as of this encounter: 81 kg (178 " lb 9.2 oz).        Malnutrition:    - Level of malnutrition: Unable to assess

## 2022-12-05 NOTE — PROGRESS NOTES
ICU End of Shift Summary. See flowsheets for vital signs and detailed assessment.    Changes this shift: MD order obtained, soft mitts applied d/t pulling at lines, agitation, and confusion. Least restrictive measures attempted first. Straight cath for 600 mL    Neuro: Mentation seems to be improving. Patient able to answer simple questions and have short conversation with choices provided. Speech is spontaneous with word-finding difficulty and mild/mod aphasia. Patient does not track but will make eye contact; right visual field neglect noted; inconsistently follows commands. RASS -1/+2.    Cardiac: TMax 102.6; normotensive; ST w/ RBBB 120's-130's.    Resp: 4L NC; LS clear/diminished    GI: NPO; two small BMs    : Bladder scanned for 498 mL and straight cath for 600 mL. Urine sample sent    Labs: Blood cultures from yesterday drawn at OSH positive for Gram + cocci and staph A. Trop 983 and Lactic 4.2. Lab contacted @ 0600 regarding patient needing morning labs and BC.        Plan: TTE. Need sputum sample. Redraw blood cultures. Continue to monitor and follow POC. Notify MDs of any acute changes.

## 2022-12-06 NOTE — PROGRESS NOTES
"  MEDICAL ICU H&P  12/06/2022    Date of Hospital Admission: 12/4/22  Date of ICU Admission: 12/4/22  Reason for Critical Care Admission: Sepsis and encephalopathy   Date of Service (when I saw the patient): 12/04/2022     ASSESSMENT: Eloy Fletcher is a 66-year-old male with a past medical history of HTN, HLD, Hodgkin's lymphoma (s/p radiation and MOPP chemo in remission), Testicular cancer (s/p left orchiectomy and chemo, in remission), prostate cancer ([place] 4+3, diagnosed in 4/2021 and s/p radiation and lupron), aortic stenosis S/P bioprosthetic AVR in 2014, and CAD s/p PCI to LAD in 2012 that is admitted to the ICU for sepsis and encephalopathy.     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 CT Head and CTA Head/Neck were performed and did not show any acute abnormality or significant stenosis. Neurology evaluated the patient and determined a NIHSS of 24 and no need for tPA. They then recommended performing a brain MRI which showed 5 scattered foci of acute ischemia involving the bilateral anterior and posterior cerebral artery distributions. Constellation of findings is suspicious for central embolic phenomena. Patient upon admission will respond to some questioning with \"yes\" but out of reflex and not within context. This encephalopathy is likely in the setting of " infection and sepsis and known emboli strokes. Currently protecting airway. His mental status has improved since the night and he is more oriented to time place and person.    - Delirium precautions   - Neurology stroke team consulted, appreciate recommendations                           Recommendations                           - Continuous vEEG x24 hours to rule-out seizures.                           - Defer treatment & management of presumed endocarditis & meningitis to ID and Primary                              teams.                            - Low threshold for repeat head CT in case of acute neurologic exam change. Patient is at risk                              for development and ruptured mycotic aneurysms in setting of endocarditis. Do not                              give thrombolysis should additional stroke occur.                          - Every 2 hours neurochecks   - Tylenol PRN for fevers   - SLP consulted in the setting of stroke and possible dysphagia                            > Recommend clear liquid diet   -  Lumber puncture - CSF specimen is positive for GPC      Pulmonary:  # Likely Bacterial Pneumonia (CAP vs aspiration)  Patient had a CT chest performed at the OSH ED that showed GGO in the MIGUEL ANGEL and LLL with tree-in-bud findings as well as interstitial pulmonary edema and a left pleural effusion. Currently the patient is saturating >90% on room air. Has not been hypoxic.    - ABX per ID section   - Sputum if able to obtain   - CTM, supplementary oxygen to keep the saturation >90%     Cardiovascular:  # Possible Endocarditis  # Gram positive blood culutre x2, MSSA detected   # Elevated Troponin  # Severe Calcific Mitral Stenosis  # Moderate Mitral Regurgitation  # Moderate Tricuspid Regurgitation  # Aortic Stenosis S/P Bioprosthetic AVR in 2014  # CAD s/p PCI to LAD in 2012  # Hx of Radiation-Induced heart disease  # HLD  # HTN  This patient follows with cardiology at [facility]. He was  "last seen by Dr. Gurrola on 11/16/22 with note as below as well as recent ILDEFONSO and coronary angiography:  \"Mr. Fletcher is a 66-year-old male with history of radiation-induced heart disease. He had mediastinal radiation for Hodgkin lymphoma in 1979. He had coronary artery disease with PCI to the left anterior descending coronary artery in 2012. Then in 2014 he underwent aortic valve replacement with a CE valve elsewhere. Now he has moderate symptoms of shortness of breath with activity and some chest compression. Evaluation revealed that he has a normal left ventricular ejection fraction. He has moderate to severe mitral regurgitation with moderate to severe mitral stenosis in the setting of mitral annular calcification. He has no significant coronary artery disease. The ascending aorta is also calcified. There is mild to moderate tricuspid regurgitation. I believe that this patient will benefit from redo median sternotomy and mitral valve replacement with a tissue valve. We may have to replace the ascending aorta at the same time. The patient has right bundle branch block and a left bob fascicular block. The patient understands and wishes to proceed with the surgery. He is thinking early February 2023.\"       Patient's presentation is very concerning for endocarditis, especially in the setting of above described replaced valves and current sepsis with a leukocytosis of 13.7, fevers, and emboli CVA's noted on brain MRI.  BNP elevated at 10,855. EKG showing known RBBB with left bifasicular block in the setting of sinus tachycardia.    - TTE and ILDEFONSO was done to further evaluate   - Holding PTA plavix and ASA in the setting of risk for bleeding and stroke   - Currently not requiring pressor support in the setting of sepsis, may use levophed PRN to maintain MAP's >65   - Troponin plateaued and have stopped trending  -  Cardiology consulted and appreciate the recommendations                           > Continue to " hold Plavix due to risk of ICH in the setting of acute ischemic stroke. Does not                                need to be restarted from Cardiology stand point since PCI was remote (in 2012)                          > Unclear etiology of his multifocal CVA and fever at this time                          > Recommend outpatient follow up at [facility]     GI/Nutrition:  # Gi prophylaxis  - pantoprazole IV 40 mg daily     # Diet  - SLP has recommended clear liquid diet        Renal/Fluids/Electrolytes:  # Acute Kidney Injury  # Hyponatremia  # Lactate elevation without AG  Patient with creatinine of 1.50 from baseline of 0.9. Sodium also noted to be 132 at OSH ED. Likely JILLIAN in the setting of sepsis any pre-renal hypoperfusion.    - Repeat BMP upon admission to monitor creatinine and sodium   - Could consider additional IVF, received 2L at OSH  -  Gave him 500 ml in the day 12/5   -  Gave him 500 ml in the day 12/5        Endocrine:  No active concerns at this time.      ID:  # Sepsis  # Possible Endocarditis vs. Encephalitis vs. Pneumonia  # Elevated Lactate  Patient with fever (TMax of 104.4), leukocytosis of 13.7, tachycardia, tachypnea, and multiple possible sources of infection. In the setting of acute encephalopathy and emboli stroke phenomenon, endocarditis is considered. Enchephalitis/Meningitis are also included in the differential due to the sepsis in the setting of encephalopathy. CT Chest also showing GGO in the MIGUEL ANGEL and LLL with tree-in-bud findings concerning for pneumonia (CAP vs. Aspiration). CT abdomen/pelvis did not show any sources of possible infection leading to this presentation. The patient was started on IV Vanc/Zosyn at outside hospital and obtain blood cultures x4 as well as a UA. - Blood cultures x4 obtained at OSH, will repeat culture if growth noted overnight   - Sputum culture if able to obtain sputum   - U/A with glucose, protein, and ketones, not concerning for infection    - Repeat CBC,  "procal, and lactate     Cultures:  Blood cultures 12/5: Growing Staph aureus     LP 12/5   - LP performed showing WBC of 230 with elevated protein at 59 and glucose at 82.    - LP cultures - growing GPC's     Abx   Vancomycin 12/5  Zosyn 12/5  Ampicillin 12/5  Acyclovir 12/5  Naffcillin 12/5 - P  Ceftriaxone 12/5-12/6       Hematology & Oncology:    # Prostate cancer ([place] 4+3, TNM: cT1c)  # Hx Testicular cancer (30 years ago s/p chemo & Left orchiectomy, in remission)  # Hx Hodgkins lymphoma (1970s treated with MOPP & radiation, in remission)  Last seen by Dr. Chester at [facility] on 6/10/22:  \"Mr. Fletcher then completed photon SABR to 40 Gy in 5 fractions completed on March 30th, 2022. He received a single four month Lupron injection on March 10th, 2022. He returns today for follow-up. PSA is 0.23 today, compared to 5.0 prior to treatment. Mr. Fletcher has had an excellent PSA response, and is doing well from a symptomatic perspective. We will get a mail-in PSA in six months, and I will see him back in one year.\"   - Continue outpatient follow-up with [facility] for prostate cancer surveillance.     Musculoskeletal:  PT & OT Consults while admitted to the ICU.     Skin:  No active concerns at this time.      General Cares/Prophylaxis:    DVT Prophylaxis: Heparin SQ  GI Prophylaxis: PPI  Restraints: None  Family Communication: Updated Estrella (Ex-wife) by phone. Daughter is out of town [facility] and she confirmed she is his current emergency contact for now.   Code Status: Full Code, confirmed by ex-wifeEstrella     Lines/tubes/drains:  - 2 pIV's (12/4/22)     Disposition:  - Medical ICU      Patient seen and findings/plan discussed with medical ICU staff, Dr. Salazar العلي  Internal Medicine Resident PGY1  [facility]     -----------------------------------------------------------------------     Interim History:    I reviewed over night nursing notes and view signs for Mr Fletcher. His " mental status has improved since his presentation to the ICU. He is oriented to time place and person. Will be shifting to the floor for further care and management.     PHYSICAL EXAMINATION:  Temp:  [97.8  F (36.6  C)-104.4  F (40.2  C)] 104.4  F (40.2  C)  Pulse:  [122-144] 133  Resp:  [17-49] 29  BP: (107-179)/(71-97) 107/71  SpO2:  [89 %-96 %] 93 %  General: Alert, oriented to time place and person, In no apparent distress   HEENT: Atraumatic, no oropharyngeal findings   Pulm/Resp: Crackles noted in the bilateral bases, moving air without difficulty  CV: Tachycardia, normal rhythm, no bilateral lower extremity edema  Abdomen: Soft, non-distended, non-tender  Incisions/Skin: No visible contusions or rashes    Neuro:  Mental status: Awake, alert, attentive, oriented to self, time, place, and circumstance. Language is fluent and coherent with intact comprehension of complex commands, naming and repetition, mildly impaired concentration.  Cranial nerves: VFF, PERRL, conjugate gaze, EOMI, facial sensation intact, face symmetric, shoulder shrug strong, tongue midline, no dysarthria.   Motor: Normal bulk and tone. No abnormal movements. 5/5 strength in 4/4 extremities.   Sensory: Intact to light touch all extremities  Coordination: FNF and HS without ataxia or dysmetria. Rapid alternating movements intact.   Gait: RICHIE, deferred.      NIHSS  1a. Level of Consciousness 0-->Alert, keenly responsive   1b. LOC Questions 0-->Answers both questions correctly   1c. LOC Commands 0-->Performs both tasks correctly   2.   Best Gaze 0-->Normal   3.   Visual 0-->No visual loss   4.   Facial Palsy 0-->Normal symmetrical movements   5a. Motor Arm, Left 0-->No drift, limb holds 90 (or 45) degrees for full 10 secs   5b. Motor Arm, Right 0-->No drift, limb holds 90 (or 45) degrees for full 10 secs   6a. Motor Leg, Left 0-->No drift, leg holds 30 degree position for full 5 secs   6b. Motor Leg, right 0-->No drift, leg holds 30 degree  position for full 5 secs   7.   Limb Ataxia 0-->Absent   8.   Sensory 0-->Normal, no sensory loss   9.   Best Language 0-->No aphasia, normal   10. Dysarthria 0-->Normal   11. Extinction and Inattention  0-->No abnormality   Total 0 (12/06/22 1140)          LABS: Reviewed.   Arterial Blood Gases   No lab results found in last 7 days.  Complete Blood Count       Recent Labs   Lab 12/04/22  1341   WBC 13.7*   HGB 13.0*         Basic Metabolic Panel      Recent Labs   Lab 12/04/22  1341   *   POTASSIUM 4.1   CHLORIDE 95*   CO2 22   BUN 29.5*   CR 1.50*   *      Liver Function Tests      Recent Labs   Lab 12/04/22  1341   INR 1.23*      Coagulation Profile      Recent Labs   Lab 12/04/22  1341   INR 1.23*   PTT 41*         IMAGING:      Recent Results (from the past 24 hour(s))   CT Head w/o Contrast     Narrative     EXAM: CT HEAD W/O CONTRAST, CTA HEAD NECK W CONTRAST  LOCATION: [facility]  DATE/TIME: 12/4/2022 1:32 PM     INDICATION: Stroke alert. Last known well 1215. Right sided neglect with left gaze preference. Weakness of extremities  COMPARISON: 07/20/2012  CONTRAST: 75 mL Isovue 370  TECHNIQUE: Head and neck CT angiogram with IV contrast. Noncontrast head CT followed by axial helical CT images of the head and neck vessels obtained during the arterial phase of intravenous contrast administration. Axial 2D reconstructed images and   multiplanar 3D MIP reconstructed images of the head and neck vessels were performed by the technologist. Dose reduction techniques were used. All stenosis measurements made according to NASCET criteria unless otherwise specified.     FINDINGS:   NONCONTRAST HEAD CT:   INTRACRANIAL CONTENTS: No intracranial hemorrhage, extraaxial collection, or mass effect.  No CT evidence of acute infarct. Normal parenchymal attenuation. Normal ventricles and sulci.      VISUALIZED ORBITS/SINUSES/MASTOIDS: No intraorbital abnormality. No paranasal sinus  mucosal disease. No middle ear or mastoid effusion.     BONES/SOFT TISSUES: No acute abnormality.     HEAD CTA:  ANTERIOR CIRCULATION: No stenosis/occlusion, aneurysm, or high flow vascular malformation. Developmentally hypoplastic right A1 anterior cerebral artery segment.Fetal origin of the right posterior cerebral artery from the anterior circulation.     POSTERIOR CIRCULATION: No stenosis/occlusion, aneurysm, or high flow vascular malformation. Balanced vertebral arteries supply a normal basilar artery.      DURAL VENOUS SINUSES: Expected enhancement of the major dural venous sinuses.     NECK CTA:  RIGHT CAROTID: No measurable stenosis or dissection. There is nonstenotic atherosclerotic calcification at the bifurcation.     LEFT CAROTID: No measurable stenosis or dissection. There is nonstenotic atherosclerotic calcification at the bifurcation.     VERTEBRAL ARTERIES: No focal stenosis or dissection. Balanced vertebral arteries.     AORTIC ARCH: Classic aortic arch anatomy with no significant stenosis at the origin of the great vessels.     NONVASCULAR STRUCTURES: Unremarkable.        Impression     IMPRESSION:   HEAD CT:  1.  No acute intracranial process.     HEAD CTA:   1.  No significant stenosis, aneurysm, or high flow vascular malformation identified.  2.  Variant [place] anatomy as above.     NECK CTA:  1.  No hemodynamically significant stenosis in the neck vessels.   2.  No evidence for dissection.     3.  Dr. Mccoy discussed the above findings by telephone with Dr. Torres at 1:59 PM 12/04/2022.   CTA Head Neck with Contrast     Narrative     EXAM: CT HEAD W/O CONTRAST, CTA HEAD NECK W CONTRAST  LOCATION: [facility]  DATE/TIME: 12/4/2022 1:32 PM     INDICATION: Stroke alert. Last known well 1215. Right sided neglect with left gaze preference. Weakness of extremities  COMPARISON: 07/20/2012  CONTRAST: 75 mL Isovue 370  TECHNIQUE: Head and neck CT angiogram with IV  contrast. Noncontrast head CT followed by axial helical CT images of the head and neck vessels obtained during the arterial phase of intravenous contrast administration. Axial 2D reconstructed images and   multiplanar 3D MIP reconstructed images of the head and neck vessels were performed by the technologist. Dose reduction techniques were used. All stenosis measurements made according to NASCET criteria unless otherwise specified.     FINDINGS:   NONCONTRAST HEAD CT:   INTRACRANIAL CONTENTS: No intracranial hemorrhage, extraaxial collection, or mass effect.  No CT evidence of acute infarct. Normal parenchymal attenuation. Normal ventricles and sulci.      VISUALIZED ORBITS/SINUSES/MASTOIDS: No intraorbital abnormality. No paranasal sinus mucosal disease. No middle ear or mastoid effusion.     BONES/SOFT TISSUES: No acute abnormality.     HEAD CTA:  ANTERIOR CIRCULATION: No stenosis/occlusion, aneurysm, or high flow vascular malformation. Developmentally hypoplastic right A1 anterior cerebral artery segment.Fetal origin of the right posterior cerebral artery from the anterior circulation.     POSTERIOR CIRCULATION: No stenosis/occlusion, aneurysm, or high flow vascular malformation. Balanced vertebral arteries supply a normal basilar artery.      DURAL VENOUS SINUSES: Expected enhancement of the major dural venous sinuses.     NECK CTA:  RIGHT CAROTID: No measurable stenosis or dissection. There is nonstenotic atherosclerotic calcification at the bifurcation.     LEFT CAROTID: No measurable stenosis or dissection. There is nonstenotic atherosclerotic calcification at the bifurcation.     VERTEBRAL ARTERIES: No focal stenosis or dissection. Balanced vertebral arteries.     AORTIC ARCH: Classic aortic arch anatomy with no significant stenosis at the origin of the great vessels.     NONVASCULAR STRUCTURES: Unremarkable.        Impression     IMPRESSION:   HEAD CT:  1.  No acute intracranial process.     HEAD CTA:   1.   No significant stenosis, aneurysm, or high flow vascular malformation identified.  2.  Variant [place] anatomy as above.     NECK CTA:  1.  No hemodynamically significant stenosis in the neck vessels.   2.  No evidence for dissection.     3.  Dr. Mccoy discussed the above findings by telephone with Dr. Torres at 1:59 PM 12/04/2022.   MR Brain w/o Contrast     Narrative     EXAM: MR BRAIN W/O CONTRAST  LOCATION: [facility]  DATE/TIME: 12/4/2022 2:57 PM     INDICATION: HYPERACUTE MR STROKE PROTOCOL, stroke alert, right-sided neglect with left gaze preference, weakness of extremities.  COMPARISON: CT head 12/04/2022  TECHNIQUE: Routine multiplanar multisequence head MRI without intravenous contrast.     FINDINGS:  INTRACRANIAL CONTENTS: There are 5 small foci of restricted diffusion representing acute lacunar infarcts. The largest focus is at the left posterior frontal centrum semiovale measuring 10 x 5 mm. Additional foci are noted at the bilateral posterior   frontal gyri, left periatrial white matter, and right cerebellar hemisphere. No mass, acute hemorrhage, or extra-axial fluid collections. Normal ventricles and sulci. Normal position of the cerebellar tonsils.      SELLA: No abnormality accounting for technique.     OSSEOUS STRUCTURES/SOFT TISSUES: Normal marrow signal. The major intracranial vascular flow voids are maintained.      ORBITS: No abnormality accounting for technique.      SINUSES/MASTOIDS: No paranasal sinus mucosal disease. No middle ear or mastoid effusion.         Impression     IMPRESSION:  1.  5 scattered foci of acute ischemia involving the bilateral anterior and posterior cerebral artery distributions. Constellation of findings is suspicious for central embolic phenomena.   CT Chest Abdomen Pelvis w/o Contrast     Narrative     EXAM: CT CHEST ABDOMEN PELVIS W/O CONTRAST  LOCATION: [facility]  DATE/TIME: 12/4/2022 6:17  PM     INDICATION: sepsis w out source in patient with encephalopathy, asplenia, and multiple new infarcts on MR brain. fever and tachypnea  COMPARISON: PET/CT 9/3/2010  TECHNIQUE: CT scan of the chest, abdomen, and pelvis was performed without IV contrast. Multiplanar reformats were obtained. Dose reduction techniques were used.   CONTRAST: None.     FINDINGS:   LUNGS AND PLEURA: Trace left pleural effusion. Respiratory motion limits evaluation of the lungs. There is mild interstitial edema. Cluster of tree-in-bud nodularity in the left upper lobe. Patchy groundglass opacities at the left lung apex and in the   superior segment left lower lobe.     MEDIASTINUM/AXILLAE: Aortic valve replacement. No lymphadenopathy.     CORONARY ARTERY CALCIFICATION: Previous intervention (stents or CABG).     HEPATOBILIARY: Normal.     PANCREAS: Normal.     SPLEEN: Splenectomy     ADRENAL GLANDS: Normal.     KIDNEYS/BLADDER: No significant mass, stones, or hydronephrosis. There are simple or benign cysts. No follow up is needed.     BOWEL: No obstruction or inflammatory change. Scattered surgical clips throughout the abdomen and pelvis.     LYMPH NODES: Normal.     VASCULATURE: Aortobiiliac calcific atherosclerosis. No abdominal aortic aneurysm.     PELVIC ORGANS: Normal.     MUSCULOSKELETAL: Old ununited left clavicle fracture. Median sternotomy. No suspicious osseous lesions.        Impression     IMPRESSION:  1.  Patchy groundglass opacities in the left upper and lower lobes, and cluster of tree-in-bud nodularity in the left upper lobe, likely infectious or inflammatory.     2.  Interstitial pulmonary edema.     3.  Trace left pleural effusion.     4.  No acute findings in the abdomen or pelvis.

## 2022-12-06 NOTE — PROGRESS NOTES
Critical Care Services Attending Progress Note:  12/06/2022  Hosp Day:2    Patient Summary  66M  Hx: Hodgkin's lymphoma s/p chemo/radiation, testicular cancer, prostate cancer. Radiation induced CM, hx of AORTIC STENOSIS, AOV replacement (2014)  Admitted 12/5 w/:  Sepsis, encephalopathy, endocarditis    Interval History  Fever curve improved  mentation improving but still not at baseline  4L NC overnight, +1.8 L overnight  Discussed with family at bedside plans and updated them on Eloy's course    Data /67   Pulse 118   Temp 98.5  F (36.9  C) (Oral)   Resp (!) 8   Wt 81 kg (178 lb 9.2 oz)   SpO2 99%   BMI 27.15 kg/m    Temp:  [97.6  F (36.4  C)-101.4  F (38.6  C)] 98.5  F (36.9  C)  Pulse:  [112-133] 118  Resp:  [8-38] 8  BP: ()/(48-78) 107/67  SpO2:  [91 %-100 %] 99 %  Resp: (!) 8    Intake/Output Summary (Last 24 hours) at 12/6/2022 0558  Last data filed at 12/6/2022 0400  Gross per 24 hour   Intake 3583.5 ml   Output 1350 ml   Net 2233.5 ml       Assessment & Plan Active Problems      Toxicmetabolic Encephalopathy (improving)  **MRI: multiple punctate lesions  AAOx1 (self) on admit. NCC consulted, likely seeding from endocarditis and unlikely primary meningitis. Cause of TME likely meningoencephalitis + bactermia   - neuro checks, Rx endocarditis (see below)   - vEEG     Endocarditis   Sepsis   Meningitis  Procal 54, hx of Bioprosthetic AVR 2014  **BCx: MSSA  **LP: + MSSA., meningitis panel negative  **TTE: EF 60-65%,   **ILDEFONSO (12/5): no vegetation seen  Hx of aortic valve replacement and PCI. Planned for MVR, replacement of ascending aorta.   Appreciate IDs review of literature, using IDSA recommended therapies below. Bacteremia-> endocarditis -> dissemination to other organs. Initial source of bacteremia is unclear. May be pulmonary given infiltrates but opacities are a bit underwhelming; however, no other wound, skin, osteo, urinary source that has been uncovered and thus pulm is most likely  "initial culprit.    - d/w ID that no other considerations/workup of source need to be explored   - nafcillin   - gentamycin given        Acute Hypoxic resp failure  Only needing 2L oxygen, likely 2/2 sepsis. GGO and multiple opacities on CT Chest (12/4),      JILLIAN   Hyperkalemia    Lactic acidosis  From sepsis, gentle fluid bolus.    - shifting   - fena   - 500cc LR, may give more   - trend    GI: seen by SLP: liquid diet    Loose stool: C.diff - (12/4)    Ok to transfer to the floor  ---------------------------------------------------------------------  I have evaluated changes in critical data from the last 24 hours including medications, laboratory results, vital signs, active consults and radiograph results.     I, Martín Lincoln MD, personally managed the Mr. Fletcher's pain control and analgesia, fluid assessment & management, metabolic abnormalities, antimicrobial therapy and nutritional status    Significant changes in status since my last evaluation and the acute issues managed by me today and their respective supportive interventions ordered by me are listed above.     I formulated today s plan with the house staff team, resident(s), APPs and agree with the findings and plan in the associated note written by Dr. العلي.    I personally spent a total of 45  minutes (excluding procedures) including chart review, patient care, and documentation on 12/06/2022    Martín Lincoln MD  Pulmonary & Critical Care Instructor  Department of Medicine  Division of Pulmonary, Allergy, Critical Care and Sleep Medicine   [facility]  [phone] (Text Page)       ---------------------------------------------------------------------    Rest per resident/NOEMI note.    Clinically Significant Risk Factors Present on Admission                # Overweight: Estimated body mass index is 27.15 kg/m  as calculated from the following:    Height as of an earlier encounter on 12/4/22: 1.727 m (5' 8\").    " Weight as of this encounter: 81 kg (178 lb 9.2 oz).        Malnutrition:    - Level of malnutrition: Unable to assess

## 2022-12-06 NOTE — PROGRESS NOTES
"CLINICAL SWALLOW EVALUATION      12/06/22 1037   Appointment Info   Signing Clinician's Name / Credentials (SLP) Dorcas Mcgrath [facility]   General Information   Onset of Illness/Injury or Date of Surgery 12/04/22  (Onset 12/2/2022)   Referring Physician David Lerner MD   Patient/Family Therapy Goal Statement (SLP) Patient/family would like patient to eat/drink today.   Pertinent History of Current Problem Per provider notes: \"Eloy Fletcher is a 66-year-old male with a past medical history of HTN, HLD, Hodgkin's lymphoma (s/p radiation and MOPP chemo in remission), Testicular cancer (s/p left orchiectomy and chemo, in remission), prostate cancer ([place] 4+3, diagnosed in 4/2021 and s/p radiation and lupron), aortic stenosis S/P bioprosthetic AVR in 2014, and CAD s/p PCI to LAD in 2012 that is admitted to the ICU for sepsis and encephalopathy. Patient presented to OSH ED with left gaze preference, right-sided neglect, weakness, and fevers since 12/2/22. Brain MRI which showed 5 scattered foci of acute ischemia involving the bilateral anterior and posterior cerebral artery distributions. CT chest performed at the OSH ED that showed GGO in the MIGUEL ANGEL and LLL with tree-in-bud findings as well as interstitial pulmonary edema and a left pleural effusion.  In the setting of acute encephalopathy and emboli stroke phenomenon, endocarditis is considered. Enchephalitis/Meningitis are also included in the differential due to the sepsis in the setting of encephalopathy.\"   General Observations Patient awake, interactive. Note left gaze preference, weakness and some confusion.   Type of Evaluation   Type of Evaluation Swallow Evaluation   Oral Motor   Oral Musculature anomalies present   Structural Abnormalities none present   Mucosal Quality dry   Dentition (Oral Motor)   Comment, Dentition (Oral Motor) Note able to fully visualize today.   Dentition (Oral Motor) adequate dentition;natural dentition;some missing teeth   Facial " "Symmetry (Oral Motor)   Facial Symmetry (Oral Motor) left side impairment   Left Side Facial Asymmetry minimal impairment   Lip Function (Oral Motor)   Lip Range of Motion (Oral Motor) retraction impairment   Lip Strength (Oral Motor) WFL   Retraction, Lip Range of Motion left side;minimal impairment   Tongue Function (Oral Motor)   Tongue Strength (Oral Motor) WFL   Tongue Coordination/Speed (Oral Motor) reduced rate   Tongue ROM (Oral Motor) protrusion is impaired;lateralization is impaired   Protrusion, Tongue ROM Impairment (Oral Motor) bilateral;minimal impairment   Lateralization, Tongue ROM Impairment (Oral Motor) bilateral;minimal impairment   Jaw Function (Oral Motor)   Jaw Function (Oral Motor) range of motion impairment   Jaw Range of Motion Impairment bilateral;moderate impairment   Cough/Swallow/Gag Reflex (Oral Motor)   Soft Palate/Velum (Oral Motor) unable/difficult to assess   Volitional Throat Clear/Cough (Oral Motor) reduced strength  (mild)   Vocal Quality/Secretion Management (Oral Motor)   Vocal Quality (Oral Motor) hoarse;breathy;harsh  (Family reported voice today sounds \"normal\"/baseline for patient and that he usually has a \"raspy\" voice.)   Secretion Management (Oral Motor) WNL   Comment, Vocal Quality/Secretion Management (Oral Motor) Family reported throat clearing behaviors at baseline.   General Swallowing Observations   Past History of Dysphagia None per EMR review.   Respiratory Support (General Swallowing Observations) nasal cannula   Current Diet/Method of Nutritional Intake (General Swallowing Observations, NIS) NPO   Swallowing Evaluation Clinical swallow evaluation   Clinical Swallow Evaluation   Feeding Assistance dependent   Clinical Swallow Evaluation Textures Trialed thin liquids;mildly thick liquids;pureed   Clinical Swallow Eval: Thin Liquid Texture Trial   Mode of Presentation, Thin Liquids spoon;cup;straw;fed by clinician   Volume of Liquid or Food Presented 3 ice chips; " 5 sips water by spoon; 2 sips by medicine cup; 2 oz by straw   Oral Phase of Swallow delayed AP movement   Pharyngeal Phase of Swallow throat clearing  (x1; suspected delayed swallow response with small bolus)   Diagnostic Statement No other throat clearing, no coughing, vocal quality unchanged. Patient's family reported throat clearing behaviors at baseline PTA.   Clinical Swallow Eval: Mildly Thick Liquids   Mode of Presentation straw;fed by clinician   Volume Presented 1 oz   Oral Phase WFL   Pharyngeal Phase   (no overt aspiration signs)   Clinical Swallow Evaluation: Puree Solid Texture Trial   Mode of Presentation, Puree spoon;fed by clinician   Volume of Puree Presented 4 bites applesauce   Oral Phase, Puree WFL   Pharyngeal Phase, Puree   (no overt aspiration signs)   Esophageal Phase of Swallow   Patient reports or presents with symptoms of esophageal dysphagia No   Esophageal comments Patient reported no sensation of bolus sticking. Hx of Mai's esophagus on prior EGD's.   Swallowing Recommendations   Diet Consistency Recommendations full liquid diet;thin liquids (level 0)   Supervision Level for Intake 1:1 supervision needed   Mode of Delivery Recommendations bolus size, small;slow rate of intake   Monitoring/Assistance Required (Eating/Swallowing) stop eating activities when fatigue is present;monitor for cough or change in vocal quality with intake   Recommended Feeding/Eating Techniques (Swallow Eval) maintain upright sitting position for eating;minimize distractions during oral intake;provide assist with feeding;set-up and prepare tray   Medication Administration Recommendations, Swallowing (SLP) One at a time.   General Therapy Interventions   Planned Therapy Interventions Dysphagia Treatment   Dysphagia treatment Modified diet education;Instruction of safe swallow strategies;Compensatory strategies for swallowing   Clinical Impression   Criteria for Skilled Therapeutic Interventions Met (SLP  Eval) Yes, treatment indicated   SLP Diagnosis Dysphagia   Risks & Benefits of therapy have been explained evaluation/treatment results reviewed;care plan/treatment goals reviewed;risks/benefits reviewed;current/potential barriers reviewed;participants voiced agreement with care plan;participants included;patient;daughter  (ex-wife)   Clinical Impression Comments Mild oropharyngeal dysphagia characterized by mild oral motor impairment (left side), mild delayed AP bolus transit and suspect intermittent delayed swallow response, and one instance of throat clearing after sip of thin water. No other throat clearing occurred. No coughing or change in vocal quality. No oral residue remained and patient reported no sensation of bolus sticking in pharynx. Patient needed 1:1 assist with feeding due to fatigue and weakness. Recommend initiate full liquid diet given 1:1 assist and swallow strategies (fully alert and upright position, small single sips/bites, slow pace). Monitor for signs/symptoms of aspiration and hold PO if occur. SLP to continue to follow for dysphagia.   SLP Total Evaluation Time   Eval: oral/pharyngeal swallow function, clinical swallow Minutes (26183) 15   SLP Goals   Therapy Frequency (SLP Eval) 5 times/wk   SLP Predicted Duration/Target Date for Goal Attainment 01/10/23   SLP Goals Swallow   Interventions   Interventions Quick Adds Swallowing Dysfunction   Swallowing Dysfunction &/or Oral Function for Feeding   Treatment of Swallowing Dysfunction &/or Oral Function for Feeding Minutes (30460) 10   Symptoms Noted During/After Treatment Fatigue   Treatment Detail/Skilled Intervention Provided education to patient and family about diet modifications and swallow strategies. Provided education about signs/potential complications of aspiration.   SLP Discharge Planning   SLP Plan diet f/u; trials advanced textures as appropriate; swallow strategy training; lang/cog eval as appropriate   SLP Discharge  Recommendation [facility] patient will benefit from intensive, interdisciplinary therapy.  Anticipate will be able to tolerate 3 hours of therapy per day   SLP Rationale for DC Rec Below baseline for swallow function   SLP Brief overview of current status  Recommend initiate full liquid diet given 1:1 assist and swallow strategies (fully alert and upright position, small single sips/bites, slow pace). Monitor for signs/symptoms of aspiration and hold PO if occur. SLP to continue to follow for dysphagia.   Total Session Time   Total Session Time (sum of timed and untimed services) 25

## 2022-12-06 NOTE — PROGRESS NOTES
Called to room to assess patient for a PRN neb per RN and MD request. Pt with clear breath sounds and tachycardic in the 120's. Opted to not give nebulizer at this time. Not indicated.     12/6/2022  Renetta Amanda, RT

## 2022-12-06 NOTE — PROGRESS NOTES
Neurocritical Care Progress Note    Reason for critical care admission: Sepsis & Encephalopathy  Admitting Team: Medical ICU  Date of Service:  12/06/2022  Date of Admission:  12/4/2022  Hospital Day: 3    Assessment  66 year old male with PMH of HTN, HLD, CAD s/p PCI to LAD (2012), Aortic stenosis s/p AOV replacement (2014), Hodgkin's disease s/p radiation therapy, testicular & prostate cancer who presented with left gaze preference, right sided neglect and generalized weakness. Found to have embolic appearing punctate infarcts, encephalopathy, fever, nuchal rigidity, multiple punctate infarcts in the setting of encephalopathy and MSSA sepsis highly concerning for endocarditis. Additionally, neuro exam, Meningitis/encephalitis panel and lumbar puncture findings concerning for bacterial meningitis/encephalitis. Broad-spectrum antibiotics have been initiated and ID consulted. Embolic infarcts are unlikely responsible for his encephalopathy and previously document gaze deviation. Possibility of seizures must also be considered. Patient's exam has shown improvement following initial doses of antibiotics.    12/6: Preliminary EEG c/w mild encephalopathy & no seizures. ILDEFONSO no evidence of endocarditis but suspicion remains high given pattern of embolic strokes in setting of sepsis.    Recommendations  -Continue vEEG x24 hours.  -Neurochecks Q2H during day & Q4H at night.  -Defer treatment & management of presumed endocarditis & meningitis to ID and Primary teams.  -Low threshold for repeat head CT in case of acute neurologic exam change. Patient is at risk for development and ruptured mycotic aneurysms in setting of endocarditis. Do not give thrombolysis should additional stroke occur.    This patient was discussed with the attending faculty, Karolina Diaz.    Shekhar Rivera DO  Neurology Resident PGY2  12/06/2022 8:12 AM      24 Hour Vital Signs Summary:  Temp: 98.5  F (36.9  C) Temp  Min: 97.6  F (36.4  C)  Max: 98.7  " F (37.1  C)  Resp: 16 Resp  Min: 8  Max: 38  SpO2: 97 % SpO2  Min: 91 %  Max: 100 %  Pulse: 112 Pulse  Min: 112  Max: 130    No data recorded  BP: (!) 84/50 Systolic (24hrs), Av , Min:84 , Max:133   Diastolic (24hrs), Av, Min:48, Max:77        Respiratory monitoring:   Resp: 16      I/O last 3 completed shifts:  In: 3198.5 [I.V.:1698.5; IV Piggyback:1500]  Out: 1700 [Urine:1650; Stool:50]    Current Medications:    cefTRIAXone  2 g Intravenous Q12H     heparin ANTICOAGULANT  5,000 Units Subcutaneous Q12H     nafcillin  2 g Intravenous Q4H     pantoprazole  40 mg Intravenous Daily with breakfast     sodium chloride (PF)  3 mL Intracatheter Q8H     sodium chloride bacteriostatic  12 mL Intravenous Once       PRN Medications:  acetaminophen, acetaminophen, acetylcysteine, albuterol, glucose **OR** dextrose **OR** glucagon, fentaNYL, flumazenil, ipratropium - albuterol 0.5 mg/2.5 mg/3 mL, lidocaine 4%, lidocaine (buffered or not buffered), midazolam, naloxone, naloxone, naloxone, naloxone, ondansetron **OR** ondansetron, polyethylene glycol, prochlorperazine **OR** prochlorperazine **OR** prochlorperazine, senna-docusate **OR** senna-docusate, sodium chloride (PF), sodium chloride, sodium chloride bacteriostatic    Infusions:    sodium chloride 150 mL/hr (22 1330)       Allergies   Allergen Reactions     Crestor [Rosuvastatin] Other (See Comments)     \"Funny feeling in both legs\" all statins       Physical Examination:  Vitals: BP (!) 84/50   Pulse 112   Temp 98.5  F (36.9  C) (Oral)   Resp 16   Wt 81 kg (178 lb 9.2 oz)   SpO2 97%   BMI 27.15 kg/m    General: Adult male patient, lying in bed, critically-ill  HEENT: Normocephalic, atraumatic, no icterus, oral cavity/oropharynx pink and moist  Cardiac: RRR, s1/s2 auscultated without murmur  Pulm: CTAB, unlabored, expansion symmetric, no retractions or use of accessory muscles  Abdomen: Soft, non-distended, bowel sounds present  Extremities: Warm, no " edema, distal pulses +2, well perfused  Skin: No rash or lesion  Psych: Calm and cooperative  Neuro:  Mental status: Awake, alert, attentive, oriented to self, time, place, and circumstance. Language is fluent and coherent with intact comprehension of complex commands, naming and repetition.  Cranial nerves: VFF, PERRL, conjugate gaze, EOMI, facial sensation intact, face symmetric, shoulder shrug strong, tongue midline, no dysarthria.   Motor: Normal bulk and tone. No abnormal movements. 5/5 strength in 4/4 extremities.   Sensory: Intact to light touch x 4 extremities  Coordination: FNF and HS without ataxia or dysmetria. Rapid alternating movements intact.   Gait: RICHIE, deferred.      NIHSS  1a. Level of Consciousness 0-->Alert, keenly responsive   1b. LOC Questions 0-->Answers both questions correctly   1c. LOC Commands 0-->Performs both tasks correctly   2.   Best Gaze 0-->Normal   3.   Visual 0-->No visual loss   4.   Facial Palsy 0-->Normal symmetrical movements   5a. Motor Arm, Left 0-->No drift, limb holds 90 (or 45) degrees for full 10 secs   5b. Motor Arm, Right 0-->No drift, limb holds 90 (or 45) degrees for full 10 secs   6a. Motor Leg, Left 0-->No drift, leg holds 30 degree position for full 5 secs   6b. Motor Leg, right 0-->No drift, leg holds 30 degree position for full 5 secs   7.   Limb Ataxia 0-->Absent   8.   Sensory 0-->Normal, no sensory loss   9.   Best Language 0-->No aphasia, normal   10. Dysarthria 0-->Normal   11. Extinction and Inattention  0-->No abnormality   Total 0 (12/06/22 1140)       Labs:  Recent Labs   Lab 12/06/22  0422 12/05/22  1141 12/05/22  1008 12/05/22  0739 12/04/22  2353 12/04/22  1341     --  141  --  140 132*   POTASSIUM 4.2 3.8 3.7 6.7* 4.4 4.1   CHLORIDE 106  --  108*  --  104 95*   CO2 20*  --  20*  --  11* 22   BUN 49.9*  --  35.5*  --  33.4* 29.5*   CR 2.87*  --  1.88*  --  1.68* 1.50*   YUNIOR 8.0*  --  7.9*  --  8.2* 8.7*   BILITOTAL  --   --  1.1  --  1.3*  --     ALKPHOS  --   --  58  --  52  --    ALT  --   --  61*  --  78*  --    AST  --   --  131*  --  149*  --        Recent Labs   Lab 12/06/22  0422 12/05/22  1141 12/04/22  2353 12/04/22  1341   WBC 12.3* 9.1 11.4* 13.7*   HGB 11.7* 12.8* 12.7* 13.0*   PLT 79* 96* 118* 168       No results for input(s): PH, PCO2, PO2, HCO3 in the last 168 hours.    All cultures:  Recent Labs   Lab 12/04/22  1738 12/04/22  1601 12/04/22  1534   CULTURE No anaerobic organisms isolated after 1 day  Culture in progress  1+ Gram positive cocci* Positive on the 1st day of incubation*  Gram positive cocci in clusters* Positive on the 1st day of incubation*  Staphylococcus aureus*  Positive on the 1st day of incubation*  Gram positive cocci in clusters*       Imaging:    VEEG monitoring preliminary results: 12/4/2022  4:15 PM     VEEG has been running for over one hour.  EEG showed mild to moderate generalized slowing of the background activities. Findings are consistent with mild to moderate diffuse encephalopathy.  No epileptiform activities, no clinical or electrographic seizures were observed.    All relevant imaging and laboratory values personally reviewed.

## 2022-12-06 NOTE — PLAN OF CARE
ICU End of Shift Summary. See flowsheets for vital signs and detailed assessment.    Changes this shift: (0735-6946) Neuro: Alert, restless/agitated beginning of the shift. Zyprexa given x1, pt did not respond. Pt stated he had pain everywhere, dilaudid given x1. Shortly after administration pt became somnolent, RR less than 10, MD notified and 0.4 narcan given x2.   Cardiac: -130s. Afebrile. Resp: sputum sample sent. GI/: rectal tube placed d/t loose stools, cdiff sample sent, result: negative.  Medina placed d/t retention.     Plan:  monitor neuro status, continue to POC

## 2022-12-06 NOTE — PROGRESS NOTES
ICU End of Shift Summary. See flowsheets for vital signs and detailed assessment.    Changes this shift: Lethargic/difficult to arouse at times, otherwise pt is alert, but disoriented x4 and word finding difficulty. Afebrile, ST in the low 100s, normotensive, 4L NC. SLP cleared for full liquid diet. Good UOP. 500ml LR bolus given. Sitter at bedside.    Plan: transfer to

## 2022-12-06 NOTE — PROGRESS NOTES
[facility]    Medicine Progress Note - Hospitalist Service, GOLD TEAM     Date of Admission:  12/4/2022    Assessment & Plan   Mr. Fletcher is a 66-year-old gentleman with a past medical history of hypertension, hyperlipidemia, Hodgkin's lymphoma status post radiation and MOPP chemotherapy, in remission, testicular cancer status post a left orchiectomy and chemotherapy, in remission, prostate cancer ([place] 4+3, diagnosed April 2021) status post radiation and Lupron, aortic stenosis status post bioprosthetic aortic valve in (2014) and coronary artery disease status post PCI to the LAD in (2012) who on December 4, 2022 developed a left-sided gaze with right-sided weakness and was noted to have a fever and brought to an outside hospital where he was recognized to be in septic shock and transferred to [facility] ICU.  Head CT and CTA of the head and neck did not display any acute abnormality or stenosis.  MRI brain displayed scattered foci of acute ischemia involving the bilateral anterior and posterior cerebral artery distributions.  Neurology was consulted and concerned that the these might represent embolic strokes.  Further infectious work-up was done including a CT chest abdomen and pelvis which revealed groundglass opacities in the left upper lobe and left lower lobe with tree-in-bud findings in the setting of interstitial pulmonary edema and a left pleural effusion.  He was started on broad-spectrum antibiotics including nafcillin and ceftriaxone.  Lumbar puncture was performed and CSF was positive for gram-positive cocci, final cultures pending.  Cardiology was consulted with concerns for possible endocarditis.  TTE was performed which revealed severe mitral calcification with moderate insufficiency and a bioprosthetic aortic valve which was well-seated with no vegetations present. ID was consulted and he was adjusted to Nafcillin with treatment for likely  endocarditis in the setting of a high grade blood stream infection from MSSA that resulted in pneumonia, embolic strokes, acute kidney injury and encephalopathy. Today, he is transferring out of the ICU to the [facility] for further care.     #Severe sepsis (fever, leukocytosis, tachycardia, tachypnea) secondary to MSSA high-grade bloodstream infection  #Acute embolic strokes with right-sided weakness  #Acute encephalopathy, improving  #Bacterial community-acquired pneumonia  He was initially started on IV Vancomycin/ Zosyn at OSH. Blood cultures x 2 from 12/4/22 grew positive for pansensitive Staph aureus. There were no repeat cultures on 12/5. CSF culture Repeat cultures done 12/6 so far are without growth. Antibiotic regimen has been adjusted to Nafcillin, per ID. His encephalopathy is improving and Neurology is following.    -Infectious Disease is following and per recommendations, continue with Nafcillin 2gm IV q4h with likely treatment for 4-6 weeks for suspected endocarditis  -Stop Ceftriaxone  -Continue daily blood cultures, CRP and CBC, CMP  -Neurology was consulted and is following and per recommendations, will continue with neuro checks q4h and EEG x 24hr. Low threshold to repeat a head CT if he develops any acute neurologic changes.   -Speech therapy was consulted and is following    #Demand ischemia with a triple anemia  #Aortic stenosis status post bioprosthetic aortic valve replacement in (2014  #Coronary artery disease status post PCI to the LAD in (2012  #Radiation induced heart disease  #Hypertension  #Hyperlipidemia  -Mr. Fletcher follows at [facility] with cardiology with Dr. Winnie Roth and was last seen November 16, 2022.  He is planned in February 2023 to undergo updated cardiac surgery for his significant calcifications and valve disease.    -TTE EEG was performed without any evidence of vegetations.  Bacteria currently in his bloodstream is not consistent with haystmarquita  organisms.  -Continue to hold Plavix and aspirin in the setting of bleeding risk given his recent stroke  -Cardiology was consulted and is following and per recommendations troponin has been elevated secondary to demand ischemia and does not require any further intervention currently    # Generalized weakness secondary to critical illness  -PT/OT consults to assess safety to return home and assist with strength training    # Hodgkin's lymphoma status post radiation and MOPP chemotherapy, in remission  -No acute issues.    # Testicular cancer status post a left orchiectomy and chemotherapy, in remission  -No acute issues.     # Prostate cancer ([place] 4+3, diagnosed April 2021) s/p radiation and Lupron  -Follows with Urology and Oncology as an outpatient.       Diet: Full Liquid Diet Thin Liquids (level 0)  Snacks/Supplements Adult: Ensure Enlive; Between Meals    DVT Prophylaxis: Heparin SQ  [place] Catheter: PRESENT, indication: Retention  Central Lines: None  Cardiac Monitoring: ACTIVE order. Indication: ICU  Code Status: Full Code      Disposition Plan  From home, will discharge likely to TCU for need of IV antibiotics once medically stable   Expected discharge date: 12/10/22          SUSSY Adame  Hospitalist Service, [facility]  Securely message with the Vocera Web Console (learn more here)  Text page via [facility] Paging/Directory   Please see signed in provider for up to date coverage information      Clinically Significant Risk Factors        # Hyperkalemia: Highest K = 6.7 mmol/L (Ref range: 3.4-5.3) in last 2 days, will monitor as appropriate      # Anion Gap Metabolic Acidosis: Highest Anion Gap = 25 mmol/L (Ref range: 7-15) in last 2 days, will monitor and treat as appropriate  # Hypoalbuminemia: Lowest albumin = 2.8 g/dL (Ref range: 3.5-5.2) at 12/6/2022  4:22 AM, will monitor as appropriate   # Thrombocytopenia: Lowest platelets = 79 (Ref  "range: 150-450) in last 2 days, will monitor for bleeding        # Overweight: Estimated body mass index is 27.15 kg/m  as calculated from the following:    Height as of an earlier encounter on 12/4/22: 1.727 m (5' 8\").    Weight as of this encounter: 81 kg (178 lb 9.2 oz)., PRESENT ON ADMISSION         ______________________________________________________________________    Interval History   Mr. Fletcher was resting in bed, no acute distress, fatigued but able to wake up and answer questions. He denies any fevers, chills, chest pain, shortness of breath, new rashes or sores. We discussed the findings of his ILDEFONSO, recent CT scans and antibiotic plan and his transition to the gold medicine team and he had no further questions or concerns.    Data reviewed today: I reviewed all medications, new labs and imaging results over the last 24 hours.     Physical Exam   Vital Signs: Temp: 98.2  F (36.8  C) Temp src: Axillary BP: 104/64 Pulse: 109   Resp: 17 SpO2: 95 % O2 Device: Nasal cannula Oxygen Delivery: 2 LPM  Weight: 178 lbs 9.16 oz  General Appearance: 66 year old gentleman resting in bed, no acute distress, denies pain  Respiratory: breathing comfortably on room air, no adventitious sounds to bilateral auscultation  Cardiovascular: regular rate and rhythm, systolic click murmur present, no heaves or lifts  GI: bowel sounds active, soft, non-tender to palpation throughout, no rebound or guarding  Skin: warm, dry, no open sores, lesions or ulcerations    Data   Recent Labs   Lab 12/06/22  1226 12/06/22  0829 12/06/22  0422 12/05/22  1537 12/05/22  1141 12/05/22  1025 12/05/22  1008 12/05/22  0024 12/04/22  2353 12/04/22  2146 12/04/22  1341   WBC  --   --  12.3*  --  9.1  --   --   --  11.4*  --  13.7*   HGB  --   --  11.7*  --  12.8*  --   --   --  12.7*  --  13.0*   MCV  --   --  92  --  93  --   --   --  97  --  91   PLT  --   --  79*  --  96*  --   --   --  118*  --  168   INR  --   --   --   --   --   --   --   " --   --   --  1.23*   NA  --   --  142  --   --   --  141  --  140  --  132*   POTASSIUM  --   --  4.2  --  3.8  --  3.7   < > 4.4  --  4.1   CHLORIDE  --   --  106  --   --   --  108*  --  104  --  95*   CO2  --   --  20*  --   --   --  20*  --  11*  --  22   BUN  --   --  49.9*  --   --   --  35.5*  --  33.4*  --  29.5*   CR  --   --  2.87*  --   --   --  1.88*  --  1.68*  --  1.50*   ANIONGAP  --   --  16*  --   --   --  13  --  25*  --  15   YUNIOR  --   --  8.0*  --   --   --  7.9*  --  8.2*  --  8.7*   * 136* 134*   < >  --    < > 142*   < > 140*   < > 155*   ALBUMIN  --   --  2.8*  --   --   --  2.9*  --  3.0*   < >  --    PROTTOTAL  --   --   --   --   --   --  5.8*  --  6.2*  --   --    BILITOTAL  --   --   --   --   --   --  1.1  --  1.3*  --   --    ALKPHOS  --   --   --   --   --   --  58  --  52  --   --    ALT  --   --   --   --   --   --  61*  --  78*  --   --    AST  --   --   --   --   --   --  131*  --  149*  --   --     < > = values in this interval not displayed.

## 2022-12-06 NOTE — PROGRESS NOTES
[facility] Progress Note     Patient:  Eloy Fletcher   YOB: 1955, MRN: [id number]  Date of Visit: 12/06/2022  Date of Admission: 12/4/2022  Consult Requester: Lauri Friend MD       ID Problem List:  1. Sepsis: MSSA bacteremia  -  Suspected endocarditis, with CNS emboli, most probably unifying diagnosis for findings described. Assume this is a widely disseminated embolic phenomena throughout the body. May consider contact with cardiology for possibility of PFO, bubble study reported non-diagnostic on the TTE. If a PFO were present, this would broaden differential.   2. Encephalopathy; Acute embolic CVA  3. Aortic Stenosis s/p Biprosthetic AVR 2014; mitral stenosis with calcifications  4. Pneumonia - probable bacteremic seeding of lung  5. Acute kidney injury     Recommendations:  - Continue: Once daily blood cultures (ordered), CRP, CBC, Creatinine   - Continue Beta-lactam, on Nafcillin 2g q4hrs, anticipate 6 weeks of antimicrobial therapy for suspected endocarditis / deep seeded infection unless further elucidation from subjective history or cardiology occurs in the future  - Stop: Ceftriaxone  - Future: Plan for Rifampin 300mg BID with bioprosthetic valve, not urgent to start, but would add this as condition stabilizes and able to take orals reliably.      Will continue to follow.       Assessment and Discussion:  Summary: 66 year-old male withPMHx notable for biprosthetic AVR 2014, hodgkin's lymphoma with thoracic radiation Evaluated for Staphylococcus Aures bacteremia with encephalopathy and acute embolic CVA.    1. Sepsis: MSSA bacteremia  -  Suspected endocarditis, with CNS emboli, most probably unifying diagnosis for findings described. Assume this is a widely disseminated embolic phenomena throughout the body. May consider contact with cardiology for possibility of PFO, bubble study reported non-diagnostic, would broaden differential.   Impression: Suspected etiology  "with MSSA bacteremia most consistent with clinical findings of multifocal cerebral infarcts for Staph. Endocarditis, plausible encephalopathy secondary to sepsis for the initial findings however, his mentation has significantly improved since initial presentation. ILDEFONSO was not demonstrative of \"definite\" valvular lesions, it is reasonable for prior vegetation to embolize. Given the findings, reasonable to treat with current endocarditis guidelines with daily cultures until negative for 48-72 hours.     Regarding CSF findings,it is plausible of embolization with subsequent growth of MSSA either direct to the CSF or adjacent parenchymal translocation, concerning for meningoencephalitis  Given the clinical findings and symptoms today, appears adequately covered at this time.     2. Encephalopathy; Acute embolic CVA- Likely cardio-embolic as above, altered mentation could have been secondary to bacteremia, with possible meningoencephalitis given Neurology evaluation correlation of findings not consistent with MRI.     3. Aortic Stenosis s/p Biprosthetic AVR 2014; mitral stenosis with calcifications- would be interested in cardiology for elucidation if PFO is present for differential, otherwise may have had prior vegetation that embolized before ILDEFONSO. Plan for Rifampin in the future with prosthetic valve.     4. Pneumonia - probable bacteremic seeding of lung-   CT chest notable for ground glass opacities and interstitial pulmonary edema, if infectious etiology, likely seeding from endocarditis. Should be appropriately covered with given date.     5. Acute kidney injury-  no additional synergistic medication with gentamicin indicated at this time       Will continue to follow for sensitivities and further recommendations and overall course.     This patient was discussed with Dr. Lauri Branham, who agrees with the above assessment and plan    Thank you for the consult. ID will continue to follow with you.    Ifrah" "Husam, MS4    Pager [phone]  12/06/2022        Reviewed and agree with the medical student's note.    Moi Goetz MD  Internal Medicine PGY1  Pager [phone]         Interval History and Events:   Overnight, acute events include agitation and pulling of lines, complaining of pain, nonspecific LE diffuse, provided dilaudid, requiring Naloxone. Otherwise Loose stools reported, C. Difficile negative.     Cognition this morning has improved, he is able to follow commands, alert to person place and time from 12/5 answering yes/no questions. Per family, waxes and wanes with his orientation. Otherwise he denies chest pain, shortness of breath, some decrease urination reported per staff. Only complaint of LE discomfort bilaterally. No new focal weakness or numbness.              Antimicrobial Treatment:     Stopped: 12/6: Ceftriaxone   Stopped 12/5:  vancomycin, acyclovir, ampicillin; one dose Gentamicin 5mg/kg)           Review of Systems:   Targeted 4 point ROS was completed with pertinent positives and negatives are detailed above.         HPI:   Adopted from initial consult note on 12/05/2022     Summary:       66 year-old male with PMHx notable for Aortic stenosis s/p bioprosthetic AVR 2014, CAD s/p stent 2012, moderate mitral regurgitation/stenosis on ILDEFONSO 11/2022, with prior chemotherapy/radiation with Hodgkin's Lymphoma 1979, testicular cancer and prostate cancer admitted overnight on 12/04 for sepsis, encephalopathy, and acute embolic CVA. Evaluated for possible endocarditis MSSA Bacteremia       \"History obtained from review of chart and discussion with patient/nursing staff.      Eloy Fletcher is a 66 year old male who presented with new left lateral ocular gaze and right sided hemineglect, weakness and fevers onset 12/02/2022.  PMHx notable for Aortic Stenosis s/p bioprosthetic AVR 2014, CAD s/p PCI and stent 2012, moderate mitral regurgitation/stenosis on ILDEFONSO 11/2022, with prior chemotherapy/radiation with " Hodgkin's Lymphoma 1979, testicular cancer and prostate cancer admitted overnight on 12/04 for sepsis, encephalopathy, and acute embolic CVA. Evaluated for possible endocarditis.     Course: Initial presentation to [facility] with confusion and unresponsiveness 12/4, discussion with his ex-wife,  initial onset of symptoms 12/1/22 multiple episodes of vomiting with a fever. Improved overnight, recurred and worsened 12/3.  He presented with left gaze preference, right-sided neglect, weakness, and fevers. Initial findings significant for showing tachycardia, tachypnea, and fevers, initial eukocytosis of 11.4, lactic acid 4.2. At that time he was confused and not responding appropriately.   He was evaluated by neurology and found to have MRI findings with non-specific small multifocal CVA suspected embolic in origin. He was provided empiric antibiotics, Blood cultures were drawn.    Due to concern for CNS infection, a LP was performed at the [facility] ED and was further transferred to [facility] ICU for work-up of endocarditis in the setting of hypotension. Initially provided   Acyclovir, Ampicillin, Ceftriaxone, Piperacillin Tazobactam and Vancomycin.   CT Chest also showing GGO in the MIGUEL ANGEL and LLL with tree-in-bud findings concerning for pneumonia (CAP vs. Aspiration). LP performed showing WBC of 230 with elevated protein at 59 and glucose at 82.                  Today, the patient was seen and examined at bedside, mild aphasia/ difficult word finding on exam. Patient is able to answer yes/no questions. Interval changes,  appears improved since 12/4.  Associated symptoms at this time positive for cough, with green/yellow sputum. No shortness of breath. Denies CV complaints of chest pain, diaphoresis, no abdominal pain. Denies new rashes or lesions. Denies new numbness or subjective weakness.            Pertinent Cardiac History: Aortic stenosis S/P bioprosthetic AVR in 2014, and CAD s/p PCI   2012.  Of note, recently  "evaluated with Cardiology at [facility]. Possible planned early next year, \"redo median sternotomy and mitral valve replacement with a tissue valve. We may have to replace the ascending aorta at the same time. The patient has right bundle branch block and a left bob fascicular block\" per evaluation with Dr. Gurrola 11/16/2022.  Last ILDEFONSO 11/11/2022, with LVEF 65%, Moderate-severe mitral valve regurgitation with moderate mitral stenosis and severe annular mitral calcification, demonstrated normal aortic valve tissue prosthesis.   Pertinent Oncology History: Prostate cancer (Elly 4+3, TNM: cT1c) treated with chemo/radiation (last dose 3/30/2022), good response at that time. Past history in remission: Testicular cancer (30 years prior  s/p chemo & Left orchiectomy) and Hodgkins lymphoma (1970s treated with MOPP & radiation)\"         Physical Examination:   Temp:  [97.6  F (36.4  C)-98.7  F (37.1  C)] 98.5  F (36.9  C)  Pulse:  [112-130] 112  Resp:  [8-38] 8  BP: ()/(48-77) 96/53  SpO2:  [91 %-100 %] 96 %    I/O last 3 completed shifts:  In: 3198.5 [I.V.:1698.5; IV Piggyback:1500]  Out: 1700 [Urine:1650; Stool:50]    Vitals:    12/04/22 2200 12/05/22 0400   Weight: 81.1 kg (178 lb 12.7 oz) 81 kg (178 lb 9.2 oz)       Constitutional: Pleasant and cooperative male seen at bedside, NAD. Tired on exam but alert and interactive.  HEENT: NC/AT, able to track eyes,  sclera clear, conjunctiva normal, OP with MMM  Respiratory: No increased work of breathing, CTAB, no crackles or wheezing.  Cardiovascular: RRR, grade IV systolic ejection murmur noted. No peripheral edema.  GI: Normal bowel sounds, soft, non-distended and non-tender.  Skin: Warm, dry, well-perfused. No bruising, bleeding, rashes, or lesions on limited exam.  Musculoskeletal: Extremities grossly normal, non-tender, no edema. Good strength and ROM in bed.   Neurologic: A&O. Answers questions appropriately, A&Ox3, speech normal. Moves all extremities " spontaneously.  Neuropsychiatric: Calm. Affect appropriate to situation.  Vascular access:  12/4 LUE pIV RUE pIV, 12/5 Right UE pIV  CDI, non-tender, no surrounding erythema.         Medications:       cefTRIAXone  2 g Intravenous Q12H     heparin ANTICOAGULANT  5,000 Units Subcutaneous Q12H     nafcillin  2 g Intravenous Q4H     pantoprazole  40 mg Intravenous Daily with breakfast     sodium chloride (PF)  3 mL Intracatheter Q8H     sodium chloride bacteriostatic  12 mL Intravenous Once       Antiinfectives:  Anti-infectives (From now, onward)    Start     Dose/Rate Route Frequency Ordered Stop    12/05/22 1500  nafcillin IV 2 g vial to attach to  ml bag         2 g  over 1 Hours Intravenous EVERY 4 HOURS 12/05/22 1455      12/05/22 1030  cefTRIAXone (ROCEPHIN) 2 g vial to attach to  ml bag for ADULTS or NS 50 ml bag for PEDS         2 g  over 30 Minutes Intravenous EVERY 12 HOURS 12/05/22 0525            Infusions/Drips:    sodium chloride 150 mL/hr (12/05/22 1330)            Laboratory Data:     Microbiology:  No results found for: CULT  Microbiology:    Summary: 12/5 preliminary Blood and CSF cultures 12/4/22 demonstrate Gram positive cocci in clusters. Preliminary Verigene no evidence of MecA           Contains critical data Cerebrospinal fluid Aerobic Bacterial Culture Routine      Collected 12/4/2022  5:38 PM      Status: Preliminary result      Visible to patient: No (not released)     Specimen Information: Lumbar Puncture; Cerebrospinal fluid    1 Result Note  Culture     Lab   Culture in progress  [facility]       1+ Gram positive cocci Panic   [facility]             Gram Stain quantification of host cells and microbiological organisms was done on a cytocentrifuged preparation.                Gram Stain Result    Lab   No organisms seen [facility]       4+ WBC seen [facility]   Predominantly PMNs                 Blood Culture Line, venous      Collected 12/4/2022  3:34 PM      Status: Preliminary result          1  Result Note  Culture Positive on the 1st day of incubation Abnormal         Gram positive cocci in clusters Panic     2 of 2 bottles         Resulting Agency: [facility]             Specimen Collected: 12/04/22  3:34 PM               Verigene GP Panel        Line, venous; Blood    1 Result Note       Component Ref Range & Units 1 d ago     Staphylococcus aureus Not Detected Detected Abnormal     Comment: Positive for Staphylococcus aureus and negative for the mecA gene (not resistant to methicillin) by Verigene multiplex nucleic acid test. Final identification and antimicrobial susceptibility testing will be verified by standard methods.    Staphylococcus epidermidis Not Detected Not Detected     Staphylococcus lugdunensis Not Detected Not Detected     Enterococcus faecalis Not Detected Not Detected     Enterococcus faecium Not Detected Not Detected     Streptococcus species Not Detected Not Detected     Streptococcus agalactiae Not Detected Not Detected     Streptococcus anginosus group Not Detected Not Detected     Streptococcus pneumoniae Not Detected Not Detected     Streptococcus pyogenes Not Detected Not Detected     Listeria species Not Detected Not Detected    Resulting Agency   [facility]                 Narrative  Performed by: [facility]  Specimen tested with Verigene multiplex, gram-positive blood culture nucleic acid test for the following targets: Staphylococcus aureus, Staphylococcus epidermidis, Staphylococcus lugdunensis, other Staphylococcus species, Enterococcus faecalis, Enterococcus faecium, Streptococcus species, Streptococcus agalactiae, Streptococcus anginosus group, Streptococcus pneumoniae, Streptococcus pyogenes, Listeria species, mecA (methicillin resistance), and Isidro/vanB (vancomycin resistance).                         CSF Findings:   *High: RBC *78, total nucleated cells *230, Glucose CSF *82  Abnormal CSF appearance Hazy.   Otherwise, colorless        Meningitis/Encephalitis Panel Qual PCR  CSF:  Order: [id number]   Status: Final result      Visible to patient: Yes (not seen)      1 Result Note    Component Ref Range & Units 1 d ago   (12/4/22) 1 d ago   (12/4/22)    Escherichia coli K1 Negative Negative       Haemophilus influenzae Negative Negative       Listeria monocytogenes Negative Negative       Neisseria meningitidis Negative Negative       Streptococcus agalactiae (GBS) Negative Negative       Streptococcus pneumoniae Negative Negative  Not Detected R     Cytomegalovirus Negative Negative       Enterovirus Negative Negative       Herpes simplex virus 1 Negative Negative      Comment: Recommend testing with another molecular method if clinical suspicion for infection is high.    Herpes simplex virus 2 Negative Negative      Comment: Recommend testing with another molecular method if clinical suspicion for infection is high.    Human Herpes Virus 6 Negative Negative       Human parechovirus Negative Negative       Varicella zoster virus Negative Negative       Cryptococcus neoformans/gattii Negative Negative      Comment: Recommend testing for Cryptococcal antigen and fungal culture if clinical suspicion for infection is high.         Strep pneumo Agn Ur greater or equal to 13yrs or CSF any age (1 mL minimum)  Order: [id number]   Status: Final result      Visible to patient: Yes (not seen)     Specimen Information: Lumbar Puncture; Cerebrospinal fluid    1 Result Note    Component Ref Range & Units 1 d ago     Streptococcus pneumoniae antigen Negative Negative    Comment: A negative Streptococcus pneumoniae antigen result does not rule out infection with Streptococcus pneumoniae.               Inflammatory Markers    Recent Labs   Lab Test 12/06/22  0422   .00*   Procalcitonin: 54.20 (12/4/22)  Lactic Acid: 4.6 (12/4), 4.2 (12/4), 4.6(12/5) 1.7 (12/6)  Cardiac: Troponin: 610 (12/4), 983 (12/4), 1,229 (12/5)    Metabolic Studies     Recent Labs   Lab Test 12/06/22  0829 12/06/22  0659  12/06/22 0424 12/06/22 0422 12/05/22  1517 12/05/22  1141 12/05/22  1025 12/05/22  1008 12/05/22  0024 12/04/22 2353   NA  --   --   --  142  --   --   --  141  --  140   POTASSIUM  --   --   --  4.2  --  3.8  --  3.7   < > 4.4   CHLORIDE  --   --   --  106  --   --   --  108*  --  104   CO2  --   --   --  20*  --   --   --  20*  --  11*   ANIONGAP  --   --   --  16*  --   --   --  13  --  25*   BUN  --   --   --  49.9*  --   --   --  35.5*  --  33.4*   CR  --   --   --  2.87*  --   --   --  1.88*  --  1.68*   GFRESTIMATED  --   --   --  23*  --   --   --  39*  --  44*   *  --   --  134*   < >  --    < > 142*   < > 140*   YUNIOR  --   --   --  8.0*  --   --   --  7.9*  --  8.2*   PHOS  --   --   --  4.1  --   --   --  2.4*  --  2.8   MAG  --   --   --  2.1  --   --   --  2.1  --  1.8   LACT  --  1.7   < >  --    < > 4.4*  --   --    < > 4.2*    < > = values in this interval not displayed.       Hepatic Studies    Recent Labs   Lab Test 12/06/22 0422 12/05/22  1008 12/04/22 2353   BILITOTAL  --  1.1 1.3*   ALKPHOS  --  58 52   ALBUMIN 2.8* 2.9* 3.0*   AST  --  131* 149*   ALT  --  61* 78*   LDH  --  468*  --        Pancreatitis testing    No lab results found.    Hematology Studies      Recent Labs   Lab Test 12/06/22 0422 12/05/22  1141 12/04/22 2353   WBC 12.3* 9.1 11.4*   ANEU 11.1*  --  10.4*   ALYM 0.4*  --  0.8   LATONYA 0.9  --  0.2   AEOS 0.0  --  0.0   HGB 11.7* 12.8* 12.7*   HCT 35.5* 38.8* 41.0   PLT 79* 96* 118*       Arterial Blood Gas Testing    Recent Labs   Lab Test 12/05/22  1832   O2PER 2        Urine Studies     Recent Labs   Lab Test 12/05/22  0538 12/04/22  1534   URINEPH 5.5 5.0   NITRITE Negative Negative   LEUKEST Negative Negative   WBCU 8* 3       Vancomycin Levels     No lab results found.    Tobramycin levels     No lab results found.    Gentamicin levels    No lab results found.    CSF testing     Recent Labs   Lab Test 12/04/22  1738   CWBC 230*   CRBC 78*   CGLU 82*   CTP 59.7*        Last check of C difficile  C Difficile Toxin B by PCR   Date Value Ref Range Status   12/05/2022 Negative Negative Final     Comment:     A negative result does not exclude actual disease due to C. difficile and may be due to improper collection, handling and storage of the specimen or the number of organisms in the specimen is below the detection limit of the assay.            Imaging:       Echo ILDEFONSO Study Date: 12/05/2022 01:14 PM   Interpretation Summary  Known radiation induced valve disease with severe calcific mitral disease. No  definite endocarditis noted.  Severe mitral annular calcification is present with multiple calcific nodules,  no definite vegetation seen.  S/p bioprosthetic AVR (23 mm Cordell-Hubbard Perimount Magna/Magna Ease  pericardial prosthesis). Valve appears well seated with good leaflet opening.  No vegetations visualized.  Global and regional left ventricular function is normal with an EF of 60-65%.     ______________________________________________________________________________  Procedure  Transesophageal Echocardiogram with color and spectral Doppler performed. 3D  image acquisition, reconstruction, and real-time interpretation was performed.  I was present during ILDEFONSO probe placement by the Fellow. I personally viewed  the imaging and agree with the interpretation and report as documented by the  Fellow. Procedure location Echo Lab. Informed consent for Transesophegeal echo  obtained. ILDEFONSO Probe #62 was used during the procedure. Patient was sedated  using Fentanyl 50 mcg. Patient was sedated using Versed 1 mg. The heart rate,  respiratory rate, oxygen saturations, blood pressure, and response to care  were monitored throughout the procedure with the assistance of the nurse. I  determined this patient to be an appropriate candidate for the planned  sedation and procedure and have reassessed the patient immediately prior to  sedation and procedure. Total sedation time: 25  minutes of continuous bedside  1:1 monitoring. The Transducer was inserted without difficulty . The patient  tolerated the procedure well. Complications None.     Left Ventricle  Mild concentric wall thickening consistent with left ventricular hypertrophy  is present. Global and regional left ventricular function is normal with an EF  of 60-65%. Left ventricular size is normal.     Right Ventricle  The right ventricle is normal size. Global right ventricular function is  normal.     Atria  The left atrial appendage is normal. It is free of spontaneous echo contrast  and thrombus. Moderate to severe left atrial enlargement is present. The  atrial septum is intact as assessed by color Doppler .     Mitral Valve  Severe mitral annular calcification is present. Mild to moderate mitral  insufficiency is present. The mean gradient across the mitral valve is 13  mmHg. The mitral valve area is 1.4 cm^2. Severe mitral stenosis is present.     Aortic Valve  S/p bioprosthetic AVR. Valve appears well seated with good leaflet opening. No  vegetations visualized. Doppler interrogation of the aortic valve is normal.     Tricuspid Valve  The tricuspid valve is normal. Mild tricuspid insufficiency is present.     Pulmonic Valve  The pulmonic valve is normal.     Vessels  The aorta root is normal. The thoracic aorta is normal. Ascending aorta and  aortic arch free of atheroma and vegetation. The inferior vena cava is normal.  The superior vena cava is normal. The RUPV Doppler shows normal velocity  waveform . The RLPV Doppler shows systolic dominance . The LUPV Doppler shows  normal velocity waveform . The LLPV Doppler shows normal velocity waveform .     Pericardium  No pericardial effusion is present.     Compared to Previous Study  This study was compared with the study from 12/5/22 . There has been no  change.       TTE: 12/5 Interpretation Summary  Would consider a transesophageal echocardiogram if clinically  indicated.  Technically difficult study.Extremely poor acoustic windows.  Limited information was obtained during study.  Global and regional left ventricular function is normal with an EF of 60-65%.  Right ventricular function, chamber size, wall motion, and thickness are  normal.  Severe mitral annular calcification is present.  Mitral mean gradient 11mmHg at heart rate 131 BPM.  H/O Bioprosthetic AVR in 2014. Valve not well visualized.    MRI of the brain 12/4/22: IMPRESSION: 5 scattered foci of acute ischemia involving the bilateral anterior and posterior cerebral artery distributions. Constellation of findings is suspicious for central embolic phenomena.           CT Abdomen and Pelvis: 12/4/22  IMPRESSION:  1.  Patchy groundglass opacities in the left upper and lower lobes, and cluster of tree-in-bud nodularity in the left upper lobe, likely infectious or inflammatory.  2.  Interstitial pulmonary edema.  3.  Trace left pleural effusion.  4.  No acute findings in the abdomen or pelvis.        CT head and CTA head/neck 12/4/22, were not suggestive of an acute abnormality, no significant stenosis of carotids or vertebral arteries.      Attending Physician Attestation:  I, Lauri Branham MD have seen and evaluated Eloy Fletcher. I have discussed with the primary provider team, and I agree with the above documented findings and plan in this note. I have reviewed today's vital signs, medications, labs and imaging.    Floor time: 30 minutes  Face-to-face time: 15 minutes  Total time: 45 minutes     Lauri Branham MD MPH  Professor of Medicine  Division of Infectious Diseases & International Medicine  Department of Medicine

## 2022-12-07 NOTE — PROGRESS NOTES
[facility] Progress Note     Patient:  Eloy Fletcher   YOB: 1955, MRN: [id number]  Date of Visit: 12/07/2022  Date of Admission: 12/4/2022  Consult Requester: Lauri Friend MD       ID Problem List:  1. Sepsis: MSSA bacteremia  -  Suspected endocarditis, with CNS emboli and CSF culture positivity, is the most probable unifying diagnosis for findings described.  May consider contact with cardiology for possibility of PFO, bubble study reported non-diagnostic on the TTE. If a PFO were present, this would broaden differential.   2. Encephalopathy; Acute embolic CVA  CSF Culture positive.  3. Aortic Stenosis s/p Biprosthetic AVR 2014; mitral stenosis with calcifications - no evidence of vegetations on ILDEFONSO.  4. Pneumonia - probable bacteremic seeding of lung  5. Acute kidney injury, creatinine still rising (Cr = 3.6 mg/dL)    Recommendations:  - Order once daily blood cultures (ordered), CRP, CBC, Creatinine  - Continue Nafcillin 2g q4hrs, anticipate 6 weeks of antimicrobial therapy for suspected endocarditis   - If worsening JILLIAN, order urinary eosinophils to check for drug-induced interstitial nephritis  -Gentamicin level ordered for AM as it was given on 12/5 and his renal function has been worsening.  Unclear the degree that gent is still on board.   - Would recommend Rifampin 300mg BID given bioprosthetic valve, not urgent to start, but would add this as condition stabilizes and he is able to take orals reliably.       Assessment and Discussion:  Summary: 66 year-old male withPMHx notable for biprosthetic AVR 2014, hodgkin's lymphoma with thoracic radiation Evaluated for Staphylococcus Aures bacteremia with encephalopathy and acute embolic CVA.    1. Sepsis: MSSA bacteremia  -  Suspected endocarditis, with CNS emboli, most probably unifying diagnosis for findings described. Assume this is a widely disseminated embolic phenomena throughout the body. May consider contact with  "cardiology for possibility of PFO, bubble study reported non-diagnostic, would broaden differential.   Impression: Suspected etiology with MSSA bacteremia most consistent with clinical findings of multifocal cerebral infarcts for Staph. Endocarditis, plausible encephalopathy secondary to sepsis for the initial findings however, his mentation has significantly improved since initial presentation. ILDEFONSO was not demonstrative of \"definite\" valvular lesions, it is reasonable for prior vegetation to embolize. Given the findings, reasonable to treat with current endocarditis guidelines with daily cultures until negative for 48-72 hours.     Regarding CSF findings,it is plausible of embolization with subsequent growth of MSSA either direct to the CSF or adjacent parenchymal translocation, concerning for meningoencephalitis  Given the clinical findings and symptoms today, appears adequately covered at this time.     2. Encephalopathy; Acute embolic CVA- Likely cardio-embolic as above, altered mentation could have been secondary to bacteremia, with possible meningoencephalitis given Neurology evaluation correlation of findings not consistent with MRI.     3. Aortic Stenosis s/p Biprosthetic AVR 2014; mitral stenosis with calcifications- would be interested in cardiology for elucidation if PFO is present for differential, otherwise may have had prior vegetation that embolized before ILDEFONSO. Plan for Rifampin in the future with prosthetic valve.     4. Pneumonia - probable bacteremic seeding of lung-   CT chest notable for ground glass opacities and interstitial pulmonary edema, if infectious etiology, likely seeding from endocarditis. Should be appropriately covered with given date.     5. Acute kidney injury-  no additional synergistic treatment with gentamicin indicated at this time. Nafcillin is known to cause f drug-induced interstitial nephritis but creatinine was up trending upon admission, so likely due to sepsis/poor renal " "perfusion. However, if creatinine continues to increase, would consider urinary eosinophil testing.     Will continue to follow for sensitivities and further recommendations and overall course.     Thank you for the consult. ID will continue to follow with you.    Raghavendra Sanchez M3  [facility]      Patient discussed with Raghavendra Sanchez and attending Dr. Carpenter. CRP down trending. Blood and CSF cultures positive for staph aureus.  Continue Nafcillin.    Sunny Canales MD  PGY 1  Med-Peds          Interval History and Events:   Overnight, acute events include agitation and softer BPs. Tolerating full liquid diet w/o appropriate UOP.     Cognition this AM is improved. He carries full conversations w/ some difficulty. Denies chest pain, shortness of breath, new focal weakness or numbness, B/L LE swelling.          Antimicrobial Treatment:     Nafcillin 12/5-Present   Received one dose Gentamicin 5mg/kg) on 12/5    Stopped: 12/6: Ceftriaxone   Stopped 12/5:  vancomycin, acyclovir, ampicillin;          Review of Systems:   Targeted 4 point ROS was completed with pertinent positives and negatives are detailed above.         HPI:   Adopted from initial consult note on 12/05/2022     Summary:       66 year-old male with PMHx notable for Aortic stenosis s/p bioprosthetic AVR 2014, CAD s/p stent 2012, moderate mitral regurgitation/stenosis on ILDEFONSO 11/2022, with prior chemotherapy/radiation with Hodgkin's Lymphoma 1979, testicular cancer and prostate cancer admitted overnight on 12/04 for sepsis, encephalopathy, and acute embolic CVA. Evaluated for possible endocarditis MSSA Bacteremia       \"History obtained from review of chart and discussion with patient/nursing staff.      Eloy Fletcher is a 66 year old male who presented with new left lateral ocular gaze and right sided hemineglect, weakness and fevers onset 12/02/2022.  PMHx notable for Aortic Stenosis s/p bioprosthetic AVR 2014, CAD s/p PCI and stent 2012, " moderate mitral regurgitation/stenosis on ILDEFONSO 11/2022, with prior chemotherapy/radiation with Hodgkin's Lymphoma 1979, testicular cancer and prostate cancer admitted overnight on 12/04 for sepsis, encephalopathy, and acute embolic CVA. Evaluated for possible endocarditis.     Course: Initial presentation to [facility] ED with confusion and unresponsiveness 12/4, discussion with his ex-wife,  initial onset of symptoms 12/1/22 multiple episodes of vomiting with a fever. Improved overnight, recurred and worsened 12/3.  He presented with left gaze preference, right-sided neglect, weakness, and fevers. Initial findings significant for showing tachycardia, tachypnea, and fevers, initial eukocytosis of 11.4, lactic acid 4.2. At that time he was confused and not responding appropriately.   He was evaluated by neurology and found to have MRI findings with non-specific small multifocal CVA suspected embolic in origin. He was provided empiric antibiotics, Blood cultures were drawn.    Due to concern for CNS infection, a LP was performed at the OSH ED and was further transferred to [facility] ICU for work-up of endocarditis in the setting of hypotension. Initially provided   Acyclovir, Ampicillin, Ceftriaxone, Piperacillin Tazobactam and Vancomycin.   CT Chest also showing GGO in the MIGUEL ANGEL and LLL with tree-in-bud findings concerning for pneumonia (CAP vs. Aspiration). LP performed showing WBC of 230 with elevated protein at 59 and glucose at 82.                  Today, the patient was seen and examined at bedside, mild aphasia/ difficult word finding on exam. Patient is able to answer yes/no questions. Interval changes,  appears improved since 12/4.  Associated symptoms at this time positive for cough, with green/yellow sputum. No shortness of breath. Denies CV complaints of chest pain, diaphoresis, no abdominal pain. Denies new rashes or lesions. Denies new numbness or subjective weakness.      Pertinent Cardiac History: Aortic  "stenosis S/P bioprosthetic AVR in 2014, and CAD s/p PCI   2012.  Of note, recently evaluated with Cardiology at [facility]. Possible planned early next year, \"redo median sternotomy and mitral valve replacement with a tissue valve. We may have to replace the ascending aorta at the same time. The patient has right bundle branch block and a left bob fascicular block\" per evaluation with Dr. Gurrola 11/16/2022.  Last ILDEFONSO 11/11/2022, with LVEF 65%, Moderate-severe mitral valve regurgitation with moderate mitral stenosis and severe annular mitral calcification, demonstrated normal aortic valve tissue prosthesis.   Pertinent Oncology History: Prostate cancer ([place] 4+3, TNM: cT1c) treated with chemo/radiation (last dose 3/30/2022), good response at that time. Past history in remission: Testicular cancer (30 years prior  s/p chemo & Left orchiectomy) and Hodgkins lymphoma (1970s treated with MOPP & radiation)\"         Physical Examination:   Temp:  [97.4  F (36.3  C)-100.5  F (38.1  C)] 97.4  F (36.3  C)  Pulse:  [] 105  Resp:  [9-28] 16  BP: ()/(30-79) 98/66  SpO2:  [71 %-99 %] 99 %    I/O last 3 completed shifts:  In: 1600 [P.O.:750; I.V.:350; IV Piggyback:500]  Out: 925 [Urine:875; Stool:50]    Vitals:    12/04/22 2200 12/05/22 0400 12/07/22 0152   Weight: 81.1 kg (178 lb 12.7 oz) 81 kg (178 lb 9.2 oz) 82.4 kg (181 lb 10.5 oz)       Constitutional: Pleasant and cooperative male seen at bedside, NAD.   Respiratory: No increased work of breathing, CTAB, no crackles or wheezing.  Cardiovascular: RRR, grade IV systolic ejection murmur noted. No peripheral edema. No gross JVD distension    Skin: Warm, dry, well-perfused. No bruising, bleeding, rashes, or lesions on limited exam.  Musculoskeletal: Extremities grossly normal, non-tender, no edema. Good strength and ROM in bed.   Neurologic: A&O. Answers questions appropriately, A&Ox3, speech normal. Moves all extremities spontaneously.  Neuropsychiatric: " Calm. Affect appropriate to situation.  Vascular access:  12/4 LUE pIV RUE pIV, 12/5 Right UE pIV  CDI, non-tender, no surrounding erythema.         Medications:       [Held by provider] heparin ANTICOAGULANT  5,000 Units Subcutaneous Q12H     magnesium oxide  400 mg Oral Q4H     nafcillin  2 g Intravenous Q4H     pantoprazole  40 mg Intravenous Daily with breakfast     potassium chloride  10 mEq Oral Once     sodium chloride (PF)  3 mL Intracatheter Q8H       Antiinfectives:  Anti-infectives (From now, onward)    Start     Dose/Rate Route Frequency Ordered Stop    12/05/22 1500  nafcillin IV 2 g vial to attach to  ml bag         2 g  over 1 Hours Intravenous EVERY 4 HOURS 12/05/22 1455            Infusions/Drips:    sodium chloride 150 mL/hr (12/05/22 1330)            Laboratory Data:     Microbiology:    Summary: 12/4 Blood and CSF cultures with Staph Aureus. Verigene with no evidence of MecA      Cerebrospinal fluid Aerobic Bacterial Culture Routine    Culture   Lab   1+ Staphylococcus aureus Panic   IDDL          Gram Stain quantification of host cells and microbiological organisms was done on a cytocentrifuged preparation.              Collected 12/4/2022  5:38 PM        12/04/2022 1601 12/07/2022 0644 Blood Culture Peripheral Blood [[id number]]   (Abnormal)   Peripheral Blood    Final result Component Value   Culture Positive on the 1st day of incubation Abnormal     Staphylococcus aureus Panic     2 of 2 bottles  Susceptibilities done on previous cultures             12/04/2022 1534 12/07/2022 0815 Blood Culture Line, venous [[id number]]    (Abnormal)   Blood from Line, venous    Final result Component Value   Culture Positive on the 1st day of incubation Abnormal     Staphylococcus aureus Panic     2 of 2 bottles       Susceptibility     Staphylococcus aureus     DEJA     Clindamycin <=0.25 ug/mL Susceptible     Erythromycin <=0.25 ug/mL Susceptible     Gentamicin <=0.5 ug/mL Susceptible      Oxacillin 0.5 ug/mL Susceptible     Tetracycline <=1 ug/mL Susceptible     Trimethoprim/Sulfamethoxazole <=0.5/9.5 u... Susceptible     Vancomycin <=0.5 ug/mL Susceptible                  12/04/2022 1534 12/07/2022 0645 Blood Culture Line, venous [[id number]]   (Abnormal)   Blood from Line, venous    Final result Component Value   Culture Positive on the 1st day of incubation Abnormal     Staphylococcus aureus Panic     2 of 2 bottles  Susceptibilities done on previous cultures             12/04/2022 1534 12/05/2022 0551 Verigene GP Panel [[id number]]    (Abnormal)   Blood from Line, venous    Final result Component Value   Staphylococcus aureus Detected Abnormal    Positive for Staphylococcus aureus and negative for the mecA gene (not resistant to methicillin) by [employer] multiplex nucleic acid test. Final identification and antimicrobial susceptibility testing will be verified by standard methods.   Staphylococcus epidermidis Not Detected   Staphylococcus lugdunensis Not Detected   Enterococcus faecalis Not Detected   Enterococcus faecium Not Detected   Streptococcus species Not Detected   Streptococcus agalactiae Not Detected   Streptococcus anginosus group Not Detected   Streptococcus pneumoniae Not Detected   Streptococcus pyogenes Not Detected   Listeria species                    CSF Findings:   *High: RBC *78, total nucleated cells *230, Glucose CSF *82  Abnormal CSF appearance Hazy.   Otherwise, colorless        Meningitis/Encephalitis Panel Qual PCR CSF:  Order: [id number]   Status: Final result      Visible to patient: Yes (not seen)      1 Result Note    Component Ref Range & Units 1 d ago   (12/4/22) 1 d ago   (12/4/22)    Escherichia coli K1 Negative Negative       Haemophilus influenzae Negative Negative       Listeria monocytogenes Negative Negative       Neisseria meningitidis Negative Negative       Streptococcus agalactiae (GBS) Negative Negative       Streptococcus pneumoniae Negative Negative   Not Detected R     Cytomegalovirus Negative Negative       Enterovirus Negative Negative       Herpes simplex virus 1 Negative Negative      Comment: Recommend testing with another molecular method if clinical suspicion for infection is high.    Herpes simplex virus 2 Negative Negative      Comment: Recommend testing with another molecular method if clinical suspicion for infection is high.    Human Herpes Virus 6 Negative Negative       Human parechovirus Negative Negative       Varicella zoster virus Negative Negative       Cryptococcus neoformans/gattii Negative Negative      Comment: Recommend testing for Cryptococcal antigen and fungal culture if clinical suspicion for infection is high.         Strep pneumo Agn Ur greater or equal to 13yrs or CSF any age (1 mL minimum)  Order: [id number]   Status: Final result      Visible to patient: Yes (not seen)     Specimen Information: Lumbar Puncture; Cerebrospinal fluid    1 Result Note    Component Ref Range & Units 1 d ago     Streptococcus pneumoniae antigen Negative Negative    Comment: A negative Streptococcus pneumoniae antigen result does not rule out infection with Streptococcus pneumoniae.               Inflammatory Markers    Recent Labs   Lab Test 12/07/22  0434 12/06/22  0422   .00* 387.00*   Procalcitonin: 54.20 (12/4/22)  Lactic Acid: 4.6 (12/4), 4.2 (12/4), 4.6(12/5) 1.7 (12/6)  Cardiac: Troponin: 610 (12/4), 983 (12/4), 1,229 (12/5)    Metabolic Studies     Recent Labs   Lab Test 12/07/22  0512 12/07/22  0434 12/06/22  0424 12/06/22  0422 12/05/22  1517 12/05/22  1141 12/05/22  1025 12/05/22  1008   NA  --  138  --  142  --   --   --  141   POTASSIUM  --  3.5  --  4.2  --  3.8  --  3.7   CHLORIDE  --  106  --  106  --   --   --  108*   CO2  --  17*  --  20*  --   --   --  20*   ANIONGAP  --  15  --  16*  --   --   --  13   BUN  --  68.9*  --  49.9*  --   --   --  35.5*   CR  --  3.59*  --  2.87*  --   --   --  1.88*   GFRESTIMATED  --  18*  --   23*  --   --   --  39*   * 119*   < > 134*   < >  --    < > 142*   YUNIOR  --  7.5*  --  8.0*  --   --   --  7.9*   PHOS  --  4.2  --  4.1  --   --   --  2.4*   MAG  --  2.0  --  2.1  --   --   --  2.1   LACT  --  1.7   < >  --    < > 4.4*  --   --     < > = values in this interval not displayed.       Hepatic Studies    Recent Labs   Lab Test 12/07/22 0434 12/06/22 0422 12/05/22  1008 12/04/22  2353   BILITOTAL  --   --  1.1 1.3*   ALKPHOS  --   --  58 52   ALBUMIN 2.3* 2.8* 2.9* 3.0*   AST  --   --  131* 149*   ALT  --   --  61* 78*   LDH  --   --  468*  --        Pancreatitis testing    No lab results found.    Hematology Studies      Recent Labs   Lab Test 12/07/22 0434 12/06/22 0422 12/05/22  1141   WBC 12.9* 12.3* 9.1   ANEU  --  11.1*  --    ALYM  --  0.4*  --    LATONYA  --  0.9  --    AEOS  --  0.0  --    HGB 10.1* 11.7* 12.8*   HCT 30.4* 35.5* 38.8*   PLT 64* 79* 96*       Arterial Blood Gas Testing    Recent Labs   Lab Test 12/07/22  0521   O2PER 21        Urine Studies     Recent Labs   Lab Test 12/05/22  0538 12/04/22  1534   URINEPH 5.5 5.0   NITRITE Negative Negative   LEUKEST Negative Negative   WBCU 8* 3       Vancomycin Levels     No lab results found.    Tobramycin levels     No lab results found.    Gentamicin levels    No lab results found.    CSF testing     Recent Labs   Lab Test 12/04/22  1738   CWBC 230*   CRBC 78*   CGLU 82*   CTP 59.7*       Last check of C difficile  C Difficile Toxin B by PCR   Date Value Ref Range Status   12/05/2022 Negative Negative Final     Comment:     A negative result does not exclude actual disease due to C. difficile and may be due to improper collection, handling and storage of the specimen or the number of organisms in the specimen is below the detection limit of the assay.            Imaging:       Echo ILDEFONSO Study Date: 12/05/2022 01:14 PM   Interpretation Summary  Known radiation induced valve disease with severe calcific mitral disease. No  definite  endocarditis noted.  Severe mitral annular calcification is present with multiple calcific nodules,  no definite vegetation seen.  S/p bioprosthetic AVR (23 mm Cordell-Hubbard Perimount Magna/Magna Ease  pericardial prosthesis). Valve appears well seated with good leaflet opening.  No vegetations visualized.  Global and regional left ventricular function is normal with an EF of 60-65%.     ______________________________________________________________________________  Procedure  Transesophageal Echocardiogram with color and spectral Doppler performed. 3D  image acquisition, reconstruction, and real-time interpretation was performed.  I was present during ILDEFONSO probe placement by the Fellow. I personally viewed  the imaging and agree with the interpretation and report as documented by the  Fellow. Procedure location Echo Lab. Informed consent for Transesophegeal echo  obtained. ILDEFONSO Probe #62 was used during the procedure. Patient was sedated  using Fentanyl 50 mcg. Patient was sedated using Versed 1 mg. The heart rate,  respiratory rate, oxygen saturations, blood pressure, and response to care  were monitored throughout the procedure with the assistance of the nurse. I  determined this patient to be an appropriate candidate for the planned  sedation and procedure and have reassessed the patient immediately prior to  sedation and procedure. Total sedation time: 25 minutes of continuous bedside  1:1 monitoring. The Transducer was inserted without difficulty . The patient  tolerated the procedure well. Complications None.     Left Ventricle  Mild concentric wall thickening consistent with left ventricular hypertrophy  is present. Global and regional left ventricular function is normal with an EF  of 60-65%. Left ventricular size is normal.     Right Ventricle  The right ventricle is normal size. Global right ventricular function is  normal.     Atria  The left atrial appendage is normal. It is free of spontaneous echo  contrast  and thrombus. Moderate to severe left atrial enlargement is present. The  atrial septum is intact as assessed by color Doppler .     Mitral Valve  Severe mitral annular calcification is present. Mild to moderate mitral  insufficiency is present. The mean gradient across the mitral valve is 13  mmHg. The mitral valve area is 1.4 cm^2. Severe mitral stenosis is present.     Aortic Valve  S/p bioprosthetic AVR. Valve appears well seated with good leaflet opening. No  vegetations visualized. Doppler interrogation of the aortic valve is normal.     Tricuspid Valve  The tricuspid valve is normal. Mild tricuspid insufficiency is present.     Pulmonic Valve  The pulmonic valve is normal.     Vessels  The aorta root is normal. The thoracic aorta is normal. Ascending aorta and  aortic arch free of atheroma and vegetation. The inferior vena cava is normal.  The superior vena cava is normal. The RUPV Doppler shows normal velocity  waveform . The RLPV Doppler shows systolic dominance . The LUPV Doppler shows  normal velocity waveform . The LLPV Doppler shows normal velocity waveform .     Pericardium  No pericardial effusion is present.     Compared to Previous Study  This study was compared with the study from 12/5/22 . There has been no  change.       TTE: 12/5 Interpretation Summary  Would consider a transesophageal echocardiogram if clinically indicated.  Technically difficult study.Extremely poor acoustic windows.  Limited information was obtained during study.  Global and regional left ventricular function is normal with an EF of 60-65%.  Right ventricular function, chamber size, wall motion, and thickness are  normal.  Severe mitral annular calcification is present.  Mitral mean gradient 11mmHg at heart rate 131 BPM.  H/O Bioprosthetic AVR in 2014. Valve not well visualized.    MRI of the brain 12/4/22: IMPRESSION: 5 scattered foci of acute ischemia involving the bilateral anterior and posterior cerebral artery  distributions. Constellation of findings is suspicious for central embolic phenomena.           CT Abdomen and Pelvis: 12/4/22  IMPRESSION:  1.  Patchy groundglass opacities in the left upper and lower lobes, and cluster of tree-in-bud nodularity in the left upper lobe, likely infectious or inflammatory.  2.  Interstitial pulmonary edema.  3.  Trace left pleural effusion.  4.  No acute findings in the abdomen or pelvis.        CT head and CTA head/neck 12/4/22, were not suggestive of an acute abnormality, no significant stenosis of carotids or vertebral arteries.      CXR 12/7/22:  IMPRESSION: Diffuse interstitial markings bilaterally and streaky  perihilar and bibasilar opacities likely represent pulmonary edema  versus atelectasis. No focal consolidation.  Attending Physician Attestation:  I, Lauri Branham MD have seen and evaluated Eloy Fletcher. I have discussed with the primary provider team, and I agree with the above documented findings and plan in this note. I have reviewed today's vital signs, medications, labs and imaging.    Floor time: 30 minutes  Face-to-face time: 10 minutes  Total time: 40 minutes     Lauri Branham MD MPH  Professor of Medicine  Division of Infectious Diseases & International Medicine  Department of Medicine

## 2022-12-07 NOTE — PROGRESS NOTES
[facility]    Stroke Progress Note    Interval EventsPatient transferred to stroke team for evaluation of multiple punctate strokes. Patient is encephalopathic.     HPI Summary  66 year old male with PMH of HTN, HLD, CAD s/p PCI to LAD (2012), Aortic stenosis s/p AOV replacement (2014), Hodgkin's disease s/p radiation therapy, testicular & prostate cancer who presented with left gaze preference, right sided neglect and generalized weakness. Found to have embolic appearing punctate infarcts, encephalopathy, fever, nuchal rigidity, multiple punctate infarcts in the setting of encephalopathy and MSSA sepsis highly concerning for endocarditis. Additionally, neuro exam, Meningitis/encephalitis panel and lumbar puncture findings concerning for bacterial meningitis/encephalitis. Broad-spectrum antibiotics have been initiated and ID consulted. Embolic infarcts are unlikely responsible for his encephalopathy and previously document gaze deviation. Patient's exam has shown improvement following initial doses of antibiotics. Low suspicion for seizures given no seizure activity on vEEG.      12/6: vEEG c/w mild-moderate encephalopathy & no seizures.    12/5: ILDEFONSO no evidence of endocarditis but suspicion remains high given pattern of embolic strokes in setting of sepsis.      Stroke Evaluation Summarized    MRI/Head CT CT Head w/o contrast 12/4  1.  No acute intracranial process.    MRI Brain w/o contrast 12/4  1.  5 scattered foci of acute ischemia involving the bilateral anterior and posterior cerebral artery distributions. Constellation of findings is suspicious for central embolic phenomena     Intracranial Vasculature CTA Head w/ contrast 12/4  1.  No significant stenosis, aneurysm, or high flow vascular malformation identified.  2.  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   Cervical Vasculature CTA Neck w/ contrast 12/4  1.  No hemodynamically significant stenosis in the neck vessels.   2.  No evidence for dissection.        Echocardiogram ILDEFONSO 12/5  - Known radiation induced valve disease with severe calcific mitral disease. No definite endocarditis noted.  - Severe mitral annular calcification is present with multiple calcific nodules, no definite vegetation seen.  - S/p bioprosthetic AVR (23 mm Cordell-Hubbard Perimount Magna/Magna Ease pericardial prosthesis). - Valve appears well seated with good leaflet opening.  - No vegetations visualized.  - Global and regional left ventricular function is normal with an EF of 60-65%.   EKG/Telemetry Sinus tachycardia   Right bundle branch block   Left anterior fascicular block   Bifascicular block    Abnormal ECG   When compared with ECG of 20-JUL-2012 20:12,   (RBBB and left anterior fascicular block) is now Present    Other Testing      LDL  12/7/2022: 35 mg/dL   A1C  No lab value available in past 90 days   Troponin 12/7/2022: 1,634 ng/L       Mike Fletcher is a 66 y.o. male with a PMH of f HTN, HLD, CAD s/p PCI to LAD (2012), Aortic stenosis s/p AOV replacement (2014), Hodgkin's disease s/p radiation therapy, testicular & prostate cancer who presented with left gaze preference, right sided neglect and generalized weakness. Found to have embolic appearing punctate infarcts, concerning for septic emboli given MSSA bacteremia and suspected endocarditis, although no vegetations found on ILDEFONSO. Embolic infarcts also possibly a sequelae of meningitis given CSF findings with positive staph aureus cultures and patient's nuchal rigidity. Patient's waxing and waning mental status and encephalopathy is most consistent with delirium, likely multifactorial in the setting of sepsis due to MSSA bacteremia (intermittently hypotensive) and metabolic abnormalities such as a rising BUN to 68  and worsening JILLIAN. Patient had 24hr video EEG monitoring that was without evidence of seizures or epileptiform activity.    Etiology for stroke is ESUS - concern for septic emboli due to suspected endocarditis (although without vegetations) v.s. in setting of MSSA meningitis v.s. other embolic source.     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     Plan  #Acute embolic multifocal punctate strokes (five total) due to ESUS  - ILDEFONSO negative for vegetations  (12/5)  - LDL 35 (not on statins), HbA1C ordered for you, pending   -Hgb A1c goal < 7, follow up with PCP for blood sugar management as needed    -LDL goal < 70  - Ok to restart PTA aspirin 81mg and plavix 75mg from neurology perspective, as long as this is indicated from a cardiology standpoint (however, would consider holding off if starting systemic anticoagulation for treatment of HIT)   - Toxic metabolic encephalopathy workup per primary team (consider ammonia if indicated)   - Ok to start systemic anticoagulation from neurology perspective for treatment of HIT if indicated by primary team, as risk of cerebral hemorrhage is low given small infarcts without evidence of cerebral infarcts  - Touched base with ID and confirmed that nafcillin is the appropriate regimen for MSSA meningitis with csf penetration   - Will need 30 day cardiac monitor at discharge     Patient Follow-up   - Follow up with general neurology in 4-8 weeks     No further recommendations. Neurology signing off at this time but will follow peripherally for final LDL and Hgb A1c results and provide recommendations as necessary      No further stroke evaluation is recommended, so we will sign off. Please contact us with any additional questions.    The Stroke Staff is Dr. Green.    Leslee Arreola  Medical Student  To page a member of the stroke/neurocritical care service, click here:   "AMCOM   Choose \"On Call\" tab at top, then search dropdown box for \"Neurology Adult\", select location, press Enter, then look for stroke/neuro ICU/telestroke.    Sera Mcfarlane MD  PGY-2 Neurology Resident    Resident/Fellow Attestation   I, Sera Mcfarlane MD, was present with the medical/NOEMI student who participated in the service and in the documentation of the note.  I have verified the history and personally performed the physical exam and medical decision making.  I agree with the assessment and plan of care as documented in the note.      ______________________________________________________    Clinically Significant Risk Factors Present on Admission                 Medications   Scheduled Meds    sodium chloride 0.9%  500 mL Intravenous Once     nafcillin  2 g Intravenous Q4H     pantoprazole  40 mg Intravenous Daily with breakfast     sodium chloride (PF)  3 mL Intracatheter Q8H       Infusion Meds    sodium chloride 150 mL/hr (12/05/22 1330)       PRN Meds  acetaminophen, acetaminophen, acetylcysteine, albuterol, glucose **OR** dextrose **OR** glucagon, ipratropium - albuterol 0.5 mg/2.5 mg/3 mL, lidocaine 4%, lidocaine (buffered or not buffered), ondansetron **OR** ondansetron, polyethylene glycol, prochlorperazine **OR** prochlorperazine **OR** prochlorperazine, senna-docusate **OR** senna-docusate, sodium chloride (PF), sodium chloride, sodium chloride bacteriostatic       PHYSICAL EXAMINATION  Temp:  [97.4  F (36.3  C)-100.5  F (38.1  C)] 98.4  F (36.9  C)  Pulse:  [] 124  Resp:  [16-28] 22  BP: ()/(30-94) 97/52  SpO2:  [71 %-99 %] 97 %      General Exam  General:  patient lying in bed without any acute distress    HEENT:  normocephalic/atraumatic  Cardio: Tachycardic per vitals   Pulmonary:  no respiratory distress  Abdomen:  soft  Extremities:  no edema  Skin:  intact, warm/dry     Neuro Exam  Mental Status: Somnolent. Required repeated stimulation with voice and touch to get him to follow " commands.  Oriented to self but not place, year or time. Follows some but not all commands (sticks tongue out but does not show thumb).   Cranial Nerves:  PERRL, facial sensation intact and symmetric, facial movements symmetric, hearing not formally tested but intact to conversation, no dysarthria, tongue protrusion midline, Difficult to ascertain visual fields. Able to bury sclera to the R but did not follow to the Left, unsure if true gaze preference or related to mental status and ability to follow commands.   Motor:  normal muscle tone and bulk, no abnormal movements, able to move all limbs spontaneously, difficult to ascess due to mental status, but patient was moving all four limbs Drift to bed in all 4 extremities   Reflexes:  no clonus, toes down-going  Sensory:  Light touch sensation intact and symmetric throughout upper and lower extremities without extinction  Coordination:  was able to touch his nose but was very bradykinetic - unable to fully perform FNF or heel to shin testing due to encephalopathy  Station/Gait:  Unable to test due to mental status    Stroke Scales    NIHSS  1a. Level of Consciousness 1-->Not alert, but arousable by minor stimulation to obey, answer, or respond   1b. LOC Questions 2-->Answers neither question correctly   1c. LOC Commands 1-->Performs one task correctly   2.   Best Gaze 1-->Partial gaze palsy, gaze is abnormal in one or both eyes, but forced deviation or total gaze paresis is not present   3.   Visual 0-->No visual loss (difficult to assess)   4.   Facial Palsy 0-->Normal symmetrical movements   5a. Motor Arm, Left 3-->No effort against gravity, limb falls   5b. Motor Arm, Right 3-->No effort against gravity, limb falls   6a. Motor Leg, Left 1-->Drift, leg falls by the end of the 5-sec period but does not hit bed   6b. Motor Leg, right 1-->Drift, leg falls by the end of the 5-sec period but does not hit bed   7.   Limb Ataxia 0-->Absent   8.   Sensory 0-->Normal, no  sensory loss   9.   Best Language 0-->No aphasia, normal   10. Dysarthria 0-->Normal   11. Extinction and Inattention  0-->No abnormality   Total 13 (12/07/22 0949)         Imaging  I personally reviewed all imaging; relevant findings per HPI.     Lab Results Data   CBC  Recent Labs   Lab 12/07/22  1359 12/07/22  0434 12/06/22 0422   WBC 13.6* 12.9* 12.3*   RBC 3.61* 3.37* 3.88*   HGB 10.8* 10.1* 11.7*   HCT 32.0* 30.4* 35.5*   PLT 84* 64* 79*     Basic Metabolic Panel    Recent Labs   Lab 12/07/22  0512 12/07/22  0434 12/06/22  1521 12/06/22  0829 12/06/22  0422 12/05/22  1537 12/05/22  1141 12/05/22  1025 12/05/22  1008   NA  --  138  --   --  142  --   --   --  141   POTASSIUM  --  3.5  --   --  4.2  --  3.8  --  3.7   CHLORIDE  --  106  --   --  106  --   --   --  108*   CO2  --  17*  --   --  20*  --   --   --  20*   BUN  --  68.9*  --   --  49.9*  --   --   --  35.5*   CR  --  3.59*  --   --  2.87*  --   --   --  1.88*   * 119* 95   < > 134*   < >  --    < > 142*   YUNIOR  --  7.5*  --   --  8.0*  --   --   --  7.9*    < > = values in this interval not displayed.     Liver Panel  Recent Labs   Lab 12/07/22  0434 12/06/22  0422 12/05/22  1008 12/04/22  2353   PROTTOTAL  --   --  5.8* 6.2*   ALBUMIN 2.3* 2.8* 2.9* 3.0*   BILITOTAL  --   --  1.1 1.3*   ALKPHOS  --   --  58 52   AST  --   --  131* 149*   ALT  --   --  61* 78*     INR    Recent Labs   Lab Test 12/04/22  1341   INR 1.23*      Lipid Profile    Recent Labs   Lab Test 12/07/22  0434   CHOL 101   HDL 14*   LDL 35   TRIG 260*     A1C  No lab results found.  Troponin    Recent Labs   Lab 12/07/22  0434 12/06/22  0659 12/06/22  0544   CTROPT 1,634* 2,010* 1,915*          Data   I have personally spent a total of  minutes providing care today, time spent in reviewing medical records and reviewing tests, examining the patient and obtaining history, coordination of care, and discussion with the patient and/or family regarding diagnostic results,  prognosis, symptom management, risks and benefits of management options, and development of plan of care. Greater than 50% was spent in counseling and coordination of care.

## 2022-12-07 NOTE — CODE/RAPID RESPONSE
Rapid Response Team Note    Assessment   In assessment a rapid response was called on Eloy Fletcher due to lactic acidosis.  LA of 2.5, which is a mild elevation but BP has been also soft , 80s/50s. Patient denies new developing symptoms concerning for new septic process like fever, new chest pain, cough, dyspnea, abd pain, diarrhea or urinary symptoms.  Patient being actively managed for suspected endocarditis with nafcillin. Other vitals relatively stable with absence of fever, increase in tachycardia. OSat stable on RA. Physical exam without change from baseline.    Plan   -Volume support with gentle bolus given underlying cardiac and neurologic conditions, 500cc/3hr; closely monitor blood pressure, will adjust as needed. -Repeat LA after 3 hours bolus  -Continue current antibiotic per ID  - Morning labs including BC, CBC, CMP pending   -  The Internal Medicine primary team was paged and currently awaiting a response.  -  Disposition: The patient will remain on the current unit. We will continue to monitor this patient closely.  -  Reassessment and plan follow-up will be performed by the primary team     Follow up note:  Fluid changed to fast bolus for unsatisfactory response (BP improved and went back to 80/50s). Will reassess after 1L. Getting CXR in mean time to assess pulmonary status. Will consider switching antibiotic back to Vancomycin and further cardiac/infectious assessment if Pt continue to be hypotensive.   - cardiac monitoring     BP after 1L going up and down SBP in 80s and 70s. Reassessed patient, mental status seemed worse, otherwise still breathing RA without distress. Ordered EKG and troponin. Revised morning labs. CXR with bilateral infiltrates vs congestion concerning for infection vs edema. Given underlying mitral stenosis and new CXR finding, Consulted cross cover for need for pressor vs more fluid. Decided to give albumin with plan to five gentle more fluid since the patient is  tolerating so far.   Considered to broaden antibiotic but LA improved after bolus to 1.6, over all patient condition did not seem significantly decompensating. Will continue current antibiotic for new and defer decision based on response and ID input.     BP improving       Areli Clark PA-C  [facility] RRT [facility] Job Code Contact #[phone]  [facility] Paging/Directory    Hospital Course   Brief Summary of events leading to rapid response:   Per chart review: Mr. Fletcher is a 66-year-old gentleman with a past medical history of hypertension, hyperlipidemia, Hodgkin's lymphoma status post radiation and MOPP chemotherapy, in remission, testicular cancer status post a left orchiectomy and chemotherapy, in remission, prostate cancer ([place] 4+3, diagnosed April 2021) status post radiation and Lupron, aortic stenosis status post bioprosthetic aortic valve in (2014) and coronary artery disease status post PCI to the LAD in (2012) who on December 4, 2022 developed a left-sided gaze with right-sided weakness and was noted to have a fever and brought to an outside hospital where he was recognized to be in septic shock and transferred to [facility] ICU.  Head CT and CTA of the head and neck did not display any acute abnormality or stenosis.  MRI brain displayed scattered foci of acute ischemia involving the bilateral anterior and posterior cerebral artery distributions.  Neurology was consulted and concerned that the these might represent embolic strokes.  Further infectious work-up was done including a CT chest abdomen and pelvis which revealed groundglass opacities in the left upper lobe and left lower lobe with tree-in-bud findings in the setting of interstitial pulmonary edema and a left pleural effusion.  He was started on broad-spectrum antibiotics including nafcillin and ceftriaxone.  Lumbar puncture was performed and CSF was positive for gram-positive cocci, final cultures pending.  Cardiology was consulted  with concerns for possible endocarditis.  TTE was performed which revealed severe mitral calcification with moderate insufficiency and a bioprosthetic aortic valve which was well-seated with no vegetations present. ID was consulted and he was adjusted to Nafcillin with treatment for likely endocarditis in the setting of a high grade blood stream infection from MSSA that resulted in pneumonia, embolic strokes, acute kidney injury and encephalopathy. Today, he is transferring out of the ICU to the [facility] for further care  RRT called for lactic acidosis and hypotension    Admission Diagnosis:   Endocarditis [I38]    Physical Exam   Temp: 98.3  F (36.8  C) Temp  Min: 98.2  F (36.8  C)  Max: 100.5  F (38.1  C)  Resp: 18 Resp  Min: 8  Max: 20  SpO2: 97 % SpO2  Min: 94 %  Max: 99 %  Pulse: 106 Pulse  Min: 106  Max: 125    No data recorded  BP: (!) 83/51 Systolic (24hrs), Av , Min:83 , Max:133   Diastolic (24hrs), Av, Min:50, Max:79     I/Os: I/O last 3 completed shifts:  In: 1800 [P.O.:750; I.V.:550; IV Piggyback:500]  Out: 1175 [Urine:1075; Stool:100]     Exam:   General: in no acute distress  Mental Status: AAOx4.  HEENT- Moist mucous membranes  Respiratory- on RA without distress , clear to auscultation bilaterally anteriorly, no crackles or wheezing.  Cardiovascular- Regular rate and rhythm, normal S1 and S2, and no murmur noted.  GI: Soft, non-distended, non-tender, normal bowel sounds.      Significant Results and Procedures   Lactic Acid:   Recent Labs   Lab Test 22  0220 22  0934 22  0659   LACT 2.5* 1.4 1.7     CBC:   Recent Labs   Lab Test 22  0422 22  1141 22  2353   WBC 12.3* 9.1 11.4*   HGB 11.7* 12.8* 12.7*   HCT 35.5* 38.8* 41.0   PLT 79* 96* 118*        Sepsis Evaluation   The patient is known to have an infection.  Eloy Fletcher meets SIRS criteria but does NOT have a lactate >2 or other evidence of acute organ damage.  These vital sign, lab and  physical exam findings constitute a diagnosis of SEPSIS.         Anti-infectives (From now, onward)    Start     Dose/Rate Route Frequency Ordered Stop    12/05/22 1500  nafcillin IV 2 g vial to attach to  ml bag         2 g  over 1 Hours Intravenous EVERY 4 HOURS 12/05/22 1455          Current antibiotic coverage is appropriate for source of infection.

## 2022-12-07 NOTE — PROVIDER NOTIFICATION
Notify MD  at 0810,  Lethergic, A/O x 2  BP 80s/60s Map 60s. MD at beside and assess patient states No further intervention at this time Map is above 60s and HR Tachy but stable.  MD states notify MD  when MAP is below 65s.

## 2022-12-07 NOTE — PROVIDER NOTIFICATION
"   12/07/22 0236   Call Information   Date of Call 12/07/22   Time of Call 0236   Name of person requesting the team Donovan ARGUELLES   Title of person requesting team RN   RRT Arrival time 0243   Time RRT ended 0315   Reason for call   Type of RRT Adult   Primary reason for call Sepsis suspected;Cardiovascular   Cardiovascular SBP less than 90   Sepsis Suspected Elevated Lactate level   Was patient transferred from the ED, ICU, or PACU within last 24 hours prior to RRT call? Yes   SBAR   Situation LA 2.5 + Hypotensive with MAPs 58-65   Background per provider H&P \"Eloy Fletcher is a 66-year-old male with a past medical history of HTN, HLD, Hodgkin's lymphoma (s/p radiation and MOPP chemo in remission), Testicular cancer (s/p left orchiectomy and chemo, in remission), prostate cancer ([place] 4+3, diagnosed in 4/2021 and s/p radiation and lupron), aortic stenosis S/P bioprosthetic AVR in 2014, and CAD s/p PCI to LAD in 2012 that is admitted to the ICU for sepsis and encephalopathy.\"   Notable History/Conditions Cancer;Cardiac;Hypertension   Assessment A&O, somewhat drowsy. MAP 60-63. HR tachy to low 100s. Afebrile. SpO2 high 90s on room air. Plan to give 500mL NS bolus/3h and closely monitor BPs while infusing. LA f/u at 0600. At 0319: BP 70/39(map 50): RRT provider notified, rate of bolus increased to 500mL/hr, HOB lowered, BP set to cycle q5m, and RN notified.   Interventions Fluid bolus;Labs   Patient Outcome   Patient Outcome Stabilized on unit   RRT Team   Attending/Primary/Covering Physician Murphy Night   Date Attending Physician notified 12/07/22   Time Attending Physician notified 0236   Physician(s) SUSSY Cormier   Lead RN Vandana Lawson   RT n/a   Other staff FRANCES Johnson and Demetra ACUÑA RN   Post RRT Intervention Assessment   Post RRT Assessment Worsened   Date Follow Up Done 12/07/22   Time Follow Up Done 0441   Comments RRT provider and Gold provider contacted due to ongoing " hypotension. Rate of bolus has been increased from 650mL/hr to 999mL/hr for the remainder of the liter ~400mL. Concern for pulm edema on CXR and ongoing cardiac/valve dysfunction. EKG ordered. Additional IV access obtained in preparation for additional fluid if necessary.

## 2022-12-07 NOTE — PLAN OF CARE
Neuro: Disoriented to time, place and situation. Slow to respond  Cardiac: .Blood pressure 93/62, pulse 104, temperature 98.3  F (36.8  C), temperature source Oral, resp. rate 28, weight 82.4 kg (181 lb 10.5 oz), SpO2 95 %.  Respiratory: Sating 95 on RA.  GI/: Adequate urine output. [place] in and positional. Rectal tube in place, rectal tube accidentally out, new tube placed this shift, no adverse reaction noted.  Diet/appetite: Tolerating full liquid diet. Drinking good  Activity:  Assist X 2, use of lift..  Pain: PRN tylenol given for generalized pain. At acceptable level on current regimen.   Skin: No new deficits noted.  LDA's: L PIV with TKO and R PIV SL    No Reed  Around 0010 AM for patient being congested,. MD here to see patient, ordered for RT to give patient neb and mucomyst.   Patient triggered sepsis and low blood pressure. Code sepsis called around 0235PM. Code rapid here to see patient. MD. Singleton put ordered for Bolus 500cc NS.P[atient not responding to the 500cc NS.  MD. Singleton said to give another 500cc NS. Patient still disoriented to time, place and situation. Patient denied any chest pain or other issues. MD. Rede notified of the critical lactic of 2.5 and low SBP of 88 by page. MD. Singleton and MD. reed see together to see patient again, CXR ordered, procalcitonin ordered, and MD ordered for Albumin .   Critical Procalcitonin called by lab at 0540AM, Nurse notified MD. reed through page.MD called back no new order. Continue to monitor patient.   Around 0610AM, Patient tried to get out from bed, redirected patient to go back to bed, patient refused. It took four nurse to put patient back to bed and patient still telling that he is going to leave. Nurse called patient daughter Carissa, and informed her, Give the phone to patient and patient is now communicating with his daughter, encouraged patient to stay on bed and call if he needs anything and nurse will come help him.  "    Plan: Continue with POC. Notify primary team with changes.    Problem: Plan of Care - These are the overarching goals to be used throughout the patient stay.    Goal: Plan of Care Review  Description: The Plan of Care Review/Shift note should be completed every shift.  The Outcome Evaluation is a brief statement about your assessment that the patient is improving, declining, or no change.  This information will be displayed automatically on your shift note.  Outcome: Not Progressing  Goal: Patient-Specific Goal (Individualized)  Description: You can add care plan individualizations to a care plan. Examples of Individualization might be:  \"Parent requests to be called daily at 9am for status\", \"I have a hard time hearing out of my right ear\", or \"Do not touch me to wake me up as it startles me\".  Outcome: Not Progressing  Goal: Absence of Hospital-Acquired Illness or Injury  Outcome: Not Progressing  Intervention: Identify and Manage Fall Risk  Recent Flowsheet Documentation  Taken 12/7/2022 0000 by Donovan Lawson RN  Safety Promotion/Fall Prevention: activity supervised  Taken 12/6/2022 2045 by Donovan Lawson RN  Safety Promotion/Fall Prevention: activity supervised  Intervention: Prevent Skin Injury  Recent Flowsheet Documentation  Taken 12/7/2022 0000 by Donovan Lawson RN  Body Position:    turned    right    heels elevated    upper extremity elevated  Taken 12/6/2022 2045 by Donovan Lawson RN  Body Position:    turned    right    heels elevated    upper extremity elevated  Intervention: Prevent and Manage VTE (Venous Thromboembolism) Risk  Recent Flowsheet Documentation  Taken 12/7/2022 0000 by Donovan Lawson RN  VTE Prevention/Management: SCDs (sequential compression devices) off  Taken 12/6/2022 2045 by Donovan Lawson RN  VTE Prevention/Management: SCDs (sequential compression devices) off  Goal: Optimal Comfort and Wellbeing  Outcome: Not " Progressing  Intervention: Provide Person-Centered Care  Recent Flowsheet Documentation  Taken 12/7/2022 0000 by Donovan Lawson RN  Trust Relationship/Rapport:    care explained    choices provided  Taken 12/6/2022 2045 by Donovan Lawson RN  Trust Relationship/Rapport:    care explained    choices provided  Goal: Readiness for Transition of Care  Outcome: Not Progressing     Problem: Risk for Delirium  Goal: Optimal Coping  Outcome: Not Progressing  Goal: Improved Behavioral Control  Outcome: Not Progressing  Intervention: Minimize Safety Risk  Recent Flowsheet Documentation  Taken 12/7/2022 0000 by Donovan Lawson RN  Enhanced Safety Measures: bed alarm set  Trust Relationship/Rapport:    care explained    choices provided  Taken 12/6/2022 2045 by Donovan Lawson RN  Enhanced Safety Measures: bed alarm set  Trust Relationship/Rapport:    care explained    choices provided  Goal: Improved Attention and Thought Clarity  Outcome: Not Progressing  Goal: Improved Sleep  Outcome: Not Progressing     Problem: Restraint, Nonviolent  Goal: Absence of Harm or Injury  Outcome: Not Progressing  Intervention: Protect Dignity, Rights and Personal Wellbeing  Recent Flowsheet Documentation  Taken 12/7/2022 0000 by Donovan Lawson RN  Trust Relationship/Rapport:    care explained    choices provided  Taken 12/6/2022 2045 by Donovan Lawson RN  Trust Relationship/Rapport:    care explained    choices provided  Intervention: Protect Skin and Joint Integrity  Recent Flowsheet Documentation  Taken 12/7/2022 0000 by Donovan Lawson RN  Body Position:    turned    right    heels elevated    upper extremity elevated  Taken 12/6/2022 2045 by Donovan Lawson RN  Body Position:    turned    right    heels elevated    upper extremity elevated     Problem: Sepsis/Septic Shock  Goal: Optimal Coping  Outcome: Not Progressing  Goal: Absence of Bleeding  Outcome: Not  Progressing  Goal: Blood Glucose Level Within Targeted Range  Outcome: Not Progressing  Goal: Absence of Infection Signs and Symptoms  Outcome: Not Progressing  Intervention: Promote Recovery  Recent Flowsheet Documentation  Taken 12/7/2022 0000 by Donovan Lawson RN  Activity Management: activity adjusted per tolerance  Taken 12/6/2022 2045 by Donovan Lawson RN  Activity Management: activity adjusted per tolerance  Goal: Optimal Nutrition Intake  Outcome: Not Progressing     Problem: Fall Injury Risk  Goal: Absence of Fall and Fall-Related Injury  Outcome: Not Progressing  Intervention: Identify and Manage Contributors  Recent Flowsheet Documentation  Taken 12/7/2022 0000 by Donovan Lawson RN  Medication Review/Management: medications reviewed  Taken 12/6/2022 2045 by Donovan Lawson RN  Medication Review/Management: medications reviewed  Intervention: Promote Injury-Free Environment  Recent Flowsheet Documentation  Taken 12/7/2022 0000 by Donovan Lawson RN  Safety Promotion/Fall Prevention: activity supervised  Taken 12/6/2022 2045 by Donovan Lawson RN  Safety Promotion/Fall Prevention: activity supervised     Problem: Stroke, Ischemic (Includes Transient Ischemic Attack)  Goal: Optimal Coping  Outcome: Not Progressing  Goal: Effective Bowel Elimination  Outcome: Not Progressing  Goal: Optimal Cerebral Tissue Perfusion  Outcome: Not Progressing  Goal: Optimal Cognitive Function  Outcome: Not Progressing  Goal: Improved Communication Skills  Outcome: Not Progressing  Goal: Optimal Functional Ability  Outcome: Not Progressing  Intervention: Optimize Functional Ability  Recent Flowsheet Documentation  Taken 12/7/2022 0000 by Donovan Lawson RN  Activity Management: activity adjusted per tolerance  Taken 12/6/2022 2045 by Donovan Lawson RN  Activity Management: activity adjusted per tolerance  Goal: Effective Oxygenation and  Ventilation  Outcome: Not Progressing  Intervention: Optimize Oxygenation and Ventilation  Recent Flowsheet Documentation  Taken 12/7/2022 0000 by Donovan Lawson RN  Head of Bed (hospitals) Positioning: HOB at 30 degrees  Taken 12/6/2022 2045 by Donovan Lawson RN  Head of Bed (hospitals) Positioning: HOB at 30 degrees  Goal: Improved Sensorimotor Function  Outcome: Not Progressing  Intervention: Optimize Range of Motion, Motor Control and Function  Recent Flowsheet Documentation  Taken 12/7/2022 0000 by Donovan Lawson RN  Positioning/Transfer Devices:    pillows    in use  Taken 12/6/2022 2045 by Donovan Lawson RN  Positioning/Transfer Devices:    pillows    in use  Goal: Optimal Eating and Swallowing without Aspiration  Outcome: Not Progressing  Goal: Effective Urinary Elimination  Outcome: Not Progressing   Goal Outcome Evaluation:

## 2022-12-07 NOTE — CONSULTS
Hematology Consult Note   Date of Service: 12/07/2022    Patient: Eloy Fletcher  MRN: [id number]  Admission Date: 12/4/2022  Hospital Day # Hospital Day: 4  Primary Outpatient Hematologist: DARIO     Reason for Consult: Thrombocytopenia, concern for HIT     Assessment & Plan:   Eloy Fletcher is a 66 year old male with remote history of hodgkin's lymphoma + testicular cancer, prostate cancer (dx 4/2021), valve replacement/radiation-induced heart disease admitted 12/4 with sepsis and encephalopathy and found to have acute embolic strokes, likely bacterial pna (CAP vs aspiration), possible endocarditis (negative ILDEFONSO), MSSA bacteremia, meningitis.  Plt have gone from 168K on admission dropping steadily to 64K today.  No clinical evidence of new clotting.  He has been on subcutaneous heparin ppx since admit due to JILLIAN, also had heparin exposure 11/14 during angiogram.  4T score 3 and HIT screen now negative.  PLASMIC score 3 (low risk, 0% risk of severe [id number] deficiency), peripheral smear reviewed and was without concerning findings such as schistocytes.    Likely etiology of thrombocytopenia is multifactorial 2/2 infection, medications (multiple antibiotics).  Would recommend holding anticoagulation until etiology of emboli determined as staph septic emboli have risk of bleeding.      Recommendations:   -Check fibrinogen (evaluate DIC), I have added  -follow-up formal peripheral smear read  -monitor plt and transfuse PRN   -Hold prophylactic anticoagulation until source of emboli determined as septic (sharron staph) emboli have risk of bleeding       Patient was seen and plan of care was discussed with attending physician Dr. Pitts     We will continue to follow this patient. Please don't hesitate to contact the Fellow On-Call with questions.    I spent 25 minutes face-to-face or coordinating care of Eloy Fletcher.  Over 50% of our time on the unit was spent counseling the patient and/or coordinating care  regarding thrombocytopenia     Ann Zimmerman PA-C  [facility]  [phone]        HEMATOLOGY STAFF  Patient was seen and evaluated as part of a shared APRN/PA visit.  Chart notes, vital signs, labs, and imaging studies reviewed.  Over 50% of our time on the unit was spent counseling the patient and/or coordinating care.    Asked to see patient with regard to thrombocytopenia, concern for HIT.  4T score is only 3 (low probability), and HIT antibody screen has returned negative.  Peripheral smear reviewed, and shows no evidence of schistocytes or other morphologic abnormalities.  Suspect thrombocytopenia due primarily to sepsis, possible contribution from medications.  Recommend continue to monitor platelet count, and transfuse as needed.  Expect thrombocytopenia will improve and overall condition improves.  Given that coags are mildly abnormal, would check fibrinogen to evaluate for DIC.      Oswaldo Pitts MD  Professor of Medicine  Division of Hematology, Oncology, and Transplantation  Director, [facility]    -------------------------------------------------------------------------------------------    History of Present Illness:    Eloy Fletcher is a 66 year old male with remote hx of Hodgkin's lymphoma in 1970s tx with MOPP chemotherapy and XRT, acquired asplenia, and remission since then, testicular cancer 1983 with recurrence, tx with likely BEP and XRT, prostate cancer 4/2021, HLD, CAD with stent 2/2 thoracic radiation, aortic valve replacement 2/2 chest radiation, who presented to ED on 12/4/22 with CVA, sepsis.   He was initially taken to OSH ([facility]) but was transferred to ICU at [facility] due to concern for possible need for intubation, higher level of care. Per EMS- patient with acute alteration in mental status.  Left gaze preference and not following commands.  Patient met at the door and on brief examination he did have right-sided neglect but weakness in  "right and left arms.  Gaze preference was leftward.  Tier 1 stroke code activated and patient sent directly to CT.  CT showed No significant stenosis, aneurysm, or high flow vascular malformation identified.  MRI brain done showed 5 scattered foci of acute ischemia involving the b/l anterior and posterior cerebral artery distributions.  Constellation of findings is suspicious for central embolic phenomena.      He also had fevers (Tmax 104.4), leukocytosis (WBC 13.7), tachycardiac, tachypnea. He underwent LP at OSH.  He had ILDEFONSO 12/5 which showed Known radiation induced valve disease with severe calcific mitral disease. No definite endocarditis noted.  No vegetations visualized.    Today he is seen and is sleeping and per family is \"pretty out of it\" today.  He has brother and daughter at bedside and is able to answer some simple questions but overall is not talkative during visit.       Hematologic History:  #Hodgkin's lymphoma  --Late 1970s  --Tx with MOPP and radiation    #Acute thrombocytopenia   -12/4/22: Admitted with concern for stroke, fever, encephalopathy   -Plt on admit 168K  -12/7/22: plt 64K, improved to 84K in afternoon draw, no plt transfusion given   -Retic 0.040   -4T score 3 (received heparin during cardiac angiogram 11/14), had been on subcutaneous heparin since admission 12/4      Review of Systems: Pertinent positive and negative systems described in HPI; the remainder of the 14 systems are negative    Past Medical History:  -Hodgkin's lymphoma 1970s  -Prostate cancer 2021  -Testicular cancer  -HTN  -CAD with stent 2/2 radiation    Past Surgical History:  -  Social History:  Social History     Socioeconomic History     Marital status:    Tobacco Use     Smoking status: Never     Smokeless tobacco: Never   Substance and Sexual Activity     Alcohol use: Not Currently     Drug use: Never        Family History  Non-contributory   Outpatient Medications:  No current facility-administered " medications on file prior to encounter.  acetaminophen (TYLENOL) 500 MG tablet, [ACETAMINOPHEN (TYLENOL) 500 MG TABLET] Take 500 mg by mouth every 6 (six) hours as needed for pain (2 tabs).  albuterol (PROVENTIL HFA;VENTOLIN HFA) 90 mcg/actuation inhaler, Inhale 2-4 puffs into the lungs every 4 hours as needed (cough)  aspirin 81 mg chewable tablet, Take 81 mg by mouth daily  calcium-vitamin D 500 mg(1,250mg) -200 unit per tablet, [CALCIUM-VITAMIN D 500 MG(1,250MG) -200 UNIT PER TABLET] Take 1 tablet by mouth 2 (two) times a day with meals.  clopidogrel (PLAVIX) 75 mg tablet, Take 75 mg by mouth daily  metoprolol (LOPRESSOR) 50 MG tablet, Take 50 mg by mouth 2 times daily  pantoprazole (PROTONIX) 40 MG EC tablet, Take 40 mg by mouth daily  PRALUENT 150 MG/ML injectable pen, Inject 150 mg Subcutaneous every 14 days       Physical Exam:    BP (!) 88/54   Pulse (!) 124   Temp 98.2  F (36.8  C)   Resp 20   Wt 82.4 kg (181 lb 10.5 oz)   SpO2 96%   BMI 27.62 kg/m    Constitutional: sleeping, in NAD, laying in bed, multiple family members at bedside.   Eyes: no scleral icterus   ENT: Normocephalic, without obvious abnormality, moist mucus membranes   Respiratory: Non-labored breathing. On RA   Cardiovascular: no edema, warm well perfused   GI: soft, non-distended   Skin: No concerning lesions or rash on exposed areas.   Musculoskeletal: Normal bulk.   Neurologic: Somnolent but able to answer simple questions, does not open eyes often       Labs & Studies: I personally reviewed the following studies:  ROUTINE LABS (Last four results):  CMP  Recent Labs   Lab 12/07/22  0512 12/07/22  0434 12/06/22  1521 12/06/22  1226 12/06/22  0829 12/06/22  0422 12/05/22  1537 12/05/22  1141 12/05/22  1025 12/05/22  1008 12/05/22  0024 12/04/22  2353   NA  --  138  --   --   --  142  --   --   --  141  --  140   POTASSIUM  --  3.5  --   --   --  4.2  --  3.8  --  3.7   < > 4.4   CHLORIDE  --  106  --   --   --  106  --   --   --   108*  --  104   CO2  --  17*  --   --   --  20*  --   --   --  20*  --  11*   ANIONGAP  --  15  --   --   --  16*  --   --   --  13  --  25*   * 119* 95 107*   < > 134*   < >  --    < > 142*   < > 140*   BUN  --  68.9*  --   --   --  49.9*  --   --   --  35.5*  --  33.4*   CR  --  3.59*  --   --   --  2.87*  --   --   --  1.88*  --  1.68*   GFRESTIMATED  --  18*  --   --   --  23*  --   --   --  39*  --  44*   YUNIOR  --  7.5*  --   --   --  8.0*  --   --   --  7.9*  --  8.2*   MAG  --  2.0  --   --   --  2.1  --   --   --  2.1  --  1.8   PHOS  --  4.2  --   --   --  4.1  --   --   --  2.4*  --  2.8   PROTTOTAL  --   --   --   --   --   --   --   --   --  5.8*  --  6.2*   ALBUMIN  --  2.3*  --   --   --  2.8*  --   --   --  2.9*  --  3.0*   BILITOTAL  --   --   --   --   --   --   --   --   --  1.1  --  1.3*   ALKPHOS  --   --   --   --   --   --   --   --   --  58  --  52   AST  --   --   --   --   --   --   --   --   --  131*  --  149*   ALT  --   --   --   --   --   --   --   --   --  61*  --  78*    < > = values in this interval not displayed.     CBC  Recent Labs   Lab 12/07/22  1359 12/07/22  0434 12/06/22  0422 12/05/22  1141   WBC 13.6* 12.9* 12.3* 9.1   RBC 3.61* 3.37* 3.88* 4.18*   HGB 10.8* 10.1* 11.7* 12.8*   HCT 32.0* 30.4* 35.5* 38.8*   MCV 89 90 92 93   MCH 29.9 30.0 30.2 30.6   MCHC 33.8 33.2 33.0 33.0   RDW 16.3* 16.2* 16.0* 15.9*   PLT 84* 64* 79* 96*     INR  Recent Labs   Lab 12/04/22  1341   INR 1.23*

## 2022-12-07 NOTE — PROGRESS NOTES
12/06/22 1530   Appointment Info   Signing Clinician's Name / Credentials (PT) Shantelle Trevino, PT   Living Environment   People in Home alone   Current Living Arrangements apartment   Home Accessibility no concerns   Living Environment Comments Pt attempting to answer PLOF and home set up questions with changing answers at times though pt expressing frustration with word finding difficulties. Able to verify true PLOF from family after assessment with pt.   Self-Care   Usual Activity Tolerance good   Current Activity Tolerance poor   Regular Exercise Yes   Activity/Exercise Type strength training;walking   Exercise Amount/Frequency 3-5 times/wk   Equipment Currently Used at Home none   Activity/Exercise/Self-Care Comment Pt exercises regularly walking for cardio workout and strength traning. Pt works full time as  and is set to retire Jan 2nd   General Information   Onset of Illness/Injury or Date of Surgery 12/04/22   Referring Physician Lauri Friend MD   Pertinent History of Current Problem (include personal factors and/or comorbidities that impact the POC) 66-year-old gentleman with a past medical history of hypertension, hyperlipidemia, Hodgkin's lymphoma status post radiation and MOPP chemotherapy, in remission, testicular cancer status post a left orchiectomy and chemotherapy, in remission, prostate cancer (Zimmerman 4+3, diagnosed April 2021) status post radiation and Lupron, aortic stenosis status post bioprosthetic aortic valve in (2014) and coronary artery disease status post PCI to the LAD in (2012) who on December 4, 2022 developed a left-sided gaze with right-sided weakness and was noted to have a fever and brought to an outside hospital where he was recognized to be in septic shock and transferred to [facility] ICU.  Head CT and CTA of the head and neck did not display any acute abnormality or stenosis.  MRI brain displayed scattered foci of acute ischemia involving the  bilateral anterior and posterior cerebral artery distributions.  Neurology was consulted and concerned that the these might represent embolic strokes.  Further infectious work-up was done including a CT chest abdomen and pelvis which revealed groundglass opacities in the left upper lobe and left lower lobe with tree-in-bud findings in the setting of interstitial pulmonary edema and a left pleural effusion.  He was started on broad-spectrum antibiotics including nafcillin and ceftriaxone.  Lumbar puncture was performed and CSF was positive for gram-positive cocci, final cultures pending.  Cardiology was consulted with concerns for possible endocarditis.  TTE was performed which revealed severe mitral calcification with moderate insufficiency and a bioprosthetic aortic valve which was well-seated with no vegetations present. ID was consulted and he was adjusted to Nafcillin with treatment for likely endocarditis in the setting of a high grade blood stream infection from MSSA that resulted in pneumonia, embolic strokes, acute kidney injury and encephalopathy. Today, he is transferring out of the ICU to the [facility] for further care.   Existing Precautions/Restrictions fall   Heart Disease Risk Factors Medical history   General Observations Pt agreeable to working with PT once awake.   Cognition   Affect/Mental Status (Cognition) confused   Orientation Status (Cognition) oriented to;person   Follows Commands (Cognition) follows one-step commands;25-49% accuracy;delayed response/completion;increased processing time needed;initiation impaired;physical/tactile prompts required;repetition of directions required;verbal cues/prompting required   Safety Deficit (Cognition) severe deficit;awareness of need for assistance;insight into deficits/self-awareness;problem-solving;safety precautions awareness   Cognitive Status Comments Pt presents this pm with fairly significant expressive aphasia, dysarthria, and moderate  "receptive aphasia. Pt varied from lethargic upon approach to as above with verbal/tactile moderate stimuli. Pt's alertness improved with mobility lifting up ot EOB and when engaged still while sittting in chair   Posture    Posture Forward head position;Protracted shoulders   Range of Motion (ROM)   ROM Comment PROM WLF except L DF decreased with pt reporting pain at \"toes\". AROM impaired by difficulty with comprehension   Strength (Manual Muscle Testing)   Strength Comments Currently presents R>L weakness UE and LE but mod to significant impairment with comprehension/direction following. with increased alertness/sitting pt able to AA move through 50% range R UE and LE.   Bed Mobility   Comment, (Bed Mobility) Max A x2 rolling   Transfers   Comment, (Transfers) Dependent, total A bed<>chair/standing   Gait/Stairs (Locomotion)   Comment, (Gait/Stairs) dependent, unable to trial   Coordination   Coordination Comments moderate to significant impairment R, but L mod impairment mostly due to receptive aphasia presentation   Clinical Impression   Criteria for Skilled Therapeutic Intervention Yes, treatment indicated   PT Diagnosis (PT) impaired functional mobility   Influenced by the following impairments decreased strength, range, balance, cognition, alertness   Functional limitations due to impairments decreased I with transfers, gait, bed mob, ADLs   Clinical Presentation (PT Evaluation Complexity) Evolving/Changing   Clinical Presentation Rationale clinical judgement   Clinical Decision Making (Complexity) moderate complexity   Planned Therapy Interventions (PT) balance training;bed mobility training;gait training;home exercise program;neuromuscular re-education;motor coordination training;patient/family education;ROM (range of motion);strengthening;stretching;transfer training;progressive activity/exercise;risk factor education;home program guidelines   Risk & Benefits of therapy have been explained " evaluation/treatment results reviewed;care plan/treatment goals reviewed;risks/benefits reviewed;current/potential barriers reviewed;participants voiced agreement with care plan;participants included;patient;daughter   PT Total Evaluation Time   PT Ricaal, Moderate Complexity Minutes (59211) 7   Physical Therapy Goals   PT Frequency 6x/week   PT Predicted Duration/Target Date for Goal Attainment 01/04/23   PT Goals Bed Mobility;Transfers;Gait   PT: Bed Mobility Modified independent;Supine to/from sit;Rolling;Within precautions   PT: Transfers Modified independent;Sit to/from stand;Bed to/from chair;Assistive device;Within precautions   PT: Gait Modified independent;Assistive device;Within precautions;100 feet   Total Session Time   Total Session Time (sum of timed and untimed services) 7

## 2022-12-07 NOTE — PLAN OF CARE
Neuro: lethargic and slight confusion during shift MD aware.   Cardiac:  Tele Sinus Tach 100s-121s, BP 80s-100s/50s-60s  MAP >64  Bolus  of  500 ml NS infused over 4 hour.   Respiratory:  RA SpO2 > 94%   GI/:  Medina and rectal tube patent, adequate UOP.   Diet/appetite: Minced and moist thin liquids, very lethargic took a couple of bites of pudding and sips of water.   Activity:  Ceiling lift, sat in chair around noon, tolerated well   Pain: denies    Skin: No new deficits noted.  LDA's: 2 Lt PIV 1 rt PIV. TKO  And abx infusing     Today   -US completed  - Bolus 500 NS infused over 4 hours, effective after completion BP 100s/ 60s MAP above 65.   - UA completed    Plan: Continue with POC. Notify primary team with changes.

## 2022-12-07 NOTE — PROGRESS NOTES
[facility]    Medicine Progress Note - Hospitalist Service, GOLD TEAM 9    Date of Admission:  12/4/2022    Assessment & Plan   Mr. Fletcher is a 66-year-old gentleman with a past medical history of hypertension, hyperlipidemia, Hodgkin's lymphoma status post radiation and MOPP chemotherapy, in remission, testicular cancer status post a left orchiectomy and chemotherapy, in remission, prostate cancer (Elly 4+3, diagnosed April 2021) status post radiation and Lupron, aortic stenosis status post bioprosthetic aortic valve in (2014) and coronary artery disease status post PCI to the LAD in (2012) who on December 4, 2022 developed a left-sided gaze with right-sided weakness and was noted to have a fever and brought to an outside hospital where he was recognized to be in septic shock and transferred to [facility] ICU.  Head CT and CTA of the head and neck did not display any acute abnormality or stenosis.  MRI brain displayed scattered foci of acute ischemia involving the bilateral anterior and posterior cerebral artery distributions.  Neurology was consulted and concerned that the these might represent embolic strokes.  Further infectious work-up was done including a CT chest abdomen and pelvis which revealed groundglass opacities in the left upper lobe and left lower lobe with tree-in-bud findings in the setting of interstitial pulmonary edema and a left pleural effusion.  He was started on broad-spectrum antibiotics including nafcillin and ceftriaxone.  Lumbar puncture was performed and CSF was positive for gram-positive cocci, final cultures pending.  Cardiology was consulted with concerns for possible endocarditis.  TTE was performed which revealed severe mitral calcification with moderate insufficiency and a bioprosthetic aortic valve which was well-seated with no vegetations present. ID was consulted and he was adjusted to Nafcillin with treatment for likely  endocarditis in the setting of a high grade blood stream infection from MSSA that resulted in pneumonia, embolic strokes, acute kidney injury and encephalopathy. 12/6, he is transfered out of the ICU.    Changes today:  - significant thrombocytopenia over the last several days - ? Possible HIT vs sepsis, other - smear ordered, will discuss with pharmacy and hematology  - some discussed re: diagnosis of endocarditis - ILDEFONSO without vegetations, but meeting [facility] Criteria.  Will discuss further with ID/cards  - mental status waxing and waning, but overall seems to be mildly improving  - neurology consult  - renal consult for progressively worsening JILLIAN - work up below    #Severe sepsis (fever, leukocytosis, tachycardia, tachypnea) secondary to MSSA high-grade bloodstream infection  #Acute embolic strokes with right-sided weakness  #Acute encephalopathy, improving  #Bacterial community-acquired pneumonia  He was initially started on IV Vancomycin/ Zosyn at OSH. Blood cultures x 2 from 12/4/22 grew positive for pansensitive Staph aureus. There were no repeat cultures on 12/5. CSF culture Repeat cultures done 12/6 so far are without growth. Antibiotic regimen has been adjusted to Nafcillin, per ID. His encephalopathy is improving and Neurology is following.  -Infectious Disease is following and per recommendations, continue with Nafcillin 2gm IV q4h with likely treatment for 4-6 weeks for suspected endocarditis  -Continue daily blood cultures, CRP and CBC, CMP  -Neurology was consulted and is following and per recommendations, will continue with neuro checks q4h and EEG x 24hr. Low threshold to repeat a head CT if he develops any acute neurologic changes.   -Speech therapy consulted, appreciate recs    #Demand ischemia with a triple anemia  #Aortic stenosis status post bioprosthetic aortic valve replacement in (2014  #Coronary artery disease status post PCI to the LAD in (2012  #Radiation induced heart  disease  #Hypertension  #Hyperlipidemia  -Mr. Fletcher follows at [facility] with cardiology with Dr. Winnie Roth and was last seen November 16, 2022.  He is planned in February 2023 to undergo updated cardiac surgery for his significant calcifications and valve disease.  -TTE EEG was performed without any evidence of vegetations.  Bacteria currently in his bloodstream is not consistent with haystack organisms.  -Continue to hold Plavix and aspirin in the setting of bleeding risk given his recent stroke  -Cardiology was consulted and is following and per recommendations troponin has been elevated secondary to demand ischemia and does not require any further intervention    #Acute Kidney Injury - baseline around 0.9, significant worsening in 24   - US collecting system  - repeat UA  - fena, follow UOP  - avoid nephrotoxic agents  - nephrology consult    # Generalized weakness secondary to critical illness  -PT/OT consults to assess safety to return home and assist with strength training    # Hodgkin's lymphoma status post radiation and MOPP chemotherapy, in remission  -No acute issues.    # Testicular cancer status post a left orchiectomy and chemotherapy, in remission  -No acute issues.     # Prostate cancer (Melvin 4+3, diagnosed April 2021) s/p radiation and Lupron  -Follows with Urology and Oncology as an outpatient     Diet: Full Liquid Diet Thin Liquids (level 0)  Snacks/Supplements Adult: Ensure Enlive; Between Meals    DVT Prophylaxis: Heparin SQ  Medina Catheter: PRESENT, indication: Retention  Central Lines: None  Cardiac Monitoring: ACTIVE order. Indication: Infective endocarditis (48 hours) - additional guidance recommending until clinically stable  Code Status: Full Code      Disposition Plan  From home, will discharge likely to TCU for need of IV antibiotics once medically stable   Expected discharge date: 12/10/22        Jericho Santos MD  Hospitalist Service, [facility] TEAM [address] [place] [address]  "[facility]  Securely message with the [employer] (learn more here)  Text page via [facility] Paging/Directory   Please see signed in provider for up to date coverage information      Clinically Significant Risk Factors              # Hypoalbuminemia: Lowest albumin = 2.3 g/dL (Ref range: 3.5-5.2) at 12/7/2022  4:34 AM, will monitor as appropriate   # Thrombocytopenia: Lowest platelets = 64 (Ref range: 150-450) in last 2 days, will monitor for bleeding        # Overweight: Estimated body mass index is 27.62 kg/m  as calculated from the following:    Height as of an earlier encounter on 12/4/22: 1.727 m (5' 8\").    Weight as of this encounter: 82.4 kg (181 lb 10.5 oz)., PRESENT ON ADMISSION       ______________________________________________________________________    Interval History   Mr. Fletcher was resting in bed, opened his eyes to sternal rub.  Denied significant pain or shortness of breath.  No fevers or chills.  He is able to state he is in a healthcare setting.  Trying to follow commands and answer questions.    Data reviewed today: I reviewed all medications, new labs and imaging results over the last 24 hours.     Physical Exam   Vital Signs: Temp: 97.5  F (36.4  C) Temp src: Oral BP: 127/77 Pulse: (!) 128   Resp: 22 SpO2: 96 % O2 Device: None (Room air) Oxygen Delivery: 2 LPM  Weight: 181 lbs 10.54 oz  General Appearance: 66 year old gentleman resting in bed, no acute distress, denies pain  Respiratory: breathing comfortably on room air, few crackles bilaterally, moderate aeration  Cardiovascular: regular rate and rhythm, systolic murmur present, no heaves or lifts  GI: bowel sounds active, soft, non-tender to palpation throughout, no rebound or guarding  Skin: warm, dry, no open sores, lesions or ulcerations    Data   Recent Labs   Lab 12/07/22  0512 12/07/22  0434 12/06/22  1521 12/06/22  0829 12/06/22  0422 12/05/22  1537 12/05/22  1141 12/05/22  1025 " 12/05/22  1008 12/05/22  0024 12/04/22  2353 12/04/22  2146 12/04/22  1341   WBC  --  12.9*  --   --  12.3*  --  9.1  --   --   --  11.4*  --  13.7*   HGB  --  10.1*  --   --  11.7*  --  12.8*  --   --   --  12.7*  --  13.0*   MCV  --  90  --   --  92  --  93  --   --   --  97  --  91   PLT  --  64*  --   --  79*  --  96*  --   --   --  118*  --  168   INR  --   --   --   --   --   --   --   --   --   --   --   --  1.23*   NA  --  138  --   --  142  --   --   --  141  --  140  --  132*   POTASSIUM  --  3.5  --   --  4.2  --  3.8  --  3.7   < > 4.4  --  4.1   CHLORIDE  --  106  --   --  106  --   --   --  108*  --  104  --  95*   CO2  --  17*  --   --  20*  --   --   --  20*  --  11*  --  22   BUN  --  68.9*  --   --  49.9*  --   --   --  35.5*  --  33.4*  --  29.5*   CR  --  3.59*  --   --  2.87*  --   --   --  1.88*  --  1.68*  --  1.50*   ANIONGAP  --  15  --   --  16*  --   --   --  13  --  25*  --  15   YUNIOR  --  7.5*  --   --  8.0*  --   --   --  7.9*  --  8.2*  --  8.7*   * 119* 95   < > 134*   < >  --    < > 142*   < > 140*   < > 155*   ALBUMIN  --  2.3*  --   --  2.8*  --   --   --  2.9*  --  3.0*   < >  --    PROTTOTAL  --   --   --   --   --   --   --   --  5.8*  --  6.2*  --   --    BILITOTAL  --   --   --   --   --   --   --   --  1.1  --  1.3*  --   --    ALKPHOS  --   --   --   --   --   --   --   --  58  --  52  --   --    ALT  --   --   --   --   --   --   --   --  61*  --  78*  --   --    AST  --   --   --   --   --   --   --   --  131*  --  149*  --   --     < > = values in this interval not displayed.

## 2022-12-07 NOTE — CONSULTS
Nephrology Initial Consult  December 7, 2022      Eloy Fletcher MRN:[id number] YOB: 1955  Date of Admission:12/4/2022  Primary care provider: Surinder Lee  Requesting physician: Jericho Santos,*    ASSESSMENT AND RECOMMENDATIONS:   #JILLIAN  Likely multifactorial in the setting of recently diagnosed bacteremia and endocarditis. Notably, he has been hypotensive compared to his prior baseline (130-150 systolic at prior clinic appointments). Recent administration of acyclovir IV (12/4-12/5), contrast (12/4), gentamycin (12/5), vancomycin/zosyn. Concern for AIN is low, given no eosinophilia on recent differential. At this time, he requires nafcillin for CNS penetration. Initial concern for embolic phenomena in kidney, but no evidence on US. Mild hydro does not explain this progressive rise. Many of these possible contributing agents have been stopped. We agree with additional volume resuscitation in setting of relative hypotension due to vasoplegia.   -Agree with resuscitation for low BP  -Limit additional nephrotoxins as able  -Of note, urine eosinophils is of limit benefit for evaluation of AIN  -We will continue to follow creatinine trend    Recommendations were communicated to primary team via note    Seen and discussed with Dr. Kiki Cesar MD   Division of Renal Disease and Hypertension  [facility]  myairmail  Vocera Web Console        REASON FOR CONSULT: JILLIAN    HISTORY OF PRESENT ILLNESS:  Admitting provider and nursing notes reviewed  Eloy Fletcher is a 66 year old with history of HTN, HLD, Hodgekins lympoma s/p radiation and MOPP (in remission), testicular cancer s/p left orchiectomy, prostate cancer ([place] 4+3, diagnosed April 2021) s/p radiation and luperon, aortic stenosis s/p bioprosthetic aortic valve (2014), CAD s/p PCI (2012). Initially presented with septic shock. Ultimately found to have MSSA bacteremia. Noted to have MSSA in CSF. Clinical picture  concerning for endocarditis. He was started on nafcillin for management. Has now noted progressive rise in creatinine, nephrology consulted for evaluation.     He is sleepy on exam, limited history. It does appear that his BP has consistently been , which is considerably lower than he was previously (130-150 in recent clinic visits).     PAST MEDICAL HISTORY:  Reviewed with patient on 12/07/2022   No past medical history on file.    No past surgical history on file.     MEDICATIONS:  PTA Meds  Prior to Admission medications    Medication Sig Last Dose Taking? Auth Provider Long Term End Date   acetaminophen (TYLENOL) 500 MG tablet [ACETAMINOPHEN (TYLENOL) 500 MG TABLET] Take 500 mg by mouth every 6 (six) hours as needed for pain (2 tabs).   Provider, Historical     albuterol (PROVENTIL HFA;VENTOLIN HFA) 90 mcg/actuation inhaler Inhale 2-4 puffs into the lungs every 4 hours as needed (cough)   Provider, Historical     aspirin 81 mg chewable tablet Take 81 mg by mouth daily   Provider, Historical     calcium-vitamin D 500 mg(1,250mg) -200 unit per tablet [CALCIUM-VITAMIN D 500 MG(1,250MG) -200 UNIT PER TABLET] Take 1 tablet by mouth 2 (two) times a day with meals.   Provider, Historical     clopidogrel (PLAVIX) 75 mg tablet Take 75 mg by mouth daily   Provider, Historical     metoprolol (LOPRESSOR) 50 MG tablet Take 50 mg by mouth 2 times daily   Provider, Historical     pantoprazole (PROTONIX) 40 MG EC tablet Take 40 mg by mouth daily   Reported, Patient     PRALUENT 150 MG/ML injectable pen Inject 150 mg Subcutaneous every 14 days   Reported, Patient        Current Meds    sodium chloride 0.9%  500 mL Intravenous Once     nafcillin  2 g Intravenous Q4H     pantoprazole  40 mg Intravenous Daily with breakfast     sodium chloride (PF)  3 mL Intracatheter Q8H     Infusion Meds    sodium chloride 150 mL/hr (12/05/22 1330)       ALLERGIES:    Allergies   Allergen Reactions     Heparin Heparin Induced  "Thrombocytopenia     HIT work up  pending     Crestor [Rosuvastatin] Other (See Comments)     \"Funny feeling in both legs\" all statins       REVIEW OF SYSTEMS:  A comprehensive of systems was negative except as noted above.    SOCIAL HISTORY:   Social History     Socioeconomic History     Marital status:      Spouse name: Not on file     Number of children: Not on file     Years of education: Not on file     Highest education level: Not on file   Occupational History     Not on file   Tobacco Use     Smoking status: Never     Smokeless tobacco: Never   Substance and Sexual Activity     Alcohol use: Not Currently     Drug use: Never     Sexual activity: Not on file   Other Topics Concern     Not on file   Social History Narrative     Not on file     Social Determinants of Health     Financial Resource Strain: Not on file   Food Insecurity: Not on file   Transportation Needs: Not on file   Physical Activity: Not on file   Stress: Not on file   Social Connections: Not on file   Intimate Partner Violence: Not on file   Housing Stability: Not on file     Reviewed    FAMILY MEDICAL HISTORY:   No family history on file.  Reviewed    PHYSICAL EXAM:   Temp  Av.4  F (37.4  C)  Min: 97.4  F (36.3  C)  Max: 104.4  F (40.2  C)      Pulse  Av.5  Min: 91  Max: 144 Resp  Av.7  Min: 8  Max: 142  SpO2  Av.3 %  Min: 71 %  Max: 100 %       BP 96/57 (BP Location: Right arm)   Pulse 120   Temp 97.8  F (36.6  C) (Oral)   Resp 20   Wt 82.4 kg (181 lb 10.5 oz)   SpO2 97%   BMI 27.62 kg/m     Date 22 0700 - 22 0659   Shift 8880-4949 0851-3266 5164-1047 24 Hour Total   INTAKE   Shift Total(mL/kg)       OUTPUT   Urine 775 300  1075   Stool  20  20   Shift Total(mL/kg) 775(9.41) 320(3.88)  1095(13.29)   Weight (kg) 82.4 82.4 82.4 82.4      Admit Weight: 81.1 kg (178 lb 12.7 oz)     GENERAL APPEARANCE: no distress, fatigued  EYES: no scleral icterus, pupils equal  Endo: no goiter, no moon " facies  Lymphatics: no cervical or supraclavicular LAD  Pulmonary: lungs clear to auscultation with equal breath sounds bilaterally  CV: regular rhythm, normal rate, no rub   - Edema trace  GI: soft, nontender, normal bowel sounds  MS: no evidence of inflammation in joints, no muscle tenderness  : yes chan  SKIN: no rash, warm, dry, no cyanosis  NEURO: face symmetric, unable to assess asterixis     LABS:   CMP  Recent Labs   Lab 12/07/22  0512 12/07/22  0434 12/06/22  1521 12/06/22  1226 12/06/22  0829 12/06/22  0422 12/05/22  1537 12/05/22  1141 12/05/22  1025 12/05/22  1008 12/05/22  0024 12/04/22  2353   NA  --  138  --   --   --  142  --   --   --  141  --  140   POTASSIUM  --  3.5  --   --   --  4.2  --  3.8  --  3.7   < > 4.4   CHLORIDE  --  106  --   --   --  106  --   --   --  108*  --  104   CO2  --  17*  --   --   --  20*  --   --   --  20*  --  11*   ANIONGAP  --  15  --   --   --  16*  --   --   --  13  --  25*   * 119* 95 107*   < > 134*   < >  --    < > 142*   < > 140*   BUN  --  68.9*  --   --   --  49.9*  --   --   --  35.5*  --  33.4*   CR  --  3.59*  --   --   --  2.87*  --   --   --  1.88*  --  1.68*   GFRESTIMATED  --  18*  --   --   --  23*  --   --   --  39*  --  44*   YUNIOR  --  7.5*  --   --   --  8.0*  --   --   --  7.9*  --  8.2*   MAG  --  2.0  --   --   --  2.1  --   --   --  2.1  --  1.8   PHOS  --  4.2  --   --   --  4.1  --   --   --  2.4*  --  2.8   PROTTOTAL  --   --   --   --   --   --   --   --   --  5.8*  --  6.2*   ALBUMIN  --  2.3*  --   --   --  2.8*  --   --   --  2.9*  --  3.0*   BILITOTAL  --   --   --   --   --   --   --   --   --  1.1  --  1.3*   ALKPHOS  --   --   --   --   --   --   --   --   --  58  --  52   AST  --   --   --   --   --   --   --   --   --  131*  --  149*   ALT  --   --   --   --   --   --   --   --   --  61*  --  78*    < > = values in this interval not displayed.     CBC  Recent Labs   Lab 12/07/22  1359 12/07/22  0434 12/06/22  8445  12/05/22  1141   HGB 10.8* 10.1* 11.7* 12.8*   WBC 13.6* 12.9* 12.3* 9.1   RBC 3.61* 3.37* 3.88* 4.18*   HCT 32.0* 30.4* 35.5* 38.8*   MCV 89 90 92 93   MCH 29.9 30.0 30.2 30.6   MCHC 33.8 33.2 33.0 33.0   RDW 16.3* 16.2* 16.0* 15.9*   PLT 84* 64* 79* 96*     INR  Recent Labs   Lab 12/04/22  1341   INR 1.23*   PTT 41*     ABG  Recent Labs   Lab 12/07/22  1622 12/07/22  0521 12/05/22  1832   O2PER 25 21 2      URINE STUDIES  Recent Labs   Lab Test 12/07/22  1509 12/05/22  0538 12/04/22  1534   COLOR Yellow Yellow Light Yellow   APPEARANCE Clear Slightly Cloudy* Slightly Cloudy*   URINEGLC Negative Negative 100*   URINEBILI Negative Negative Negative   URINEKETONE Negative Negative 15*   SG 1.013 1.023 1.025   UBLD Moderate* Moderate* Moderate*   URINEPH 5.5 5.5 5.0   PROTEIN 20* 70* 100*   NITRITE Negative Negative Negative   LEUKEST Trace* Negative Negative   RBCU 23* 5* 4*   WBCU 6* 8* 3     No lab results found.  PTH  No lab results found.  IRON STUDIES  No lab results found.    IMAGING:  All imaging studies reviewed by me.     Monty Cesar MD  Division of Renal Disease and Hypertension  [facility]  [employer] Web Console

## 2022-12-07 NOTE — PLAN OF CARE
Transfer  Transferred from:   Via:bed  Reason for transfer: Pt appropriate for 6B- improved patient condition  Family: Aware of transfer  Belongings: Received with pt  Chart: Received with pt  Medications: Meds received from old unit with pt  Code Status verified on armband: yes  2 RN Skin Assessment Completed By: Annamarie GALLEGO and Elizabeth PATINO Blanchable redness on heels and sacrum. Pulsate mattress ordered.   Med rec completed: yes  Bed surface reassessed with algorithm and charted: yes  New bed surface ordered: yes   Suction/Ambu bag/Flowmeter at bedside: yes    Report received from:  nurse Tiffanie   Pt status:     Neuro: Lethargic but arousable, oriented to place. Temp. Max 100.5   Cardiac: ST, 's. VSS.   Respiratory: Sating >93% on RA.  GI/: Adequate urine output via chan, rectal tube in place.   Diet/appetite: Tolerating full liquid diet. Eating well.  Activity: Lift assist, up to chair and in halls.  Pain: At acceptable level on current regimen.   Skin: No new deficits noted.  LDA's: Right PIV x2, left PIV x1    Plan: Continue with POC. Notify primary team with changes.

## 2022-12-08 NOTE — PROGRESS NOTES
Nephrology Progress Note  12/08/2022         Assessment & Recommendations:   Eloy Fletcher is a 66 year old with history of HTN, HLD, Hodgekins lympoma s/p radiation and MOPP (in remission), testicular cancer s/p left orchiectomy, prostate cancer ([place] 4+3, diagnosed April 2021) s/p radiation and luperon, aortic stenosis s/p bioprosthetic aortic valve (2014), CAD s/p PCI (2012). Initially presented with septic shock. Ultimately found to have MSSA bacteremia. Noted to have MSSA in CSF. Clinical picture concerning for endocarditis. He was started on nafcillin for management. Has now noted progressive rise in creatinine, nephrology consulted for evaluation.    #JILLIAN  Likely multifactorial in the setting of recently diagnosed bacteremia and endocarditis. Notably, he has been hypotensive compared to his prior baseline (130-150 systolic at prior clinic appointments). Recent administration of acyclovir IV (12/4-12/5), contrast (12/4), gentamycin (12/5), vancomycin/zosyn. Concern for AIN is low, given no eosinophilia on recent differential. At this time, he requires nafcillin for CNS penetration. We agree with additional volume resuscitation in setting of relative hypotension due to vasoplegia.   -Agree with resuscitation for low BP  -Limit additional nephrotoxins as able  -Of note, urine eosinophils is of limit benefit for evaluation of AIN  -We will continue to follow creatinine trend      Recommendations were communicated to primary team verbally    Seen and discussed with Dr. Kiki Cesar MD   Division of Renal Disease and Hypertension  [facility]  [facility]  [employer] Web Console      Interval History :   Nursing and provider notes from last 24 hours reviewed.  In the last 24 hours Eloy Fletcher remained hypotensive overnight. This morning is somewhat more awake and working on transition to chair. Noted to have some improvement in BP with this. Has had cautious IVF resuscitation due to significant  valvular disease.     Physical Exam:   I/O last 3 completed shifts:  In: 1150 [P.O.:600; I.V.:550]  Out: 2545 [Urine:2325; Stool:220]   /78 (BP Location: Right arm)   Pulse 107   Temp 98.2  F (36.8  C) (Oral)   Resp 20   Wt 84.2 kg (185 lb 10 oz)   SpO2 98%   BMI 28.22 kg/m       GENERAL APPEARANCE: Fatigued, arousable   EYES:  no scleral icterus, pupils equal  HENT: mouth without ulcers or lesions  PULM: lungs clear to auscultation bilaterally  CV: regular rhythm, normal rate, no rub     -edema 1+ LE   GI: soft, nondistended  INTEGUMENT: no cyanosis, no rash  NEURO:  no asterixis   Access none    Labs:   All labs reviewed by me  Electrolytes/Renal - Recent Labs   Lab Test 12/08/22  0812 12/07/22  1650 12/07/22  0512 12/07/22  0434 12/06/22  0829 12/06/22 0422    137  --  138  --  142   POTASSIUM 3.6  --   --  3.5  --  4.2   CHLORIDE 104  --   --  106  --  106   CO2 19*  --   --  17*  --  20*   BUN 83.0*  --   --  68.9*  --  49.9*   CR 4.16* 3.85*  --  3.59*  --  2.87*   *  --  107* 119*   < > 134*   YUNIOR 7.5*  --   --  7.5*  --  8.0*   MAG 2.5*  --   --  2.0  --  2.1   PHOS 5.3*  --   --  4.2  --  4.1    < > = values in this interval not displayed.       CBC -   Recent Labs   Lab Test 12/08/22  0812 12/07/22  1359 12/07/22  0434   WBC 15.4* 13.6* 12.9*   HGB 10.5* 10.8* 10.1*   * 84* 64*       LFTs -   Recent Labs   Lab Test 12/08/22  0812 12/07/22  0434 12/06/22  0422 12/05/22  1008 12/04/22  2353   ALKPHOS 92  --   --  58 52   BILITOTAL 4.0*  --   --  1.1 1.3*   ALT 26  --   --  61* 78*   AST 69*  --   --  131* 149*   PROTTOTAL 5.1*  --   --  5.8* 6.2*   ALBUMIN 2.4*  2.4* 2.3* 2.8* 2.9* 3.0*       Iron Panel - No lab results found.      Imaging:  All imaging studies reviewed by me.     Current Medications:    sodium chloride 0.9%  250 mL Intravenous Once     nafcillin  2 g Intravenous Q4H     pantoprazole  40 mg Intravenous Daily with breakfast     sodium chloride (PF)  3 mL  Intracatheter Q8H       sodium chloride 150 mL/hr (12/05/22 1330)     Monty Cesar MD  Division of Renal Disease and Hypertension  [facility]  myairmail  Vocera Web Console

## 2022-12-08 NOTE — PROVIDER NOTIFICATION
"Provider notified: Gold 9 Attending   0748- \"D Eagleidictamar 6B 6233-1 pts MAP 59 after bolus.  BP 86/44. pt also very lethargic. do you want to do another 250 cc bolus? can you come to bedside? Thanks Cindy [phone]\"    0806- \"D Delguidice 6B 6233-1 pts last MAP 58. BP 85/48. pt very drowsy. how do would you like to proceed? thanks Cindy Quintanilla\"    MD to bedside. Plan for gentle bolus and continue to monitor BP.    "

## 2022-12-08 NOTE — PROVIDER NOTIFICATION
0310: Updated gold cross coverage regarding critical lab value. Blood cultures drawn yesterday are growing gram positive cultures.    0550: Updated gold cross coverage about MAPs slowly trending down 60-67 over last 2 hours. 250 mL LR bolus ordered.

## 2022-12-08 NOTE — PROVIDER NOTIFICATION
"Provider notified: Murphy 9 NP    9557- \"CLOVIS Fletcher 6B 6233-1- fyi pt very lethargic this afternoon, difficult to arouse at times. neuro assessments relatively unchanged. blood pressures stable. Thanks Cindy [phone]\"      "

## 2022-12-08 NOTE — PROGRESS NOTES
[facility]    Medicine Progress Note - Hospitalist Service, GOLD TEAM 9    Date of Admission:  12/4/2022    Assessment & Plan   Mr. Fletcher is a 66-year-old gentleman with a past medical history of hypertension, hyperlipidemia, Hodgkin's lymphoma status post radiation and MOPP chemotherapy, in remission, testicular cancer status post a left orchiectomy and chemotherapy, in remission, prostate cancer ([place] 4+3, diagnosed April 2021) status post radiation and Lupron, aortic stenosis status post bioprosthetic aortic valve in (2014) and coronary artery disease status post PCI to the LAD in (2012) who on December 4, 2022 developed a left-sided gaze with right-sided weakness and was noted to have a fever and brought to an outside hospital where he was recognized to be in septic shock and transferred to [facility] ICU.  Head CT and CTA of the head and neck did not display any acute abnormality or stenosis.  MRI brain displayed scattered foci of acute ischemia involving the bilateral anterior and posterior cerebral artery distributions.  Neurology was consulted and concerned that the these might represent embolic strokes.  Further infectious work-up was done including a CT chest abdomen and pelvis which revealed groundglass opacities in the left upper lobe and left lower lobe with tree-in-bud findings in the setting of interstitial pulmonary edema and a left pleural effusion.  He was started on broad-spectrum antibiotics including nafcillin and ceftriaxone.  Lumbar puncture was performed and CSF was positive for gram-positive cocci, final cultures pending.  Cardiology was consulted with concerns for possible endocarditis.  TTE was performed which revealed severe mitral calcification with moderate insufficiency and a bioprosthetic aortic valve which was well-seated with no vegetations present. ID was consulted and he was adjusted to Nafcillin with treatment for likely  endocarditis in the setting of a high grade blood stream infection from MSSA that resulted in pneumonia, embolic strokes, acute kidney injury and encephalopathy. 12/6, he is transfered out of the ICU.    Changes today:  - continue to evaluate volume status frequently and bolus prn  - MS continue to be labile, but overall seems to becoming more clear with longer periods of time of wakefulness - labs reassurring  - less concern for HIT/DIC with work up thus far  - continue current management of bacteremia, ongoing persistent positive cultures - will discuss with ID    #Severe sepsis (fever, leukocytosis, tachycardia, tachypnea) secondary to MSSA high-grade bloodstream infection  #Acute embolic strokes with right-sided weakness  #Acute encephalopathy, improving  #Bacterial community-acquired pneumonia  He was initially started on IV Vancomycin/ Zosyn at OSH. Blood cultures x 2 from 12/4/22 grew positive for pansensitive Staph aureus. There were no repeat cultures on 12/5. CSF culture Repeat cultures done 12/6 so far are without growth. Antibiotic regimen has been adjusted to Nafcillin, per ID. His encephalopathy is improving and Neurology is following.  -Infectious Disease is following and per recommendations, continue with Nafcillin 2gm IV q4h with likely treatment for 4-6 weeks for suspected endocarditis  -Continue daily blood cultures, CRP and CBC, CMP  -Neurology was consulted and has signed off. OK to restart antiplatelets from their perspective, but holding due to hematology concern for bleeding with MSSA embolic bleeding risk  - Low threshold to repeat a head CT if he develops any acute neurologic changes.   -Speech therapy consulted, appreciate recs    #Demand ischemia with a triple anemia  #Aortic stenosis status post bioprosthetic aortic valve replacement in (2014  #Coronary artery disease status post PCI to the LAD in (2012  #Radiation induced heart disease  #Hypertension  #Hyperlipidemia  -Mr. Fletcher  follows at [facility] with cardiology with Dr. Winnie Roth and was last seen November 16, 2022.  He is planned in February 2023 to undergo updated cardiac surgery for his significant calcifications and valve disease.  -TTE EEG was performed without any evidence of vegetations.    -Continue to hold Plavix and aspirin in the setting of bleeding risk given his recent stroke (as above)  -Cardiology was consulted and is following and per recommendations troponin has been elevated secondary to demand ischemia and does not require any further intervention    #acute thrombocytopenia - initial concern for possible HIT given score >3 and recent heparin exposure in November. HIT screen negative, fibrinogen not low.  DIC/HIT less likely. Supsect related to medications, sepsis, etc  - hematology consulted appreciate recs  - platelets improving, follow.    #Acute Kidney Injury, non-oliguric - baseline around 0.9, appears to be peaking around 4. Likely related to medications, hypotension, vasoplegia  - US collecting system without hydro  - follow UOP, monitor closely for post-ATN diuresis  - avoid nephrotoxic agents  - nephrology consult    # Generalized weakness secondary to critical illness  -PT/OT consults to assess safety to return home and assist with strength training    # Hodgkin's lymphoma status post radiation and MOPP chemotherapy, in remission  -No acute issues.    # Testicular cancer status post a left orchiectomy and chemotherapy, in remission  -No acute issues.     # Prostate cancer ([place] 4+3, diagnosed April 2021) s/p radiation and Lupron  -Follows with Urology and Oncology as an outpatient     Diet: Snacks/Supplements Adult: Ensure Enlive; Between Meals  Minced & Moist Diet (level 5) Thin Liquids (level 0)    DVT Prophylaxis: Heparin SQ  Medina Catheter: PRESENT, indication: Retention  Central Lines: None  Cardiac Monitoring: ACTIVE order. Indication: Infective endocarditis (48 hours) - additional guidance  "recommending until clinically stable  Code Status: Full Code      Disposition Plan  From home, will discharge likely to TCU for need of IV antibiotics once medically stable   Expected discharge date: 12/10/22      Jericho Santos MD  Hospitalist Service, GOLD TEAM [address] [place] [address]  Securely message with the Vocera Web Console (learn more here)  Text page via [facility] Paging/Directory   Please see signed in provider for up to date coverage information      Clinically Significant Risk Factors              # Hypoalbuminemia: Lowest albumin = 2.3 g/dL at 12/7/2022  4:34 AM, will monitor as appropriate   # Thrombocytopenia: Lowest platelets = 64 in last 2 days, will monitor for bleeding        # Overweight: Estimated body mass index is 28.22 kg/m  as calculated from the following:    Height as of an earlier encounter on 12/4/22: 1.727 m (5' 8\").    Weight as of this encounter: 84.2 kg (185 lb 10 oz)., PRESENT ON ADMISSION       ______________________________________________________________________    Interval History   Lying in bed, opened his eyes to sternal rub.  Denied significant pain or shortness of breath.  No fevers or chills. Able to answer most questions (not higher order).  Following commands well.     Data reviewed today: I reviewed all medications, new labs and imaging results over the last 24 hours.     Physical Exam   Vital Signs: Temp: 98.2  F (36.8  C) Temp src: Oral BP: 134/78 Pulse: 107   Resp: 20 SpO2: 98 % O2 Device: None (Room air)    Weight: 185 lbs 10.04 oz  General Appearance: 66 year old gentleman resting in bed, no acute distress, denies pain  Respiratory: breathing comfortably on room air, few crackles bilaterally, moderate aeration  Cardiovascular: regular rate and rhythm, systolic murmur present, no heaves or lifts  GI: bowel sounds active, soft, non-tender to palpation throughout, no rebound or guarding  Skin: warm, dry, no open sores, lesions " or ulcerations, edema in upper extremities, trace in lower and sacrum    Data   Recent Labs   Lab 12/08/22  0812 12/07/22  1650 12/07/22  1359 12/07/22  0512 12/07/22  0434 12/06/22  0829 12/06/22  0422 12/05/22  1025 12/05/22  1008 12/04/22  2146 12/04/22  1341   WBC 15.4*  --  13.6*  --  12.9*  --  12.3*   < >  --    < > 13.7*   HGB 10.5*  --  10.8*  --  10.1*  --  11.7*   < >  --    < > 13.0*   MCV 89  --  89  --  90  --  92   < >  --    < > 91   *  --  84*  --  64*  --  79*   < >  --    < > 168   INR  --   --   --   --   --   --   --   --   --   --  1.23*    137  --   --  138  --  142  --  141   < > 132*   POTASSIUM 3.6  --   --   --  3.5  --  4.2   < > 3.7   < > 4.1   CHLORIDE 104  --   --   --  106  --  106  --  108*   < > 95*   CO2 19*  --   --   --  17*  --  20*  --  20*   < > 22   BUN 83.0*  --   --   --  68.9*  --  49.9*  --  35.5*   < > 29.5*   CR 4.16* 3.85*  --   --  3.59*  --  2.87*  --  1.88*   < > 1.50*   ANIONGAP 15  --   --   --  15  --  16*  --  13   < > 15   YUNIOR 7.5*  --   --   --  7.5*  --  8.0*  --  7.9*   < > 8.7*   *  --   --  107* 119*   < > 134*   < > 142*   < > 155*   ALBUMIN 2.4*  2.4*  --   --   --  2.3*  --  2.8*  --  2.9*   < >  --    PROTTOTAL 5.1*  --   --   --   --   --   --   --  5.8*   < >  --    BILITOTAL 4.0*  --   --   --   --   --   --   --  1.1   < >  --    ALKPHOS 92  --   --   --   --   --   --   --  58   < >  --    ALT 26  --   --   --   --   --   --   --  61*   < >  --    AST 69*  --   --   --   --   --   --   --  131*   < >  --     < > = values in this interval not displayed.

## 2022-12-08 NOTE — PLAN OF CARE
BP 96/57   Pulse 107   Temp 97.6  F (36.4  C) (Oral)   Resp 20   Wt 84.2 kg (185 lb 10 oz)   SpO2 96%   BMI 28.22 kg/m      Shift events: Monitoring BP and neuro status closely overnight. Continues to have soft BP with MAPs 60-70 overnight. 250 LR bolus x1 in AM. Blood cultures from yesterday growing gram positive cocci. Continuing nafcillin q 4 hours. Denies pain.    Admission diagnoses: Sepsis; cerebral infarcts; suspected endocarditis.    Neuro: A&Ox2-3; word-finding difficulty noted; slow and inconsistent to follow commands. Moving all extremities but very weak.   CV: Sinus tach; -120. Soft BP with MAPs trending to low 60s. 250 LR bolus.  Resp: Sats 90-95% on RA. Wheezing and congestion with nonproductive cough; bilateral crackles noted. PRN nebs x2. Denies dyspnea.  GI: Rectal tube with small output.  : Chan in place with good UOP.  Skin: No new deficits noted.  LDA: PIV x2; rectal tube; chan.    Plan: Continue to monitor BP and neuro status closely.

## 2022-12-08 NOTE — PROGRESS NOTES
"   12/08/22 1000   Appointment Info   Signing Clinician's Name / Credentials (OT) Samson Momin, OTR/L   Living Environment   People in Home alone   Current Living Arrangements apartment   Home Accessibility no concerns   Transportation Anticipated car, drives self;family or friend will provide   Living Environment Comments Pt reports he has a walk in shower and a tub. Pt reports he has a shower chair and grab bars.   Self-Care   Usual Activity Tolerance excellent   Current Activity Tolerance poor   Regular Exercise Yes   Activity/Exercise Type strength training;walking   Exercise Amount/Frequency 3-5 times/wk;other (see comments)  (3 x walking (45 min), ,3x weight training.)   Equipment Currently Used at Home grab bar, tub/shower;shower chair   Fall history within last six months no   Activity/Exercise/Self-Care Comment Pt is usually independent and very active as a .   General Information   Onset of Illness/Injury or Date of Surgery 12/04/22   Referring Physician David Lerner MD   Patient/Family Therapy Goal Statement (OT) Pt would like to regain function and return home independently.   Additional Occupational Profile Info/Pertinent History of Current Problem Per chart, pt is a \"66 year old male with PMH of HTN, HLD, CAD s/p PCI to LAD (2012), Aortic stenosis s/p AOV replacement (2014), Hodgkin's disease s/p radiation therapy, testicular & prostate cancer who presented with left gaze preference, right sided neglect and generalized weakness. Found to have embolic appearing punctate infarcts, encephalopathy, fever, nuchal rigidity, multiple punctate infarcts in the setting of encephalopathy and MSSA sepsis highly concerning for endocarditis. Additionally, neuro exam, Meningitis/encephalitis panel and lumbar puncture findings concerning for bacterial meningitis/encephalitis. Broad-spectrum antibiotics have been initiated and ID consulted. Embolic infarcts are unlikely responsible for his " "encephalopathy and previously document gaze deviation. Patient's exam has shown improvement following initial doses of antibiotics. Low suspicion for seizures given no seizure activity on vEEG.\"   Existing Precautions/Restrictions fall   Left Upper Extremity (Weight-bearing Status) full weight-bearing (FWB)   Right Upper Extremity (Weight-bearing Status) full weight-bearing (FWB)   Left Lower Extremity (Weight-bearing Status) full weight-bearing (FWB)   Right Lower Extremity (Weight-bearing Status) full weight-bearing (FWB)   Cognitive Status Examination   Orientation Status orientation to person, place and time   Visual Perception   Visual Impairment/Limitations WFL;corrective lenses full-time   Sensory   Sensory Quick Adds sensation intact   Pain Assessment   Patient Currently in Pain Yes, see Vital Sign flowsheet  (Neck)   Range of Motion Comprehensive   Comment, General Range of Motion BUE PROM WFL, AROM limited by weakness RUE >LUE.   Strength Comprehensive (MMT)   Comment, General Manual Muscle Testing (MMT) Assessment BUE strength grossly 3/3-/5, Pt with significant deficits in  strength bilaterally.   Bed Mobility   Comment (Bed Mobility) Mod A and Max vc's   Transfers   Transfer Comments OH lift transfer.   Activities of Daily Living   BADL Assessment/Intervention bathing;upper body dressing;lower body dressing;grooming;toileting;feeding   Bathing Assessment/Intervention   [place] Level (Bathing) dependent (less than 25% patient effort);assist of 2   Upper Body Dressing Assessment/Training   [place] Level (Upper Body Dressing) set up;verbal cues;maximum assist (25% patient effort)   Lower Body Dressing Assessment/Training   [place] Level (Lower Body Dressing) maximum assist (25% patient effort);set up;verbal cues   Grooming Assessment/Training   [place] Level (Grooming) set up;verbal cues;moderate assist (50% patient effort)   Eating/Self Feeding   [place] Level (Feeding) set " up;verbal cues;moderate assist (50% patient effort)   Toileting   [place] Level (Toileting) dependent (less than 25% patient effort)   Clinical Impression   Criteria for Skilled Therapeutic Interventions Met (OT) Yes, treatment indicated   OT Diagnosis Decreased independence with functional transfers and ADLS/IADLS.   OT Problem List-Impairments impacting ADL problems related to;activity tolerance impaired;cognition;flexibility;mobility;range of motion (ROM);strength;pain;postural control   Assessment of Occupational Performance 5 or more Performance Deficits   Identified Performance Deficits Decreased independence with functional transfers and ADLS/IADLs.   Planned Therapy Interventions (OT) ADL retraining;IADL retraining;bed mobility training;cognition;fine motor coordination training;joint mobilization;neuromuscular re-education;ROM;strengthening;stretching;transfer training;home program guidelines;progressive activity/exercise   Clinical Decision Making Complexity (OT) low complexity   Risk & Benefits of therapy have been explained evaluation/treatment results reviewed;care plan/treatment goals reviewed;patient;daughter   OT Total Evaluation Time   OT Eval, Low Complexity Minutes (89989) 10   OT Goals   Therapy Frequency (OT) Daily   OT Predicted Duration/Target Date for Goal Attainment 12/22/22   OT Goals Hygiene/Grooming;Upper Body Dressing;Lower Body Dressing;Transfers;Toilet Transfer/Toileting;Cognition;OT Goal 1   OT: Hygiene/Grooming independent;within precautions;while standing   OT: Upper Body Dressing Independent;within precautions   OT: Lower Body Dressing Independent;within precautions   OT: Transfer Modified independent;within precautions;with assistive device  (tub/shower transfer)   OT: Toilet Transfer/Toileting Independent;cleaning and garment management;toilet transfer   OT: Cognitive Patient/caregiver will verbalize understanding of cognitive assessment results/recommendations as needed for  safe discharge planning   OT: Goal 1 Pt will verbalize and demonstrate independence with BUE AROM HEP in prep for ADLS/transfers.   OT Discharge Planning   OT Discharge Recommendation (DC Rec) [facility]-Motivated patient will benefit from intensive, interdisciplinary therapy.  Anticipate will be able to tolerate 3 hours of therapy per day   OT Rationale for DC Rec Pt will benefit from continued therapy in ARU setting as pt is motivated and was completely independent prior to admission.   OT Brief overview of current status OH lift, Mod A and max vc's for bed mobility.   Total Session Time   Total Session Time (sum of timed and untimed services) 10

## 2022-12-08 NOTE — PROGRESS NOTES
[facility] Progress Note     Patient:  Eloy Fletcher   YOB: 1955, MRN: [id number]  Date of Visit: 12/08/2022  Date of Admission: 12/4/2022  Consult Requester: Lauri Friend MD       ID Problem List:  1. Sepsis: MSSA bacteremia  -  Suspected endocarditis, with CNS emboli and CSF culture positivity  2. Encephalopathy; Acute embolic CVA  CSF Culture positive.  3. Acute kidney injury, creatinine still rising (Cr = 4.1 mg/dL) - multi-factorial -> sepsis, hypotension, meds.  Creatinine rise started before Nafcillin was started (and before Gent was given).    4. Aortic Stenosis s/p Biprosthetic AVR 2014; mitral stenosis with calcifications - no evidence of vegetations on ILDEFONSO.  5.  Pneumonia - probable bacteremic seeding of lung      ID Recommendations:  1.  Continue once daily blood cultures (ordered)   - Last blood culture positive 12/7  2.  Continue Nafcillin 2g q4hrs, anticipate 6 weeks of antimicrobial therapy for suspected endocarditis.  If creatinine continues to rise, we would switch to cefazolin to eliminate Nafcillin acute interstitial nephritis as a contributor; however, AIN is not likely.         3.Gentamicin level ordered: 0.4 mcg/mL today,  given on 12/5. This is likely still above the MSSA DEJA (<0.5 mcg/mL).   Do not redose.     4.  Would eventually recommend Rifampin 300mg QDay given bioprosthetic valve, not urgent to start, but would add this as condition stabilizes and he is able to take orals reliably. Would ideally like renal function to stabilize and improve before adding.          Assessment and Discussion:  Summary: 66 year-old male withPMHx notable for biprosthetic AVR 2014, hodgkin's lymphoma with thoracic radiation Evaluated for Staphylococcus Aures bacteremia with encephalopathy and acute embolic CVA. Since admission, CRP down trending. Blood and CSF cultures positive for staph aureus MSSA.  Continue Nafcillin.    1. Sepsis: MSSA bacteremia  -  Suspected  "endocarditis, with CNS emboli, most probable unifying diagnosis for findings described. Assume this is a widely disseminated embolic phenomena throughout the body. May consider contact with cardiology for possibility of PFO, bubble study reported non-diagnostic, would broaden differential.     Impression: Suspected etiology with MSSA bacteremia most consistent with clinical findings of multifocal cerebral infarcts for Staph. Endocarditis, plausible encephalopathy secondary to sepsis for the initial findings however, his mentation has significantly improved since initial presentation. ILDEFONSO was not demonstrative of \"definite\" valvular lesions, it is reasonable for prior vegetation to embolize. Given the findings, reasonable to treat with current endocarditis guidelines with daily cultures until negative for 48-72 hours.     2.Encephalopathy; Acute embolic CVA- Likely cardio-embolic as above  Altered mentation could have been secondary to bacteremia, with possible meningoencephalitis given Neurology evaluation correlation of findings not consistent with MRI.  Regarding CSF findings,it is plausible of embolization with subsequent growth of MSSA either direct to the CSF or adjacent parenchymal translocation, concerning for meningoencephalitis  Given the clinical findings, should be appropriately covered at this time.     3. Acute kidney injury-  no additional synergistic treatment with gentamicin indicated at this time. Nafcillin is known to cause drug-induced interstitial nephritis but creatinine was up trending upon admission, so likely due to sepsis/poor renal perfusion. However, if creatinine continues to increase, would consider urinary eosinophil testing. May discuss with Nephrology if continues.     4. Aortic Stenosis s/p Biprosthetic AVR 2014; mitral stenosis with calcifications- would be interested in cardiology for elucidation if PFO is present for differential, otherwise may have had prior vegetation that " "embolized before ILDEFONSO. Plan for Rifampin in the future with prosthetic valve otherwise    5. Pneumonia - probable bacteremic seeding of lung-   CT chest notable for ground glass opacities and interstitial pulmonary edema, if infectious etiology, likely seeding from endocarditis. Should be appropriately covered with given date.     Will continue to follow for sensitivities and further recommendations and overall course.     Thank you for the consult. ID will continue to follow with you.    Ifrah Weiner, MS4  Pager [phone]                Interval History and Events:   Overnight, acute events include agitation and softer BPs. Lethargic, inconsistent with following commands. Proved 250 LR bolus. Spoke to the nursing staff, lethargic overall no interval change. UOP appropriate.            Antimicrobial Treatment:   Nafcillin 12/5-Present   Received one dose Gentamicin 5mg/kg) on 12/5    Stopped: 12/6: Ceftriaxone   Stopped 12/5:  vancomycin, acyclovir, ampicillin;          Review of Systems:   Targeted 4 point ROS was completed with pertinent positives and negatives are detailed above.         HPI:   Adopted from initial consult note on 12/05/2022     Summary:       66 year-old male with PMHx notable for Aortic stenosis s/p bioprosthetic AVR 2014, CAD s/p stent 2012, moderate mitral regurgitation/stenosis on ILDEFONSO 11/2022, with prior chemotherapy/radiation with Hodgkin's Lymphoma 1979, testicular cancer and prostate cancer admitted overnight on 12/04 for sepsis, encephalopathy, and acute embolic CVA. Evaluated for possible endocarditis MSSA Bacteremia       \"History obtained from review of chart and discussion with patient/nursing staff.      Eloy Fletcher is a 66 year old male who presented with new left lateral ocular gaze and right sided hemineglect, weakness and fevers onset 12/02/2022.  PMHx notable for Aortic Stenosis s/p bioprosthetic AVR 2014, CAD s/p PCI and stent 2012, moderate mitral regurgitation/stenosis on " ILDEFONSO 11/2022, with prior chemotherapy/radiation with Hodgkin's Lymphoma 1979, testicular cancer and prostate cancer admitted overnight on 12/04 for sepsis, encephalopathy, and acute embolic CVA. Evaluated for possible endocarditis.     Course: Initial presentation to OSH ED with confusion and unresponsiveness 12/4, discussion with his ex-wife,  initial onset of symptoms 12/1/22 multiple episodes of vomiting with a fever. Improved overnight, recurred and worsened 12/3.  He presented with left gaze preference, right-sided neglect, weakness, and fevers. Initial findings significant for showing tachycardia, tachypnea, and fevers, initial eukocytosis of 11.4, lactic acid 4.2. At that time he was confused and not responding appropriately.   He was evaluated by neurology and found to have MRI findings with non-specific small multifocal CVA suspected embolic in origin. He was provided empiric antibiotics, Blood cultures were drawn.    Due to concern for CNS infection, a LP was performed at the OSH ED and was further transferred to [facility] ICU for work-up of endocarditis in the setting of hypotension. Initially provided   Acyclovir, Ampicillin, Ceftriaxone, Piperacillin Tazobactam and Vancomycin.   CT Chest also showing GGO in the MIGUEL ANGEL and LLL with tree-in-bud findings concerning for pneumonia (CAP vs. Aspiration). LP performed showing WBC of 230 with elevated protein at 59 and glucose at 82.                  Today, the patient was seen and examined at bedside, mild aphasia/ difficult word finding on exam. Patient is able to answer yes/no questions. Interval changes,  appears improved since 12/4.  Associated symptoms at this time positive for cough, with green/yellow sputum. No shortness of breath. Denies CV complaints of chest pain, diaphoresis, no abdominal pain. Denies new rashes or lesions. Denies new numbness or subjective weakness.      Pertinent Cardiac History: Aortic stenosis S/P bioprosthetic AVR in 2014, and  "CAD s/p PCI   2012.  Of note, recently evaluated with Cardiology at [facility]. Possible planned early next year, \"redo median sternotomy and mitral valve replacement with a tissue valve. We may have to replace the ascending aorta at the same time. The patient has right bundle branch block and a left bob fascicular block\" per evaluation with Dr. Gurrola 11/16/2022.  Last ILDEFONSO 11/11/2022, with LVEF 65%, Moderate-severe mitral valve regurgitation with moderate mitral stenosis and severe annular mitral calcification, demonstrated normal aortic valve tissue prosthesis.   Pertinent Oncology History: Prostate cancer ([facility] 4+3, TNM: cT1c) treated with chemo/radiation (last dose 3/30/2022), good response at that time. Past history in remission: Testicular cancer (30 years prior  s/p chemo & Left orchiectomy) and Hodgkins lymphoma (1970s treated with MOPP & radiation)\"         Physical Examination:   Temp:  [97.4  F (36.3  C)-98.4  F (36.9  C)] 97.8  F (36.6  C)  Pulse:  [100-128] 105  Resp:  [12-22] 12  BP: ()/(44-94) 85/48  SpO2:  [91 %-98 %] 92 %    I/O last 3 completed shifts:  In: 1150 [P.O.:600; I.V.:550]  Out: 2545 [Urine:2325; Stool:220]    Vitals:    12/04/22 2200 12/05/22 0400 12/07/22 0152 12/08/22 0345   Weight: 81.1 kg (178 lb 12.7 oz) 81 kg (178 lb 9.2 oz) 82.4 kg (181 lb 10.5 oz) 84.2 kg (185 lb 10 oz)       Constitutional: Adult male seen and examined at bedside. Sleeping on exam.   Respiratory: No increased work of breathing, CTAB, no crackles or wheezing.  Cardiovascular: RRR, grade IV systolic ejection murmur noted. No peripheral edema.   Skin: Warm, dry, well-perfused. No bruising, bleeding, rashes, or lesions on limited exam.   Musculoskeletal: Extremities grossly normal, non-tender, no edema.    Neurologic: arousable to painful stimuli, otherwise sleeping on exam, pupils equal and reactive to light  Neuropsychiatric: Calm. Affect appropriate to situation.  Vascular access:  12/4 MARIAM Sanabria " RUE pIV, 12/5 Right UE pIV  CDI, non-tender, no surrounding erythema.          Medications:       sodium chloride 0.9%  250 mL Intravenous Once     nafcillin  2 g Intravenous Q4H     pantoprazole  40 mg Intravenous Daily with breakfast     sodium chloride (PF)  3 mL Intracatheter Q8H       Antiinfectives:  Anti-infectives (From now, onward)    Start     Dose/Rate Route Frequency Ordered Stop    12/05/22 1500  nafcillin IV 2 g vial to attach to  ml bag         2 g  over 1 Hours Intravenous EVERY 4 HOURS 12/05/22 1455            Infusions/Drips:    sodium chloride 150 mL/hr (12/05/22 1330)            Laboratory Data:     Microbiology:      Summary: 12/4 Blood and CSF cultures with Staph Aureus. Verigene with no evidence of MecA    12/7 Prelim Gram Positive Cocci 1/2 bottles        Blood Culture Hand, Right     Collected 12/7/2022  4:34 AM      Status: Preliminary result      Visible to patient: No (not released)     Specimen Information: Hand, Right; Blood    0 Result Notes  Culture Positive on the 1st day of incubation Abnormal        Gram positive cocci in clusters Panic     1 of 2 bottles        Resulting Agency: [facility]             Details:         Cerebrospinal fluid Aerobic Bacterial Culture Routine    Culture   Lab   1+ Staphylococcus aureus Panic   [facility]          Gram Stain quantification of host cells and microbiological organisms was done on a cytocentrifuged preparation.              Collected 12/4/2022  5:38 PM        12/04/2022 1601 12/07/2022 0644 Blood Culture Peripheral Blood [[id number]]   (Abnormal)   Peripheral Blood    Final result Component Value   Culture Positive on the 1st day of incubation Abnormal     Staphylococcus aureus Panic     2 of 2 bottles  Susceptibilities done on previous cultures             12/04/2022 1534 12/07/2022 0815 Blood Culture Line, venous [[id number]]    (Abnormal)   Blood from Line, venous    Final result Component Value   Culture Positive on the 1st day of  incubation Abnormal     Staphylococcus aureus Panic     2 of 2 bottles       Susceptibility     Staphylococcus aureus     DEJA     Clindamycin <=0.25 ug/mL Susceptible     Erythromycin <=0.25 ug/mL Susceptible     Gentamicin <=0.5 ug/mL Susceptible     Oxacillin 0.5 ug/mL Susceptible     Tetracycline <=1 ug/mL Susceptible     Trimethoprim/Sulfamethoxazole <=0.5/9.5 u... Susceptible     Vancomycin <=0.5 ug/mL Susceptible                  12/04/2022 1534 12/07/2022 0645 Blood Culture Line, venous [[id number]]   (Abnormal)   Blood from Line, venous    Final result Component Value   Culture Positive on the 1st day of incubation Abnormal     Staphylococcus aureus Panic     2 of 2 bottles  Susceptibilities done on previous cultures             12/04/2022 1534 12/05/2022 0551 Verigene GP Panel [[id number]]    (Abnormal)   Blood from Line, venous    Final result Component Value   Staphylococcus aureus Detected Abnormal    Positive for Staphylococcus aureus and negative for the mecA gene (not resistant to methicillin) by ANT Farmigene multiplex nucleic acid test. Final identification and antimicrobial susceptibility testing will be verified by standard methods.   Staphylococcus epidermidis Not Detected   Staphylococcus lugdunensis Not Detected   Enterococcus faecalis Not Detected   Enterococcus faecium Not Detected   Streptococcus species Not Detected   Streptococcus agalactiae Not Detected   Streptococcus anginosus group Not Detected   Streptococcus pneumoniae Not Detected   Streptococcus pyogenes Not Detected   Listeria species                    CSF Findings:   *High: RBC *78, total nucleated cells *230, Glucose CSF *82  Abnormal CSF appearance Hazy.   Otherwise, colorless        Meningitis/Encephalitis Panel Qual PCR CSF:  Order: [id number]   Status: Final result      Visible to patient: Yes (not seen)      1 Result Note    Component Ref Range & Units 1 d ago   (12/4/22) 1 d ago   (12/4/22)    Escherichia coli K1 Negative  Negative       Haemophilus influenzae Negative Negative       Listeria monocytogenes Negative Negative       Neisseria meningitidis Negative Negative       Streptococcus agalactiae (GBS) Negative Negative       Streptococcus pneumoniae Negative Negative  Not Detected R     Cytomegalovirus Negative Negative       Enterovirus Negative Negative       Herpes simplex virus 1 Negative Negative      Comment: Recommend testing with another molecular method if clinical suspicion for infection is high.    Herpes simplex virus 2 Negative Negative      Comment: Recommend testing with another molecular method if clinical suspicion for infection is high.    Human Herpes Virus 6 Negative Negative       Human parechovirus Negative Negative       Varicella zoster virus Negative Negative       Cryptococcus neoformans/gattii Negative Negative      Comment: Recommend testing for Cryptococcal antigen and fungal culture if clinical suspicion for infection is high.         Strep pneumo Agn Ur greater or equal to 13yrs or CSF any age (1 mL minimum)  Order: [id number]   Status: Final result      Visible to patient: Yes (not seen)     Specimen Information: Lumbar Puncture; Cerebrospinal fluid    1 Result Note    Component Ref Range & Units 1 d ago     Streptococcus pneumoniae antigen Negative Negative    Comment: A negative Streptococcus pneumoniae antigen result does not rule out infection with Streptococcus pneumoniae.               Inflammatory Markers    Recent Labs   Lab Test 12/07/22  0434 12/06/22  0422   .00* 387.00*   Procalcitonin: 54.20 (12/4/22)  Lactic Acid: 4.6 (12/4), 4.2 (12/4), 4.6(12/5) 1.7 (12/6)  Cardiac: Troponin: 610 (12/4), 983 (12/4), 1,229 (12/5)    Metabolic Studies     Recent Labs   Lab Test 12/07/22  1650 12/07/22  0512 12/07/22  0434 12/06/22  0424 12/06/22  0422 12/05/22  1517 12/05/22  1141 12/05/22  1025 12/05/22  1008     --  138  --  142  --   --   --  141   POTASSIUM  --   --  3.5  --  4.2  --   3.8  --  3.7   CHLORIDE  --   --  106  --  106  --   --   --  108*   CO2  --   --  17*  --  20*  --   --   --  20*   ANIONGAP  --   --  15  --  16*  --   --   --  13   BUN  --   --  68.9*  --  49.9*  --   --   --  35.5*   CR 3.85*  --  3.59*  --  2.87*  --   --   --  1.88*   GFRESTIMATED  --   --  18*  --  23*  --   --   --  39*   GLC  --  107* 119*   < > 134*   < >  --    < > 142*   YUNIOR  --   --  7.5*  --  8.0*  --   --   --  7.9*   PHOS  --   --  4.2  --  4.1  --   --   --  2.4*   MAG  --   --  2.0  --  2.1  --   --   --  2.1   LACT  --   --  1.7   < >  --    < > 4.4*  --   --     < > = values in this interval not displayed.       Hepatic Studies    Recent Labs   Lab Test 12/07/22 0434 12/06/22 0422 12/05/22  1008 12/04/22  2353   BILITOTAL  --   --  1.1 1.3*   ALKPHOS  --   --  58 52   ALBUMIN 2.3* 2.8* 2.9* 3.0*   AST  --   --  131* 149*   ALT  --   --  61* 78*   LDH  --   --  468*  --        Pancreatitis testing    Recent Labs   Lab Test 12/07/22 0434   TRIG 260*       Hematology Studies      Recent Labs   Lab Test 12/08/22  0812 12/07/22  1359 12/07/22 0434 12/06/22 0422   WBC 15.4* 13.6* 12.9* 12.3*   ANEU  --   --   --  11.1*   ALYM  --   --   --  0.4*   LATONYA  --   --   --  0.9   AEOS  --   --   --  0.0   HGB 10.5* 10.8* 10.1* 11.7*   HCT 30.8* 32.0* 30.4* 35.5*   * 84* 64* 79*       Arterial Blood Gas Testing    Recent Labs   Lab Test 12/07/22  1622   O2PER 25        Urine Studies     Recent Labs   Lab Test 12/07/22  1509 12/05/22  0538 12/04/22  1534   URINEPH 5.5 5.5 5.0   NITRITE Negative Negative Negative   LEUKEST Trace* Negative Negative   WBCU 6* 8* 3       Vancomycin Levels     No lab results found.    Tobramycin levels     No lab results found.    Gentamicin levels    No lab results found.    CSF testing     Recent Labs   Lab Test 12/04/22  1738   CWBC 230*   CRBC 78*   CGLU 82*   CTP 59.7*       Last check of C difficile  C Difficile Toxin B by PCR   Date Value Ref Range Status    12/05/2022 Negative Negative Final     Comment:     A negative result does not exclude actual disease due to C. difficile and may be due to improper collection, handling and storage of the specimen or the number of organisms in the specimen is below the detection limit of the assay.            Imaging:       Echo ILDEFONSO Study Date: 12/05/2022 01:14 PM   Interpretation Summary  Known radiation induced valve disease with severe calcific mitral disease. No  definite endocarditis noted.  Severe mitral annular calcification is present with multiple calcific nodules,  no definite vegetation seen.  S/p bioprosthetic AVR (23 mm Cordell-Hubbard Perimount Magna/Magna Ease  pericardial prosthesis). Valve appears well seated with good leaflet opening.  No vegetations visualized.  Global and regional left ventricular function is normal with an EF of 60-65%.     ______________________________________________________________________________  Procedure  Transesophageal Echocardiogram with color and spectral Doppler performed. 3D  image acquisition, reconstruction, and real-time interpretation was performed.  I was present during ILDEFONSO probe placement by the Fellow. I personally viewed  the imaging and agree with the interpretation and report as documented by the  Fellow. Procedure location Echo Lab. Informed consent for Transesophegeal echo  obtained. ILDEFONSO Probe #62 was used during the procedure. Patient was sedated  using Fentanyl 50 mcg. Patient was sedated using Versed 1 mg. The heart rate,  respiratory rate, oxygen saturations, blood pressure, and response to care  were monitored throughout the procedure with the assistance of the nurse. I  determined this patient to be an appropriate candidate for the planned  sedation and procedure and have reassessed the patient immediately prior to  sedation and procedure. Total sedation time: 25 minutes of continuous bedside  1:1 monitoring. The Transducer was inserted without difficulty .  The patient  tolerated the procedure well. Complications None.     Left Ventricle  Mild concentric wall thickening consistent with left ventricular hypertrophy  is present. Global and regional left ventricular function is normal with an EF  of 60-65%. Left ventricular size is normal.     Right Ventricle  The right ventricle is normal size. Global right ventricular function is  normal.     Atria  The left atrial appendage is normal. It is free of spontaneous echo contrast  and thrombus. Moderate to severe left atrial enlargement is present. The  atrial septum is intact as assessed by color Doppler .     Mitral Valve  Severe mitral annular calcification is present. Mild to moderate mitral  insufficiency is present. The mean gradient across the mitral valve is 13  mmHg. The mitral valve area is 1.4 cm^2. Severe mitral stenosis is present.     Aortic Valve  S/p bioprosthetic AVR. Valve appears well seated with good leaflet opening. No  vegetations visualized. Doppler interrogation of the aortic valve is normal.     Tricuspid Valve  The tricuspid valve is normal. Mild tricuspid insufficiency is present.     Pulmonic Valve  The pulmonic valve is normal.     Vessels  The aorta root is normal. The thoracic aorta is normal. Ascending aorta and  aortic arch free of atheroma and vegetation. The inferior vena cava is normal.  The superior vena cava is normal. The RUPV Doppler shows normal velocity  waveform . The RLPV Doppler shows systolic dominance . The LUPV Doppler shows  normal velocity waveform . The LLPV Doppler shows normal velocity waveform .     Pericardium  No pericardial effusion is present.     Compared to Previous Study  This study was compared with the study from 12/5/22 . There has been no  change.       TTE: 12/5 Interpretation Summary  Would consider a transesophageal echocardiogram if clinically indicated.  Technically difficult study.Extremely poor acoustic windows.  Limited information was obtained during  study.  Global and regional left ventricular function is normal with an EF of 60-65%.  Right ventricular function, chamber size, wall motion, and thickness are  normal.  Severe mitral annular calcification is present.  Mitral mean gradient 11mmHg at heart rate 131 BPM.  H/O Bioprosthetic AVR in 2014. Valve not well visualized.    MRI of the brain 12/4/22: IMPRESSION: 5 scattered foci of acute ischemia involving the bilateral anterior and posterior cerebral artery distributions. Constellation of findings is suspicious for central embolic phenomena.           CT Abdomen and Pelvis: 12/4/22  IMPRESSION:  1.  Patchy groundglass opacities in the left upper and lower lobes, and cluster of tree-in-bud nodularity in the left upper lobe, likely infectious or inflammatory.  2.  Interstitial pulmonary edema.  3.  Trace left pleural effusion.  4.  No acute findings in the abdomen or pelvis.        CT head and CTA head/neck 12/4/22, were not suggestive of an acute abnormality, no significant stenosis of carotids or vertebral arteries.      CXR 12/7/22:  IMPRESSION: Diffuse interstitial markings bilaterally and streaky  perihilar and bibasilar opacities likely represent pulmonary edema  versus atelectasis. No focal consolidation.  Attending Physician Attestation:  I, Lauri Branham MD have seen and evaluated Eloy Fletcher. I have discussed with the primary provider team, and I agree with the above documented findings and plan in this note. I have reviewed today's vital signs, medications, labs and imaging.    Floor time: 30 minutes  Face-to-face time: 10 minutes  Total time: 40 minutes     Lauri Branham MD MPH  Professor of Medicine  Division of Infectious Diseases & International Medicine  Department of Medicine

## 2022-12-09 NOTE — PLAN OF CARE
"SHIFT 2300 PM-0700 AM    Neuro: A&Ox4. Patient able to move all extremities, generalized weakness noted, right side more weaker than left side. Patient answered questions but slow to respond. Patient denied any pain or concerns.   Cardiac: ST, BBB. Blood pressure 117/65, pulse 105, temperature 98.3  F (36.8  C), temperature source Axillary, resp. rate 16, weight 86.4 kg (190 lb 7.6 oz), SpO2 96 %.  Respiratory: Sating 96% on RA.  GI/: Adequate urine output. Medina in and positional.   Diet/appetite: Tolerating thin liquid diet. Drinking good  Activity:  Assist X 2, use of lift..  Pain:  At acceptable level on current regimen.   Skin: No new deficits noted.  LDA's: L PIV X 2, Medina.        Plan: Continue with POC. Notify primary team with changes.    Problem: Plan of Care - These are the overarching goals to be used throughout the patient stay.    Goal: Plan of Care Review  Description: The Plan of Care Review/Shift note should be completed every shift.  The Outcome Evaluation is a brief statement about your assessment that the patient is improving, declining, or no change.  This information will be displayed automatically on your shift note.  Outcome: Not Progressing  Goal: Patient-Specific Goal (Individualized)  Description: You can add care plan individualizations to a care plan. Examples of Individualization might be:  \"Parent requests to be called daily at 9am for status\", \"I have a hard time hearing out of my right ear\", or \"Do not touch me to wake me up as it startles me\".  Outcome: Not Progressing  Goal: Absence of Hospital-Acquired Illness or Injury  Outcome: Not Progressing  Goal: Optimal Comfort and Wellbeing  Outcome: Not Progressing  Goal: Readiness for Transition of Care  Outcome: Not Progressing     Problem: Risk for Delirium  Goal: Optimal Coping  Outcome: Not Progressing  Goal: Improved Behavioral Control  Outcome: Not Progressing  Goal: Improved Attention and Thought Clarity  Outcome: Not " Progressing  Goal: Improved Sleep  Outcome: Not Progressing     Problem: Restraint, Nonviolent  Goal: Absence of Harm or Injury  Outcome: Not Progressing     Problem: Sepsis/Septic Shock  Goal: Optimal Coping  Outcome: Not Progressing  Goal: Absence of Bleeding  Outcome: Not Progressing  Goal: Blood Glucose Level Within Targeted Range  Outcome: Not Progressing  Goal: Absence of Infection Signs and Symptoms  Outcome: Not Progressing  Goal: Optimal Nutrition Intake  Outcome: Not Progressing     Problem: Fall Injury Risk  Goal: Absence of Fall and Fall-Related Injury  Outcome: Not Progressing     Problem: Stroke, Ischemic (Includes Transient Ischemic Attack)  Goal: Optimal Coping  Outcome: Not Progressing  Goal: Effective Bowel Elimination  Outcome: Not Progressing  Goal: Optimal Cerebral Tissue Perfusion  Outcome: Not Progressing  Goal: Optimal Cognitive Function  Outcome: Not Progressing  Goal: Improved Communication Skills  Outcome: Not Progressing  Goal: Optimal Functional Ability  Outcome: Not Progressing  Goal: Effective Oxygenation and Ventilation  Outcome: Not Progressing  Goal: Improved Sensorimotor Function  Outcome: Not Progressing  Goal: Optimal Eating and Swallowing without Aspiration  Outcome: Not Progressing  Goal: Effective Urinary Elimination  Outcome: Not Progressing   Goal Outcome Evaluation:

## 2022-12-09 NOTE — PROGRESS NOTES
[facility]    Medicine Progress Note - Hospitalist Service, GOLD TEAM 9    Date of Admission:  12/4/2022    Assessment & Plan   Mr. Fletcher is a 66-year-old gentleman with a past medical history of hypertension, hyperlipidemia, Hodgkin's lymphoma status post radiation and MOPP chemotherapy, in remission, testicular cancer status post a left orchiectomy and chemotherapy, in remission, prostate cancer (Elly 4+3, diagnosed April 2021) status post radiation and Lupron, aortic stenosis status post bioprosthetic aortic valve in (2014) and coronary artery disease status post PCI to the LAD in (2012) who on December 4, 2022 developed a left-sided gaze with right-sided weakness and was noted to have a fever and brought to an outside hospital where he was recognized to be in septic shock and transferred to [facility] ICU.  Head CT and CTA of the head and neck did not display any acute abnormality or stenosis.  MRI brain displayed scattered foci of acute ischemia involving the bilateral anterior and posterior cerebral artery distributions.  Neurology was consulted and concerned that the these might represent embolic strokes.  Further infectious work-up was done including a CT chest abdomen and pelvis which revealed groundglass opacities in the left upper lobe and left lower lobe with tree-in-bud findings in the setting of interstitial pulmonary edema and a left pleural effusion.  He was started on broad-spectrum antibiotics including nafcillin and ceftriaxone.  Lumbar puncture was performed and CSF was positive for gram-positive cocci, final cultures pending.  Cardiology was consulted with concerns for possible endocarditis.  TTE was performed which revealed severe mitral calcification with moderate insufficiency and a bioprosthetic aortic valve which was well-seated with no vegetations present. ID was consulted and he was adjusted to Nafcillin with treatment for likely  endocarditis in the setting of a high grade blood stream infection from MSSA that resulted in pneumonia, embolic strokes, acute kidney injury and encephalopathy. 12/6, he is transfered out of the ICU.    Changes today:  - cardiology following, try to avoid further crystalloid, may need pressors of hypotensive due to sensitive volume status. Trial small dose lasix with ongoing renal recovery.  - MS continue to be labile, defer further work up unless persistent  - defer addition of Moxifloxacin for now given prolonged QTc. Reassess tomorrow  - Direct bili bea significantly without abdominal pain or other obstructive picture. Suspect cholestasis will trend and obtain RUQ US.  - still deferring AC/AP at this time due to risk of post-embolic bleeding, will discuss in next 1-2 days with hematology to discuss timing of initiation    #Severe sepsis (fever, leukocytosis, tachycardia, tachypnea) secondary to MSSA high-grade bloodstream infection  #Acute embolic strokes with right-sided weakness  #Acute encephalopathy, improving  #Bacterial community-acquired pneumonia  He was initially started on IV Vancomycin/ Zosyn at OSH. Blood cultures x 2 from 12/4/22 grew positive for pansensitive Staph aureus. There were no repeat cultures on 12/5. CSF culture Repeat cultures done 12/6 so far are without growth. Antibiotic regimen has been adjusted to Nafcillin, per ID. His encephalopathy is improving and Neurology is following.  -Infectious Disease is following and per recommendations, continue with Nafcillin 2gm IV q4h with likely treatment for 4-6 weeks for suspected endocarditis  -Continue daily blood cultures, CRP and CBC, CMP  -Neurology was consulted and has signed off. OK to restart antiplatelets from their perspective, but holding due to hematology concern for bleeding with MSSA embolic bleeding risk  - Low threshold to repeat a head CT if he develops any acute neurologic changes.   -Speech therapy consulted, appreciate  recs    #Demand ischemia with a triple anemia  #Aortic stenosis status post bioprosthetic aortic valve replacement in (2014  #Coronary artery disease status post PCI to the LAD in (2012  #Radiation induced heart disease  #Hypertension  #Hyperlipidemia  -Mr. Fletcher follows at [facility] with cardiology with Dr. Winnie Roth and was last seen November 16, 2022.  He is planned in February 2023 to undergo updated cardiac surgery for his significant calcifications and valve disease.  -TTE EEG was performed without any evidence of vegetations.    -Continue to hold Plavix and aspirin in the setting of bleeding risk given his recent stroke (as above)  -Cardiology was consulted and is following and per recommendations troponin has been elevated secondary to demand ischemia and does not require any further intervention    #acute thrombocytopenia, improving - initial concern for possible HIT given score >3 and recent heparin exposure in November. HIT screen negative, fibrinogen not low.  DIC/HIT less likely. Supsect related to medications, sepsis, etc  - hematology consulted appreciate recs  - platelets improving, follow.    #Acute Kidney Injury, non-oliguric - baseline around 0.9, appears to be peaking around 4. Likely related to medications, hypotension, vasoplegia  - US collecting system without hydro  - follow UOP, monitor closely for post-ATN diuresis  - avoid nephrotoxic agents  - nephrology consult    # Generalized weakness secondary to critical illness  -PT/OT consults to assess safety to return home and assist with strength training    # Hodgkin's lymphoma status post radiation and MOPP chemotherapy, in remission  -No acute issues.    # Testicular cancer status post a left orchiectomy and chemotherapy, in remission  -No acute issues.     # Prostate cancer (Elly 4+3, diagnosed April 2021) s/p radiation and Lupron  -Follows with Urology and Oncology as an outpatient     Diet: Snacks/Supplements Adult: Ensure  "Enlive; Between Meals  Minced & Moist Diet (level 5) Thin Liquids (level 0)    DVT Prophylaxis: Heparin SQ  Medina Catheter: PRESENT, indication: Retention  Central Lines: None  Cardiac Monitoring: None  Code Status: Full Code      Disposition Plan  From home, will discharge likely to TCU for need of IV antibiotics once medically stable   Expected discharge date: 12/10/22      Jericho Santos MD  Hospitalist Service, GOLD TEAM 9  [facility]  Securely message with the Vocera Web Console (learn more here)  Text page via [facility] Paging/Directory   Please see signed in provider for up to date coverage information    Clinically Significant Risk Factors        # Hypokalemia: Lowest K = 3.3 mmol/L in last 2 days, will replace as needed      # Anion Gap Metabolic Acidosis: Highest Anion Gap = 19 mmol/L in last 2 days, will monitor and treat as appropriate  # Hypoalbuminemia: Lowest albumin = 2.3 g/dL at 12/7/2022  4:34 AM, will monitor as appropriate   # Thrombocytopenia: Lowest platelets = 84 in last 2 days, will monitor for bleeding       # DMII: A1C = 6.6 % (Ref range: <5.7 %) within past 3 months   # Overweight: Estimated body mass index is 28.96 kg/m  as calculated from the following:    Height as of an earlier encounter on 12/4/22: 1.727 m (5' 8\").    Weight as of this encounter: 86.4 kg (190 lb 7.6 oz).      _____________________________________________________________________    Interval History   Sitting in bed, trying to eat oatmeal and drink his coffee. Reports that he is not in pain, breathing is mildly worse compared to prior.  He has no nausea or vomiting.  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      Data reviewed today: I reviewed all medications, new labs and imaging results over the last 24 hours.     Physical Exam   Vital Signs: Temp: 97.5  F (36.4  C) Temp src: Axillary BP: (!) 131/96 Pulse: (!) " 125   Resp: 24 SpO2: 96 % O2 Device: Nasal cannula Oxygen Delivery: 2 LPM  Weight: 190 lbs 7.64 oz  General Appearance: 66 year old gentleman resting in bed, no acute distress, denies pain  Respiratory: moderate increase WOB, on room air, few crackles bilaterally, moderate aeration, rare wheeze, mildly prolonged expiratory phase  Cardiovascular: regular rate and rhythm, systolic murmur present, no heaves or lifts  GI: bowel sounds active, soft, non-tender to palpation throughout, no rebound or guarding  Skin: warm, dry, no open sores, lesions or ulcerations, edema in upper and lower extremities  Data   Recent Labs   Lab 12/09/22  0814 12/09/22  0532 12/08/22  1948 12/08/22  0812 12/07/22  1650 12/07/22  1359 12/07/22  0512 12/07/22  0434 12/04/22  2146 12/04/22  1341   WBC 21.4*  --   --  15.4*  --  13.6*  --  12.9*   < > 13.7*   HGB 11.3*  --   --  10.5*  --  10.8*  --  10.1*   < > 13.0*   MCV 86  --   --  89  --  89  --  90   < > 91   *  --   --  120*  --  84*  --  64*   < > 168   INR  --   --   --   --   --   --   --   --   --  1.23*   NA  --  137  --  138 137  --   --  138   < > 132*   POTASSIUM  --  3.3*  --  3.6  --   --   --  3.5   < > 4.1   CHLORIDE  --  103  --  104  --   --   --  106   < > 95*   CO2  --  15*  --  19*  --   --   --  17*   < > 22   BUN  --  87.8*  --  83.0*  --   --   --  68.9*   < > 29.5*   CR  --  3.85*  --  4.16* 3.85*  --   --  3.59*   < > 1.50*   ANIONGAP  --  19*  --  15  --   --   --  15   < > 15   YUNIOR  --  7.5*  --  7.5*  --   --   --  7.5*   < > 8.7*   GLC  --  130* 117* 114*  --   --    < > 119*   < > 155*   ALBUMIN  --  2.5*  2.5*  --  2.4*  2.4*  --   --   --  2.3*   < >  --    PROTTOTAL  --  5.3*  --  5.1*  --   --   --   --    < >  --    BILITOTAL  --  4.1*  --  4.0*  --   --   --   --    < >  --    ALKPHOS  --  125  --  92  --   --   --   --    < >  --    ALT  --  38  --  26  --   --   --   --    < >  --    AST  --  92*  --  69*  --   --   --   --    < >  --     < > =  values in this interval not displayed.

## 2022-12-09 NOTE — PLAN OF CARE
Goal Outcome Evaluation:    Neuro: Orientation waxes and wanes. Oriented x2-3. Was very awake/alert for a couple of hours this afternoon up in the chair. Pt very lethargic most of the day, difficult to arouse at times. Right sided weakness noted at times. Slow to respond to conversation and inconsistently follows command when very lethargic. Pupils PERRLA. See rapid note for neuro status concerns.  Cardiac: ST. Rates 110s consistently. Afebrile. Blood pressures soft at beginning of shift (80s/40s, MAPs 58-59). Improving after gentle fluid boluses throughout the day. 250 ml bolus given over 4 hours in AM, 500 ml bolus given over 4 hours in AM.  Respiratory: Sating >95% on RA. Dry cough. PRN nebs given x1.  GI/: Adequate urine output via chan. Rectal tube removed per protocol.   Diet/appetite: Tolerating level 5/thin liquids. PO intake ok when patient is alert and directable. Ate few bites of breakfast with assistance from daughter.  Activity:  Lift assist out of bed. Up to chair today x2. Reposition Q2H in bed.  Pain: At acceptable level on current regimen. Patient grimaces when moving, but denies pain when asked.   Skin: No new deficits noted.  LDA's: PIV x 2, chan    Plan: Continue with POC. Notify primary team with changes.

## 2022-12-09 NOTE — PROGRESS NOTES
0630AM, Notified MD. Reed by page of patient blood culture from left hand is growing Gram Positive Cocci in Clusters.     0642AM, MD. Reed called back. No new order.

## 2022-12-09 NOTE — PROGRESS NOTES
Cardiology Inpatient Consultation  Original consult date: December 5, 2022    Reason for Consult:  A cardiology consult was requested by Dr. Lerner from the medicine service to provide clinical guidance regarding concern for endocarditis.    Assessment and Recommendation:  Eloy Fletcher is a 66 year old male with a history of severe calcific mitral stenosis, bioprosthetic aortic valve replacement in 2014, CAD w/ prior PCI of the LAD in 2012, HTN, HLD, Hodgkin's lymphoma s/p chest radiation in 1979, acquired asplenia (from splenectomy in 1979 from Hodkin's laparotomy), testicular cancer and prostate cancer who is currently admitted for multifocal CVA involving the bilateral LIZZY and PCA territories in the setting of known valvular disease c/f possible endocarditis. However, ILDEFONSO does not show evidence of vegetations. He is volume overloaded now on the floor in the setting of requiring fluid boluses for soft blood pressure in setting of sepsis.    #Severe mitral annular calcification  #Mild to moderate mitral insufficiency  #Severe mitral stenosis  #Pulmonary Edema  Findings above are consistent with known history of severe mitral valvular disease that will likely require surgical replacement in the future. Patient is following with cardiovascular surgery at the [facility] for this with plans for tentative surgery in Jan/Feb of 2023. In terms of volume overload, recommend being conservative with volume resuscitation in this patient with severe mitral stenosis and mild-moderate mitral regurgitation. Agree with diuresis since patient is currently volume overloaded with pulmonary edema. Will need to monitor respiratory status closely. If patient has soft blood pressures, recommend considering albumin or giving fluids at small aliquots (250-500cc). If hypotension persists, would not recommend aggressive fluid resuscitation but rather consider transferring to MICU for pressors.  -Avoid aggressive volume  resuscitation since patient can easily have pulmonary edema  -If patient has hypotension, can consider albumin or LR/NS at small aliquots (250-500cc) and if remains hypotensive can consider transfer to MICU for pressors to treat septic shock  -Agree with gentle IV diuresis with lasix  -Monitor respiratory and mental status  -Strict I/O  -Daily standing weights  -Recommend outpatient follow up at [place] for valve surgery    Patient seen and discussed with Dr. Samuels, who agrees with above plan.    Thank you for consulting the cardiovascular services at the [facility]. Please do not hesitate to contact us with any questions.      Jose Cox MD  Cardiology Fellow    Subjective:  Required fluid boluses past few days for low blood pressure with concern for sepsis in setting of positive blood cultures. Now slightly tachypnic with more wet lungs on CXR.    HPI:   Eloy Fletcher is a 66 year old male with a history of severe calcific mitral stenosis, bioprosthetic AVR in 2014, CAD w/ prior PCI of the LAD in 2012, Hodgkin's lymphoma s/p chest radiation in 1979, testicular cancer, asplenia and prostate cancer admitted with left gaze preference, right sided neglect and generalized weakness on 12/04/2022. He was found to have 5 scattered foci of acute ischemia involving the bilateral anterior and posterior cerebral artery distributions c/f for embolic disease on MRI on 12/04/2022. Cardiology was consulted for workup of possible endocarditis and emboli. He is currently on cefazolin, ceftriaxone and gentamicin.     History is limited, as the patient intermittently is able to answer questions. He reports having neck pain with movement and denies having chest pain or discomfort, but he otherwise is unable to fully participate in the history. Per his girlfriend, he has been having intermittent fevers and vomiting since Friday, 12/02/2022 (2 days prior to admission). He did not wake up at his usual time  yesterday morning and was suddenly not interactive and stopped talking at 1100 on 12/04/2022. He had a subjective fever at that time as well, so his significant other called 911.     At the time of interview, the patient denies chest pain, dyspnea at rest or with exertion, orthopnea, PND, palpitations, lightheadedness, or syncope.     Review of Systems:  A comprehensive ROS was done and the details are included above in the HPI.      PMH:  No past medical history on file.  Active Problems:  Patient Active Problem List    Diagnosis Date Noted    Endocarditis 12/04/2022     Priority: Medium    Prostate cancer (H) 07/15/2021     Priority: Medium    Chemotherapy-induced peripheral neuropathy (H) 11/16/2020     Priority: Medium    Atherosclerosis of both carotid arteries 07/08/2020     Priority: Medium     Formatting of this note might be different from the original.  Careplan:  Background:  7/8/2020: Carotid ultrasound:                  IMPRESSION:  1.  Moderate calcified atheromatous plaque in the carotid arteries.  2.  No significant stenosis on either side.    Goals/Recommendations:  7/8/2020:    Hi Eloy,   Your carotid ultrasound shows moderate calcifications but no significant stenosis. Please continue your current medications. Continue to keep your blood pressure under good control. I recommend that we recheck this ultrasound again in 2 years for follow up.      Essential hypertension 08/02/2018     Priority: Medium     Formatting of this note is different from the original.  Careplan:  Background:    12/2/2020: Eloy denies complaints. Eloy is taking her medications as directed and denies side effects. He states that his blood pressure is normal at home.   BP Readings from Last 3 Encounters:   11/30/20 (!) 145/76   11/16/20 134/76   11/09/20 125/63   6/23/2020, 8/2/2018: currently taking metoprolol 50 mg bid  BP Readings from Last 3 Encounters:   02/13/20 139/79   01/06/20 113/58   11/06/19 101/58        Goals/Recommendations:    12/2/2020: Please recheck your blood pressure with your [employer] blood pressure cuff in 2 weeks and please let me know what you find.   6/23/2020: Please stop by lab today for: -     Basic Metabolic Panel; Future  8/2/2018: recheck blood pressure at nurse only visit. -     Hypertension Follow Up ([id number])      Acquired asplenia 12/22/2014     Priority: Medium     Formatting of this note might be different from the original.  splenectomy 1979 as part of hodkings laparotomy      Aortic valve replaced 12/15/2014     Priority: Medium     Formatting of this note might be different from the original.  4/6/2018: followed by [facility] cardiologist Dr. Peng  Aortic valve replaced 11/17/2014 w organic material (not mechanical)      Stented coronary artery 10/22/2012     Priority: Medium     Formatting of this note might be different from the original.  6/23/2020, 4/6/2018: followed by [facility] cardiologist Dr. Peng    Patient is on Clopidogrel (Plavix) following stent placement.  Date of Intervention:  10/22/2012   Type of Stent:  BAIRON  Patient is expected to continue taking Clopidogrel (Plavix) for one year (until 10/22/13) unless otherwise advised due to subsequent stent placement or continued bleeding.      Hyperlipidemia 06/19/2008     Priority: Medium     Formatting of this note might be different from the original.  4/6/2018: recommended increase of atorvastatin to 10 mg daily.      History of Hodgkin's disease 12/06/2004     Priority: Medium     Formatting of this note might be different from the original.  1979,  age 27  Epic      History of testicular cancer 12/06/2004     Priority: Medium     Formatting of this note might be different from the original.  age 19s       Social History:  Social History     Tobacco Use    Smoking status: Never    Smokeless tobacco: Never   Substance Use Topics    Alcohol use: Not Currently    Drug use: Never   Unable to obtain full social  history at this time. Significant other states he does not smoke and occasionally drinks alcohol. He lives at home alone.     Family History:  Unable to obtain family history due to aphasia.     Medications:  Current Outpatient Medications   Medication Instructions    acetaminophen (TYLENOL) 500 mg, EVERY 6 HOURS PRN    albuterol (PROVENTIL HFA;VENTOLIN HFA) 90 mcg/actuation inhaler 2-4 puffs, Inhalation, EVERY 4 HOURS PRN    aspirin (ASA) 81 mg, Oral, DAILY    calcium-vitamin D 500 mg(1,250mg) -200 unit per tablet 1 tablet, 2 TIMES DAILY WITH MEALS    clopidogrel (PLAVIX) 75 mg, Oral, DAILY    metoprolol tartrate (LOPRESSOR) 50 mg, Oral, 2 TIMES DAILY    pantoprazole (PROTONIX) 40 mg, Oral, DAILY    Praluent 150 mg, Subcutaneous, EVERY 14 DAYS      sodium chloride 150 mL/hr (12/05/22 1330)     Physical Exam:  Temp:  [97.5  F (36.4  C)-99.5  F (37.5  C)] 97.5  F (36.4  C)  Pulse:  [105-125] 125  Resp:  [11-24] 24  BP: (104-133)/(60-96) 131/96  Cuff Mean (mmHg):  [112] 112  SpO2:  [93 %-100 %] 96 %    Intake/Output Summary (Last 24 hours) at 12/5/2022 0817  Last data filed at 12/5/2022 0700  Gross per 24 hour   Intake 1050 ml   Output 600 ml   Net 450 ml     GEN: Laying in bed, appears uncomfortable, intermittently makes eye contact but otherwise has difficulty answering questions  ENT: MMM  Eyes: no icterus   CV: Tachycardic, regular rhythm. Diastolic murmur+. JVP 8 cm H2O  CHEST: Bilateral crackles  ABD: soft, non-tender, normal active bowel sounds  EXTR: pulses 2+ bilaterally in radial and and DP/PT pulses. No clubbing, cyanosis or edema.   NEURO: alert.   SKIN: No rash    Diagnostics:  All labs and imaging were reviewed, of note:    CMP  Recent Labs   Lab 12/09/22  0532 12/08/22  1948 12/08/22  0812 12/07/22  1650 12/07/22  0512 12/07/22  0434 12/06/22  0829 12/06/22  0422 12/05/22  1025 12/05/22  1008 12/05/22  0024 12/04/22  2353     --  138 137  --  138  --  142  --  141  --  140   POTASSIUM 3.3*  --   3.6  --   --  3.5  --  4.2   < > 3.7   < > 4.4   CHLORIDE 103  --  104  --   --  106  --  106  --  108*  --  104   CO2 15*  --  19*  --   --  17*  --  20*  --  20*  --  11*   ANIONGAP 19*  --  15  --   --  15  --  16*  --  13  --  25*   * 117* 114*  --  107* 119*   < > 134*   < > 142*   < > 140*   BUN 87.8*  --  83.0*  --   --  68.9*  --  49.9*  --  35.5*  --  33.4*   CR 3.85*  --  4.16* 3.85*  --  3.59*  --  2.87*  --  1.88*  --  1.68*   GFRESTIMATED 16*  --  15*  --   --  18*  --  23*  --  39*  --  44*   YUNIOR 7.5*  --  7.5*  --   --  7.5*  --  8.0*  --  7.9*  --  8.2*   MAG 2.7*  --  2.5*  --   --  2.0  --  2.1  --  2.1  --  1.8   PHOS 5.2*  --  5.3*  --   --  4.2  --  4.1  --  2.4*  --  2.8   PROTTOTAL  --   --  5.1*  --   --   --   --   --   --  5.8*  --  6.2*   ALBUMIN 2.5*  --  2.4*  2.4*  --   --  2.3*  --  2.8*  --  2.9*  --  3.0*   BILITOTAL  --   --  4.0*  --   --   --   --   --   --  1.1  --  1.3*   ALKPHOS  --   --  92  --   --   --   --   --   --  58  --  52   AST  --   --  69*  --   --   --   --   --   --  131*  --  149*   ALT  --   --  26  --   --   --   --   --   --  61*  --  78*    < > = values in this interval not displayed.     CBC  Recent Labs   Lab 22  0814 22  0812 22  1359 22  0434   WBC 21.4* 15.4* 13.6* 12.9*   RBC 3.71* 3.47* 3.61* 3.37*   HGB 11.3* 10.5* 10.8* 10.1*   HCT 31.8* 30.8* 32.0* 30.4*   MCV 86 89 89 90   MCH 30.5 30.3 29.9 30.0   MCHC 35.5 34.1 33.8 33.2   RDW 16.5* 16.8* 16.3* 16.2*   * 120* 84* 64*     INR  Recent Labs   Lab 22  1341   INR 1.23*     Arterial Blood Gas  Recent Labs   Lab 22  0845 22  1622 22  0521 22  1832   O2PER 90 25 21 2       No results found for: TROPI, TROPONIN, TROPR, TROPN    [id number]  Rate 140  Sinus tachycardia  Right bundle branch block, present on ECG from 2022  No ST or T wave changes suggestive of ischemia.     ILDEFONSO:  2022, ILDEFONSO @ [facility]  PRE-SEDATION  ASSESSMENT & CONSENT (performed immediately prior to the start of the procedure): The goals, risks and alternatives to moderate sedation and the transesophageal echo were explained to the patient, questions were answered and consent was   given to proceed. The physician reviewed the patient's history, medication list, allergies, and review of systems, and the findings as documented in the RN assessment and also performed a pertinent examination including a heart, airway and lung   assessment. Mallampati Assessment: Class II. Sedation plan: Transesophageal echo - moderate sedation. ASA physical status score: Class IV. The patient's identity and all needed equipment were confirmed and a final confirmatory pause was performed by the   team immediately prior to start. 3D imaging was performed to evaluate mitral valve which could not be adequately assessed by 2D imaging. PROCEDURAL ECHO FINDINGS: Transesophageal echocardiogram performed at the request of the primary .   Adult probe inserted without difficulty.  LEFT VENTRICLE:Normal left ventricular chamber size. Estimated left ventricular ejection fraction 65%. No regional wall motion abnormalities.  RIGHT VENTRICLE:Normal right ventricular chamber size. Borderline reduced right ventricular systolic function.  ATRIA:Enlarged left atrial size. Normal left atrial appendage. Filamentous strand measuring approximately 0.4 cm in length appreciated on superoposterior wall of left atrial appendage. Could represent PFE vs small mobile clot(although less likely).   Enlarged right atrial size.  CARDIAC VALVES:Status post 23 mm aortic valve Cordell-Hubbard PERIMOUNT Magna/Magna Ease pericardial prosthesis. Aortic valve systolic mean Doppler gradient 4 mmHg. Trivial aortic valve regurgitation. No paraprosthetic regurgitation. Severely   calcified mitral valve , severely restricted leaflet mobility resulting in severe MS. 3D planimetry of valve opening measured  1.02 cm2. Mitral valve diastolic mean Doppler gradient 9 mmHg (heart rate 73 BPM). Moderate-severe mitral valve regurgitation ,   two regurgitant jets appreciated flanking A2/P2 on either side. Mitral valve effective regurgitant orifice 0.31 cm2. Trivial pulmonary valve regurgitation. Thickened tricuspid valve. Mild-moderate tricuspid valve regurgitation.  OTHER ECHO FINDINGS:Normal ascending aorta diameter. Normal aortic arch. Moderate immobile (atheroma 3-5 mm thickness without ulceration) atherosclerosis of the descending thoracic aorta. Normally connected pulmonary veins. Pulmonary artery bifurcation   is normal. Normal superior vena cava. Agitated saline injection(s) performed during sedation. No right-to-left shunt at atrial level at rest or with Valsalva release. No intracardiac mass or thrombus identified. No  pericardial effusion. PROCEDURE NOTES:   Procedure performed with appropriate level of sedation. A trained independent observer assisted with monitoring the patient's level of consciousness and physiologic status throughout the procedure, see nursing documentation. The patient tolerated the   sedation and procedure well and was released awake, alert, and in good condition. Transesophageal echocardiogram completed without complications. See Sedation Narrator or other pertinent record in Epic for additional procedure and sedation information.   Physician signature for procedural medications and patient discharge when DC criteria met.    Coronary angiogram @ [facility] on 11/14/2022  1.  CORONARY ANGIOGRAPHY   FINAL DIAGNOSIS   1.  Moderate coronary artery atherosclerosis   2.  Coronary calcification   PRE-PROCEDURE DIAGNOSIS   1.  Stenosis Mitral Not Rheumatic Acquired   2.  Preoperative Exam   3.  Atherosclerotic Heart Disease Of Native Coronary Artery Without Angina Pectoris   CORONARY DIAGNOSTIC SUMMARY   Coronary artery dominance is right.   The left main coronary artery is 10% obstructed by a  discrete lesion.    The middle left anterior descending artery is 40% obstructed by a tubular lesion. The distal segment is normal size, diseased.   The distal left anterior descending artery is 30% obstructed by a tubular lesion.   The distal circumflex artery is 30% obstructed by a tubular lesion.   The proximal right coronary artery is 20% obstructed by a discrete lesion.   The middle right coronary artery is 30% obstructed by a discrete lesion.   The distal right coronary artery is 60% obstructed by a discrete lesion.   The right posterior descending artery is 30% obstructed by a discrete lesion. The distal segment is normal size, diseased.   Prior LAD stents are patent   RADIATION DOSE DATA   Procedure cumulative skin dose (mGy): 378.71   Procedure cumulative dose area product (Gy-cm**2): 16.97   Fluoro Time (Min): 7.28   CONTRAST DOSE DATA   IOHEXOL 350 MG IODINE/ML INTRAVENOUS SOLUTION: 60mL

## 2022-12-09 NOTE — PROGRESS NOTES
[facility] Progress Note     Patient:  Eloy Fletcher   YOB: 1955, MRN: [id number]  Date of Visit: 12/09/2022  Date of Admission: 12/4/2022  Consult Requester: Lauri Friend MD       ID Problem List:  1. Sepsis: MSSA bacteremia  -  Suspected endocarditis, with CNS emboli and CSF culture positivity. ILDEFONSO did not reveal vegetations, but that does not fully exclude endocarditis  2. Septic emboli to the brain, multiple. Assume MSSA  3. Encephalopathy; Acute embolic CVA  CSF Culture positive MSSA (also +RBC. Waxing/waning   --  CSF penetration of nafcillin is 10-20% of serum levels with parenchymal penetration less/variable with ~10% having undetectable brain tissue levels.   4. Acute kidney injury, creatinine beginning to decrease (Cr = 4.1 -> 3.9 mg/dL) - multi-factorial -> sepsis, hypotension, meds.  Creatinine rise started before Nafcillin was started (and before Gent was given).    5. Aortic Stenosis s/p Biprosthetic AVR 2014; mitral stenosis with calcifications - no evidence of vegetations on ILDEFONSO.  6.  Pneumonia - probable MSSA bacteremic seeding of lung. +MSSA from sputum.  7. Cholestasis from critical illness;   Nafcillin can cause a cholestatic hepatitis.       ID Recommendations:  1. Add linezolid for better CNS brain tissue coverage.  Achieves ~70% of serum levels in CSF and ~45% in brain tissue. (Ref:  Jasvir PEREIRA et al. JADYN 2013).   -- alternatives of ceftriaxone (rising T-bili) or fluoroquinolone (long QT) not ideal.   2. Continue once daily blood cultures (ordered x3 more days)   - Last blood culture positive 12/8  3. Follow up LFTs tomorrow, check CBC with diff to exclude eosinophilia. (ordered)  4. If T-bili continues to rise on Saturday with cholestatic hepatitis picture, then ID would switch from nafcillin to cefazolin 2g IV q8Hr for the first day, then renally adjust thereafter as Cr may be improving, the calculated CrCl clearance would lag behind actual clearance  "and would prefer to not under-dose.     4. If by Monday, still bacteremic consider ILDEFONSO w/ bubble study early next week to rule out PFO since initial TTE was non-diagnostic to exclude PFO.         5.  Would eventually recommend Rifampin 300mg BID given bioprosthetic valve, but with T-bili of 4.0 today, this is not the ideal time to start.       Will follow. I am on call over the weekend.   Lauri Branham [phone]    Assessment and Discussion:  Summary: 66 year-old male with PMHx notable for biprosthetic AVR 2014, hodgkin's lymphoma with thoracic radiation Evaluated for Staphylococcus Aures bacteremia with encephalopathy and acute embolic CVA. Since admission, CRP down trending. Blood and CSF cultures positive for staph aureus MSSA 12/4 .  Continued Nafcillin.  Provided gentamicin on the 5th, Gent level 0.4 on the 8th.  Cultures 12/7 and 12/8 positive for gram positive cocci in clusters.      1. Sepsis: MSSA bacteremia- Suspected endocarditis, Given CNS emboli, most probable unifying diagnosis for findings described. Assume this is a widely disseminated embolic phenomena throughout the body. May consider contact with cardiology for possibility of PFO, bubble study reported non-diagnostic, would broaden differential.     Impression: Suspected etiology with MSSA bacteremia most consistent with clinical findings of multifocal cerebral infarcts for Staph. Endocarditis, plausible encephalopathy secondary to sepsis for the initial findings however, his mentation has significantly improved since initial presentation. ILDEFONSO was not demonstrative of \"definite\" valvular lesions, it is reasonable for prior vegetation to embolize. Given the findings, reasonable to treat with current endocarditis guidelines with daily cultures until negative for 48-72 hours.     2.Encephalopathy; Acute embolic CVA- Likely cardio-embolic as above  Altered mentation could have been secondary to bacteremia, with possible meningoencephalitis given " Neurology evaluation correlation of findings not consistent with MRI.  Regarding CSF findings,it is plausible of embolization with subsequent growth of MSSA either direct to the CSF or adjacent parenchymal translocation, concerning for meningoencephalitis. Treat bacteremia as above. Agree with low threshold for CT.      3. Acute kidney injury-  no additional synergistic treatment with gentamicin indicated at this time. Nafcillin is known to cause drug-induced interstitial nephritis but creatinine is now improving as BP rises. so likely due to sepsis/poor renal perfusion. However, if creatinine continues to increase, would consider urinary eosinophil testing. May discuss with Nephrology if continues.      4. Aortic Stenosis s/p Biprosthetic AVR 2014; mitral stenosis with calcifications- would be interested in cardiology for elucidation if PFO is present for differential, otherwise may have had prior vegetation that embolized before ILDEFONSO. Plan for Rifampin in the future with prosthetic valve otherwise    5. Pneumonia - probable bacteremic seeding of lung-   CT chest notable for ground glass opacities and interstitial pulmonary edema, if infectious etiology, likely seeding from endocarditis. Should be appropriately covered with given date.     Will continue to follow for further recommendations and overall course.      Thank you for the consult. ID will continue to follow with you.    Ifrah Weiner, MS4   Pager [phone]              Interval History and Events:   Overnight, acute events include increased altered mental status, reported weakness, evaluated at that time, normal examination so no CT at that time. Lethargic, inconsistent with following commands.  Evaluated at bed side, able to answer questions and follow-commands. However, increased work of breathing during encounter.  UOP appropriate.     Ongoing fever 100.4 this 12/9 afternoon.           Antimicrobial Treatment:   Nafcillin 12/5-Present   Received one dose  "Gentamicin (5mg/kg) on 12/5 follow up level 12/8: 0.4     Stopped: 12/6: Ceftriaxone   Stopped 12/5:  vancomycin, acyclovir, ampicillin;          Review of Systems:   Targeted 4 point ROS was completed with pertinent positives and negatives are detailed above.         HPI:   Adopted from initial consult note on 12/05/2022     Summary:       66 year-old male with PMHx notable for Aortic stenosis s/p bioprosthetic AVR 2014, CAD s/p stent 2012, moderate mitral regurgitation/stenosis on ILDEFONSO 11/2022, with prior chemotherapy/radiation with Hodgkin's Lymphoma 1979, testicular cancer and prostate cancer admitted overnight on 12/04 for sepsis, encephalopathy, and acute embolic CVA. Evaluated for possible endocarditis MSSA Bacteremia       \"History obtained from review of chart and discussion with patient/nursing staff.      Eloy Fletcher is a 66 year old male who presented with new left lateral ocular gaze and right sided hemineglect, weakness and fevers onset 12/02/2022.  PMHx notable for Aortic Stenosis s/p bioprosthetic AVR 2014, CAD s/p PCI and stent 2012, moderate mitral regurgitation/stenosis on ILDEFONSO 11/2022, with prior chemotherapy/radiation with Hodgkin's Lymphoma 1979, testicular cancer and prostate cancer admitted overnight on 12/04 for sepsis, encephalopathy, and acute embolic CVA. Evaluated for possible endocarditis.     Course: Initial presentation to [facility] ED with confusion and unresponsiveness 12/4, discussion with his ex-wife,  initial onset of symptoms 12/1/22 multiple episodes of vomiting with a fever. Improved overnight, recurred and worsened 12/3.  He presented with left gaze preference, right-sided neglect, weakness, and fevers. Initial findings significant for showing tachycardia, tachypnea, and fevers, initial eukocytosis of 11.4, lactic acid 4.2. At that time he was confused and not responding appropriately.   He was evaluated by neurology and found to have MRI findings with non-specific small " "multifocal CVA suspected embolic in origin. He was provided empiric antibiotics, Blood cultures were drawn.    Due to concern for CNS infection, a LP was performed at the [facility] ED and was further transferred to [facility] ICU for work-up of endocarditis in the setting of hypotension. Initially provided   Acyclovir, Ampicillin, Ceftriaxone, Piperacillin Tazobactam and Vancomycin.   CT Chest also showing GGO in the MIGUEL ANGEL and LLL with tree-in-bud findings concerning for pneumonia (CAP vs. Aspiration). LP performed showing WBC of 230 with elevated protein at 59 and glucose at 82.                  Today, the patient was seen and examined at bedside, mild aphasia/ difficult word finding on exam. Patient is able to answer yes/no questions. Interval changes,  appears improved since 12/4.  Associated symptoms at this time positive for cough, with green/yellow sputum. No shortness of breath. Denies CV complaints of chest pain, diaphoresis, no abdominal pain. Denies new rashes or lesions. Denies new numbness or subjective weakness.      Pertinent Cardiac History: Aortic stenosis S/P bioprosthetic AVR in 2014, and CAD s/p PCI   2012.  Of note, recently evaluated with Cardiology at [facility]. Possible planned early next year, \"redo median sternotomy and mitral valve replacement with a tissue valve. We may have to replace the ascending aorta at the same time. The patient has right bundle branch block and a left bob fascicular block\" per evaluation with Dr. Gurrola 11/16/2022.  Last ILDEFONSO 11/11/2022, with LVEF 65%, Moderate-severe mitral valve regurgitation with moderate mitral stenosis and severe annular mitral calcification, demonstrated normal aortic valve tissue prosthesis.   Pertinent Oncology History: Prostate cancer (Lulu 4+3, TNM: cT1c) treated with chemo/radiation (last dose 3/30/2022), good response at that time. Past history in remission: Testicular cancer (30 years prior  s/p chemo & Left orchiectomy) and " "Hodgkins lymphoma (1970s treated with MOPP & radiation)\"         Physical Examination:   Temp:  [98  F (36.7  C)-99.5  F (37.5  C)] 98.5  F (36.9  C)  Pulse:  [103-115] 105  Resp:  [11-24] 11  BP: (100-134)/(57-82) 133/75  Cuff Mean (mmHg):  [112] 112  SpO2:  [91 %-100 %] 94 %    I/O last 3 completed shifts:  In: 780 [P.O.:480; I.V.:300]  Out: 3900 [Urine:3850; Stool:50]    Vitals:    12/04/22 2200 12/05/22 0400 12/07/22 0152 12/08/22 0345   Weight: 81.1 kg (178 lb 12.7 oz) 81 kg (178 lb 9.2 oz) 82.4 kg (181 lb 10.5 oz) 84.2 kg (185 lb 10 oz)    12/09/22 0556   Weight: 86.4 kg (190 lb 7.6 oz)       Constitutional: Adult male seen and examined at bedside.  Tired appearing, able to converse.   Respiratory: increased work of breathing, oxygenating appropriately, end expiratory wheezing R>L  Cardiovascular: Tachycardic, regular rhythm, grade IV systolic ejection murmur noted. No peripheral edema.   Skin: Warm, dry, well-perfused. No bruising, bleeding, rashes, or lesions on limited exam.   Musculoskeletal: Extremities grossly normal, non-tender, no edema.    Neurologic: A&Ox3, pupils equal and reactive to light, 2+ brachial and patellar reflexes, symmetric smile.   Neuropsychiatric: Calm. Affect appropriate to situation.  Vascular access:  12/4 LUE pIV RUE pIV, 12/5 Right UE pIV  CDI, non-tender, no surrounding erythema.          Medications:       nafcillin  2 g Intravenous Q4H     pantoprazole  40 mg Intravenous Daily with breakfast     sodium chloride (PF)  3 mL Intracatheter Q8H       Antiinfectives:  Anti-infectives (From now, onward)    Start     Dose/Rate Route Frequency Ordered Stop    12/05/22 1500  nafcillin IV 2 g vial to attach to  ml bag         2 g  over 1 Hours Intravenous EVERY 4 HOURS 12/05/22 1455            Infusions/Drips:    sodium chloride 150 mL/hr (12/05/22 1330)            Laboratory Data:     Microbiology:      Summary: 12/4 Blood and CSF cultures with Staph Aureus. Verigene with no " evidence of MecA      12/7 Prelim Gram Positive Cocci 1/2 bottles  Blood Culture Hand, Right     Collected 12/7/2022  4:34 AM      Status: Preliminary result      Visible to patient: No (not released)     Specimen Information: Hand, Right; Blood    0 Result Notes  Culture Positive on the 1st day of incubation Abnormal        Gram positive cocci in clusters Panic     1 of 2 bottles        Resulting Agency: [facility]             Details:      Cerebrospinal fluid Aerobic Bacterial Culture Routine    Culture   Lab   1+ Staphylococcus aureus Panic   [facility]          Gram Stain quantification of host cells and microbiological organisms was done on a cytocentrifuged preparation.              Collected 12/4/2022  5:38 PM        12/04/2022 1601 12/07/2022 0644 Blood Culture Peripheral Blood [[id number]]   (Abnormal)   Peripheral Blood    Final result Component Value   Culture Positive on the 1st day of incubation Abnormal     Staphylococcus aureus Panic     2 of 2 bottles  Susceptibilities done on previous cultures             12/04/2022 1534 12/07/2022 0815 Blood Culture Line, venous [[id number]]    (Abnormal)   Blood from Line, venous    Final result Component Value   Culture Positive on the 1st day of incubation Abnormal     Staphylococcus aureus Panic     2 of 2 bottles       Susceptibility     Staphylococcus aureus     DEJA     Clindamycin <=0.25 ug/mL Susceptible     Erythromycin <=0.25 ug/mL Susceptible     Gentamicin <=0.5 ug/mL Susceptible     Oxacillin 0.5 ug/mL Susceptible     Tetracycline <=1 ug/mL Susceptible     Trimethoprim/Sulfamethoxazole <=0.5/9.5 u... Susceptible     Vancomycin <=0.5 ug/mL Susceptible                  12/04/2022 1534 12/07/2022 0645 Blood Culture Line, venous [[id number]]   (Abnormal)   Blood from Line, venous    Final result Component Value   Culture Positive on the 1st day of incubation Abnormal     Staphylococcus aureus Panic     2 of 2 bottles  Susceptibilities done on previous cultures              12/04/2022 1534 12/05/2022 0551 Verigene GP Panel [[id number]]    (Abnormal)   Blood from Line, venous    Final result Component Value   Staphylococcus aureus Detected Abnormal    Positive for Staphylococcus aureus and negative for the mecA gene (not resistant to methicillin) by [employer] multiplex nucleic acid test. Final identification and antimicrobial susceptibility testing will be verified by standard methods.   Staphylococcus epidermidis Not Detected   Staphylococcus lugdunensis Not Detected   Enterococcus faecalis Not Detected   Enterococcus faecium Not Detected   Streptococcus species Not Detected   Streptococcus agalactiae Not Detected   Streptococcus anginosus group Not Detected   Streptococcus pneumoniae Not Detected   Streptococcus pyogenes Not Detected   Listeria species                    CSF Findings:   *High: RBC *78, total nucleated cells *230, Glucose CSF *82  Abnormal CSF appearance Hazy.   Otherwise, colorless        Meningitis/Encephalitis Panel Qual PCR CSF:  Order: [id number]   Status: Final result      Visible to patient: Yes (not seen)      1 Result Note    Component Ref Range & Units 1 d ago   (12/4/22) 1 d ago   (12/4/22)    Escherichia coli K1 Negative Negative       Haemophilus influenzae Negative Negative       Listeria monocytogenes Negative Negative       Neisseria meningitidis Negative Negative       Streptococcus agalactiae (GBS) Negative Negative       Streptococcus pneumoniae Negative Negative  Not Detected R     Cytomegalovirus Negative Negative       Enterovirus Negative Negative       Herpes simplex virus 1 Negative Negative      Comment: Recommend testing with another molecular method if clinical suspicion for infection is high.    Herpes simplex virus 2 Negative Negative      Comment: Recommend testing with another molecular method if clinical suspicion for infection is high.    Human Herpes Virus 6 Negative Negative       Human parechovirus Negative Negative        Varicella zoster virus Negative Negative       Cryptococcus neoformans/gattii Negative Negative      Comment: Recommend testing for Cryptococcal antigen and fungal culture if clinical suspicion for infection is high.         Strep pneumo Agn Ur greater or equal to 13yrs or CSF any age (1 mL minimum)  Order: [id number]   Status: Final result      Visible to patient: Yes (not seen)     Specimen Information: Lumbar Puncture; Cerebrospinal fluid    1 Result Note    Component Ref Range & Units 1 d ago     Streptococcus pneumoniae antigen Negative Negative    Comment: A negative Streptococcus pneumoniae antigen result does not rule out infection with Streptococcus pneumoniae.               Inflammatory Markers    Recent Labs   Lab Test 12/09/22  0532 12/07/22  0434 12/06/22  0422   .00* 254.00* 387.00*   Procalcitonin: 54.20 (12/4/22)  Lactic Acid: 4.6 (12/4), 4.2 (12/4), 4.6(12/5) 1.7 (12/6)  Cardiac: Troponin: 610 (12/4), 983 (12/4), 1,229 (12/5)    Metabolic Studies     Recent Labs   Lab Test 12/09/22  0532 12/08/22  2345 12/08/22  1948 12/08/22  0812 12/07/22  1650 12/07/22  0512 12/07/22  0434     --   --  138 137  --  138   POTASSIUM 3.3*  --   --  3.6  --   --  3.5   CHLORIDE 103  --   --  104  --   --  106   CO2 15*  --   --  19*  --   --  17*   ANIONGAP 19*  --   --  15  --   --  15   BUN 87.8*  --   --  83.0*  --   --  68.9*   CR 3.85*  --   --  4.16* 3.85*  --  3.59*   GFRESTIMATED 16*  --   --  15*  --   --  18*   *  --    < > 114*  --    < > 119*   YUNIOR 7.5*  --   --  7.5*  --   --  7.5*   PHOS 5.2*  --   --  5.3*  --   --  4.2   MAG 2.7*  --   --  2.5*  --   --  2.0   LACT  --  1.4  --   --   --   --  1.7    < > = values in this interval not displayed.       Hepatic Studies    Recent Labs   Lab Test 12/09/22  0532 12/08/22  0812 12/07/22  0434 12/06/22  0422 12/05/22  1008 12/04/22  0353   BILITOTAL  --  4.0*  --   --  1.1 1.3*   ALKPHOS  --  92  --   --  58 52   ALBUMIN 2.5* 2.4*  2.4*  2.3*   < > 2.9* 3.0*   AST  --  69*  --   --  131* 149*   ALT  --  26  --   --  61* 78*   LDH  --   --   --   --  468*  --     < > = values in this interval not displayed.       Pancreatitis testing    Recent Labs   Lab Test 12/07/22  0434   TRIG 260*       Hematology Studies      Recent Labs   Lab Test 12/09/22  0814 12/08/22  0812 12/07/22  1359 12/07/22  0434 12/06/22  0422   WBC 21.4* 15.4* 13.6*   < > 12.3*   ANEU  --   --   --   --  11.1*   ALYM  --   --   --   --  0.4*   LATONYA  --   --   --   --  0.9   AEOS  --   --   --   --  0.0   HGB 11.3* 10.5* 10.8*   < > 11.7*   HCT 31.8* 30.8* 32.0*   < > 35.5*   * 120* 84*   < > 79*    < > = values in this interval not displayed.       Arterial Blood Gas Testing    Recent Labs   Lab Test 12/08/22  0845   O2PER 90        Urine Studies     Recent Labs   Lab Test 12/07/22  1509 12/05/22  0538 12/04/22  1534   URINEPH 5.5 5.5 5.0   NITRITE Negative Negative Negative   LEUKEST Trace* Negative Negative   WBCU 6* 8* 3       Vancomycin Levels     No lab results found.    Tobramycin levels     No lab results found.    Gentamicin levels    Recent Labs   Lab Test 12/08/22  0812   GENT 0.4       CSF testing     Recent Labs   Lab Test 12/04/22  1738   CWBC 230*   CNEU 86   CLYM 1   CMONO 4   COTH 9   CRBC 78*   CGLU 82*   CTP 59.7*       Last check of C difficile  C Difficile Toxin B by PCR   Date Value Ref Range Status   12/05/2022 Negative Negative Final     Comment:     A negative result does not exclude actual disease due to C. difficile and may be due to improper collection, handling and storage of the specimen or the number of organisms in the specimen is below the detection limit of the assay.            Imaging:     EKG     Component Ref Range & Units 12/9/22 11:28 AM 12/7/22  5:02 AM    Systolic Blood Pressure mmHg       Diastolic Blood Pressure mmHg       Ventricular Rate   102     Atrial Rate   102     NY Interval ms  176     QRS Duration ms 144  144      QT ms 382  400     QTc ms 551  521     P Axis degrees  53     R AXIS degrees -79  -75     T Axis degrees 23  22     Interpretation ECG  Wide QRS tachycardia   Right bundle branch block   Left anterior fascicular block     Bifascicular block     Abnormal ECG   When compared with ECG of 07-DEC-2022 05:02,   Wide QRS tachycardia has replaced Sinus rhythm  Sinus tachycardia   Right bundle branch block   Left anterior fascicular block    Bifascicular block    Abnormal ECG   When compared with ECG of 20-JUL-2012 20:12,   (RBBB and left anterior fascicular block) is now Present   Confirmed by Jackson Kong ([id number]) on 12/7/2022 10:12:14 AM          Echo ILDEFONSO Study Date: 12/05/2022 01:14 PM   Interpretation Summary  Known radiation induced valve disease with severe calcific mitral disease. No  definite endocarditis noted.  Severe mitral annular calcification is present with multiple calcific nodules,  no definite vegetation seen.  S/p bioprosthetic AVR (23 mm Cordell-Hubbard Perimount Magna/Magna Ease  pericardial prosthesis). Valve appears well seated with good leaflet opening.  No vegetations visualized.  Global and regional left ventricular function is normal with an EF of 60-65%.     ______________________________________________________________________________  Procedure  Transesophageal Echocardiogram with color and spectral Doppler performed. 3D  image acquisition, reconstruction, and real-time interpretation was performed.  I was present during ILDEFONSO probe placement by the Fellow. I personally viewed  the imaging and agree with the interpretation and report as documented by the  Fellow. Procedure location Echo Lab. Informed consent for Transesophegeal echo  obtained. ILDEFONSO Probe #62 was used during the procedure. Patient was sedated  using Fentanyl 50 mcg. Patient was sedated using Versed 1 mg. The heart rate,  respiratory rate, oxygen saturations, blood pressure, and response to care  were monitored throughout  the procedure with the assistance of the nurse. I  determined this patient to be an appropriate candidate for the planned  sedation and procedure and have reassessed the patient immediately prior to  sedation and procedure. Total sedation time: 25 minutes of continuous bedside  1:1 monitoring. The Transducer was inserted without difficulty . The patient  tolerated the procedure well. Complications None.     Left Ventricle  Mild concentric wall thickening consistent with left ventricular hypertrophy  is present. Global and regional left ventricular function is normal with an EF  of 60-65%. Left ventricular size is normal.     Right Ventricle  The right ventricle is normal size. Global right ventricular function is  normal.     Atria  The left atrial appendage is normal. It is free of spontaneous echo contrast  and thrombus. Moderate to severe left atrial enlargement is present. The  atrial septum is intact as assessed by color Doppler .     Mitral Valve  Severe mitral annular calcification is present. Mild to moderate mitral  insufficiency is present. The mean gradient across the mitral valve is 13  mmHg. The mitral valve area is 1.4 cm^2. Severe mitral stenosis is present.     Aortic Valve  S/p bioprosthetic AVR. Valve appears well seated with good leaflet opening. No  vegetations visualized. Doppler interrogation of the aortic valve is normal.     Tricuspid Valve  The tricuspid valve is normal. Mild tricuspid insufficiency is present.     Pulmonic Valve  The pulmonic valve is normal.     Vessels  The aorta root is normal. The thoracic aorta is normal. Ascending aorta and  aortic arch free of atheroma and vegetation. The inferior vena cava is normal.  The superior vena cava is normal. The RUPV Doppler shows normal velocity  waveform . The RLPV Doppler shows systolic dominance . The LUPV Doppler shows  normal velocity waveform . The LLPV Doppler shows normal velocity waveform .     Pericardium  No pericardial  effusion is present.     Compared to Previous Study  This study was compared with the study from 12/5/22 . There has been no  change.       TTE: 12/5 Interpretation Summary  Would consider a transesophageal echocardiogram if clinically indicated.  Technically difficult study.Extremely poor acoustic windows.  Limited information was obtained during study.  Global and regional left ventricular function is normal with an EF of 60-65%.  Right ventricular function, chamber size, wall motion, and thickness are  normal.  Severe mitral annular calcification is present.  Mitral mean gradient 11mmHg at heart rate 131 BPM.  H/O Bioprosthetic AVR in 2014. Valve not well visualized.    MRI of the brain 12/4/22: IMPRESSION: 5 scattered foci of acute ischemia involving the bilateral anterior and posterior cerebral artery distributions. Constellation of findings is suspicious for central embolic phenomena.           CT Abdomen and Pelvis: 12/4/22  IMPRESSION:  1.  Patchy groundglass opacities in the left upper and lower lobes, and cluster of tree-in-bud nodularity in the left upper lobe, likely infectious or inflammatory.  2.  Interstitial pulmonary edema.  3.  Trace left pleural effusion.  4.  No acute findings in the abdomen or pelvis.        CT head and CTA head/neck 12/4/22, were not suggestive of an acute abnormality, no significant stenosis of carotids or vertebral arteries.      CXR 12/7/22:  IMPRESSION: Diffuse interstitial markings bilaterally and streaky  perihilar and bibasilar opacities likely represent pulmonary edema  versus atelectasis. No focal consolidation.    Attending Physician Attestation:  ILauri MD have seen and evaluated Eloy Fletcher. I have discussed with the primary provider team, and I agree with the above documented findings and plan in this note. I have reviewed today's vital signs, medications, labs and imaging.    Floor time: 30 minutes  Face-to-face time: 20 minutes  Total time:  50 minutes     Lauri Branham MD MPH  Professor of Medicine  Division of Infectious Diseases & International Medicine  Department of Medicine

## 2022-12-09 NOTE — PLAN OF CARE
Neuro: This morning pt was A&Ox3-4 and responding appropriately to questions. Mid-morning pt began to get much more lethargic and was not following commands and would fall asleep with any questions asked. He does open his eye to gentle touch but is very lethargic- MD notified and aware of this change throughout the shift.   Cardiac: ST w/ BBB - -120s. SBP 130s. Tmax 100.5F, MD notified.   Respiratory: Pt tachypneic w/ labored breathing and accessory muscle use. Expiratory wheezing and increased work of breathing noted mid-morning so MD was paged and came to bedside - CXR, EKG, and VBGs completed, prn albuterol given. Pt placed on 1-2L NC for comfort. 40mg IV lasix given.   GI/: Good urine output via chan. BM X1 this shift - loose/brown in appearance.   Diet/appetite: Minced & most diet. Able to eat breakfast this morning but has been too lethargic for lunch.   Activity:  Lift assist, up to chair x1 this shift.   Pain: At acceptable level on current regimen.   Skin: No new deficits noted.   LDA's: L PIV x2.     Plan: Monitor respiratory and mental status. Potassium recheck scheduled for 1515. RUQ US tomorrow AM. Continue with POC. Notify primary team with changes.     Goal Outcome Evaluation:      Plan of Care Reviewed With: patient    Overall Patient Progress: decliningOverall Patient Progress: declining    Outcome Evaluation: Increased work of breathing and increased lethargy throughout the day.      Problem: Fall Injury Risk  Goal: Absence of Fall and Fall-Related Injury  Intervention: Identify and Manage Contributors  Recent Flowsheet Documentation  Taken 12/9/2022 1120 by Ann Calvert, RN  Medication Review/Management: medications reviewed  Taken 12/9/2022 0800 by Ann Calvert, RN  Medication Review/Management: medications reviewed     Problem: Stroke, Ischemic (Includes Transient Ischemic Attack)  Goal: Effective Oxygenation and Ventilation  Intervention: Optimize Oxygenation and  Ventilation  Recent Flowsheet Documentation  Taken 12/9/2022 1200 by Ann Calvert, RN  Head of Bed ([facility]) Positioning: HOB at 30-45 degrees  Taken 12/9/2022 0800 by Ann Calvert, RN  Head of Bed ([facility]) Positioning: HOB at 45 degrees

## 2022-12-09 NOTE — PROGRESS NOTES
CLINICAL NUTRITION SERVICES - BRIEF NOTE     Nutrition Prescription     Recommendations already ordered by Registered Dietitian (RD):  1. [facility] 12/10-12/12  Goal for pt to consume at least ~1300 Kcals and 65 gm protein PO vs need to consider alternate source of nutrition (~65% low end est needs)    2. Adjust ONS   - Ensure Enlive once daily   - Magic Cup with meals TID  - Ensure Clear once daily    For last full RD assessment, see note dated 12/5/22    NEW FINDINGS   - Checking ONS and overall intake. Pt sleeping soundly at time of assessment. Multiple family members in room who report pt has been sleeping a lot. Has been taking ~1 Ensure drink per day with encouragement. Has been otherwise taking pudding fairly well. Family thinks he will like Magic Cup. Dislikes Jello, so will not try Gelatein. Might like Ensure Clear.    - Continue Ensure once daily and trial other supplements over weekend (Magic Cup with meal trays, Ensure Clear once daily). Initiate [facility] 12/10-12/12 to better assess PO intake trends. Full reassessment Monday.    INTERVENTIONS  Implementation  Collaboration with other nutrition professionals - [facility]  Medical food supplement therapy - Adjust ONS    Monitoring/Evaluation  Will continue to monitor and evaluate per protocol.    Kamran Sanchez RDN, LD, CNSC  6B RD pager: [phone]   6B work-room RD phone: *[phone]    Weekend/Holiday RD pager [phone]

## 2022-12-09 NOTE — PROVIDER NOTIFICATION
"   12/08/22 2000   Call Information   Date of Call 12/08/22   Time of Call 1943   Name of person requesting the team Cindy NEGRON   Title of person requesting team RN   RRT Arrival time 1946   Time RRT ended 2004   Reason for call   Type of RRT Adult   Primary reason for call Staff concerned;Neurological   Neurological Lethargic   Was patient transferred from the ED, ICU, or PACU within last 24 hours prior to RRT call? No   SBAR   Situation Nurse concerned about lethargy, strength L>R.   Background per provider H&P \"Eloy Fletcher is a 66-year-old male with a past medical history of HTN, HLD, Hodgkin's lymphoma (s/p radiation and MOPP chemo in remission), Testicular cancer (s/p left orchiectomy and chemo, in remission), prostate cancer (Elly 4+3, diagnosed in 4/2021 and s/p radiation and lupron), aortic stenosis S/P bioprosthetic AVR in 2014, and CAD s/p PCI to LAD in 2012 that is admitted to the ICU for sepsis and encephalopathy.\"   Notable History/Conditions Cancer;Cardiac;Hypertension   Assessment Arouses intermittently to voice, shoulder squeeze, VSS.  PAULA intermittently to command.   Interventions No interventions   Patient Outcome   Patient Outcome Stabilized on unit   RRT Team   Attending/Primary/Covering Physician Gold 9   Date Attending Physician notified 12/08/22   Time Attending Physician notified 1943   Physician(s) Katlyn HI   Lead RN Jovi NEGRON   RT n/a   Post RRT Intervention Assessment   Post RRT Assessment Stable/Improved   Date Follow Up Done 12/08/22   Time Follow Up Done 2327   Comments VSS.     "

## 2022-12-09 NOTE — PROGRESS NOTES
Rapid Response Team Note    Assessment   In assessment a rapid response was called on Eloy Fletcher due to lethargy. This presentation is likely due to MSSA bacteremia with suspected endocarditis and CNS emboli, CSF culture positive.    Brief Summary of events leading to rapid response:   RRT called due to increased lethargy this evening. Per charge RN, patient came from the ICU to [facility] a few days ago in a similar state. He was having a slightly better today and was more alert this afternoon. However, he slept most of the day while family was here visiting. Per chart review, patient's mental status BL appears to be quite lethargic and has some waxing and waning (with best mental status being alert and able to answer basic questions). After d/w nursing and chart review, it is felt his mental status is within waxing and waning baseline     Plan   -  Continue very close monitoring. Contact medicine or repeat RRT if any worsening noted. Very low threshold for repeat head imaging if any new concerns   -  The Internal Medicine primary team was able to be reached and they are in agreement with the above plan.  -  Disposition: The patient will remain on the current unit. We will continue to monitor this patient closely.  -  Reassessment and plan follow-up will be performed by the primary team    Katlyn Tracy PA-C  [facility] RRT [facility] Job Code Contact #[phone]  [facility] Paging/Directory    Hospital Course       Admission Diagnosis:   Endocarditis [I38]    Physical Exam   Temp: 99  F (37.2  C) Temp  Min: 97.4  F (36.3  C)  Max: 99.5  F (37.5  C)  Resp: 16 Resp  Min: 11  Max: 20  SpO2: 98 % SpO2  Min: 91 %  Max: 99 %  Pulse: 112 Pulse  Min: 103  Max: 121    No data recorded  BP: 104/60 Systolic (24hrs), Av , Min:78 , Max:134   Diastolic (24hrs), Av, Min:44, Max:82     I/Os: I/O last 3 completed shifts:  In: 1830 [P.O.:1080; I.V.:750]  Out: 2920 [Urine:2650; Stool:270]     Exam:   General: chronically ill  appearing  Mental Status: lethargic. arouses to voice with moderate touch. Oriented to person only. Frequently falls back asleep.  CV: RRR, S1, S2  Resp: CTAB on room air  Neuro: Exam limited. Arouses to voice with touch. Oriented to person. Engages in movement of the BUE and BLE. Strength 4/5 in the BUE and BLE. Resists opening the eyes. Unable to perform full occular exam, but pupils reactive.     Significant Results and Procedures   Lactic Acid:   Recent Labs   Lab Test 12/07/22  0434 12/07/22  0220 12/06/22  0934   LACT 1.7 2.5* 1.4     CBC:   Recent Labs   Lab Test 12/08/22  0812 12/07/22  1359 12/07/22  0434   WBC 15.4* 13.6* 12.9*   HGB 10.5* 10.8* 10.1*   HCT 30.8* 32.0* 30.4*   * 84* 64*        Sepsis Evaluation   The patient is known to have an infection.  NO EVIDENCE OF SEPSIS at this time.  Vital sign, physical exam, and lab findings are due to see above.

## 2022-12-09 NOTE — PROVIDER NOTIFICATION
"1935- Medicine NP notified  \"CLOVIS Fletcher [id number] can you call regarding pt? Thanks Cindy [phone]\"    1943- RRT called d/t pt's increased lethargy and change in writer's neuro asessment. At this time it was very difficult to arouse patient. Change in neuro assessment regarding pt's right side. Pt was not squeezing with his right hand or moving his right foot on command, was doing this earlier in the shift. Vital signs unchanged. No medications administered to patient except IV antibiotics as ordered.    PA and rapid RN to bedside. No further interventions at this time.  Will continue to monitor pt.  "

## 2022-12-09 NOTE — PHARMACY-ADMISSION MEDICATION HISTORY
Admission Medication History Completed by Pharmacy    See [facility] Admission Navigator for allergy information, preferred outpatient pharmacy, prior to admission medications and immunization status.     Medication History Sources:     Dispense Report, Chart Review, Care Everywhere    Changes made to PTA medication list (reason):    Added: None    Deleted: None    Changed: None    Additional Information:    This is a chart based medication review as unable to interview patient at this time. PTA medications below match refill history and [facility] active medication list per Care Everywhere.     Prior to Admission medications    Medication Sig Last Dose Taking? Auth Provider Long Term End Date   albuterol (PROVENTIL HFA;VENTOLIN HFA) 90 mcg/actuation inhaler  Last filled 6/10/22 #9g   Inhale 2-4 puffs into the lungs every 4 hours as needed (cough) Unknown Yes Provider, Historical     aspirin 81 mg chewable tablet Take 81 mg by mouth daily Unknown Yes Provider, Historical     clopidogrel (PLAVIX) 75 mg tablet  Last filled 8/22/22 #90   Take 75 mg by mouth daily Unknown Yes Provider, Historical     metoprolol (LOPRESSOR) 50 MG tablet  Last filled 11/12/22 #180   Take 50 mg by mouth 2 times daily Unknown Yes Provider, Historical     pantoprazole (PROTONIX) 40 MG EC tablet  Last filled 10/27/22 #90   Take 40 mg by mouth daily Unknown Yes Reported, Patient     PRALUENT 150 MG/ML injectable pen  Last filled 9/4/22 #6mL   Inject 150 mg Subcutaneous every 14 days Unknown Yes Reported, Patient     acetaminophen (TYLENOL) 500 MG tablet [ACETAMINOPHEN (TYLENOL) 500 MG TABLET] Take 500 mg by mouth every 6 (six) hours as needed for pain (2 tabs).   Provider, Historical     calcium-vitamin D 500 mg(1,250mg) -200 unit per tablet [CALCIUM-VITAMIN D 500 MG(1,250MG) -200 UNIT PER TABLET] Take 1 tablet by mouth 2 (two) times a day with meals.   Provider, Historical         Date completed: 12/08/22    Medication history completed by:  Samantha Moore, [facility]

## 2022-12-10 PROBLEM — N17.9 ACUTE KIDNEY FAILURE, UNSPECIFIED (H): Status: ACTIVE | Noted: 2022-01-01

## 2022-12-10 NOTE — PROGRESS NOTES
[facility] Progress Note     Patient:  Eloy Fletcher   YOB: 1955, MRN: [id number]  Date of Visit: 12/10/2022  Date of Admission: 12/4/2022  Consult Requester: Lauri Friend MD       ID Problem List:  1. Sepsis: MSSA bacteremia  -  Suspected endocarditis, with CNS emboli and CSF culture positivity. ILDEFONSO did not reveal vegetations, but that does not fully exclude endocarditis.  Last positive culture 12/9 AM (~24 hrs to grow)  2. Septic emboli to the brain, multiple. Assume MSSA  3. Encephalopathy; Acute embolic CVA  CSF Culture positive MSSA (also +RBC. Waxing/waning   --  CSF penetration of nafcillin is 10-20% of serum levels with parenchymal penetration less/variable with ~10% having undetectable brain tissue levels.   4. Cholestasis / cholestatic hepatitis.  Critical illness vs. Nafcillin as a cause of cholestatic hepatitis -- although no peripheral eosinophilia is present in blood .  Cholestasis creates risk of ascending cholangitis.  One review on cholestatic hepatitis from nafcillin is at: doi:[email or web address]  5. Acute kidney injury, creatinine beginning to decrease (Cr = 4.1 -> 3.9 mg/dL) - multi-factorial -> sepsis, hypotension, meds.  Creatinine rise started before Nafcillin was started (and before Gent was given).    6. Aortic Stenosis s/p Biprosthetic AVR 2014; mitral stenosis with calcifications - no evidence of vegetations on ILDEFONSO.  7.  Pneumonia - probable MSSA bacteremic seeding of lung. +MSSA from sputum.  8. Asplenic -- which further complicates ability to clear bacteremia.      ID Recommendations:  1. ID would rec to change from nafcillin to cefazolin 2g IV q8Hr for the first day, then renally adjust thereafter (currently would be Q12Hr). Prefer higher levels.  ID staff is well aware of the data on CNS penetration and difference between brain tissue (better for Ancef) vs. CSF (better for nafcillin).  Linezolid has good brain tissue and CSF penetration.   2.  "Consider ciprofloxacin 400mg IV QDay x 3 days for GNR coverage of possible biliary pathogens with cholestasis and new fever.  -- Prefer Cipro for biliary penetration, achieves 3000% of serum levels.      -- No observational study or cohort study has shown that ciprofloxacin has a QT prolonging effect or increases the risk of TdP or (cardiovascular) mortality.   Citations:  1.   Hari MCKEON, Jelly E, Kyle M, et al. Ciprofloxacin does not Prolong the QTc Interval: A Clinical Study in ICU Patients and Review of the Literature. J Pharm Pharm Sci. 2017; 20(1): 360-364. doi: 10.75818/J3ZD15.    2. Martha FA, van Maryseen W, van praful Sijs H, et al. Dynamics of the QTc interval over a 24-h dose interval after start of intravenous ciprofloxacin or low-dose erythromycin administration in ICU patients. Pharmacol Res Perspect. 2021 ; 9(6): t36082.   3. Audi AN, Anson K, Audi DE LOS SANTOS, et al. Effect of ciprofloxacin and levofloxacin on the QT interval: is this a significant \"clinical\" event? [place] Med J. 2006; 99(1):52-6.     Future:  3. If hemodynamically decompensates, ID would rec gentamicin 150mg IV x 1 which would be the renally adjusted once-daily dose (2mg/kg).   The prior dose of gentamicin on 12/5 was able to briefly sterilize blood culture on 12/6.    4. If by Monday, still bacteremic consider ILDEFONSO w/ bubble study early next week to rule out PFO since initial TTE was non-diagnostic to exclude PFO.     5. By Monday could also consider CT scan Chest/Abd/Pelvis to exclude abscess.  In the short term if an abscess is present, this is not going to change antimicrobial therapy, thus it would be better if kidney function improved such that contrast could be utilized. .     5.  ID still will eventually recommend Rifampin 300mg BID given bioprosthetic valve, but with elevated LFTs (T-bili of 4.2 today) this remains not an ideal time to start.       Will follow. I am on call over the weekend.   Lauri Branham" "[id number]    Assessment and Discussion:  Summary: 66 year-old male with PMHx notable for biprosthetic AVR 2014, hodgkin's lymphoma with thoracic radiation Evaluated for Staphylococcus Aures bacteremia with encephalopathy and acute embolic CVA. Since admission, CRP down trending. Blood and CSF cultures positive for staph aureus MSSA 12/4 .  Continued Nafcillin.  Provided gentamicin on the 5th, Gent level 0.4 on the 8th.  Cultures 12/7 and 12/8 positive for gram positive cocci in clusters.      1. Sepsis: MSSA bacteremia- Suspected endocarditis, Given CNS emboli, most probable unifying diagnosis for findings described. Assume this is a widely disseminated embolic phenomena throughout the body. May consider contact with cardiology for possibility of PFO, bubble study reported non-diagnostic, would broaden differential.     Impression: Suspected etiology with MSSA bacteremia most consistent with clinical findings of multifocal cerebral infarcts for Staph. Endocarditis, plausible encephalopathy secondary to sepsis for the initial findings however, his mentation has significantly improved since initial presentation. ILDEFONSO was not demonstrative of \"definite\" valvular lesions, it is reasonable for prior vegetation to embolize. Given the findings, reasonable to treat with current endocarditis guidelines with daily cultures until negative for 48-72 hours.     2.Encephalopathy; Acute embolic CVA- Likely cardio-embolic as above  Altered mentation could have been secondary to bacteremia, with possible meningoencephalitis given Neurology evaluation correlation of findings not consistent with MRI.  Regarding CSF findings,it is plausible of embolization with subsequent growth of MSSA either direct to the CSF or adjacent parenchymal translocation, concerning for meningoencephalitis. Treat bacteremia as above. Agree with low threshold for CT.      3. Acute kidney injury-  no additional synergistic treatment with gentamicin indicated at " this time. Nafcillin is known to cause drug-induced interstitial nephritis but creatinine is now improving as BP rises. so likely due to sepsis/poor renal perfusion. However, if creatinine continues to increase, would consider urinary eosinophil testing. May discuss with Nephrology if continues.      4. Aortic Stenosis s/p Biprosthetic AVR 2014; mitral stenosis with calcifications- would be interested in cardiology for elucidation if PFO is present for differential, otherwise may have had prior vegetation that embolized before ILDEFONSO. Plan for Rifampin in the future with prosthetic valve otherwise    5. Pneumonia - probable bacteremic seeding of lung-   CT chest notable for ground glass opacities and interstitial pulmonary edema, if infectious etiology, likely seeding from endocarditis. Should be appropriately covered with given date.     Will continue to follow for further recommendations and overall course.      Thank you for the consult. ID will continue to follow with you.    Ifrah Weiner, MS4   Pager [phone]              Interval History and Events:   Overnight, acute events include increased altered mental status, ongoing fever 101.4 F on 12/8 afternoon. Linzeolid started on 12/9.  T-bili continues to rise.  Lethargic, inconsistent with following commands.  Evaluated at bed side, able to answer questions and follow-commands. However, increased work of breathing during encounter.  UOP appropriate.            Antimicrobial Treatment:   Nafcillin 12/5-Present   Received one dose Gentamicin (5mg/kg) on 12/5 follow up level 12/8: 0.4     Stopped: 12/6: Ceftriaxone   Stopped 12/5:  vancomycin, acyclovir, ampicillin;          Review of Systems:   Targeted 4 point ROS was completed with pertinent positives and negatives are detailed above.         HPI:   Adopted from initial consult note on 12/05/2022     Summary:       66 year-old male with PMHx notable for Aortic stenosis s/p bioprosthetic AVR 2014, CAD s/p stent 2012,  "moderate mitral regurgitation/stenosis on ILDEFONSO 11/2022, with prior chemotherapy/radiation with Hodgkin's Lymphoma 1979, testicular cancer and prostate cancer admitted overnight on 12/04 for sepsis, encephalopathy, and acute embolic CVA. Evaluated for possible endocarditis MSSA Bacteremia       \"History obtained from review of chart and discussion with patient/nursing staff.      Eloy Fletcher is a 66 year old male who presented with new left lateral ocular gaze and right sided hemineglect, weakness and fevers onset 12/02/2022.  PMHx notable for Aortic Stenosis s/p bioprosthetic AVR 2014, CAD s/p PCI and stent 2012, moderate mitral regurgitation/stenosis on ILDEFONSO 11/2022, with prior chemotherapy/radiation with Hodgkin's Lymphoma 1979, testicular cancer and prostate cancer admitted overnight on 12/04 for sepsis, encephalopathy, and acute embolic CVA. Evaluated for possible endocarditis.     Course: Initial presentation to OSH ED with confusion and unresponsiveness 12/4, discussion with his ex-wife,  initial onset of symptoms 12/1/22 multiple episodes of vomiting with a fever. Improved overnight, recurred and worsened 12/3.  He presented with left gaze preference, right-sided neglect, weakness, and fevers. Initial findings significant for showing tachycardia, tachypnea, and fevers, initial eukocytosis of 11.4, lactic acid 4.2. At that time he was confused and not responding appropriately.   He was evaluated by neurology and found to have MRI findings with non-specific small multifocal CVA suspected embolic in origin. He was provided empiric antibiotics, Blood cultures were drawn.    Due to concern for CNS infection, a LP was performed at the OSH ED and was further transferred to [facility] ICU for work-up of endocarditis in the setting of hypotension. Initially provided   Acyclovir, Ampicillin, Ceftriaxone, Piperacillin Tazobactam and Vancomycin.   CT Chest also showing GGO in the MIGUEL ANGEL and LLL with tree-in-bud " "findings concerning for pneumonia (CAP vs. Aspiration). LP performed showing WBC of 230 with elevated protein at 59 and glucose at 82.                  Today, the patient was seen and examined at bedside, mild aphasia/ difficult word finding on exam. Patient is able to answer yes/no questions. Interval changes,  appears improved since 12/4.  Associated symptoms at this time positive for cough, with green/yellow sputum. No shortness of breath. Denies CV complaints of chest pain, diaphoresis, no abdominal pain. Denies new rashes or lesions. Denies new numbness or subjective weakness.      Pertinent Cardiac History: Aortic stenosis S/P bioprosthetic AVR in 2014, and CAD s/p PCI   2012.  Of note, recently evaluated with Cardiology at [facility]. Possible planned early next year, \"redo median sternotomy and mitral valve replacement with a tissue valve. We may have to replace the ascending aorta at the same time. The patient has right bundle branch block and a left bob fascicular block\" per evaluation with Dr. Gurrola 11/16/2022.  Last ILDEFONSO 11/11/2022, with LVEF 65%, Moderate-severe mitral valve regurgitation with moderate mitral stenosis and severe annular mitral calcification, demonstrated normal aortic valve tissue prosthesis.   Pertinent Oncology History: Prostate cancer (Elly 4+3, TNM: cT1c) treated with chemo/radiation (last dose 3/30/2022), good response at that time. Past history in remission: Testicular cancer (30 years prior  s/p chemo & Left orchiectomy) and Hodgkins lymphoma (1970s treated with MOPP & radiation)\"         Physical Examination:   Temp:  [97.4  F (36.3  C)-101.7  F (38.7  C)] 99.7  F (37.6  C)  Pulse:  [] 109  Resp:  [17-24] 20  BP: ()/(48-96) 114/57  Cuff Mean (mmHg):  [66] 66  SpO2:  [94 %-98 %] 97 %    I/O last 3 completed shifts:  In: 2250 [P.O.:1500; I.V.:750]  Out: 4525 [Urine:4525]    Vitals:    12/04/22 2200 12/05/22 0400 12/07/22 0152 12/08/22 0345   Weight: 81.1 kg " (178 lb 12.7 oz) 81 kg (178 lb 9.2 oz) 82.4 kg (181 lb 10.5 oz) 84.2 kg (185 lb 10 oz)    12/09/22 0556   Weight: 86.4 kg (190 lb 7.6 oz)       Constitutional: Adult male seen and examined at bedside.  Tired appearing, able to converse.   Respiratory: increased work of breathing, oxygenating appropriately, end expiratory wheezing R>L  Cardiovascular: Tachycardic, regular rhythm, grade IV systolic ejection murmur noted. No peripheral edema.   Skin: Warm, dry, well-perfused. No bruising, bleeding, rashes, or lesions on limited exam.   Musculoskeletal: Extremities grossly normal, non-tender, no edema.    Neurologic: A&Ox3, pupils equal and reactive to light, 2+ brachial and patellar reflexes, symmetric smile.   Neuropsychiatric: Calm. Affect appropriate to situation.  Vascular access:  12/4 LUE pIV RUE pIV, 12/5 Right UE pIV  CDI, non-tender, no surrounding erythema.          Medications:       linezolid  600 mg Intravenous Q12H     nafcillin  2 g Intravenous Q4H     pantoprazole  40 mg Oral QAM AC     sodium chloride (PF)  3 mL Intracatheter Q8H       Antiinfectives:  Anti-infectives (From now, onward)    Start     Dose/Rate Route Frequency Ordered Stop    12/09/22 1600  linezolid (ZYVOX) infusion 600 mg         600 mg  over 60 Minutes Intravenous EVERY 12 HOURS 12/09/22 1529      12/05/22 1500  nafcillin IV 2 g vial to attach to  ml bag         2 g  over 1 Hours Intravenous EVERY 4 HOURS 12/05/22 1455            Infusions/Drips:    sodium chloride 150 mL/hr (12/05/22 1330)            Laboratory Data:     Microbiology:      Summary: 12/4 Blood and CSF cultures with Staph Aureus. Verigene with no evidence of MecA      12/7 Prelim Gram Positive Cocci 1/2 bottles  Blood Culture Hand, Right     Collected 12/7/2022  4:34 AM      Status: Preliminary result      Visible to patient: No (not released)     Specimen Information: Hand, Right; Blood    0 Result Notes  Culture Positive on the 1st day of incubation Abnormal         Gram positive cocci in clusters Panic     1 of 2 bottles        Resulting Agency: [facility]             Details:      Cerebrospinal fluid Aerobic Bacterial Culture Routine    Culture   Lab   1+ Staphylococcus aureus Panic   [facility]          Gram Stain quantification of host cells and microbiological organisms was done on a cytocentrifuged preparation.              Collected 12/4/2022  5:38 PM        12/04/2022 1601 12/07/2022 0644 Blood Culture Peripheral Blood [[id number]]   (Abnormal)   Peripheral Blood    Final result Component Value   Culture Positive on the 1st day of incubation Abnormal     Staphylococcus aureus Panic     2 of 2 bottles  Susceptibilities done on previous cultures             12/04/2022 1534 12/07/2022 0815 Blood Culture Line, venous [[id number]]    (Abnormal)   Blood from Line, venous    Final result Component Value   Culture Positive on the 1st day of incubation Abnormal     Staphylococcus aureus Panic     2 of 2 bottles       Susceptibility     Staphylococcus aureus     DEJA     Clindamycin <=0.25 ug/mL Susceptible     Erythromycin <=0.25 ug/mL Susceptible     Gentamicin <=0.5 ug/mL Susceptible     Oxacillin 0.5 ug/mL Susceptible     Tetracycline <=1 ug/mL Susceptible     Trimethoprim/Sulfamethoxazole <=0.5/9.5 u... Susceptible     Vancomycin <=0.5 ug/mL Susceptible                  12/04/2022 1534 12/07/2022 0645 Blood Culture Line, venous [[id number]]   (Abnormal)   Blood from Line, venous    Final result Component Value   Culture Positive on the 1st day of incubation Abnormal     Staphylococcus aureus Panic     2 of 2 bottles  Susceptibilities done on previous cultures             12/04/2022 1534 12/05/2022 0551 Verigene GP Panel [[id number]]    (Abnormal)   Blood from Line, venous    Final result Component Value   Staphylococcus aureus Detected Abnormal    Positive for Staphylococcus aureus and negative for the mecA gene (not resistant to methicillin) by Verigene multiplex nucleic  acid test. Final identification and antimicrobial susceptibility testing will be verified by standard methods.   Staphylococcus epidermidis Not Detected   Staphylococcus lugdunensis Not Detected   Enterococcus faecalis Not Detected   Enterococcus faecium Not Detected   Streptococcus species Not Detected   Streptococcus agalactiae Not Detected   Streptococcus anginosus group Not Detected   Streptococcus pneumoniae Not Detected   Streptococcus pyogenes Not Detected   Listeria species                    CSF Findings:   *High: RBC *78, total nucleated cells *230, Glucose CSF *82  Abnormal CSF appearance Hazy.   Otherwise, colorless        Meningitis/Encephalitis Panel Qual PCR CSF:  Order: [id number]   Status: Final result      Visible to patient: Yes (not seen)      1 Result Note    Component Ref Range & Units 1 d ago   (12/4/22) 1 d ago   (12/4/22)    Escherichia coli K1 Negative Negative       Haemophilus influenzae Negative Negative       Listeria monocytogenes Negative Negative       Neisseria meningitidis Negative Negative       Streptococcus agalactiae (GBS) Negative Negative       Streptococcus pneumoniae Negative Negative  Not Detected R     Cytomegalovirus Negative Negative       Enterovirus Negative Negative       Herpes simplex virus 1 Negative Negative      Comment: Recommend testing with another molecular method if clinical suspicion for infection is high.    Herpes simplex virus 2 Negative Negative      Comment: Recommend testing with another molecular method if clinical suspicion for infection is high.    Human Herpes Virus 6 Negative Negative       Human parechovirus Negative Negative       Varicella zoster virus Negative Negative       Cryptococcus neoformans/gattii Negative Negative      Comment: Recommend testing for Cryptococcal antigen and fungal culture if clinical suspicion for infection is high.         Strep pneumo Agn Ur greater or equal to 13yrs or CSF any age (1 mL minimum)  Order:  [id number]   Status: Final result      Visible to patient: Yes (not seen)     Specimen Information: Lumbar Puncture; Cerebrospinal fluid    1 Result Note    Component Ref Range & Units 1 d ago     Streptococcus pneumoniae antigen Negative Negative    Comment: A negative Streptococcus pneumoniae antigen result does not rule out infection with Streptococcus pneumoniae.               Inflammatory Markers    Recent Labs   Lab Test 12/10/22  0545 12/09/22  0532 12/07/22  0434   .00* 219.00* 254.00*   Procalcitonin: 54.20 (12/4/22)  Lactic Acid: 4.6 (12/4), 4.2 (12/4), 4.6(12/5) 1.7 (12/6)  Cardiac: Troponin: 610 (12/4), 983 (12/4), 1,229 (12/5)    Metabolic Studies     Recent Labs   Lab Test 12/10/22  0545 12/09/22  2331 12/09/22  1622 12/09/22  0532 12/07/22  1650 12/07/22  1359     --  138 137   < >  --    POTASSIUM 3.5  3.5 3.3* 3.3*  3.3* 3.3*   < >  --    CHLORIDE 101  --  104 103   < >  --    CO2 20*  --  19* 15*   < >  --    ANIONGAP 18*  --  15 19*   < >  --    BUN 93.1*  --  93.0* 87.8*   < >  --    CR 3.73*  --  3.84* 3.85*   < >  --    GFRESTIMATED 17*  --  16* 16*   < >  --    *  --  174* 130*   < >  --    A1C  --   --   --   --   --  6.6*   YUNIOR 7.9*  --  7.6* 7.5*   < >  --    PHOS 5.4*  --  4.8* 5.2*   < >  --    MAG 2.6*  --  2.7* 2.7*   < >  --    LACT  --   --  1.8  --    < >  --     < > = values in this interval not displayed.       Hepatic Studies    Recent Labs   Lab Test 12/10/22  0545 12/09/22  1622 12/09/22  0532 12/08/22  0812 12/06/22  0422 12/05/22  1008   BILITOTAL 4.2*  --  4.1* 4.0*  --  1.1   ALKPHOS 190*  --  125 92  --  58   ALBUMIN 2.6* 2.6* 2.5*  2.5* 2.4*  2.4*   < > 2.9*   AST 76*  --  92* 69*  --  131*   ALT 34  --  38 26  --  61*   LDH  --   --   --   --   --  468*    < > = values in this interval not displayed.       Pancreatitis testing    Recent Labs   Lab Test 12/07/22  0434   TRIG 260*       Hematology Studies      Recent Labs   Lab Test 12/10/22  0545  12/09/22  0814 12/08/22  0812   WBC 21.3* 21.4* 15.4*   ANEU 20.0* 19.5*  --    ALYM 1.3 0.6*  --    LATONYA 0.0 1.1  --    AEOS 0.0 0.0  --    HGB 10.8* 11.3* 10.5*   HCT 30.4* 31.8* 30.8*   * 124* 120*       Arterial Blood Gas Testing    Recent Labs   Lab Test 12/09/22  1147   O2PER 2        Urine Studies     Recent Labs   Lab Test 12/07/22  1509 12/05/22  0538 12/04/22  1534   URINEPH 5.5 5.5 5.0   NITRITE Negative Negative Negative   LEUKEST Trace* Negative Negative   WBCU 6* 8* 3       Vancomycin Levels     No lab results found.    Tobramycin levels     No lab results found.    Gentamicin levels    Recent Labs   Lab Test 12/08/22  0812   GENT 0.4       CSF testing     Recent Labs   Lab Test 12/04/22  1738   CWBC 230*   CNEU 86   CLYM 1   CMONO 4   COTH 9   CRBC 78*   CGLU 82*   CTP 59.7*       Last check of C difficile  C Difficile Toxin B by PCR   Date Value Ref Range Status   12/05/2022 Negative Negative Final     Comment:     A negative result does not exclude actual disease due to C. difficile and may be due to improper collection, handling and storage of the specimen or the number of organisms in the specimen is below the detection limit of the assay.            Imaging:     EKG     Component Ref Range & Units 12/9/22 11:28 AM 12/7/22  5:02 AM    Systolic Blood Pressure mmHg       Diastolic Blood Pressure mmHg       Ventricular Rate   102     Atrial Rate   102     CT Interval ms  176     QRS Duration ms 144  144     QT ms 382  400     QTc ms 551  521     P Axis degrees  53     R AXIS degrees -79  -75     T Axis degrees 23  22     Interpretation ECG  Wide QRS tachycardia   Right bundle branch block   Left anterior fascicular block     Bifascicular block     Abnormal ECG   When compared with ECG of 07-DEC-2022 05:02,   Wide QRS tachycardia has replaced Sinus rhythm  Sinus tachycardia   Right bundle branch block   Left anterior fascicular block    Bifascicular block    Abnormal ECG   When  compared with ECG of 20-JUL-2012 20:12,   (RBBB and left anterior fascicular block) is now Present   Confirmed by Jackson Kong ([id number]) on 12/7/2022 10:12:14 AM          Echo ILDEFONSO Study Date: 12/05/2022 01:14 PM   Interpretation Summary  Known radiation induced valve disease with severe calcific mitral disease. No  definite endocarditis noted.  Severe mitral annular calcification is present with multiple calcific nodules,  no definite vegetation seen.  S/p bioprosthetic AVR (23 mm Cordell-Hubbard Perimount Magna/Magna Ease  pericardial prosthesis). Valve appears well seated with good leaflet opening.  No vegetations visualized.  Global and regional left ventricular function is normal with an EF of 60-65%.     ______________________________________________________________________________  Procedure  Transesophageal Echocardiogram with color and spectral Doppler performed. 3D  image acquisition, reconstruction, and real-time interpretation was performed.  I was present during ILDEFONSO probe placement by the Fellow. I personally viewed  the imaging and agree with the interpretation and report as documented by the  Fellow. Procedure location Echo Lab. Informed consent for Transesophegeal echo  obtained. ILDEFONSO Probe #[id number] was used during the procedure. Patient was sedated  using Fentanyl 50 mcg. Patient was sedated using Versed 1 mg. The heart rate,  respiratory rate, oxygen saturations, blood pressure, and response to care  were monitored throughout the procedure with the assistance of the nurse. I  determined this patient to be an appropriate candidate for the planned  sedation and procedure and have reassessed the patient immediately prior to  sedation and procedure. Total sedation time: 25 minutes of continuous bedside  1:1 monitoring. The Transducer was inserted without difficulty . The patient  tolerated the procedure well. Complications None.     Left Ventricle  Mild concentric wall thickening consistent with left  ventricular hypertrophy  is present. Global and regional left ventricular function is normal with an EF  of 60-65%. Left ventricular size is normal.     Right Ventricle  The right ventricle is normal size. Global right ventricular function is  normal.     Atria  The left atrial appendage is normal. It is free of spontaneous echo contrast  and thrombus. Moderate to severe left atrial enlargement is present. The  atrial septum is intact as assessed by color Doppler .     Mitral Valve  Severe mitral annular calcification is present. Mild to moderate mitral  insufficiency is present. The mean gradient across the mitral valve is 13  mmHg. The mitral valve area is 1.4 cm^2. Severe mitral stenosis is present.     Aortic Valve  S/p bioprosthetic AVR. Valve appears well seated with good leaflet opening. No  vegetations visualized. Doppler interrogation of the aortic valve is normal.     Tricuspid Valve  The tricuspid valve is normal. Mild tricuspid insufficiency is present.     Pulmonic Valve  The pulmonic valve is normal.     Vessels  The aorta root is normal. The thoracic aorta is normal. Ascending aorta and  aortic arch free of atheroma and vegetation. The inferior vena cava is normal.  The superior vena cava is normal. The RUPV Doppler shows normal velocity  waveform . The RLPV Doppler shows systolic dominance . The LUPV Doppler shows  normal velocity waveform . The LLPV Doppler shows normal velocity waveform .     Pericardium  No pericardial effusion is present.     Compared to Previous Study  This study was compared with the study from 12/5/22 . There has been no  change.       TTE: 12/5 Interpretation Summary  Would consider a transesophageal echocardiogram if clinically indicated.  Technically difficult study.Extremely poor acoustic windows.  Limited information was obtained during study.  Global and regional left ventricular function is normal with an EF of 60-65%.  Right ventricular function, chamber size, wall  motion, and thickness are  normal.  Severe mitral annular calcification is present.  Mitral mean gradient 11mmHg at heart rate 131 BPM.  H/O Bioprosthetic AVR in 2014. Valve not well visualized.    MRI of the brain 12/4/22: IMPRESSION: 5 scattered foci of acute ischemia involving the bilateral anterior and posterior cerebral artery distributions. Constellation of findings is suspicious for central embolic phenomena.           CT Abdomen and Pelvis: 12/4/22  IMPRESSION:  1.  Patchy groundglass opacities in the left upper and lower lobes, and cluster of tree-in-bud nodularity in the left upper lobe, likely infectious or inflammatory.  2.  Interstitial pulmonary edema.  3.  Trace left pleural effusion.  4.  No acute findings in the abdomen or pelvis.        CT head and CTA head/neck 12/4/22, were not suggestive of an acute abnormality, no significant stenosis of carotids or vertebral arteries.      CXR 12/7/22:  IMPRESSION: Diffuse interstitial markings bilaterally and streaky  perihilar and bibasilar opacities likely represent pulmonary edema  versus atelectasis. No focal consolidation.    Attending Physician Attestation:  I, Lauri Branham MD have seen and evaluated Eloy Fletcher. I have discussed with the primary provider team, and I agree with the above documented findings and plan in this note. I have reviewed today's vital signs, medications, labs and imaging.    Floor time: 30 minutes  Face-to-face time: 10 minutes  Total time: 40 minutes     Lauri Branham MD MPH  Professor of Medicine  Division of Infectious Diseases & International Medicine  Department of Medicine

## 2022-12-10 NOTE — PLAN OF CARE
Goal Outcome Evaluation:      Plan of Care Reviewed With: patient    Overall Patient Progress: no changeOverall Patient Progress: no change    S/B: Pt up all morning in chair, staying awake. Family helping to keep him awake and working on chair exercises. This afternoon he was placed back to bed to nap and more difficult to awake after 2 hours, placed back in chair and working on keeping him awake. Offering fruit ice, drank 3 ensures. Trying to not let him over drink and offering ice, ensures and fruit ice.    A: A&Ox4.Lethargic at times  VSS. ST Afebrile. Denies pain and nausea. Minimal tolerance of diet, no appetite, needs to be feed and offer encouragement. Calorie cts. Potassium replacement given and recheck in am.     I/O this shift:  In: 537 [P.O.:237; I.V.:300]  Out: 400 [Urine:400]    Temp:  [97.4  F (36.3  C)-101  F (38.3  C)] 99.5  F (37.5  C)  Pulse:  [] 114  Resp:  [15-20] 16  BP: ()/(51-70) 140/58  Cuff Mean (mmHg):  [66-81] 81  SpO2:  [94 %-98 %] 95 %     R: Strengthening, PT/OT offer encouragement. Keep sleep/wake cycle. Continue with POC. Notify primary team with changes.     Addendum: New fever at 1800 102.4, 102.8 tylenol given, increased lethargy and . 1L oxygen placed for comfort RR 18-20s, increased grunting with breathing. Recheck temperature. Pt also has new cough after drinking or swallowing pills due to lethergy, difficult to keep awake, sternal rubbing. Could be due to tiredness from staying up all morning or due to fever. Md notified on G9 crosscover.

## 2022-12-10 NOTE — PLAN OF CARE
Neuro: alertness fluctuates. lethargic, opens eye to voice or gentle touch, follows commands intermittently. Speech is slow, slurred.  Cardiac: ST with BBB (110-120's) BP low (80's/50's), MD notified, Albumin ordered, BP improved to 100/60's. Tmax 101.4 (MD aware), tylenol given  Respiratory: Sating >95% on 1L NC. Abd muscle use, tachypnea, occasional cough.  GI/: Adequate urine output via chan, had BM on previous shift.  Diet/appetite: Tolerating sips of water and ensure when awake enough to safely take.  Activity:  Assist of 2 for bed mobility, turn Q2.  Pain: No s/sx of distress.  Skin: No new deficits noted.  LDA's: PIV x 2, chan    Plan: Continue with POC. Pt triggered sepsis protocol, lactic 1.8. Notify primary team with changes.

## 2022-12-10 NOTE — PLAN OF CARE
"  Neuro: A&Ox4. Patient able to move all extremities, generalized weakness noted, right side more weaker than left side. Patient answered questions but slow to respond. Patient denied any pain or concerns.   Cardiac: SR/ST, BBB. Blood pressure 107/63, pulse 93, temperature 97.4  F (36.3  C), temperature source Oral, resp. rate 20, weight 86.4 kg (190 lb 7.6 oz), SpO2 97 %.  Respiratory: Sating 97% on 2L NC.  GI/: Adequate urine output. Medina in and positional. BM X1.  Diet/appetite: Tolerating minced thin liquid diet. Drinking good. NPO since midnight, except meds.  Activity:  Assist X 2, use of lift.  Pain:  At acceptable level on current regimen.   Skin: No new deficits noted.  LDA's: L PIV X 2, Medina.      Plan: Continue with POC. Notify primary team with changes.    Problem: Plan of Care - These are the overarching goals to be used throughout the patient stay.    Goal: Plan of Care Review  Description: The Plan of Care Review/Shift note should be completed every shift.  The Outcome Evaluation is a brief statement about your assessment that the patient is improving, declining, or no change.  This information will be displayed automatically on your shift note.  Outcome: Not Progressing  Goal: Patient-Specific Goal (Individualized)  Description: You can add care plan individualizations to a care plan. Examples of Individualization might be:  \"Parent requests to be called daily at 9am for status\", \"I have a hard time hearing out of my right ear\", or \"Do not touch me to wake me up as it startles me\".  Outcome: Not Progressing  Goal: Absence of Hospital-Acquired Illness or Injury  Outcome: Not Progressing  Intervention: Identify and Manage Fall Risk  Recent Flowsheet Documentation  Taken 12/10/2022 0000 by Donovan Lawson, RN  Safety Promotion/Fall Prevention:    activity supervised    nonskid shoes/slippers when out of bed    patient and family education  Taken 12/9/2022 2000 by Donovan Lawson, " RN  Safety Promotion/Fall Prevention:    activity supervised    nonskid shoes/slippers when out of bed    patient and family education  Intervention: Prevent Skin Injury  Recent Flowsheet Documentation  Taken 12/10/2022 0000 by Donovan Lawson RN  Body Position: weight shifting  Taken 12/9/2022 2000 by Donovan Lawson RN  Body Position: weight shifting  Intervention: Prevent and Manage VTE (Venous Thromboembolism) Risk  Recent Flowsheet Documentation  Taken 12/10/2022 0000 by Donovan Lawson RN  VTE Prevention/Management: SCDs (sequential compression devices) on  Taken 12/9/2022 2000 by Donovan Lawson RN  VTE Prevention/Management: SCDs (sequential compression devices) on  Goal: Optimal Comfort and Wellbeing  Outcome: Not Progressing  Intervention: Provide Person-Centered Care  Recent Flowsheet Documentation  Taken 12/10/2022 0000 by Donovan Lawson RN  Trust Relationship/Rapport: care explained  Taken 12/9/2022 2000 by Donovan Lawson RN  Trust Relationship/Rapport: care explained  Goal: Readiness for Transition of Care  Outcome: Not Progressing     Problem: Risk for Delirium  Goal: Optimal Coping  Outcome: Not Progressing  Intervention: Optimize Psychosocial Adjustment to Delirium  Recent Flowsheet Documentation  Taken 12/9/2022 2000 by Donovan Lawson RN  Family/Support System Care: involvement promoted  Goal: Improved Behavioral Control  Outcome: Not Progressing  Intervention: Minimize Safety Risk  Recent Flowsheet Documentation  Taken 12/10/2022 0000 by Donovan Lawson RN  Trust Relationship/Rapport: care explained  Taken 12/9/2022 2000 by Donovan Lawson RN  Trust Relationship/Rapport: care explained  Goal: Improved Attention and Thought Clarity  Outcome: Not Progressing  Goal: Improved Sleep  Outcome: Not Progressing     Problem: Restraint, Nonviolent  Goal: Absence of Harm or Injury  Outcome: Not Progressing  Intervention:  Protect Dignity, Rights and Personal Wellbeing  Recent Flowsheet Documentation  Taken 12/10/2022 0000 by Donovan Lawson RN  Trust Relationship/Rapport: care explained  Taken 12/9/2022 2000 by Donovan Lawson RN  Trust Relationship/Rapport: care explained  Intervention: Protect Skin and Joint Integrity  Recent Flowsheet Documentation  Taken 12/10/2022 0000 by Donovan Lawson RN  Body Position: weight shifting  Taken 12/9/2022 2000 by Donovan Lawson RN  Body Position: weight shifting     Problem: Sepsis/Septic Shock  Goal: Optimal Coping  Outcome: Not Progressing  Intervention: Optimize Psychosocial Adjustment to Illness  Recent Flowsheet Documentation  Taken 12/9/2022 2000 by Donovan Lawson RN  Family/Support System Care: involvement promoted  Goal: Absence of Bleeding  Outcome: Not Progressing  Goal: Blood Glucose Level Within Targeted Range  Outcome: Not Progressing  Goal: Absence of Infection Signs and Symptoms  Outcome: Not Progressing  Intervention: Promote Recovery  Recent Flowsheet Documentation  Taken 12/10/2022 0000 by Donovan Lawson RN  Activity Management: activity adjusted per tolerance  Taken 12/9/2022 2000 by Donovan Lawson RN  Activity Management: activity adjusted per tolerance  Goal: Optimal Nutrition Intake  Outcome: Not Progressing     Problem: Fall Injury Risk  Goal: Absence of Fall and Fall-Related Injury  Outcome: Not Progressing  Intervention: Identify and Manage Contributors  Recent Flowsheet Documentation  Taken 12/10/2022 0000 by Donovan Lawson RN  Medication Review/Management: medications reviewed  Taken 12/9/2022 2000 by Donovan Lawson RN  Medication Review/Management: medications reviewed  Intervention: Promote Injury-Free Environment  Recent Flowsheet Documentation  Taken 12/10/2022 0000 by Donovan Lawson RN  Safety Promotion/Fall Prevention:    activity supervised    nonskid shoes/slippers  when out of bed    patient and family education  Taken 12/9/2022 2000 by Donovan Lawson RN  Safety Promotion/Fall Prevention:    activity supervised    nonskid shoes/slippers when out of bed    patient and family education     Problem: Stroke, Ischemic (Includes Transient Ischemic Attack)  Goal: Optimal Coping  Outcome: Not Progressing  Intervention: Support Psychosocial Response to Stroke  Recent Flowsheet Documentation  Taken 12/9/2022 2000 by Donovan Lawson RN  Family/Support System Care: involvement promoted  Goal: Effective Bowel Elimination  Outcome: Not Progressing  Goal: Optimal Cerebral Tissue Perfusion  Outcome: Not Progressing  Goal: Optimal Cognitive Function  Outcome: Not Progressing  Goal: Improved Communication Skills  Outcome: Not Progressing  Goal: Optimal Functional Ability  Outcome: Not Progressing  Intervention: Optimize Functional Ability  Recent Flowsheet Documentation  Taken 12/10/2022 0000 by Donovan Lawson RN  Activity Management: activity adjusted per tolerance  Taken 12/9/2022 2000 by Donovan Lawson RN  Activity Management: activity adjusted per tolerance  Goal: Effective Oxygenation and Ventilation  Outcome: Not Progressing  Intervention: Optimize Oxygenation and Ventilation  Recent Flowsheet Documentation  Taken 12/10/2022 0000 by Donovan Lawson RN  Head of Bed (HOB) Positioning: HOB at 20-30 degrees  Taken 12/9/2022 2000 by Donovan Lawson RN  Head of Bed (HOB) Positioning: HOB at 20-30 degrees  Goal: Improved Sensorimotor Function  Outcome: Not Progressing  Intervention: Optimize Range of Motion, Motor Control and Function  Recent Flowsheet Documentation  Taken 12/10/2022 0000 by Donovan Lawson RN  Positioning/Transfer Devices:    pillows    in use  Taken 12/9/2022 2000 by Donovan Lawson RN  Positioning/Transfer Devices:    pillows    in use  Goal: Optimal Eating and Swallowing without Aspiration  Outcome:  Not Progressing  Goal: Effective Urinary Elimination  Outcome: Not Progressing   Goal Outcome Evaluation:

## 2022-12-10 NOTE — PROGRESS NOTES
[facility]    Medicine Progress Note - Hospitalist Service, GOLD TEAM 9    Date of Admission:  12/4/2022    Assessment & Plan   Mr. Fletcher is a 66-year-old gentleman with a past medical history of hypertension, hyperlipidemia, Hodgkin's lymphoma status post radiation and MOPP chemotherapy, in remission, testicular cancer status post a left orchiectomy and chemotherapy, in remission, prostate cancer (Elly 4+3, diagnosed April 2021) status post radiation and Lupron, aortic stenosis status post bioprosthetic aortic valve in (2014) and coronary artery disease status post PCI to the LAD in (2012) who on December 4, 2022 developed a left-sided gaze with right-sided weakness and was noted to have a fever and brought to an outside hospital where he was recognized to be in septic shock and transferred to [facility] ICU.  Head CT and CTA of the head and neck did not display any acute abnormality or stenosis.  MRI brain displayed scattered foci of acute ischemia involving the bilateral anterior and posterior cerebral artery distributions.  Neurology was consulted and concerned that the these might represent embolic strokes.  Further infectious work-up was done including a CT chest abdomen and pelvis which revealed groundglass opacities in the left upper lobe and left lower lobe with tree-in-bud findings in the setting of interstitial pulmonary edema and a left pleural effusion.  He was started on broad-spectrum antibiotics including nafcillin and ceftriaxone.  Lumbar puncture was performed and CSF was positive for gram-positive cocci, final cultures pending.  Cardiology was consulted with concerns for possible endocarditis.  TTE was performed which revealed severe mitral calcification with moderate insufficiency and a bioprosthetic aortic valve which was well-seated with no vegetations present. ID was consulted and he was adjusted to Nafcillin with treatment for likely  endocarditis in the setting of a high grade blood stream infection from MSSA that resulted in pneumonia, embolic strokes, acute kidney injury and encephalopathy. 12/6, he is transfered out of the ICU.    Changes today:  - cardiology following, intermittent diuresis to maintain goal net- 1-2 L with goal weight of 178  - MS improving x24 hours, continue to monitor closely  - ID recs transitioning from nafcillin to cefazolin, continue linezolid and add 3 days of ciprofloxacin for cholestasis/cholangitis  - Follow up read of RUQ US for elevated bilirubin  - have reached out to hematology re: timing of initiation for AP/AC, awaiting recs    #Severe sepsis (fever, leukocytosis, tachycardia, tachypnea) secondary to MSSA high-grade bloodstream infection  #Acute embolic strokes with right-sided weakness  #Acute encephalopathy, improving  #Bacterial community-acquired pneumonia  He was initially started on IV Vancomycin/ Zosyn at OSH. Blood cultures x 2 from 12/4/22 grew positive for pansensitive Staph aureus. There were no repeat cultures on 12/5. CSF culture Repeat cultures done 12/6 so far are without growth. Antibiotic regimen has been adjusted to Nafcillin, per ID. His encephalopathy is improving and Neurology is following.  -Infectious Disease is following and per recommendations, likely treatment for 4-6 weeks for suspected endocarditis see their note for abx changes  -Continue daily blood cultures, CRP and CBC, CMP  -Neurology was consulted and has signed off. OK to restart antiplatelets from their perspective, but holding due to hematology concern for bleeding with MSSA embolic bleeding risk  - Low threshold to repeat a head CT if he develops any acute neurologic changes.   -Speech therapy consulted, appreciate recs    #Demand ischemia  #Aortic stenosis status post bioprosthetic aortic valve replacement in 2014  #Coronary artery disease status post PCI to the LAD in 2012  #Radiation induced heart  disease  #Hypertension  #Hyperlipidemia  -Mr. Fletcher follows at [facility] with cardiology with Dr. Winnie Roth and was last seen November 16, 2022.  He is planned in February 2023 to undergo updated cardiac surgery for his significant calcifications and valve disease.  -TTE EEG was performed without any evidence of vegetations.    -Continue to hold Plavix and aspirin in the setting of bleeding risk given his recent stroke (as above)  -Cardiology was consulted and is following and per recommendations troponin has been elevated secondary to demand ischemia and does not require any further intervention    #acute thrombocytopenia, improving - initial concern for possible HIT given score >3 and recent heparin exposure in November. HIT screen negative, fibrinogen not low.  DIC/HIT less likely. Supsect related to medications, sepsis, etc  - hematology consulted appreciate recs  - platelets improving, follow.    #Acute Kidney Injury, non-oliguric - baseline around 0.9, appears to be peaking around 4. Likely related to medications, hypotension, vasoplegia  - US collecting system without hydro  - follow UOP, monitor closely for post-ATN diuresis  - avoid nephrotoxic agents  - nephrology consult    # Generalized weakness secondary to critical illness  -PT/OT consults to assess safety to return home and assist with strength training    # Hodgkin's lymphoma status post radiation and MOPP chemotherapy, in remission  -No acute issues.    # Testicular cancer status post a left orchiectomy and chemotherapy, in remission  -No acute issues.     # Prostate cancer (Elly 4+3, diagnosed April 2021) s/p radiation and Lupron  -Follows with Urology and Oncology as an outpatient     Diet: Minced & Moist Diet (level 5) Thin Liquids (level 0)  Snacks/Supplements Adult: Ensure Enlive; Between Meals  Snacks/Supplements Adult: Magic Cup; With Meals  Calorie Counts    DVT Prophylaxis: Heparin SQ  Medina Catheter: PRESENT, indication:  "Retention  Central Lines: None  Cardiac Monitoring: None  Code Status: Full Code      Disposition Plan  From home, will discharge likely to TCU for need of IV antibiotics once medically stable   Expected discharge date: 12/10/22      Jericho Santos MD  Hospitalist Service, GOLD TEAM 9  [facility]  Securely message with the Vocera Web Console (learn more here)  Text page via [facility] Paging/Directory   Please see signed in provider for up to date coverage information    Clinically Significant Risk Factors        # Hypokalemia: Lowest K = 3.3 mmol/L in last 2 days, will replace as needed      # Anion Gap Metabolic Acidosis: Highest Anion Gap = 19 mmol/L in last 2 days, will monitor and treat as appropriate  # Hypoalbuminemia: Lowest albumin = 2.3 g/dL at 12/7/2022  4:34 AM, will monitor as appropriate   # Thrombocytopenia: Lowest platelets = 124 in last 2 days, will monitor for bleeding       # DMII: A1C = 6.6 % (Ref range: <5.7 %) within past 3 months   # Overweight: Estimated body mass index is 28.96 kg/m  as calculated from the following:    Height as of an earlier encounter on 12/4/22: 1.727 m (5' 8\").    Weight as of this encounter: 86.4 kg (190 lb 7.6 oz).      _____________________________________________________________________    Interval History   Sitting in cahir, eating and drinking with some assistance. Reports that he is not in pain, breathing is improved compared to prior.  He has no nausea or vomiting.  He is able to track conversation and feels improved today. Moving his bowels, peeing frequently. Very thirsty.    Data reviewed today: I reviewed all medications, new labs and imaging results over the last 24 hours.     Physical Exam   Vital Signs: Temp: 99.7  F (37.6  C) Temp src: Oral BP: 116/70 Pulse: 101   Resp: 15 SpO2: 94 % O2 Device: None (Room air) Oxygen Delivery: 2 LPM  Weight: 190 lbs 7.64 oz  General Appearance: 66 year old gentleman " resting in bed, no acute distress, denies pain  Respiratory: mild increase WOB, on room air, rare crackles bilaterally, moderate aeration,   Cardiovascular: regular rate and rhythm, systolic murmur present, no heaves or lifts  GI: bowel sounds active, soft, non-tender to palpation throughout, no rebound or guarding  Skin: warm, dry, no open sores, lesions or ulcerations, edema in upper and lower extremities  Data   Recent Labs   Lab 12/10/22  0545 12/09/22  2331 12/09/22  1622 12/09/22  0814 12/09/22  0532 12/08/22  1948 12/08/22  0812 12/04/22  2146 12/04/22  1341   WBC 21.3*  --   --  21.4*  --   --  15.4*   < > 13.7*   HGB 10.8*  --   --  11.3*  --   --  10.5*   < > 13.0*   MCV 84  --   --  86  --   --  89   < > 91   *  --   --  124*  --   --  120*   < > 168   INR  --   --   --   --   --   --   --   --  1.23*     --  138  --  137  --  138   < > 132*   POTASSIUM 3.5  3.5 3.3* 3.3*  3.3*  --  3.3*  --  3.6   < > 4.1   CHLORIDE 101  --  104  --  103  --  104   < > 95*   CO2 20*  --  19*  --  15*  --  19*   < > 22   BUN 93.1*  --  93.0*  --  87.8*  --  83.0*   < > 29.5*   CR 3.73*  --  3.84*  --  3.85*  --  4.16*   < > 1.50*   ANIONGAP 18*  --  15  --  19*  --  15   < > 15   YUNIOR 7.9*  --  7.6*  --  7.5*  --  7.5*   < > 8.7*   *  --  174*  --  130*   < > 114*   < > 155*   ALBUMIN 2.6*  --  2.6*  --  2.5*  2.5*  --  2.4*  2.4*   < >  --    PROTTOTAL 5.4*  --   --   --  5.3*  --  5.1*   < >  --    BILITOTAL 4.2*  --   --   --  4.1*  --  4.0*   < >  --    ALKPHOS 190*  --   --   --  125  --  92   < >  --    ALT 34  --   --   --  38  --  26   < >  --    AST 76*  --   --   --  92*  --  69*   < >  --     < > = values in this interval not displayed.

## 2022-12-10 NOTE — PROGRESS NOTES
Cardiology Consult Progress Note     Assessment and Recommendation:  Eloy Fletcher is a 66 year old male with a history of severe calcific mitral stenosis, bioprosthetic aortic valve replacement in 2014, CAD w/ prior PCI of the LAD in 2012, HTN, HLD, Hodgkin's lymphoma s/p chest radiation in 1979, acquired asplenia (from splenectomy in 1979 from Hodkin's laparotomy), testicular cancer and prostate cancer who is currently admitted for multifocal CVA involving the bilateral LIZZY and PCA territories in the setting of known valvular disease c/f possible endocarditis. However, ILDEFONSO does not show evidence of vegetations. He is volume overloaded now on the floor in the setting of requiring fluid boluses for soft blood pressure in setting of sepsis.     #Severe mitral annular calcification  #Mild to moderate mitral insufficiency  #Severe mitral stenosis  #Pulmonary Edema  Findings above are consistent with known history of severe mitral valvular disease that will likely require surgical replacement in the future. Patient is following with cardiovascular surgery at the [facility] for this with plans for tentative surgery in Jan/Feb of 2023.      He has been diuresing well since yesterday. This is likely related to auto diuresis from ATN (lasix 20 mg IV with a creatinine of 3.7 is unlikely to be driving his diuresis). Regardless, he is making progress and should his urine output slow down, would recommend bolusing with a larger dose of lasix (at least 100 mg IV lasix) to aim for net negative 1-2 liters today. He was admitted with a weight of 178 lbs., would ultimately aim to get close to this over the next few days    -continue intermittent diuresis once auto-diuresis abates  -goal weight ~178 lbs   -continue strict I/O, daily standing weights if possible   -Recommend outpatient follow up at [facility] for valve surgery     Patient seen and discussed with Dr. Samuels, who agrees with above plan.     Thank you for consulting the  cardiovascular services at the [facility]. Please do not hesitate to contact us with any questions.    Ceasar Mcghee MD  Cardiology Fellow  Pager: [phone]     REASON FOR CONSULT: A cardiology consult was requested by Dr. Lerner from the medicine service to provide clinical guidance regarding concern for endocarditis.    Subjective/24 hour events:   -blood pressures stable overnight, HR down to 105 from 125 yesterday, febrile to 101 F, oxygenation stable on 2L NC   -net negative 1.8 liters after lasix 20 mg IV x2   -encephalopathic this am, doesn't know what month it is, does know he is at [facility] and that it is 2022, quickly falls asleep during interview     A comprehensive ROS was done and the details are included above in the HPI.    CURRENT MEDICATIONS:  No current outpatient medications on file.         Physical Exam   Temp: 99.7  F (37.6  C) Temp src: Oral BP: 114/57 Pulse: 109   Resp: 20 SpO2: 97 % O2 Device: Nasal cannula Oxygen Delivery: 2 LPM  Vital Signs with Ranges  Temp:  [97.4  F (36.3  C)-101.7  F (38.7  C)] 99.7  F (37.6  C)  Pulse:  [] 109  Resp:  [17-24] 20  BP: ()/(48-96) 114/57  Cuff Mean (mmHg):  [66] 66  SpO2:  [94 %-98 %] 97 %  190 lbs 7.64 oz    GEN: lying in bed NAD, somnolent but easily arousable to voice   HEENT: no icterus  CV: tachycardic, regular rhythm, III/VI systolic murmur heard best at RUSB, faint diastolic murmur heard best over apex. JVP 8 cm H2O.   CHEST: +bibasilar crackles   ABD: soft, NT/ND  : chan in place   NEURO: somnolent but arousable to voice, oriented to location, year, disoriented to month, day of week, circumstance   PSYCH: cooperative, affect appropriate  Skin: contusions bilaterally UEs    Data   Data reviewed today:  I personally reviewed the EKG tracing showing sinus rhythm with bifascicular block .  Recent Labs   Lab 12/10/22  0545 12/09/22  2331 12/09/22  1622 12/09/22  0814 12/09/22  0532 12/08/22  1948 12/08/22  0812  12/04/22  2146 12/04/22  1341   WBC 21.3*  --   --  21.4*  --   --  15.4*   < > 13.7*   HGB 10.8*  --   --  11.3*  --   --  10.5*   < > 13.0*   MCV 84  --   --  86  --   --  89   < > 91   *  --   --  124*  --   --  120*   < > 168   INR  --   --   --   --   --   --   --   --  1.23*     --  138  --  137  --  138   < > 132*   POTASSIUM 3.5  3.5 3.3* 3.3*  3.3*  --  3.3*  --  3.6   < > 4.1   CHLORIDE 101  --  104  --  103  --  104   < > 95*   CO2 20*  --  19*  --  15*  --  19*   < > 22   BUN 93.1*  --  93.0*  --  87.8*  --  83.0*   < > 29.5*   CR 3.73*  --  3.84*  --  3.85*  --  4.16*   < > 1.50*   ANIONGAP 18*  --  15  --  19*  --  15   < > 15   YUNIOR 7.9*  --  7.6*  --  7.5*  --  7.5*   < > 8.7*   *  --  174*  --  130*   < > 114*   < > 155*   ALBUMIN 2.6*  --  2.6*  --  2.5*  2.5*  --  2.4*  2.4*   < >  --    PROTTOTAL 5.4*  --   --   --  5.3*  --  5.1*   < >  --    BILITOTAL 4.2*  --   --   --  4.1*  --  4.0*   < >  --    ALKPHOS 190*  --   --   --  125  --  92   < >  --    ALT 34  --   --   --  38  --  26   < >  --    AST 76*  --   --   --  92*  --  69*   < >  --     < > = values in this interval not displayed.       Recent Results (from the past 24 hour(s))   XR Chest Port 1 View    Narrative    Exam: XR CHEST PORT 1 VIEW, 12/9/2022 10:16 AM    Indication: new wheezes, pulmonary edema    Comparison: 12/7/2022    Findings:   Cardiac borders are obscured. Low inspiratory lung volumes. No  effusions or pneumothorax. Diffuse mixed interstitial and airspace  opacities with atelectasis. Prior wedge resection changes in the right  lung. Surgical clips project over the upper abdomen.      Impression    Impression: Low lung volumes with atelectasis and diffuse mixed  opacities, suggesting airspace edema and/or infection.    I have personally reviewed the examination and initial interpretation  and I agree with the findings.    FARHEEN GRIMM MD         SYSTEM ID:  [id number]

## 2022-12-10 NOTE — PROGRESS NOTES
Brief Medicine Note    Contacted by nursing regarding recurrent fever to 102.4, tachycardic at 125 (presumably d/t fever). Has been fevering almost daily. More difficult to arouse this evening, awakens to sternal rub but difficult to keep him awaken. RN notes they were able to keep him awake for 5 hours straight earlier today so he may just be very tired after a prolonged stretch of being awake. Pt had been reporting to earlier RN he was tired and did not sleep well last night.    Chart reviewed. Patient is quite ill and his status is overall tenuous. He has MSSA bacteremia due to suspected endocarditis with multiple septic emboli to brain and associated encephalopathy d/t acute embolic CVA with CSF culture positive for MSSA and cholestasis/cholestatic hepatitis. ID is following, changed from nafcillin to cefazolin and ciprofloxacin was added; he continues on linezolid. His MS has been waxing and waning, with significant lethargy often times requiring sternal rub to wake him; inability to wake patient w/ sternal rub would represent a marked change.    In discussion with RN, it sounds like his MS is unchanged overall, just more tired tonight after being up for prolonged period earlier today and poor sleep last night. Certainly his fever could be causing him to be more tired this evening than earlier today, as well. He was given Tylenol, though RN notes there was some difficulty with this due to his drowsiness.     Today's vital signs, progress notes and nursing notes were reviewed.    BP (!) 140/58 (BP Location: Right arm)   Pulse (!) 125   Temp (!) 102.4  F (39.1  C) (Oral)   Resp 16   Wt 86.4 kg (190 lb 7.6 oz)   SpO2 95%   BMI 28.96 kg/m        - Will continue to monitor closely.    - If unable to wake patient with sternal rub, will need to call RRT/stroke code.  - Continue current abx regimen.    JESSE Macias, PA-C  General acute hospital Medicine  Securely message with  the [employer] Web Console (learn more here)  Text page via [facility] Paging/Directory

## 2022-12-11 NOTE — PROGRESS NOTES
[facility] [place]    Stroke Progress Note    Interval EventsPersistent generalized weakness more prominent on right side of the body.      HPI Summary  Eloy Fletcher is a 66 year old male with PMH of HTN, HLD, CAD s/p stent, Hodgkin's disease, prostate cancer who had presented with left gaze preference, right sided neglect and generalized weakness and first seen by stroke team on 12/4 and found to have small multifocal infarcts on MRI brain, etiology ESUS/septic emboli.  His vEEG didn't show any epileptiform activity. Also had signs of meningism. Had evidence of MSSA bacteremia. Lumbar picture was done; CSF culture were positive for staph aureus on  12/4. Also had high protein and neutrophils on CSF studies. There was suspicion for endocarditis but ILDEFONSO didn't show any vegetations. He is being treated with antibiotics cefazolin and cefrofloxacin currently.     Impression     #Acute embolic multifocal punctate strokes (five total) due to ESUS  #Suspicion for epidural abscess vs discitis  Eloy Fletcher is a 66 year old male with PMH of HTN, HLD, CAD s/p stent, Hodgkin's disease, prostate cancer  who had presented with left gaze preference, right sided neglect and generalized weakness, was  seen by stroke team on 12/4 and found to have small multifocal infarcts on MRI brain, etiology ESUS 2/2 septic emboli. Though TTE has been negative for vegetations.  His CSF study was positive for staph aureus and no other source of infection has been found out yet. Patient still has generalized weakness, more prominent on the right side.  We suspect and would like to rule out epidural abscess or discitis with further imaging of spine.     Plan  -MRI spine (C, T, and L) w and w/o contrast    Patient Follow-up    - final recommendation pending work-up    We will continue to follow.     The patient was discussed with  The Stroke Staff Dr Peter Betancourt MD  Neurology  "Resident  [id number]________________________________________    Clinically Significant Risk Factors        # Hypokalemia: Lowest K = 2.8 mmol/L in last 2 days, will replace as needed       # Hypoalbuminemia: Lowest albumin = 2.3 g/dL at 12/7/2022  4:34 AM, will monitor as appropriate          # DMII: A1C = 6.6 % (Ref range: <5.7 %) within past 3 months   # Overweight: Estimated body mass index is 28.76 kg/m  as calculated from the following:    Height as of an earlier encounter on 12/4/22: 1.727 m (5' 8\").    Weight as of this encounter: 85.8 kg (189 lb 2.5 oz).            Medications   Scheduled Meds    ceFAZolin  2 g Intravenous Q12H     ciprofloxacin  400 mg Intravenous Q24H     heparin ANTICOAGULANT  5,000 Units Subcutaneous Q12H     linezolid  600 mg Intravenous Q12H     pantoprazole  40 mg Oral QAM AC     sodium chloride (PF)  3 mL Intracatheter Q8H       Infusion Meds    sodium chloride 150 mL/hr (12/05/22 1330)       PRN Meds  acetaminophen, acetaminophen, acetylcysteine, albuterol, albuterol, lidocaine 4%, lidocaine (buffered or not buffered), ondansetron **OR** ondansetron, polyethylene glycol, prochlorperazine **OR** prochlorperazine **OR** prochlorperazine, senna-docusate **OR** senna-docusate, sodium chloride (PF), sodium chloride, sodium chloride bacteriostatic       PHYSICAL EXAMINATION  Temp:  [97.4  F (36.3  C)-102.4  F (39.1  C)] 98.3  F (36.8  C)  Pulse:  [] 120  Resp:  [14-28] 28  BP: (103-140)/(54-64) 128/64  Cuff Mean (mmHg):  [76-81] 76  SpO2:  [91 %-97 %] 91 %        Neuro Exam  Mental Status:  alert, oriented x 3, follows commands, speech clear and fluent, naming and repetition normal  Cranial Nerves:  visual fields intact, PERRL, EOMI with normal smooth pursuit, facial sensation intact and symmetric, facial movements symmetric, hearing not formally tested but intact to conversation, palate elevation symmetric and uvula midline, no dysarthria, shoulder shrug strong bilaterally, tongue " protrusion midline  Motor:  normal muscle tone and bulk, no abnormal movements,  strength 3/5 in b/l UE and LE  , pronator drift B/L more prominent on R side  Reflexes:  toes down-going  Sensory:  light touch sensation intact and symmetric throughout upper and lower extremities, no extinction on double simultaneous stimulation   Coordination:  normal finger-to-nose   Station/Gait:  deferred    Imaging  I personally reviewed all imaging; relevant findings per HPI.     Lab Results Data   CBC  Recent Labs   Lab 12/11/22  0508 12/10/22  0545 12/09/22  0814   WBC 25.8* 21.3* 21.4*   RBC 3.69* 3.61* 3.71*   HGB 11.0* 10.8* 11.3*   HCT 30.8* 30.4* 31.8*    138* 124*     Basic Metabolic Panel    Recent Labs   Lab 12/11/22  0508 12/10/22  0545 12/09/22  2331 12/09/22  1622    139  --  138   POTASSIUM 2.8*  2.8* 3.5  3.5 3.3* 3.3*  3.3*   CHLORIDE 99 101  --  104   CO2 20* 20*  --  19*   BUN 93.5* 93.1*  --  93.0*   CR 3.46* 3.73*  --  3.84*   * 114*  --  174*   YUNIOR 8.0* 7.9*  --  7.6*     Liver Panel  Recent Labs   Lab 12/11/22  0508 12/10/22  0545 12/09/22  1622 12/09/22  0532   PROTTOTAL 5.8* 5.4*  --  5.3*   ALBUMIN 2.7* 2.6* 2.6* 2.5*  2.5*   BILITOTAL 1.9* 4.2*  --  4.1*   ALKPHOS 167* 190*  --  125   AST 67* 76*  --  92*   ALT 28 34  --  38     INR    Recent Labs   Lab Test 12/04/22  1341   INR 1.23*      Lipid Profile    Recent Labs   Lab Test 12/07/22  0434   CHOL 101   HDL 14*   LDL 35   TRIG 260*     A1C    Recent Labs   Lab Test 12/07/22  1359   A1C 6.6*     Troponin    Recent Labs   Lab 12/07/22  0434 12/06/22  0659 12/06/22  0544   CTROPT 1,634* 2,010* 1,915*          Data

## 2022-12-11 NOTE — CONSULTS
Care Management Initial Consult    General Information  Assessment completed with: VM-chart review, Children,  (patients daughter)  Type of CM/SW Visit: Initial Assessment    Primary Care Provider verified and updated as needed:     Readmission within the last 30 days: no previous admission in last 30 days         Advance Care Planning: Advance Care Planning Reviewed: other (see comments) (Full Code)          Communication Assessment  Patient's communication style: spoken language (English or Bilingual)    Hearing Difficulty or Deaf: no   Wear Glasses or Blind: other (see comments) (unknown)    Cognitive  Cognitive/Neuro/Behavioral: .WDL except  Level of Consciousness: alert  Arousal Level: opens eyes spontaneously  Orientation: disoriented to, time, situation  Mood/Behavior: calm, cooperative  Best Language: 1 - Mild to moderate  Speech: slow, spontaneous, logical    Living Environment:   People in home: alone     Current living Arrangements: apartment      Able to return to prior arrangements: other (see comments) (current therapy recommendations is TCU vs ARU)       Family/Social Support:  Care provided by: self  Provides care for: no one  Marital Status:   Children, Other (specify) (ex wife listed as contact)          Description of Support System: Involved         Current Resources:   Patient receiving home care services: No     Community Resources: None  Equipment currently used at home: grab bar, tub/shower, shower chair  Supplies currently used at home: None    Employment/Financial:  Employment Status: employed full-time, other (see comments) (set to retire in Jan)        Financial Concerns: No concerns identified           Lifestyle & Psychosocial Needs:  Social Determinants of Health     Tobacco Use: Not on file   Alcohol Use: Not on file   Financial Resource Strain: Not on file   Food Insecurity: Not on file   Transportation Needs: Not on file   Physical Activity: Not on file   Stress: Not on file  "  Social Connections: Not on file   Intimate Partner Violence: Not on file   Depression: Not on file   Housing Stability: Not on file       Functional Status:  Prior to admission patient needed assistance:   Dependent ADLs:: Independent  Dependent IADLs:: Independent       Mental Health Status:  Mental Health Status: No Current Concerns       Chemical Dependency Status:  Chemical Dependency Status: No Current Concerns             Values/Beliefs:  Spiritual, Cultural Beliefs, Taoism Practices, Values that affect care: no               Additional Information:  Spoke with patients daughter and reviewed chart to complete assessment above. Patient's daughter currently in [place] to provide support to her dad but lives in [place] full time. She expressed that he is extremely independent and active at baseline. He has not had any home care or medical supplies in the home. We discussed the potential for him to discharge to an ARU or TCU (based off of therapies recommendations) and she was very agreeable to this and said \"my dad is very motivated to get back to where he was\".     She was going to confirm her dad's pcp with him; RNCC or SW to check in on this, but she did say that he is well established and follow closely with his cardiology clinic and provider.     No other needs identified at this time. RNCC and SW to follow for discharge recs.     Cindy Choe RN   Weekend Care Coordinator          "

## 2022-12-11 NOTE — PROGRESS NOTES
[facility]    Medicine Progress Note - Hospitalist Service, GOLD TEAM 9    Date of Admission:  12/4/2022    Assessment & Plan   Mr. Fletcher is a 66-year-old gentleman with a past medical history of hypertension, hyperlipidemia, Hodgkin's lymphoma status post radiation and MOPP chemotherapy, in remission, testicular cancer status post a left orchiectomy and chemotherapy, in remission, prostate cancer ([place] 4+3, diagnosed April 2021) status post radiation and Lupron, aortic stenosis status post bioprosthetic aortic valve in (2014) and coronary artery disease status post PCI to the LAD in (2012) who on December 4, 2022 developed a left-sided gaze with right-sided weakness and was noted to have a fever and brought to an outside hospital where he was recognized to be in septic shock and transferred to [facility] ICU.  Head CT and CTA of the head and neck did not display any acute abnormality or stenosis.  MRI brain displayed scattered foci of acute ischemia involving the bilateral anterior and posterior cerebral artery distributions.  Neurology was consulted and concerned that the these might represent embolic strokes.  Further infectious work-up was done including a CT chest abdomen and pelvis which revealed groundglass opacities in the left upper lobe and left lower lobe with tree-in-bud findings in the setting of interstitial pulmonary edema and a left pleural effusion.  He was started on broad-spectrum antibiotics including nafcillin and ceftriaxone.  Lumbar puncture was performed and CSF was positive for gram-positive cocci, final cultures pending.  Cardiology was consulted with concerns for possible endocarditis.  TTE was performed which revealed severe mitral calcification with moderate insufficiency and a bioprosthetic aortic valve which was well-seated with no vegetations present. ID was consulted and he was adjusted to Nafcillin with treatment for likely  endocarditis in the setting of a high grade blood stream infection from MSSA that resulted in pneumonia, embolic strokes, acute kidney injury and encephalopathy. 12/6, he is transfered out of the ICU.    Changes today:  - cardiology following, Considering next steps for possible heart block.  Continue intermittent diuresis to maintain goal net- 1-2 L with goal weight of 178. S/p 40 mg today  - Neurology re-evaluated this afternoon, wanting to rule out spinal abscess with MRI.  Will repeat renal function tomorrow morning and plan for MRI spine at that time.  - ID recs - continue cefazolin, linezolid. 3 days of ciprofloxacin, 3 days of elias nebs  - heme cleared for ppx heparin, DAP to start pending course    #septic shock (fever, leukocytosis, tachycardia, tachypnea, end organ damage) secondary to MSSA high-grade bloodstream infection  #Acute embolic strokes with right-sided weakness  #Acute encephalopathy, improving  #Bacterial community-acquired pneumonia  He was initially started on IV Vancomycin/ Zosyn at OSH. Blood cultures x 2 from 12/4/22 grew positive for pansensitive Staph aureus. There were no repeat cultures on 12/5. CSF culture Repeat cultures done 12/6 so far are without growth. Antibiotic regimen has been adjusted to Nafcillin, per ID. His encephalopathy is improving and Neurology is following.  -Infectious Disease is following and per recommendations, likely treatment for 4-6 weeks for suspected endocarditis see their note for abx changes  -Continue daily blood cultures, CRP and CBC, CMP  -Neurology was consulted and has signed off. OK to restart antiplatelets from their perspective, but holding due to hematology concern for bleeding with MSSA embolic bleeding risk  - Low threshold to repeat a head CT if he develops any acute neurologic changes.   -Speech therapy consulted, appreciate recs    #Demand ischemia  #Aortic stenosis status post bioprosthetic aortic valve replacement in 2014  #Coronary artery  disease status post PCI to the LAD in 2012  #Radiation induced heart disease  #Hypertension  #Hyperlipidemia  -Mr. Fletcher follows at [facility] with cardiology with Dr. Winnie Roth and was last seen November 16, 2022.  He is planned in February 2023 to undergo updated cardiac surgery for his significant calcifications and valve disease.  -TTE EEG was performed without any evidence of vegetations.    -Continue to hold Plavix and aspirin in the setting of bleeding risk given his recent stroke (as above)  -Cardiology following    #acute thrombocytopenia, improving - initial concern for possible HIT given score >3 and recent heparin exposure in November. HIT screen negative, fibrinogen not low.  DIC/HIT less likely. Supsect related to medications, sepsis, etc  - hematology consulted appreciate recs    #Acute Kidney Injury, non-oliguric - baseline around 0.9, appears to be peaking around 4. Likely related to medications, hypotension, vasoplegia  - US collecting system without hydro  - follow UOP, monitor closely for post-ATN diuresis  - avoid nephrotoxic agents  - nephrology consult    # Generalized weakness secondary to critical illness  -PT/OT consults to assess safety to return home and assist with strength training    # Hodgkin's lymphoma status post radiation and MOPP chemotherapy, in remission  -No acute issues.    # Testicular cancer status post a left orchiectomy and chemotherapy, in remission  -No acute issues.     # Prostate cancer ([place] 4+3, diagnosed April 2021) s/p radiation and Lupron  -Follows with Urology and Oncology as an outpatient     Diet: Minced & Moist Diet (level 5) Thin Liquids (level 0)  Snacks/Supplements Adult: Ensure Enlive; Between Meals  Snacks/Supplements Adult: Magic Cup; With Meals  Calorie Counts    DVT Prophylaxis: Heparin SQ  [place] Catheter: PRESENT, indication: Retention  Central Lines: None  Cardiac Monitoring: None  Code Status: Full Code      Disposition Plan  From home,  "will discharge likely to TCU for need of IV antibiotics once medically stable   Expected discharge date: 12/19/22      Jericho Santos MD  Hospitalist Service, GOLD TEAM 9  [facility]  Securely message with the Vocera Web Console (learn more here)  Text page via [facility] Paging/Directory   Please see signed in provider for up to date coverage information    Clinically Significant Risk Factors        # Hypokalemia: Lowest K = 2.8 mmol/L in last 2 days, will replace as needed       # Hypoalbuminemia: Lowest albumin = 2.3 g/dL at 12/7/2022  4:34 AM, will monitor as appropriate         # DMII: A1C = 6.6 % (Ref range: <5.7 %) within past 3 months   # Overweight: Estimated body mass index is 28.76 kg/m  as calculated from the following:    Height as of an earlier encounter on 12/4/22: 1.727 m (5' 8\").    Weight as of this encounter: 85.8 kg (189 lb 2.5 oz).      _____________________________________________________________________    Interval History   Breathing mildly worse this morning. Denies cough, few, intermittent fevers. No chest pain, back pain, abdominal pain.  Moderate appetite. Very thirsty.  No other concerns this morning. Family at bed and updated on plan.    Data reviewed today: I reviewed all medications, new labs and imaging results over the last 24 hours.     Physical Exam   Vital Signs: Temp: 100.2  F (37.9  C) Temp src: Axillary BP: 102/57 Pulse: 112   Resp: 18 SpO2: 94 % O2 Device: Nasal cannula Oxygen Delivery: 2 LPM  Weight: 189 lbs 2.47 oz  General Appearance: 66 year old gentleman resting in bed, no acute distress, denies pain  Respiratory: mild increase WOB, on room air, rare crackles bilaterally, moderate aeration,   Cardiovascular: regular rate and rhythm, systolic murmur present, no heaves or lifts  GI: bowel sounds active, soft, non-tender to palpation throughout, no rebound or guarding  Skin: warm, dry, no open sores, lesions or ulcerations, " edema in upper and lower extremities  Data    Recent Labs   Lab 12/11/22  1320 12/11/22  0508 12/10/22  0545 12/09/22  2331 12/09/22  1622 12/09/22  0814   WBC  --  25.8* 21.3*  --   --  21.4*   HGB  --  11.0* 10.8*  --   --  11.3*   MCV  --  84 84  --   --  86   PLT  --  166 138*  --   --  124*   NA  --  136 139  --  138  --    POTASSIUM 3.4 2.8*  2.8* 3.5  3.5   < > 3.3*  3.3*  --    CHLORIDE  --  99 101  --  104  --    CO2  --  20* 20*  --  19*  --    BUN  --  93.5* 93.1*  --  93.0*  --    CR  --  3.46* 3.73*  --  3.84*  --    ANIONGAP  --  17* 18*  --  15  --    YUNIOR  --  8.0* 7.9*  --  7.6*  --    GLC  --  148* 114*  --  174*  --    ALBUMIN  --  2.7* 2.6*  --  2.6*  --    PROTTOTAL  --  5.8* 5.4*  --   --   --    BILITOTAL  --  1.9* 4.2*  --   --   --    ALKPHOS  --  167* 190*  --   --   --    ALT  --  28 34  --   --   --    AST  --  67* 76*  --   --   --     < > = values in this interval not displayed.

## 2022-12-11 NOTE — PLAN OF CARE
Neuro: A&Ox3-4. Occassionally disoriented to time/situation. Able to make needs known.  Cardiac: ST with HR's 110-120's. Per tele, 3rd degree AVB noted at 0158 for 30 secs and 0608 for 20-30 secs. MD's aware, Cards consulted. BP's 120's/70's. T-.8F. PRN Tylenol given x1. Recheck 100.2F.  Respiratory: Sating >90% on 2-3L NC. LS course. Tachypneic (low 30's) w/abd and accessory muscle use. Started Drew nebs BID per RT for possible aspiration PNA.  GI/: AUOP via chan. Lasix IV 20mg given x2. Inc., loose BM X2.   Diet/appetite: Tolerating Soft & Bite Sized Diet (Level 6) Thin Liquids (Level 0) + snacks/supps. Poor appetite. Total feed. Only drank one Ensure Plus. Strict I/O's. On pascual counts (12/10 - 12/13). SLP following.  Activity: Assist of 2 + lift, repositioned q2hrs PRN. Up to chair x1 for ~2hrs. Encouraged OOB activity. PT/OT consulted.  Pain: At acceptable level on current regimen.   Skin: No new deficits noted.   LDA's: L PIV - abx/TKO.    Plan: K+ (2.8), replaced. Recheck 3.4. Replaced again, recheck 1915. Repeat renal function tomorrow, poss MRI of spine. Neuro Stroke Education tomorrow in room at 1130. Will continue with POC and notify primary team with any changes.

## 2022-12-11 NOTE — PROGRESS NOTES
[facility] Progress Note     Patient:  Eloy Fletcher   YOB: 1955, MRN: [id number]  Date of Visit: 12/11/2022  Date of Admission: 12/4/2022  Consult Requester: Lauri Friend MD       ID Problem List:  1. Sepsis: MSSA bacteremia  -  Suspected endocarditis, with CNS emboli and CSF culture positivity. ILDEFONSO did not reveal vegetations, but that does not fully exclude endocarditis.  Last positive culture 12/10 AM (~24 hrs to grow)  2. Septic emboli to the brain, multiple. Assume MSSA  3. Encephalopathy; Acute embolic CVA  CSF Culture positive MSSA (also +RBC. Waxing/waning. Improved on 12/10-11   --  CSF penetration of nafcillin is 10-20% of serum levels with parenchymal penetration less/variable with ~10% having undetectable brain tissue levels.  Linezolid has excellent CNS tissue and CSF penetration.  4. Cholestasis / cholestatic hepatitis.  Critical illness cholestasis vs. Nafcillin as a cause of cholestatic hepatitis -- although no peripheral eosinophilia is present in blood .  At risk for ascending cholangitis  -- Stopping nafcillin on 12/10 has improved LFTs.  T-bili from 4.2 to   5. Acute kidney injury, creatinine beginning to decrease (Cr = 4.1 -> 3.9 -> 3.6mg/dL) - multi-factorial -> sepsis, hypotension, meds.  Creatinine rise started before Nafcillin was started (and before Gent was given).    6. Biprosthetic Aortic valve 2014; mitral stenosis with calcifications - no evidence of vegetations on ILDEFONSO. (yet septic emboli to CNS make endocarditis / endovascular still probable)  7.  Pneumonia - probable MSSA bacteremic seeding of lung. +MSSA from sputum. At risk for aspiration pneumonia due to altered mental status.  GNR coverage added 12/10 with ciprofloxacin.  8. Asplenic -- which further complicates ability to clear bacteremia.      ID Recommendations:  1. Add elias nebs 300mg BID x 3 days for coverage of possible aspiration pneumonia    Future:  2. If hemodynamically  decompensates, ID would rec gentamicin 150mg IV x 1 which would be the renally adjusted once-daily dose (2mg/kg).   The prior dose of gentamicin on 12/5 was able to briefly sterilize blood culture on 12/6.    3. If by Monday, he is still bacteremic consider ILDEFONSO w/ bubble study early next week to rule out PFO since initial TTE was non-diagnostic to exclude PFO.       4. Could also consider CT scan Chest/Abd/Pelvis to exclude abscess.  In the short term if an abscess is present, this is not going to change antimicrobial therapy, thus it would be better if kidney function improved such that contrast could be utilized. Thus utility of non-contrast CT is relative low    5.  ID still will eventually recommend Rifampin 300mg BID given bioprosthetic valve, but would wait until tomorrow to let LFTs fully settle from recent cholestatic hepatitis picture.       ID Will follow.    Lauri Branham [id number]    Assessment and Discussion:  Summary: 66 year-old male with PMHx notable for biprosthetic AVR 2014, hodgkin's lymphoma with thoracic radiation Evaluated for Staphylococcus Aures bacteremia with encephalopathy and acute embolic CVA. Since admission, CRP down trending. Blood and CSF cultures positive for staph aureus MSSA 12/4 .  Continued Nafcillin.  Provided gentamicin on the 5th, Gent level 0.4 on the 8th.  Cultures 12/7 and 12/8 positive for gram positive cocci in clusters.      1. Sepsis: MSSA bacteremia- Suspected endocarditis, Given CNS emboli, most probable unifying diagnosis for findings described. Assume this is a widely disseminated embolic phenomena throughout the body. May consider contact with cardiology for possibility of PFO, bubble study reported non-diagnostic, would broaden differential.     Impression: Suspected etiology with MSSA bacteremia most consistent with clinical findings of multifocal cerebral infarcts for Staph. Endocarditis, plausible encephalopathy secondary to sepsis for the initial findings  "however, his mentation has significantly improved since initial presentation. ILDEFONSO was not demonstrative of \"definite\" valvular lesions, it is reasonable for prior vegetation to embolize. Given the findings, reasonable to treat with current endocarditis guidelines with daily cultures until negative for 48-72 hours.     2.Encephalopathy; Acute embolic CVA- Likely cardio-embolic as above  Altered mentation could have been secondary to bacteremia, with possible meningoencephalitis given Neurology evaluation correlation of findings not consistent with MRI.  Regarding CSF findings,it is plausible of embolization with subsequent growth of MSSA either direct to the CSF or adjacent parenchymal translocation, concerning for meningoencephalitis. Treat bacteremia as above. Agree with low threshold for CT.      3. Acute kidney injury-  no additional synergistic treatment with gentamicin indicated at this time. Nafcillin is known to cause drug-induced interstitial nephritis but creatinine is now improving as BP rises. so likely due to sepsis/poor renal perfusion. However, if creatinine continues to increase, would consider urinary eosinophil testing. May discuss with Nephrology if continues.      4. Aortic Stenosis s/p Biprosthetic AVR 2014; mitral stenosis with calcifications- would be interested in cardiology for elucidation if PFO is present for differential, otherwise may have had prior vegetation that embolized before ILDEFONSO. Plan for Rifampin in the future with prosthetic valve otherwise    5. Pneumonia - probable bacteremic seeding of lung-   CT chest notable for ground glass opacities and interstitial pulmonary edema, if infectious etiology, likely seeding from endocarditis. Should be appropriately covered with given date.                   Interval History and Events:   Overnight, acute events include improvement in mental status. Febrile again (100.8F) on Sunday. , Linzeolid started on 12/9.  T-bili improved. Renal " function improving. Last positive culture on 12/10.  Does have some increased work of breathing.      Pertinent cultures include:  Culture   Date Value Ref Range Status   12/10/2022 Positive on the 1st day of incubation (A)  Preliminary   12/10/2022 Gram positive cocci in clusters (AA)  Preliminary     Comment:     1 of 2 bottles   12/09/2022 Positive on the 1st day of incubation (A)  Preliminary   12/09/2022 Staphylococcus aureus (AA)  Preliminary     Comment:     1 of 2 bottlesSusceptibilities done on previous cultures   12/08/2022 Positive on the 1st day of incubation (A)  Final   12/08/2022 Staphylococcus aureus (AA)  Final     Comment:     1 of 2 bottlesSusceptibilities done on previous cultures       Recent Inflammatory Biomarkers:   Recent Labs   Lab Test 12/11/22  0508 12/10/22  0545 12/09/22  0814 12/09/22  0532 12/08/22  0812 12/07/22  1359 12/07/22  0434 12/06/22  0422 12/05/22  1141 12/04/22  2353   .00* 220.00*  --  219.00*  --   --  254.00* 387.00*  --   --    PCAL  --   --   --   --   --   --  24.80*  --   --  54.20*   WBC 25.8* 21.3* 21.4*  --  15.4* 13.6* 12.9* 12.3*   < > 11.4*    < > = values in this interval not displayed.                  Antimicrobial Treatment:   Cefazolin 12/10->  Ciproflox 400mg QDay 12/10 ->  Linezolid 12/9 ->  Nafcillin 12/5-12/10 stopped due to possible cholestatic hepatitis   Received one dose Gentamicin (5mg/kg) on 12/5 follow up level 12/8: 0.4     Stopped: 12/6: Ceftriaxone   Stopped 12/5:  vancomycin, acyclovir, ampicillin;          Review of Systems:   Targeted 4 point ROS was completed with pertinent positives and negatives are detailed above.         HPI:   Adopted from initial consult note on 12/05/2022     Summary:       66 year-old male with PMHx notable for Aortic stenosis s/p bioprosthetic AVR 2014, CAD s/p stent 2012, moderate mitral regurgitation/stenosis on ILDEFONSO 11/2022, with prior chemotherapy/radiation with Hodgkin's Lymphoma 1979, testicular  "cancer and prostate cancer admitted overnight on 12/04 for sepsis, encephalopathy, and acute embolic CVA. Evaluated for possible endocarditis MSSA Bacteremia       \"History obtained from review of chart and discussion with patient/nursing staff.      Eloy Fletcher is a 66 year old male who presented with new left lateral ocular gaze and right sided hemineglect, weakness and fevers onset 12/02/2022.  PMHx notable for Aortic Stenosis s/p bioprosthetic AVR 2014, CAD s/p PCI and stent 2012, moderate mitral regurgitation/stenosis on ILDEFONSO 11/2022, with prior chemotherapy/radiation with Hodgkin's Lymphoma 1979, testicular cancer and prostate cancer admitted overnight on 12/04 for sepsis, encephalopathy, and acute embolic CVA. Evaluated for possible endocarditis.     Course: Initial presentation to OSH ED with confusion and unresponsiveness 12/4, discussion with his ex-wife,  initial onset of symptoms 12/1/22 multiple episodes of vomiting with a fever. Improved overnight, recurred and worsened 12/3.  He presented with left gaze preference, right-sided neglect, weakness, and fevers. Initial findings significant for showing tachycardia, tachypnea, and fevers, initial eukocytosis of 11.4, lactic acid 4.2. At that time he was confused and not responding appropriately.   He was evaluated by neurology and found to have MRI findings with non-specific small multifocal CVA suspected embolic in origin. He was provided empiric antibiotics, Blood cultures were drawn.    Due to concern for CNS infection, a LP was performed at the OSH ED and was further transferred to [facility] ICU for work-up of endocarditis in the setting of hypotension. Initially provided   Acyclovir, Ampicillin, Ceftriaxone, Piperacillin Tazobactam and Vancomycin.   CT Chest also showing GGO in the MIGUEL ANGEL and LLL with tree-in-bud findings concerning for pneumonia (CAP vs. Aspiration). LP performed showing WBC of 230 with elevated protein at 59 and glucose at " "82.                  Today, the patient was seen and examined at bedside, mild aphasia/ difficult word finding on exam. Patient is able to answer yes/no questions. Interval changes,  appears improved since 12/4.  Associated symptoms at this time positive for cough, with green/yellow sputum. No shortness of breath. Denies CV complaints of chest pain, diaphoresis, no abdominal pain. Denies new rashes or lesions. Denies new numbness or subjective weakness.      Pertinent Cardiac History: Aortic stenosis S/P bioprosthetic AVR in 2014, and CAD s/p PCI   2012.  Of note, recently evaluated with Cardiology at [facility]. Possible planned early next year, \"redo median sternotomy and mitral valve replacement with a tissue valve. We may have to replace the ascending aorta at the same time. The patient has right bundle branch block and a left bob fascicular block\" per evaluation with Dr. Gurrola 11/16/2022.  Last ILDEFONSO 11/11/2022, with LVEF 65%, Moderate-severe mitral valve regurgitation with moderate mitral stenosis and severe annular mitral calcification, demonstrated normal aortic valve tissue prosthesis.   Pertinent Oncology History: Prostate cancer (Elly 4+3, TNM: cT1c) treated with chemo/radiation (last dose 3/30/2022), good response at that time. Past history in remission: Testicular cancer (30 years prior  s/p chemo & Left orchiectomy) and Hodgkins lymphoma (1970s treated with MOPP & radiation)\"         Physical Examination:   Temp:  [97.4  F (36.3  C)-102.4  F (39.1  C)] 100.8  F (38.2  C)  Pulse:  [] 112  Resp:  [14-28] 18  BP: (102-140)/(54-64) 102/57  Cuff Mean (mmHg):  [76-81] 76  SpO2:  [91 %-97 %] 94 %    I/O last 3 completed shifts:  In: 2691 [P.O.:1551; I.V.:1140]  Out: 3290 [Urine:3290]    Vitals:    12/05/22 0400 12/07/22 0152 12/08/22 0345 12/09/22 0556   Weight: 81 kg (178 lb 9.2 oz) 82.4 kg (181 lb 10.5 oz) 84.2 kg (185 lb 10 oz) 86.4 kg (190 lb 7.6 oz)    12/11/22 0615   Weight: 85.8 kg (189 " lb 2.5 oz)       Constitutional: Adult male seen and examined at bedside.  Tired appearing, able to converse.   Respiratory: increased work of breathing, oxygenating appropriately, end expiratory wheezing R>L  Cardiovascular: Tachycardic, regular rhythm, grade IV systolic ejection murmur noted. No peripheral edema.   Skin: Warm, dry, well-perfused. No bruising, bleeding, rashes, or lesions on limited exam.   Musculoskeletal: Extremities grossly normal, non-tender, no edema.    Neurologic: A&Ox3, pupils equal and reactive to light, 2+ brachial and patellar reflexes, symmetric smile.   Neuropsychiatric: Calm. Affect appropriate to situation.  Vascular access:  12/4 LUE pIV RUE pIV, 12/5 Right UE pIV  CDI, non-tender, no surrounding erythema.          Medications:       ceFAZolin  2 g Intravenous Q12H     ciprofloxacin  400 mg Intravenous Q24H     heparin ANTICOAGULANT  5,000 Units Subcutaneous Q12H     linezolid  600 mg Intravenous Q12H     pantoprazole  40 mg Oral QAM AC     sodium chloride (PF)  3 mL Intracatheter Q8H     tobramycin (PF)  300 mg Nebulization 2 times daily       Antiinfectives:  Anti-infectives (From now, onward)    Start     Dose/Rate Route Frequency Ordered Stop    12/11/22 2000  tobramycin (PF) (NATALYA) neb solution 300 mg         300 mg Nebulization 2 TIMES DAILY RT 12/11/22 1234 12/14/22 1959    12/11/22 1512  ceFAZolin (ANCEF) 2 g in 100 mL D5W intermittent infusion        Note to Pharmacy: ID recs q8 for 24 hours then q12 based on current renal function, but defer to pharmacy after first 24 hours    2 g  200 mL/hr over 30 Minutes Intravenous EVERY 12 HOURS 12/11/22 0739      12/10/22 1330  ciprofloxacin (CIPRO) infusion 400 mg         400 mg  over 1 Hours Intravenous EVERY 24 HOURS 12/10/22 1158 12/13/22 1329    12/09/22 1600  linezolid (ZYVOX) infusion 600 mg         600 mg  over 60 Minutes Intravenous EVERY 12 HOURS 12/09/22 1529            Infusions/Drips:    sodium chloride 150 mL/hr  (12/05/22 1330)            Laboratory Data:     Microbiology:      Summary: 12/4 Blood and CSF cultures with Staph Aureus. Verigene with no evidence of MecA      12/7 Prelim Gram Positive Cocci 1/2 bottles  Blood Culture Hand, Right     Collected 12/7/2022  4:34 AM      Status: Preliminary result      Visible to patient: No (not released)     Specimen Information: Hand, Right; Blood    0 Result Notes  Culture Positive on the 1st day of incubation Abnormal        Gram positive cocci in clusters Panic     1 of 2 bottles        Resulting Agency: [facility]             Details:      Cerebrospinal fluid Aerobic Bacterial Culture Routine    Culture   Lab   1+ Staphylococcus aureus Panic   [facility]          Gram Stain quantification of host cells and microbiological organisms was done on a cytocentrifuged preparation.              Collected 12/4/2022  5:38 PM        12/04/2022 1601 12/07/2022 0644 Blood Culture Peripheral Blood [[id number]]   (Abnormal)   Peripheral Blood    Final result Component Value   Culture Positive on the 1st day of incubation Abnormal     Staphylococcus aureus Panic     2 of 2 bottles  Susceptibilities done on previous cultures             12/04/2022 1534 12/07/2022 0815 Blood Culture Line, venous [[id number]]    (Abnormal)   Blood from Line, venous    Final result Component Value   Culture Positive on the 1st day of incubation Abnormal     Staphylococcus aureus Panic     2 of 2 bottles       Susceptibility     Staphylococcus aureus     DEJA     Clindamycin <=0.25 ug/mL Susceptible     Erythromycin <=0.25 ug/mL Susceptible     Gentamicin <=0.5 ug/mL Susceptible     Oxacillin 0.5 ug/mL Susceptible     Tetracycline <=1 ug/mL Susceptible     Trimethoprim/Sulfamethoxazole <=0.5/9.5 u... Susceptible     Vancomycin <=0.5 ug/mL Susceptible                  12/04/2022 1534 12/07/2022 0645 Blood Culture Line, venous [[id number]]   (Abnormal)   Blood from Line, venous    Final result Component Value   Culture  Positive on the 1st day of incubation Abnormal     Staphylococcus aureus Panic     2 of 2 bottles  Susceptibilities done on previous cultures             12/04/2022 1534 12/05/2022 0551 Verigene GP Panel [[id number]]    (Abnormal)   Blood from Line, venous    Final result Component Value   Staphylococcus aureus Detected Abnormal    Positive for Staphylococcus aureus and negative for the mecA gene (not resistant to methicillin) by [employer] Gridigene multiplex nucleic acid test. Final identification and antimicrobial susceptibility testing will be verified by standard methods.   Staphylococcus epidermidis Not Detected   Staphylococcus lugdunensis Not Detected   Enterococcus faecalis Not Detected   Enterococcus faecium Not Detected   Streptococcus species Not Detected   Streptococcus agalactiae Not Detected   Streptococcus anginosus group Not Detected   Streptococcus pneumoniae Not Detected   Streptococcus pyogenes Not Detected   Listeria species                    CSF Findings:   *High: RBC *78, total nucleated cells *230, Glucose CSF *82  Abnormal CSF appearance Hazy.   Otherwise, colorless        Meningitis/Encephalitis Panel Qual PCR CSF:  Order: [id number]   Status: Final result      Visible to patient: Yes (not seen)      1 Result Note    Component Ref Range & Units 1 d ago   (12/4/22) 1 d ago   (12/4/22)    Escherichia coli K1 Negative Negative       Haemophilus influenzae Negative Negative       Listeria monocytogenes Negative Negative       Neisseria meningitidis Negative Negative       Streptococcus agalactiae (GBS) Negative Negative       Streptococcus pneumoniae Negative Negative  Not Detected R     Cytomegalovirus Negative Negative       Enterovirus Negative Negative       Herpes simplex virus 1 Negative Negative      Comment: Recommend testing with another molecular method if clinical suspicion for infection is high.    Herpes simplex virus 2 Negative Negative      Comment: Recommend testing with another molecular  method if clinical suspicion for infection is high.    Human Herpes Virus 6 Negative Negative       Human parechovirus Negative Negative       Varicella zoster virus Negative Negative       Cryptococcus neoformans/gattii Negative Negative      Comment: Recommend testing for Cryptococcal antigen and fungal culture if clinical suspicion for infection is high.         Strep pneumo Agn Ur greater or equal to 13yrs or CSF any age (1 mL minimum)  Order: [id number]   Status: Final result      Visible to patient: Yes (not seen)     Specimen Information: Lumbar Puncture; Cerebrospinal fluid    1 Result Note    Component Ref Range & Units 1 d ago     Streptococcus pneumoniae antigen Negative Negative    Comment: A negative Streptococcus pneumoniae antigen result does not rule out infection with Streptococcus pneumoniae.               Inflammatory Markers    Recent Labs   Lab Test 12/11/22  0508 12/10/22  0545 12/09/22  0532   .00* 220.00* 219.00*   Procalcitonin: 54.20 (12/4/22)  Lactic Acid: 4.6 (12/4), 4.2 (12/4), 4.6(12/5) 1.7 (12/6)  Cardiac: Troponin: 610 (12/4), 983 (12/4), 1,229 (12/5)    Metabolic Studies     Recent Labs   Lab Test 12/11/22  0508 12/10/22  1126 12/10/22  0545 12/09/22  2331 12/09/22  1622 12/07/22  1650 12/07/22  1359     --  139  --  138   < >  --    POTASSIUM 2.8*  2.8*  --  3.5  3.5 3.3* 3.3*  3.3*   < >  --    CHLORIDE 99  --  101  --  104   < >  --    CO2 20*  --  20*  --  19*   < >  --    ANIONGAP 17*  --  18*  --  15   < >  --    BUN 93.5*  --  93.1*  --  93.0*   < >  --    CR 3.46*  --  3.73*  --  3.84*   < >  --    GFRESTIMATED 19*  --  17*  --  16*   < >  --    *  --  114*  --  174*   < >  --    A1C  --   --   --   --   --   --  6.6*   YUNIOR 8.0*  --  7.9*  --  7.6*   < >  --    PHOS 5.3*  --  5.4*  --  4.8*   < >  --    MAG 2.6*  --  2.6*  --  2.7*   < >  --    LACT 1.9   < >  --   --  1.8   < >  --     < > = values in this interval not displayed.       Hepatic Studies     Recent Labs   Lab Test 12/11/22  0508 12/10/22  0545 12/09/22  1622 12/09/22  0532 12/06/22  0422 12/05/22  1008   BILITOTAL 1.9* 4.2*  --  4.1*   < > 1.1   ALKPHOS 167* 190*  --  125   < > 58   ALBUMIN 2.7* 2.6* 2.6* 2.5*  2.5*   < > 2.9*   AST 67* 76*  --  92*   < > 131*   ALT 28 34  --  38   < > 61*   LDH  --   --   --   --   --  468*    < > = values in this interval not displayed.       Pancreatitis testing    Recent Labs   Lab Test 12/07/22  0434   TRIG 260*       Hematology Studies      Recent Labs   Lab Test 12/11/22  0508 12/10/22  0545 12/09/22  0814   WBC 25.8* 21.3* 21.4*   ANEU  --  20.0* 19.5*   ALYM  --  1.3 0.6*   LATONYA  --  0.0 1.1   AEOS  --  0.0 0.0   HGB 11.0* 10.8* 11.3*   HCT 30.8* 30.4* 31.8*    138* 124*       Arterial Blood Gas Testing    Recent Labs   Lab Test 12/09/22  1147   O2PER 2        Urine Studies     Recent Labs   Lab Test 12/07/22  1509 12/05/22  0538 12/04/22  1534   URINEPH 5.5 5.5 5.0   NITRITE Negative Negative Negative   LEUKEST Trace* Negative Negative   WBCU 6* 8* 3       Vancomycin Levels     No lab results found.    Tobramycin levels     No lab results found.    Gentamicin levels    Recent Labs   Lab Test 12/08/22  0812   GENT 0.4       CSF testing     Recent Labs   Lab Test 12/04/22  1738   CWBC 230*   CNEU 86   CLYM 1   CMONO 4   COTH 9   CRBC 78*   CGLU 82*   CTP 59.7*       Last check of C difficile  C Difficile Toxin B by PCR   Date Value Ref Range Status   12/05/2022 Negative Negative Final     Comment:     A negative result does not exclude actual disease due to C. difficile and may be due to improper collection, handling and storage of the specimen or the number of organisms in the specimen is below the detection limit of the assay.            Imaging:     EKG     Component Ref Range & Units 12/9/22 11:28 AM 12/7/22  5:02 AM    Systolic Blood Pressure mmHg       Diastolic Blood Pressure mmHg       Ventricular Rate   102     Atrial Rate   102      NE Interval ms  176     QRS Duration ms 144  144     QT ms 382  400     QTc ms 551  521     P Axis degrees  53     R AXIS degrees -79  -75     T Axis degrees 23  22     Interpretation ECG  Wide QRS tachycardia   Right bundle branch block   Left anterior fascicular block     Bifascicular block     Abnormal ECG   When compared with ECG of 07-DEC-2022 05:02,   Wide QRS tachycardia has replaced Sinus rhythm  Sinus tachycardia   Right bundle branch block   Left anterior fascicular block    Bifascicular block    Abnormal ECG   When compared with ECG of 20-JUL-2012 20:12,   (RBBB and left anterior fascicular block) is now Present   Confirmed by Jackson Kong ([id number]) on 12/7/2022 10:12:14 AM          Echo ILDEFONSO Study Date: 12/05/2022 01:14 PM   Interpretation Summary  Known radiation induced valve disease with severe calcific mitral disease. No  definite endocarditis noted.  Severe mitral annular calcification is present with multiple calcific nodules,  no definite vegetation seen.  S/p bioprosthetic AVR (23 mm Cordell-Hubbard Perimount Magna/Magna Ease  pericardial prosthesis). Valve appears well seated with good leaflet opening.  No vegetations visualized.  Global and regional left ventricular function is normal with an EF of 60-65%.     ______________________________________________________________________________  Procedure  Transesophageal Echocardiogram with color and spectral Doppler performed. 3D  image acquisition, reconstruction, and real-time interpretation was performed.  I was present during ILDEFONSO probe placement by the Fellow. I personally viewed  the imaging and agree with the interpretation and report as documented by the  Fellow. Procedure location Echo Lab. Informed consent for Transesophegeal echo  obtained. ILDEFONSO Probe #[id number] was used during the procedure. Patient was sedated  using Fentanyl 50 mcg. Patient was sedated using Versed 1 mg. The heart rate,  respiratory rate, oxygen saturations, blood  pressure, and response to care  were monitored throughout the procedure with the assistance of the nurse. I  determined this patient to be an appropriate candidate for the planned  sedation and procedure and have reassessed the patient immediately prior to  sedation and procedure. Total sedation time: 25 minutes of continuous bedside  1:1 monitoring. The Transducer was inserted without difficulty . The patient  tolerated the procedure well. Complications None.     Left Ventricle  Mild concentric wall thickening consistent with left ventricular hypertrophy  is present. Global and regional left ventricular function is normal with an EF  of 60-65%. Left ventricular size is normal.     Right Ventricle  The right ventricle is normal size. Global right ventricular function is  normal.     Atria  The left atrial appendage is normal. It is free of spontaneous echo contrast  and thrombus. Moderate to severe left atrial enlargement is present. The  atrial septum is intact as assessed by color Doppler .     Mitral Valve  Severe mitral annular calcification is present. Mild to moderate mitral  insufficiency is present. The mean gradient across the mitral valve is 13  mmHg. The mitral valve area is 1.4 cm^2. Severe mitral stenosis is present.     Aortic Valve  S/p bioprosthetic AVR. Valve appears well seated with good leaflet opening. No  vegetations visualized. Doppler interrogation of the aortic valve is normal.     Tricuspid Valve  The tricuspid valve is normal. Mild tricuspid insufficiency is present.     Pulmonic Valve  The pulmonic valve is normal.     Vessels  The aorta root is normal. The thoracic aorta is normal. Ascending aorta and  aortic arch free of atheroma and vegetation. The inferior vena cava is normal.  The superior vena cava is normal. The RUPV Doppler shows normal velocity  waveform . The RLPV Doppler shows systolic dominance . The LUPV Doppler shows  normal velocity waveform . The LLPV Doppler shows normal  velocity waveform .     Pericardium  No pericardial effusion is present.     Compared to Previous Study  This study was compared with the study from 12/5/22 . There has been no  change.       TTE: 12/5 Interpretation Summary  Would consider a transesophageal echocardiogram if clinically indicated.  Technically difficult study.Extremely poor acoustic windows.  Limited information was obtained during study.  Global and regional left ventricular function is normal with an EF of 60-65%.  Right ventricular function, chamber size, wall motion, and thickness are  normal.  Severe mitral annular calcification is present.  Mitral mean gradient 11mmHg at heart rate 131 BPM.  H/O Bioprosthetic AVR in 2014. Valve not well visualized.    MRI of the brain 12/4/22: IMPRESSION: 5 scattered foci of acute ischemia involving the bilateral anterior and posterior cerebral artery distributions. Constellation of findings is suspicious for central embolic phenomena.           CT Abdomen and Pelvis: 12/4/22  IMPRESSION:  1.  Patchy groundglass opacities in the left upper and lower lobes, and cluster of tree-in-bud nodularity in the left upper lobe, likely infectious or inflammatory.  2.  Interstitial pulmonary edema.  3.  Trace left pleural effusion.  4.  No acute findings in the abdomen or pelvis.        CT head and CTA head/neck 12/4/22, were not suggestive of an acute abnormality, no significant stenosis of carotids or vertebral arteries.      CXR 12/7/22:  IMPRESSION: Diffuse interstitial markings bilaterally and streaky  perihilar and bibasilar opacities likely represent pulmonary edema  versus atelectasis. No focal consolidation.

## 2022-12-11 NOTE — PROGRESS NOTES
Nephrology Progress Note  12/10/2022         Assessment & Recommendations:   Eloy Fletcher is a 66 year old with history of HTN, HLD, Hodgekins lympoma s/p radiation and MOPP (in remission), testicular cancer s/p left orchiectomy, prostate cancer ([place] 4+3, diagnosed April 2021) s/p radiation and luperon, aortic stenosis s/p bioprosthetic aortic valve (2014), CAD s/p PCI (2012). Initially presented with septic shock. Ultimately found to have MSSA bacteremia. Noted to have MSSA in CSF. Clinical picture concerning for endocarditis. He was started on nafcillin for management. Has now noted progressive rise in creatinine, nephrology consulted for evaluation.    #JILLIAN  Likely multifactorial in the setting of recently diagnosed bacteremia and endocarditis. Notably, he has been hypotensive compared to his prior baseline (130-150 systolic at prior clinic appointments). Recent administration of acyclovir IV (12/4-12/5), contrast (12/4), gentamycin (12/5), vancomycin/zosyn. Concern for AIN is low, given no eosinophilia on recent differential. At this time, he requires nafcillin for CNS penetration. We agree with additional volume resuscitation in setting of relative hypotension due to vasoplegia.   #Low bicarb 2/2 renal failure: Bicarb of 20 should improve with renal recovery  #Anemia of chronic disease: Hb is 10.8 stable    Plan:  -F/U C3 and C4 to rule out infectious GN  -Limit additional nephrotoxins as able  -Of note, urine eosinophils is of limit benefit for evaluation of AIN  -We will continue to follow creatinine trend  -Renally dose all antibiotic and other medication      Recommendations were communicated to primary team verbally    Seen and discussed with Dr. Kiki London MD   Division of Renal Disease and Hypertension  [facility]  [employer] Web Console      Interval History :   Nursing and provider notes from last 24 hours reviewed.  In the last 24 hours Eloy Fletcher blood pressure  improved responded to Lasix yesterday.He is more awake, and renal function is improving. Feels puffy in his hands    Physical Exam:   I/O last 3 completed shifts:  In: 2584 [P.O.:1734; I.V.:850]  Out: 4350 [Urine:4350]   BP (!) 140/58 (BP Location: Right arm)   Pulse (!) 125   Temp (!) 102.4  F (39.1  C) (Oral)   Resp 16   Wt 86.4 kg (190 lb 7.6 oz)   SpO2 95%   BMI 28.96 kg/m       GENERAL APPEARANCE: Fatigued, arousable   EYES:  no scleral icterus, pupils equal  HENT: mouth without ulcers or lesions  PULM: lungs clear to auscultation bilaterally  CV: regular rhythm, normal rate, no rub     -edema 1+  UE.No L.E edema  GI: soft, nondistended  INTEGUMENT: no cyanosis, no rash  NEURO:  no asterixis   Access none    Labs:   All labs reviewed by me  Electrolytes/Renal -   Recent Labs   Lab Test 12/10/22  0545 12/09/22  2331 12/09/22  1622 12/09/22  0532     --  138 137   POTASSIUM 3.5  3.5 3.3* 3.3*  3.3* 3.3*   CHLORIDE 101  --  104 103   CO2 20*  --  19* 15*   BUN 93.1*  --  93.0* 87.8*   CR 3.73*  --  3.84* 3.85*   *  --  174* 130*   YUNIOR 7.9*  --  7.6* 7.5*   MAG 2.6*  --  2.7* 2.7*   PHOS 5.4*  --  4.8* 5.2*       CBC -   Recent Labs   Lab Test 12/10/22  0545 12/09/22  0814 12/08/22  0812   WBC 21.3* 21.4* 15.4*   HGB 10.8* 11.3* 10.5*   * 124* 120*       LFTs -   Recent Labs   Lab Test 12/10/22  0545 12/09/22  1622 12/09/22  0532 12/08/22  0812   ALKPHOS 190*  --  125 92   BILITOTAL 4.2*  --  4.1* 4.0*   ALT 34  --  38 26   AST 76*  --  92* 69*   PROTTOTAL 5.4*  --  5.3* 5.1*   ALBUMIN 2.6* 2.6* 2.5*  2.5* 2.4*  2.4*       Iron Panel - No lab results found.      Imaging:  All imaging studies reviewed by me.     Current Medications:    ceFAZolin  2 g Intravenous Q8H     ciprofloxacin  400 mg Intravenous Q24H     heparin ANTICOAGULANT  5,000 Units Subcutaneous Q12H     linezolid  600 mg Intravenous Q12H     pantoprazole  40 mg Oral QAM AC     sodium chloride (PF)  3 mL Intracatheter Q8H        sodium chloride 150 mL/hr (12/05/22 1330)     Bishop London MD  Division of Renal Disease and Hypertension  [facility]  [employer] Web Console

## 2022-12-11 NOTE — PROGRESS NOTES
Calorie Count  Intake recorded for: 12/10  Total Kcals: 425 Total Protein: 10g  Kcals from Hospital Food: 425   Protein: 10g  Kcals from Outside Food (average):0 Protein: 0g  # Meals Ordered from Kitchen: 1 meal  # Meals Recorded: 1 meal (100% applesauce, 1 8oz apple juice, 50% scrambled eggs, 25% home fried potatoes)  # Supplements Recorded: 50% Ensure Clear

## 2022-12-11 NOTE — PLAN OF CARE
Fever down overnight, gave Tylenol and external cooling measures. Family at bedside until 2030, supportive of pt at bedside.  Pt less confused tonight, on 1L NC, coughing independently. Pt did sleep between cares tonight. Pt still sounds 'wet' with coughing, not coughing up much.  Attempted NT suctioning with minimal results. Sats 93-96% on 2L NC.    Critical lab call for Gram + cocci clusters f/ blood culture R arm drawn 12/10 @ 0545. Critical lab also called for positive blood culture f/ 12/06 at 0544 for gram + cocci f/ venipuncture.    2 loose stool overnight, incontinent.  UOP ~[id number].    Potassium 2.8 this AM, running the protocol allows 10meq x4 (4hrs) to catch up, notified MD for plan for better coverage.  Will continue to update team with any changes in condition per protocol.    Called for RT PRN Neb treatment.

## 2022-12-12 NOTE — PROGRESS NOTES
Brief Nephrology Note:    Recent improvement with creatinine and reasonable UOP. Continues to have risk factors for renal dysfunction with ongoing MSSA bacteremia. Agree with nafcillin for management. Pursuing MRI for additional source investigation. We are okay with administration of group 2 gadolinium based agents (gadobutrol) as these have low risk for NSF.     Renal will sign off at this time, but please contact us again if we can be of further assistance.    Monty Cesar MD  Division of Renal Disease and Hypertension  [facility]  [employer] Web Console

## 2022-12-12 NOTE — PROGRESS NOTES
Calorie Count  Intake recorded for: 12/11  Total Kcals: 350 Total Protein: 20g  Kcals from Hospital Food: 350   Protein: 20g  Kcals from Outside Food (average):0 Protein: 0g  # Meals Ordered from Kitchen: 0 meals recorded, 0 meals recorded  # Supplements Recorded: 1 (100% Ensure Enlive chocolate)

## 2022-12-12 NOTE — PROGRESS NOTES
[facility] Progress Note     Patient:  Eloy Fletcher   YOB: 1955, MRN: [id number]  Date of Visit: 12/12/2022  Date of Admission: 12/4/2022  Consult Requester: Lauri Friend MD       ID Problem List:  1. Sepsis: MSSA bacteremia  -  Suspected endocarditis, with CNS emboli and CSF culture positivity. ILDEFONSO did not reveal vegetations, but that does not fully exclude endocarditis.  Last positive culture 12/10 AM (~24 hrs to grow), (12/11: no growth in 24 hours)  2. Septic emboli to the brain, multiple. Assume MSSA  3. Encephalopathy; Acute embolic CVA  CSF Culture positive MSSA (also +RBC. Waxing/waning. Improved on 12/10-11   -- Continue Linezolid given has excellent CNS tissue and CSF penetration. CSF penetration of nafcillin is 10-20% of serum levels with parenchymal penetration less/variable with ~10% having undetectable brain tissue levels.    4. Cholestasis / cholestatic hepatitis.  Critical illness cholestasis vs. Nafcillin as a cause of cholestatic hepatitis -- although no peripheral eosinophilia is present in blood .  At risk for ascending cholangitis  -- Stopping nafcillin on 12/10 has improved LFTs.  T-bili from 4.2 to 1.5  5. Acute kidney injury, creatinine beginning to decrease (Cr = 4.1 -> 3.9 -> 3.6-->3.5mg/dL) - multi-factorial -> sepsis, hypotension, meds.  Creatinine rise started before Nafcillin was started (and before Gent was given).    6. Biprosthetic Aortic valve 2014; mitral stenosis with calcifications - no evidence of vegetations on ILDEFONSO. (yet septic emboli to CNS make endocarditis / endovascular still probable)  7.  Pneumonia - probable MSSA bacteremic seeding of lung. +MSSA from sputum. At risk for aspiration pneumonia due to altered mental status.  GNR coverage added 12/10 with ciprofloxacin.  8. Asplenic -- further complicates ability to clear bacteremia.      ID Recommendations:  1. Given reported neck pain, and history reported today of cervical spine  "epidural steroid injection earlier this year (patient unsure of exact date)- reasonable to evaluate cervical spine for possible abscess/ostemyelitis/discitis with MRI cervical spine with/wo contrast  2.Continue elias nebs 300mg BID x 3 days for coverage of possible aspiration pneumonia and continue Cefazolin as well as Linezolid    Future:  3. If hemodynamically decompensates, start gentamicin 150mg IV x 1 which would be the renal adjustment once-daily dose (2mg/kg).    4. If no surgical indication/abscess found on work-up today, reasonable to begin Rifampin 300mg BID given bioprosthetic valve, but would wait until evaluation/work-up before transitioning .     5.  If still bacteremic consider ILDEFONSO w/ bubble study early next week to rule out PFO since initial TTE was non-diagnostic to exclude PFO.     6.  Could also consider CT scan Chest/Abd/Pelvis to exclude abscess (no acute findings from 12/4 CT W/O). For further elucidation of primary source.  In the short term if an abscess is present, this is not going to change antimicrobial therapy, thus it would be better if kidney function improved such that contrast could be utilized. Thus utility of non-contrast CT is relative low    ID Will follow.         Assessment and Discussion:  Summary: 66 year-old male with PMHx notable for biprosthetic AVR 2014, hodgkin's lymphoma with thoracic radiation Evaluated for Staphylococcus Aures bacteremia with encephalopathy and acute embolic CVA. Since admission, CRP down trending. Blood and CSF cultures positive for staph aureus MSSA 12/4 .  Continued Nafcillin.  Provided gentamicin on the 5th, Gent level 0.4 on the 8th.  Cultures 12/7 and 12/8 positive for gram positive cocci in clusters.      1. MSSA bacteremia with suspected endocarditis given multifocal CNS emboli. ILDEFONSO not demonstrative of \"definite\" valvular lesions, bubble study reported non-diagnostic therefore PFO still technically on differential.     Given the findings, " reasonable to treat with current endocarditis guidelines with daily cultures until negative for 48-72 hours, then starting 6 week course of antibiotics at that point.     If persistently positive blood cultures, further work-up for possible source of infection is reasonable. Agree with Neurology evaluation for MRI of the spine (given his complaints today of neck pain, with reported epidural steroid injection if positive findings, may consider Neurosurgical consult.     2.Encephalopathy; Acute embolic CVA- Likely cardio-embolic as above  Altered mentation possibly secondary to bacteremia, with possible meningoencephalitis given Neurology evaluation correlation of findings not consistent with vascular territories for CVA on MRI.  Agree with low threshold for CT.  Continue therapy for MSSA bacteremia as above.     Will continue to follow for further recommendations and overall course.      Thank you for the consult. ID will continue to follow with you.     Ifrah Weiner, MS4   Pager [phone]    Medical Student Note and findings reviewed.  Moi Goetz MD  Internal Medicine PGY1  Pager [phone]           Interval History and Events:   Overnight, acute events include severe neck pain. Treated with Dilaudid. He denies focal weakness/numbness, no bowel/bladder incontinence or saddle anesthesia. Overall  improvement in mental status Tmax (100.8F) . He endorses continued persistent cough and increased work of breathing. No significant improvement over a few days per report.     Linzeolid started on 12/9.  T-bili improved. Renal function improving. Last positive culture on 12/10. Ciprofloxacin added 12/10      Pertinent cultures include:  Culture   Date Value Ref Range Status   12/10/2022 Positive on the 1st day of incubation (A)  Preliminary   12/10/2022 Gram positive cocci in clusters (AA)  Preliminary     Comment:     1 of 2 bottles   12/09/2022 Positive on the 1st day of incubation (A)  Preliminary   12/09/2022  "Staphylococcus aureus (AA)  Preliminary     Comment:     1 of 2 bottlesSusceptibilities done on previous cultures   12/08/2022 Positive on the 1st day of incubation (A)  Final   12/08/2022 Staphylococcus aureus (AA)  Final     Comment:     1 of 2 bottlesSusceptibilities done on previous cultures       Recent Inflammatory Biomarkers:   Recent Labs   Lab Test 12/11/22  0508 12/10/22  0545 12/09/22  0814 12/09/22  0532 12/08/22  0812 12/07/22  1359 12/07/22  0434 12/06/22  0422 12/05/22  1141 12/04/22  2353   .00* 220.00*  --  219.00*  --   --  254.00* 387.00*  --   --    PCAL  --   --   --   --   --   --  24.80*  --   --  54.20*   WBC 25.8* 21.3* 21.4*  --  15.4* 13.6* 12.9* 12.3*   < > 11.4*    < > = values in this interval not displayed.            Antimicrobial Treatment:   Cefazolin 12/10->  Ciproflox 400mg QDay 12/10 ->  Linezolid 12/9 ->  Nafcillin 12/5-12/10 stopped due to possible cholestatic hepatitis   Received one dose Gentamicin (5mg/kg) on 12/5 follow up level 12/8: 0.4     Stopped: 12/6: Ceftriaxone   Stopped 12/5:  vancomycin, acyclovir, ampicillin;          Review of Systems:   Targeted 4 point ROS was completed with pertinent positives and negatives are detailed above.         HPI:   Adopted from initial consult note on 12/05/2022     Summary:       66 year-old male with PMHx notable for Aortic stenosis s/p bioprosthetic AVR 2014, CAD s/p stent 2012, moderate mitral regurgitation/stenosis on ILDEFONSO 11/2022, with prior chemotherapy/radiation with Hodgkin's Lymphoma 1979, testicular cancer and prostate cancer admitted overnight on 12/04 for sepsis, encephalopathy, and acute embolic CVA. Evaluated for possible endocarditis MSSA Bacteremia       \"History obtained from review of chart and discussion with patient/nursing staff.      Eloy Fletcher is a 66 year old male who presented with new left lateral ocular gaze and right sided hemineglect, weakness and fevers onset 12/02/2022.  PMHx notable for " Aortic Stenosis s/p bioprosthetic AVR 2014, CAD s/p PCI and stent 2012, moderate mitral regurgitation/stenosis on ILDEFONSO 11/2022, with prior chemotherapy/radiation with Hodgkin's Lymphoma 1979, testicular cancer and prostate cancer admitted overnight on 12/04 for sepsis, encephalopathy, and acute embolic CVA. Evaluated for possible endocarditis.     Course: Initial presentation to [facility] with confusion and unresponsiveness 12/4, discussion with his ex-wife,  initial onset of symptoms 12/1/22 multiple episodes of vomiting with a fever. Improved overnight, recurred and worsened 12/3.  He presented with left gaze preference, right-sided neglect, weakness, and fevers. Initial findings significant for showing tachycardia, tachypnea, and fevers, initial eukocytosis of 11.4, lactic acid 4.2. At that time he was confused and not responding appropriately.   He was evaluated by neurology and found to have MRI findings with non-specific small multifocal CVA suspected embolic in origin. He was provided empiric antibiotics, Blood cultures were drawn.    Due to concern for CNS infection, a LP was performed at the [facility] ED and was further transferred to [facility] ICU for work-up of endocarditis in the setting of hypotension. Initially provided   Acyclovir, Ampicillin, Ceftriaxone, Piperacillin Tazobactam and Vancomycin.   CT Chest also showing GGO in the MIGUEL ANGEL and LLL with tree-in-bud findings concerning for pneumonia (CAP vs. Aspiration). LP performed showing WBC of 230 with elevated protein at 59 and glucose at 82.                  Today, the patient was seen and examined at bedside, mild aphasia/ difficult word finding on exam. Patient is able to answer yes/no questions. Interval changes,  appears improved since 12/4.  Associated symptoms at this time positive for cough, with green/yellow sputum. No shortness of breath. Denies CV complaints of chest pain, diaphoresis, no abdominal pain. Denies new rashes or lesions. Denies new  "numbness or subjective weakness.      Pertinent Cardiac History: Aortic stenosis S/P bioprosthetic AVR in 2014, and CAD s/p PCI   2012.  Of note, recently evaluated with Cardiology at [facility]. Possible planned early next year, \"redo median sternotomy and mitral valve replacement with a tissue valve. We may have to replace the ascending aorta at the same time. The patient has right bundle branch block and a left bob fascicular block\" per evaluation with Dr. Gurrola 11/16/2022.  Last ILDEFONSO 11/11/2022, with LVEF 65%, Moderate-severe mitral valve regurgitation with moderate mitral stenosis and severe annular mitral calcification, demonstrated normal aortic valve tissue prosthesis.   Pertinent Oncology History: Prostate cancer (Elly 4+3, TNM: cT1c) treated with chemo/radiation (last dose 3/30/2022), good response at that time. Past history in remission: Testicular cancer (30 years prior  s/p chemo & Left orchiectomy) and Hodgkins lymphoma (1970s treated with MOPP & radiation)\"         Physical Examination:   Temp:  [97.4  F (36.3  C)-100.7  F (38.2  C)] 98.1  F (36.7  C)  Pulse:  [101-120] 104  Resp:  [12-26] 22  BP: ()/(49-76) 99/54  SpO2:  [93 %-98 %] 94 %    I/O last 3 completed shifts:  In: 1085 [P.O.:1080; I.V.:5]  Out: 4050 [Urine:4050]    Vitals:    12/08/22 0345 12/09/22 0556 12/11/22 0615 12/12/22 0618   Weight: 84.2 kg (185 lb 10 oz) 86.4 kg (190 lb 7.6 oz) 85.8 kg (189 lb 2.5 oz) 85.2 kg (187 lb 13.3 oz)    12/12/22 1044   Weight: 85.4 kg (188 lb 4.4 oz)       Constitutional: Adult male seen and examined at bedside.  Tired appearing, able to converse.   Respiratory: increased work of breathing, oxygenating appropriately, no wheezing or rales  Cardiovascular: Tachycardic, regular rhythm, grade IV systolic ejection murmur noted. 1+ pedal edema.   Skin: Warm, dry, well-perfused. No bruising, bleeding, rashes, or lesions on limited exam.   Musculoskeletal: Extremities grossly normal, non-tender "   Neurologic: A&Ox3, pupils equal and reactive to light, 2+ brachial and patellar reflexes, symmetric smile. Intact sensation to light touch   Neuropsychiatric: Calm. Affect appropriate to situation.  Vascular access:  12/4 LUE pIV RUE pIV, 12/5 Right UE pIV  CDI, non-tender, no surrounding erythema.          Medications:       ceFAZolin  2 g Intravenous Q12H     furosemide         heparin ANTICOAGULANT  5,000 Units Subcutaneous Q12H     linezolid  600 mg Intravenous Q12H     pantoprazole  40 mg Oral QAM AC     sodium chloride (PF)  3 mL Intracatheter Q8H     tobramycin (PF)  300 mg Nebulization 2 times daily       Antiinfectives:  Anti-infectives (From now, onward)    Start     Dose/Rate Route Frequency Ordered Stop    12/11/22 2000  tobramycin (PF) (NATALYA) neb solution 300 mg         300 mg Nebulization 2 TIMES DAILY RT 12/11/22 1234 12/14/22 1959    12/11/22 1512  ceFAZolin (ANCEF) 2 g in 100 mL D5W intermittent infusion        Note to Pharmacy: ID recs q8 for 24 hours then q12 based on current renal function, but defer to pharmacy after first 24 hours    2 g  200 mL/hr over 30 Minutes Intravenous EVERY 12 HOURS 12/11/22 0739      12/09/22 1600  linezolid (ZYVOX) infusion 600 mg         600 mg  over 60 Minutes Intravenous EVERY 12 HOURS 12/09/22 1529            Infusions/Drips:    sodium chloride 150 mL/hr (12/05/22 1330)            Laboratory Data:     Microbiology:      Summary: 12/4 Blood and CSF cultures with Staph Aureus. Verigene with no evidence of MecA      12/7 Prelim Gram Positive Cocci 1/2 bottles  Blood Culture Hand, Right     Collected 12/7/2022  4:34 AM      Status: Preliminary result      Visible to patient: No (not released)     Specimen Information: Hand, Right; Blood    0 Result Notes  Culture Positive on the 1st day of incubation Abnormal        Gram positive cocci in clusters Panic     1 of 2 bottles        Resulting Agency: [facility]             Details:      Cerebrospinal fluid Aerobic Bacterial  Culture Routine    Culture   Lab   1+ Staphylococcus aureus Panic   [facility]          Gram Stain quantification of host cells and microbiological organisms was done on a cytocentrifuged preparation.              Collected 12/4/2022  5:38 PM        12/04/2022 1601 12/07/2022 0644 Blood Culture Peripheral Blood [[id number]]   (Abnormal)   Peripheral Blood    Final result Component Value   Culture Positive on the 1st day of incubation Abnormal     Staphylococcus aureus Panic     2 of 2 bottles  Susceptibilities done on previous cultures             12/04/2022 1534 12/07/2022 0815 Blood Culture Line, venous [[id number]]    (Abnormal)   Blood from Line, venous    Final result Component Value   Culture Positive on the 1st day of incubation Abnormal     Staphylococcus aureus Panic     2 of 2 bottles       Susceptibility     Staphylococcus aureus     DEJA     Clindamycin <=0.25 ug/mL Susceptible     Erythromycin <=0.25 ug/mL Susceptible     Gentamicin <=0.5 ug/mL Susceptible     Oxacillin 0.5 ug/mL Susceptible     Tetracycline <=1 ug/mL Susceptible     Trimethoprim/Sulfamethoxazole <=0.5/9.5 u... Susceptible     Vancomycin <=0.5 ug/mL Susceptible                  12/04/2022 1534 12/07/2022 0645 Blood Culture Line, venous [[id number]]   (Abnormal)   Blood from Line, venous    Final result Component Value   Culture Positive on the 1st day of incubation Abnormal     Staphylococcus aureus Panic     2 of 2 bottles  Susceptibilities done on previous cultures             12/04/2022 1534 12/05/2022 0551 Verigene GP Panel [[id number]]    (Abnormal)   Blood from Line, venous    Final result Component Value   Staphylococcus aureus Detected Abnormal    Positive for Staphylococcus aureus and negative for the mecA gene (not resistant to methicillin) by Verigene multiplex nucleic acid test. Final identification and antimicrobial susceptibility testing will be verified by standard methods.   Staphylococcus epidermidis Not Detected    Staphylococcus lugdunensis Not Detected   Enterococcus faecalis Not Detected   Enterococcus faecium Not Detected   Streptococcus species Not Detected   Streptococcus agalactiae Not Detected   Streptococcus anginosus group Not Detected   Streptococcus pneumoniae Not Detected   Streptococcus pyogenes Not Detected   Listeria species                    CSF Findings:   *High: RBC *78, total nucleated cells *230, Glucose CSF *82  Abnormal CSF appearance Hazy.   Otherwise, colorless        Meningitis/Encephalitis Panel Qual PCR CSF:  Order: [id number]   Status: Final result      Visible to patient: Yes (not seen)      1 Result Note    Component Ref Range & Units 1 d ago   (12/4/22) 1 d ago   (12/4/22)    Escherichia coli K1 Negative Negative       Haemophilus influenzae Negative Negative       Listeria monocytogenes Negative Negative       Neisseria meningitidis Negative Negative       Streptococcus agalactiae (GBS) Negative Negative       Streptococcus pneumoniae Negative Negative  Not Detected R     Cytomegalovirus Negative Negative       Enterovirus Negative Negative       Herpes simplex virus 1 Negative Negative      Comment: Recommend testing with another molecular method if clinical suspicion for infection is high.    Herpes simplex virus 2 Negative Negative      Comment: Recommend testing with another molecular method if clinical suspicion for infection is high.    Human Herpes Virus 6 Negative Negative       Human parechovirus Negative Negative       Varicella zoster virus Negative Negative       Cryptococcus neoformans/gattii Negative Negative      Comment: Recommend testing for Cryptococcal antigen and fungal culture if clinical suspicion for infection is high.         Strep pneumo Agn Ur greater or equal to 13yrs or CSF any age (1 mL minimum)  Order: [id number]   Status: Final result      Visible to patient: Yes (not seen)     Specimen Information: Lumbar Puncture; Cerebrospinal fluid    1 Result Note     Component Ref Range & Units 1 d ago     Streptococcus pneumoniae antigen Negative Negative    Comment: A negative Streptococcus pneumoniae antigen result does not rule out infection with Streptococcus pneumoniae.               Inflammatory Markers    Recent Labs   Lab Test 12/12/22  0531 12/11/22  0508 12/10/22  0545   .00* 225.00* 220.00*   Procalcitonin: 54.20 (12/4/22)  Lactic Acid: 4.6 (12/4), 4.2 (12/4), 4.6(12/5) 1.7 (12/6)  Cardiac: Troponin: 610 (12/4), 983 (12/4), 1,229 (12/5)    Metabolic Studies     Recent Labs   Lab Test 12/12/22  0936 12/12/22  0717 12/12/22  0531 12/12/22  0228 12/11/22  2116 12/11/22  1908 12/11/22  1702 12/11/22  1320 12/11/22  0508 12/07/22  1650 12/07/22  1359   NA  --   --   --  137  --   --  136  --  136   < >  --    POTASSIUM  --  3.3*  --  3.3*  3.3*  --  3.3* 3.4   < > 2.8*  2.8*   < >  --    CHLORIDE  --   --   --  99  --   --  99  --  99   < >  --    CO2  --   --   --  18*  --   --  18*  --  20*   < >  --    ANIONGAP  --   --   --  20*  --   --  19*  --  17*   < >  --    BUN  --   --   --  97.5*  --   --  94.5*  --  93.5*   < >  --    CR  --   --   --  3.50*  --   --  3.43*  --  3.46*   < >  --    GFRESTIMATED  --   --   --  18*  --   --  19*  --  19*   < >  --    GLC  --   --   --  115*  --   --  172*  --  148*   < >  --    A1C  --   --   --   --   --   --   --   --   --   --  6.6*   YUNIOR  --   --   --  8.1*  --   --  7.9*  --  8.0*   < >  --    PHOS  --   --  5.9*  --   --   --   --   --  5.3*   < >  --    MAG  --   --  2.4*  --   --   --   --   --  2.6*   < >  --    LACT 2.3* 2.4*  --   --    < >  --   --   --  1.9   < >  --     < > = values in this interval not displayed.       Hepatic Studies    Recent Labs   Lab Test 12/12/22  0531 12/11/22  1702 12/11/22  0508 12/06/22  0422 12/05/22  1008   BILITOTAL 1.5* 1.3* 1.9*   < > 1.1   ALKPHOS 150* 157* 167*   < > 58   ALBUMIN 2.5* 2.5* 2.7*   < > 2.9*   AST 70* 68* 67*   < > 131*   ALT 18 22 28   < > 61*   LDH  --    --   --   --  468*    < > = values in this interval not displayed.       Pancreatitis testing    Recent Labs   Lab Test 12/07/22  0434   TRIG 260*       Hematology Studies      Recent Labs   Lab Test 12/12/22  0551 12/11/22  1702 12/11/22  0508 12/10/22  0545   WBC 24.5* 24.7* 25.8* 21.3*   ANEU  --   --   --  20.0*   ALYM  --   --   --  1.3   LATONYA  --   --   --  0.0   AEOS  --   --   --  0.0   HGB 9.4* 10.1* 11.0* 10.8*   HCT 26.5* 27.9* 30.8* 30.4*    157 166 138*       Arterial Blood Gas Testing    Recent Labs   Lab Test 12/09/22  1147   O2PER 2        Urine Studies     Recent Labs   Lab Test 12/07/22  1509 12/05/22  0538 12/04/22  1534   URINEPH 5.5 5.5 5.0   NITRITE Negative Negative Negative   LEUKEST Trace* Negative Negative   WBCU 6* 8* 3       Vancomycin Levels     No lab results found.    Tobramycin levels     No lab results found.    Gentamicin levels    Recent Labs   Lab Test 12/08/22  0812   GENT 0.4       CSF testing     Recent Labs   Lab Test 12/04/22  1738   CWBC 230*   CNEU 86   CLYM 1   CMONO 4   COTH 9   CRBC 78*   CGLU 82*   CTP 59.7*       Last check of C difficile  C Difficile Toxin B by PCR   Date Value Ref Range Status   12/05/2022 Negative Negative Final     Comment:     A negative result does not exclude actual disease due to C. difficile and may be due to improper collection, handling and storage of the specimen or the number of organisms in the specimen is below the detection limit of the assay.            Imaging:     XR CHEST 12/11/2022  Increased left greater than right mixed opacities, likely  worsening pulmonary edema. Infection is not excluded.      EKG     Component Ref Range & Units 12/9/22 11:28 AM 12/7/22  5:02 AM    Systolic Blood Pressure mmHg       Diastolic Blood Pressure mmHg       Ventricular Rate   102     Atrial Rate   102     ME Interval ms  176     QRS Duration ms 144  144     QT ms 382  400     QTc ms 551  521     P Axis degrees  53     R AXIS  degrees -79  -75     T Axis degrees 23  22     Interpretation ECG  Wide QRS tachycardia   Right bundle branch block   Left anterior fascicular block     Bifascicular block     Abnormal ECG   When compared with ECG of 07-DEC-2022 05:02,   Wide QRS tachycardia has replaced Sinus rhythm  Sinus tachycardia   Right bundle branch block   Left anterior fascicular block    Bifascicular block    Abnormal ECG   When compared with ECG of 20-JUL-2012 20:12,   (RBBB and left anterior fascicular block) is now Present   Confirmed by Jackson Kong ([id number]) on 12/7/2022 10:12:14 AM          Echo ILDEFONSO Study Date: 12/05/2022 01:14 PM   Interpretation Summary  Known radiation induced valve disease with severe calcific mitral disease. No  definite endocarditis noted.  Severe mitral annular calcification is present with multiple calcific nodules,  no definite vegetation seen.  S/p bioprosthetic AVR (23 mm Cordell-Hubbard Perimount Magna/Magna Ease  pericardial prosthesis). Valve appears well seated with good leaflet opening.  No vegetations visualized.  Global and regional left ventricular function is normal with an EF of 60-65%.     ______________________________________________________________________________  Procedure  Transesophageal Echocardiogram with color and spectral Doppler performed. 3D  image acquisition, reconstruction, and real-time interpretation was performed.  I was present during ILDEFONSO probe placement by the Fellow. I personally viewed  the imaging and agree with the interpretation and report as documented by the  Fellow. Procedure location Echo Lab. Informed consent for Transesophegeal echo  obtained. ILDEFONSO Probe #[id number] was used during the procedure. Patient was sedated  using Fentanyl 50 mcg. Patient was sedated using Versed 1 mg. The heart rate,  respiratory rate, oxygen saturations, blood pressure, and response to care  were monitored throughout the procedure with the assistance of the nurse. I  determined this patient  to be an appropriate candidate for the planned  sedation and procedure and have reassessed the patient immediately prior to  sedation and procedure. Total sedation time: 25 minutes of continuous bedside  1:1 monitoring. The Transducer was inserted without difficulty . The patient  tolerated the procedure well. Complications None.     Left Ventricle  Mild concentric wall thickening consistent with left ventricular hypertrophy  is present. Global and regional left ventricular function is normal with an EF  of 60-65%. Left ventricular size is normal.     Right Ventricle  The right ventricle is normal size. Global right ventricular function is  normal.     Atria  The left atrial appendage is normal. It is free of spontaneous echo contrast  and thrombus. Moderate to severe left atrial enlargement is present. The  atrial septum is intact as assessed by color Doppler .     Mitral Valve  Severe mitral annular calcification is present. Mild to moderate mitral  insufficiency is present. The mean gradient across the mitral valve is 13  mmHg. The mitral valve area is 1.4 cm^2. Severe mitral stenosis is present.     Aortic Valve  S/p bioprosthetic AVR. Valve appears well seated with good leaflet opening. No  vegetations visualized. Doppler interrogation of the aortic valve is normal.     Tricuspid Valve  The tricuspid valve is normal. Mild tricuspid insufficiency is present.     Pulmonic Valve  The pulmonic valve is normal.     Vessels  The aorta root is normal. The thoracic aorta is normal. Ascending aorta and  aortic arch free of atheroma and vegetation. The inferior vena cava is normal.  The superior vena cava is normal. The RUPV Doppler shows normal velocity  waveform . The RLPV Doppler shows systolic dominance . The LUPV Doppler shows  normal velocity waveform . The LLPV Doppler shows normal velocity waveform .     Pericardium  No pericardial effusion is present.     Compared to Previous Study  This study was compared with  the study from 12/5/22 . There has been no  change.       TTE: 12/5 Interpretation Summary  Would consider a transesophageal echocardiogram if clinically indicated.  Technically difficult study.Extremely poor acoustic windows.  Limited information was obtained during study.  Global and regional left ventricular function is normal with an EF of 60-65%.  Right ventricular function, chamber size, wall motion, and thickness are  normal.  Severe mitral annular calcification is present.  Mitral mean gradient 11mmHg at heart rate 131 BPM.  H/O Bioprosthetic AVR in 2014. Valve not well visualized.    MRI of the brain 12/4/22: IMPRESSION: 5 scattered foci of acute ischemia involving the bilateral anterior and posterior cerebral artery distributions. Constellation of findings is suspicious for central embolic phenomena.           CT Abdomen and Pelvis: 12/4/22  IMPRESSION:  1.  Patchy groundglass opacities in the left upper and lower lobes, and cluster of tree-in-bud nodularity in the left upper lobe, likely infectious or inflammatory.  2.  Interstitial pulmonary edema.  3.  Trace left pleural effusion.  4.  No acute findings in the abdomen or pelvis.        CT head and CTA head/neck 12/4/22, were not suggestive of an acute abnormality, no significant stenosis of carotids or vertebral arteries.      CXR 12/7/22:  IMPRESSION: Diffuse interstitial markings bilaterally and streaky  perihilar and bibasilar opacities likely represent pulmonary edema  versus atelectasis. No focal consolidation.    Attending Physician Attestation:  ILauri MD have seen and evaluated Eloy Fletcher. I have discussed with the primary provider team, and I agree with the above documented findings and plan in this note. I have reviewed today's vital signs, medications, labs and imaging.    Floor time: 20 minutes  Face-to-face time: 10 minutes  Total time: 30 minutes     Lauri Branham MD MPH  Professor of Medicine  Division of  Infectious Diseases & International Medicine  Department of Medicine

## 2022-12-12 NOTE — PLAN OF CARE
Goal Outcome Evaluation:      Plan of Care Reviewed With: patient, family    Overall Patient Progress: decliningOverall Patient Progress: declining    Outcome Evaluation: Aubrey cts to continue through 12/15. Please assist with placing meal orders when pt alert; avoid missed meals. Encourage sips of Ensure and/or bites of Magic Cup between meals. Possible need for FT if PO does not improve. See RD note.    Kamran Sanchez RDN, LD, CNSC  6B RD pager: [phone]   6B work-room RD phone: *[phone]    Weekend/Holiday RD pager [phone]

## 2022-12-12 NOTE — PROVIDER NOTIFICATION
Code sepsis was called for pt due to lactic lab of 2.3 around 2145. Providers notified. Awaiting orders.

## 2022-12-12 NOTE — PLAN OF CARE
Neuro: A&Ox3-4. Disoriented to time and place at times but calls for help as needed.  Cardiac: SR/ST. HR around 100 - 120 this shift. Denies chest pain.  Respiratory: Sating above 92% on 3 L NC  GI/: Adequate urine output vua chan. BM incontinence x1  Diet/appetite: Soft & Bite Sized Diet (Level 6) Thin Liquids (Level 0) with snacks and supplements  Activity:  1-2 assist for repositioning. Pt sat at edge of bed tonight for a 5-10mins.   Pain: Denied pain this shift.  Skin: No new deficits noted.  LDA's: L PIV.    Pt's breathing got audibly worse in the AM. O2 sats dropped to 89 and required 5-8 L on NC. Gold team was contacted  and Lasix and diludid was ordered.    Plan: Continue with POC. Notify primary team with changes.

## 2022-12-12 NOTE — PROGRESS NOTES
Chart check  Creatinine is improving and urine output is improving but today he is more lethargic and his blood cultures is still growing bacteria therefore there is a concern that his renal function might worsen from here.  Nephrology will continue to follow the patient

## 2022-12-12 NOTE — PROGRESS NOTES
[facility]    Stroke Progress Note    Interval EventsPersistent generalized weakness more prominent on right side of the body.      Patient Summary  Mr Fletcher is a 66M PMHx HTN, HLD, CAD s/p PCI to LAD 2012, AS s/p bioprosthetic AVR 2014, Hodgkins s/p thoracic radiation, testicular cancer s/p L orchiectomy and chemotherapy, prostate cancer s/p radiation and lupron who presented 12/4 w/ vomiting, fever, generalized weakness more prominent on the right and waxing/waning encephalopathy.  FTH MSSA bacteremia felt 2/2 endocarditis per ID and small diffuse infarcts on MR, c/w septic embolization.      Impression     #Acute septic cardioembolic stroke (five total)  #Endocarditis  #MSSA bacteremia  #MSSA meningitis  #Concern for epidural abscess vs discitis  66 M with multiple risk factors and sequale of vascular disease, presenting with multiple punctate infarcts in multiple vascular territories c/w embolic stroke.  No Hx of Afib or evidence of same this admission.  In the setting of suspected endocarditis and MSSA bacteremia and meningitis, without evidence of Hx of Afib or evidence of same this admission, his presentation is c/w stroke 2/2 septic embolization.    -Antibiosis and general cares per primary team    #Concern for epidural abscess vs discitis  Patient with ongoing weakness, most prominent on R, felt somewhat out of proportion to very small diffuse punctate infarcts.  In setting of known MSSA bacteremia and CSF colonization, we are recommending rule of compressive spinal pathologies with MR C/T/L spine.   -MRI C/T/L spine w/ and w/o contrast    Neurology will continue to follow pending return of MRI.    Patient discussed with my supervising physician, Dr. Sutton, who has independently confirmed and agrees with the critical findings, assessment, and plan as documented in the note herein, or as otherwise noted in their attestation or staff note.      Bena  "MD Moris, MS  PGY-1, Neurology        ~~~    Clinically Significant Risk Factors        # Hypokalemia: Lowest K = 2.8 mmol/L in last 2 days, will replace as needed      # Anion Gap Metabolic Acidosis: Highest Anion Gap = 20 mmol/L in last 2 days, will monitor and treat as appropriate  # Hypoalbuminemia: Lowest albumin = 2.3 g/dL at 12/7/2022  4:34 AM, will monitor as appropriate          # DMII: A1C = 6.6 % (Ref range: <5.7 %) within past 3 months   # Overweight: Estimated body mass index is 28.63 kg/m  as calculated from the following:    Height as of an earlier encounter on 12/4/22: 1.727 m (5' 8\").    Weight as of this encounter: 85.4 kg (188 lb 4.4 oz).            Medications   Scheduled Meds    ceFAZolin  2 g Intravenous Q12H     furosemide         heparin ANTICOAGULANT  5,000 Units Subcutaneous Q12H     linezolid  600 mg Intravenous Q12H     pantoprazole  40 mg Oral QAM AC     sodium chloride (PF)  3 mL Intracatheter Q8H     tobramycin (PF)  300 mg Nebulization 2 times daily       Infusion Meds    sodium chloride 150 mL/hr (12/05/22 1330)       PRN Meds  acetaminophen, acetaminophen, acetylcysteine, albuterol, albuterol, ipratropium - albuterol 0.5 mg/2.5 mg/3 mL, lidocaine 4%, lidocaine (buffered or not buffered), ondansetron **OR** ondansetron, polyethylene glycol, prochlorperazine **OR** prochlorperazine **OR** prochlorperazine, senna-docusate **OR** senna-docusate, sodium chloride (PF), sodium chloride, sodium chloride bacteriostatic       PHYSICAL EXAMINATION  Temp:  [97.4  F (36.3  C)-100.7  F (38.2  C)] 98.1  F (36.7  C)  Pulse:  [101-120] 104  Resp:  [12-26] 22  BP: ()/(49-76) 99/54  SpO2:  [93 %-98 %] 93 %   General Exam  General:  patient lying in bed without any apparent acute distress    HEENT:  normocephalic/atraumatic  Cardio: Tachycardic per vitals   Pulmonary:  no respiratory distress  Abdomen:  soft  Extremities:  no edema  Skin:  intact, warm/dry     Neuro Exam  Mental Status:  alert, " oriented x 3, follows commands, naming and repetition normal  Cranial Nerves:  visual fields intact, PERRL, EOMI with normal smooth pursuit, facial sensation intact and symmetric, facial movements symmetric, hearing not formally tested but intact to conversation, palate elevation symmetric and uvula midline, mildly dysarthric   shoulder shrug strong bilaterally, tongue protrusion midline  Motor:  normal muscle tone and bulk, no abnormal movements,  Spontaneous antigravity effort in all 4 extremities. Strength 3/5 in b/l UE and equal bilaterally.  LE 3/5 with weakness more prominent on R.    Reflexes:  toes down-going  Sensory:  light touch sensation intact and symmetric throughout upper and lower extremities, no extinction on double simultaneous stimulation   Coordination:  normal finger-to-nose   Station/Gait:  deferred    Imaging  I personally reviewed all imaging; relevant findings per HPI.     Lab Results Data   CBC  Recent Labs   Lab 12/12/22  0551 12/11/22  1702 12/11/22  0508   WBC 24.5* 24.7* 25.8*   RBC 3.15* 3.32* 3.69*   HGB 9.4* 10.1* 11.0*   HCT 26.5* 27.9* 30.8*    157 166     Basic Metabolic Panel    Recent Labs   Lab 12/12/22  0717 12/12/22  0228 12/11/22  1908 12/11/22  1702 12/11/22  1320 12/11/22  0508   NA  --  137  --  136  --  136   POTASSIUM 3.3* 3.3*  3.3* 3.3* 3.4   < > 2.8*  2.8*   CHLORIDE  --  99  --  99  --  99   CO2  --  18*  --  18*  --  20*   BUN  --  97.5*  --  94.5*  --  93.5*   CR  --  3.50*  --  3.43*  --  3.46*   GLC  --  115*  --  172*  --  148*   YUNIOR  --  8.1*  --  7.9*  --  8.0*    < > = values in this interval not displayed.     Liver Panel  Recent Labs   Lab 12/12/22  0531 12/11/22  1702 12/11/22  0508   PROTTOTAL 5.7* 5.5* 5.8*   ALBUMIN 2.5* 2.5* 2.7*   BILITOTAL 1.5* 1.3* 1.9*   ALKPHOS 150* 157* 167*   AST 70* 68* 67*   ALT 18 22 28     INR    Recent Labs   Lab Test 12/04/22  1341   INR 1.23*      Lipid Profile    Recent Labs   Lab Test 12/07/22  0434   CHOL 101    HDL 14*   LDL 35   TRIG 260*     A1C    Recent Labs   Lab Test 12/07/22  1359   A1C 6.6*     Troponin    Recent Labs   Lab 12/07/22  0434 12/06/22  0659 12/06/22  0544   CTROPT 1,634* 2,010* 1,915*          Data         MRI/Head CT CT Head w/o contrast 12/4  1.  No acute intracranial process.     MRI Brain w/o contrast 12/4  1.  5 scattered foci of acute ischemia involving the bilateral anterior and posterior cerebral artery distributions. Constellation of findings is suspicious for central embolic phenomena      Intracranial Vasculature CTA Head w/ contrast 12/4  1.  No significant stenosis, aneurysm, or high flow vascular malformation identified.  2.  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   Cervical Vasculature CTA Neck w/ contrast 12/4  1.  No hemodynamically significant stenosis in the neck vessels.   2.  No evidence for dissection.         Echocardiogram ILDEFONSO 12/5  - Known radiation induced valve disease with severe calcific mitral disease. No definite endocarditis noted.  - Severe mitral annular calcification is present with multiple calcific nodules, no definite vegetation seen.  - S/p bioprosthetic AVR (23 mm Cordell-Hubbard Perimount Magna/Magna Ease pericardial prosthesis). - Valve appears well seated with good leaflet opening.  - No vegetations visualized.  - Global and regional left ventricular function is normal with an EF of 60-65%.   EKG/Telemetry Sinus tachycardia   Right bundle branch block   Left anterior fascicular block   Bifascicular block    Abnormal ECG   When compared with ECG of 20-JUL-2012 20:12,   (RBBB and left anterior fascicular block) is now Present    Other Testing        LDL  12/7/2022: 35 mg/dL   A1C  No lab value available in past 90 days   Troponin 12/7/2022: 1,634 ng/L

## 2022-12-12 NOTE — CODE/RAPID RESPONSE
"   12/12/22 0752   Call Information   Date of Call 12/12/22   Time of Call 0738   Name of person requesting the team Cynkirby Elizalde   Title of person requesting team RN   RRT Arrival time 0743   Time RRT ended 0748   Reason for call   Type of RRT Adult   Primary reason for call Sepsis suspected   Sepsis Suspected Elevated Lactate level;Heart Rate > 100   Was patient transferred from the ED, ICU, or PACU within last 24 hours prior to RRT call? No   SBAR   Situation Lactic Acid 2.4   Background Per medicine note: \"Mr. Fletcher is a 66-year-old gentleman with a past medical history of hypertension, hyperlipidemia, Hodgkin's lymphoma status post radiation and MOPP chemotherapy, in remission, testicular cancer status post a left orchiectomy and chemotherapy, in remission, prostate cancer ([place] 4+3, diagnosed April 2021) status post radiation and Lupron, aortic stenosis status post bioprosthetic aortic valve in (2014) and coronary artery disease status post PCI to the LAD in (2012) who on December 4, 2022 developed a left-sided gaze with right-sided weakness and was noted to have a fever and brought to an outside hospital where he was recognized to be in septic shock and transferred to [facility] ICU.  Head CT and CTA of the head and neck did not display any acute abnormality or stenosis.  MRI brain displayed scattered foci of acute ischemia involving the bilateral anterior and posterior cerebral artery distributions.  Neurology was consulted and concerned that the these might represent embolic strokes.  Further infectious work-up was done including a CT chest abdomen and pelvis which revealed groundglass opacities in the left upper lobe and left lower lobe with tree-in-bud findings in the setting of interstitial pulmonary edema and a left pleural effusion.  He was started on broad-spectrum antibiotics including nafcillin and ceftriaxone.  Lumbar puncture was performed and CSF was positive for gram-positive cocci, " "final cultures pending.  Cardiology was consulted with concerns for possible endocarditis.  TTE was performed which revealed severe mitral calcification with moderate insufficiency and a bioprosthetic aortic valve which was well-seated with no vegetations present. ID was consulted and he was adjusted to Nafcillin with treatment for likely endocarditis in the setting of a high grade blood stream infection from MSSA that resulted in pneumonia, embolic strokes, acute kidney injury and encephalopathy. 12/6, he is transfered out of the ICU.\"   Notable History/Conditions Cancer;Cardiac;Hypertension   Assessment Patient slightly drowsy, arrouses to voice. Normotensive and tachycardic to 110s. Had been tachypneic but improved WOB after PRN dilaudid per RN. Patient already recieving IV antibiotics and followed by ID.   Interventions No interventions   Patient Outcome   Patient Outcome Stabilized on unit   RRT Team   Attending/Primary/Covering Physician Gold 9   Date Attending Physician notified 12/12/22   Time Attending Physician notified 2928   Physician(s) Areli Huerta PA-C   Lead RN Romario Woods, RN   FRANCES Elizalde, RN   RT N/A   Post RRT Intervention Assessment   Post RRT Assessment Stable/Improved   Date Follow Up Done 12/12/22   Time Follow Up Done 1007   Comments VSS, patient resting in bed and states he is not having any N/V, SOB or pain. Coarse lung sounds, but good productive cough. Bedside FRANCES Addison without any immediate concerns     "

## 2022-12-12 NOTE — PROGRESS NOTES
Rapid Response Team Note    Assessment   In assessment a rapid response was called on Eloy Fletcher due to lactic acidosis. This presentation is likely due to severe sepsis 2/2 MSSA bacteremia.     Plan   -  Albumin 5% 12.5 grams x1. Need to be judicious with IVF.   -  Recheck lactate after albumin infusiong  -  The Internal Medicine primary team was able to be reached and they are in agreement with the above plan. Discussed with Dr. Santos.   -  Disposition: The patient will remain on the current unit. We will continue to monitor this patient closely.  -  Reassessment and plan follow-up will be performed by the primary team      Carissa Saba PA-C  [facility] [place] RRT [facility] Job Code Contact #[phone]  [facility] Paging/Directory    Hospital Course   Brief Summary of events leading to rapid response:   Sepsis BPA fired for the fourth time today per RN. Lactate 3.3, increased from prior checks. Patient with known MSSA bacteremia with CNS emboli and suspected endocarditis. New neck pain today and is awaiting complete spine MRI to evaluate for possible epidural abscess vs discitis vs osteomyelitis. On regimen of IV cefazolin, linezolid.     Admission Diagnosis:   Endocarditis [I38]    Physical Exam   Temp: 100.3  F (37.9  C) Temp  Min: 98.1  F (36.7  C)  Max: 100.7  F (38.2  C)  Resp: 12 Resp  Min: 12  Max: 26  SpO2: 94 % SpO2  Min: 93 %  Max: 98 %  Pulse: 113 Pulse  Min: 101  Max: 120    No data recorded  BP: 97/55 Systolic (24hrs), Av , Min:93 , Max:139   Diastolic (24hrs), Av, Min:49, Max:76     I/Os: I/O last 3 completed shifts:  In: 840 [P.O.:840]  Out: 3375 [Urine:3375]     Exam:   General: chronically ill appearing  Mental Status: baseline mental status.  Resp: Labored. Bibasilar crackles.  CV: Tachycardic, regular rhythm.    Significant Results and Procedures   Lactic Acid:   Recent Labs   Lab Test 22  1600 22  0936 22  0717   LACT 3.3* 2.3* 2.4*     CBC:   Recent Labs   Lab Test  12/12/22  0551 12/11/22  1702 12/11/22  0508   WBC 24.5* 24.7* 25.8*   HGB 9.4* 10.1* 11.0*   HCT 26.5* 27.9* 30.8*    157 166        Sepsis Evaluation   The patient is known to have an infection.  Eloy Fletcher meets SIRS criteria AND has a lactate >2 or other evidence of acute organ damage.  These vital signs, lab and physical exam findings constitute a diagnosis of SEVERE SEPSIS, based on: Lactate resulted, and the level was > 2.0          Anti-infectives (From now, onward)    Start     Dose/Rate Route Frequency Ordered Stop    12/11/22 2000  tobramycin (PF) (NATALYA) neb solution 300 mg         300 mg Nebulization 2 TIMES DAILY RT 12/11/22 1234 12/14/22 1959    12/11/22 1512  ceFAZolin (ANCEF) 2 g in 100 mL D5W intermittent infusion        Note to Pharmacy: ID recs q8 for 24 hours then q12 based on current renal function, but defer to pharmacy after first 24 hours    2 g  200 mL/hr over 30 Minutes Intravenous EVERY 12 HOURS 12/11/22 0739      12/09/22 1600  linezolid (ZYVOX) infusion 600 mg         600 mg  over 60 Minutes Intravenous EVERY 12 HOURS 12/09/22 1529          Current antibiotic coverage is appropriate for source of infection.    3 Hour Severe Sepsis Bundle Completion:  1. Initial Lactic Acid result shown above. Repeat lactic acid is not indicated within 3 hours. Ordered to be rechecked after administration of albumin.   2. Blood Cultures before Antibiotics: Yes  3. Broad Spectrum Antibiotics Administered: yes  4. Is hypotension present? No (IV fluid bolus NOT required). IV Fluid volume administered: giving small volume albumin

## 2022-12-12 NOTE — PROGRESS NOTES
CLINICAL NUTRITION SERVICES - REASSESSMENT NOTE     Nutrition Prescription    RECOMMENDATIONS FOR MDs/PROVIDERS TO ORDER:  Goal for pt to consume at least ~1300 Kcals and 65 gm protein PO (~65% low end est needs). vs need to consider alternate source of nutrition over next 24-72 hours.    Malnutrition Status:    Patient does not meet two of the established criteria necessary for diagnosing malnutrition but is at risk for malnutrition    Recommendations already ordered by Registered Dietitian (RD):  - Extending/continue pascual ct order through 12/15 though per discussion with bedside RN suspect PO will not meet goal. Paged primary team MD to discuss need for FT.     - Continue Ensure Enlive, increase to BID + PRN if requested. Continue Magic Cup with meal trays when ordered.     - Per bedside RN, not appropriate for standard meal trays or room service with assistance at this time (mentation waxes/wanes). Family often present to assist with placing meal orders when appropriate.   --> Please order meal trays when pt alert and appropriate. Needs 1:1 assistance with eating. Offer Ensure sips between meals as appropriate.     Future/Additional Recommendations:  Continue to monitor nutrition-related findings and follow pt per protocol    If patient requires FT placement for enteral nutrition support, see recs below:  Use dosing weight 81 kg (admit wt)  EN access: TBD   Regimen: (renal formula given hyperphosphatemia, though is also lower K+)   Novasource Renal @ 50 ml/hr (1200 ml) provides 2400 kcal (30 Kcal/kg), 109 g pro (1.3), 220 g CHO, 860 ml free water, and 0 g fiber daily.   - Do not advance TF rate unless Mg++ >1.5, K+ is >3, and phos >2   (given phos and Mg currently elevated, more concern to monitor K+)  - Initiate @ 10 ml/hr and advance by 10 ml q8hr pending pt's tolerance.  - Recommend 30-60 ml q4hr fluid flushes for tube patency. Additional fluids and/or adjustments per MD.    - Order multivitamin/mineral to help  "ensure micronutrient needs being met with suspected hypermetabolic demands and potential interruptions to TF infusions.   - HOB >30-45 degrees if FT is gastric.              -Send a nutrition consult for \"Registered Dietitian to Order TF per Medical Nutrition Therapy Guidelines\" if desire RD to order TFs.       EVALUATION OF THE PROGRESS TOWARD GOALS   Diet: Level 6: Soft & Bite-Sized Dysphagia Diet, level 0 thin liquids  Supplements: Magic Cup with meal trays and Ensure Enlive at 10 AM     PO Intake: % of meals per I/O documentation but only has ~1 meal per day ordered via rooms service per review of [employer] program.     Calorie counts 12/; results so far range 350-425 Kcals and 10-20 gm protein per day. Only 0-1 meal orders from kitchen per day (frequently sleeping). Drank 100% of 1 Ensure Enlive supplement per pascual ct records 12/11.     Needs total feeding assistance per chart.      NEW FINDINGS   General:    Pt awake but slurred speech, difficult to understand. Has an Ensure supplement at bedside; nearly finished but unsure if from today or not. Magic Cup unopened at bedside.     Per bedside RN report, mentation waxes and wanes; therefore not always appropriate for meal trays. Concern for aspiration risk. Needs 1:1 assistance at meal times per SLP note.     GI:    0-6 unmeasured BMs per day over past week, per I/O.     Weights:    Up ~10 lbs compared to admission weight. Possibly confounded by fluid status and/or bed scale measures. Stable wt hx PTA.     Labs:    K+ 3.3 (L), high replacement protocol     Mg 2.4 (H); Phos 5.9 (H) --Both elevated since 12/8     BUN high 90s (elevated); Cr 3.5 (elevated)    Elevated LFTs (Tbili, Dbili, Alk Phos, AST)    CRP elevated, monitor trends as available     Lactic acid elevated, >2    BG trends acceptable for acute care setting (acceptable BG range for hospital setting is 140-180 mg/dL per ADA for most patients)  o A1C 6.6% (preDM range) 12/7/22 "     Medications:    IVF - NS at 30 mL/hr     Skin:    No deficits noted per bedside RN documentation     MALNUTRITION  % Intake: </= 50% for >/= 5 days (severe)  % Weight Loss: None noted  Subcutaneous Fat Loss: None observed  Muscle Loss: None observed  Fluid Accumulation/Edema: None noted  Malnutrition Diagnosis: Patient does not meet two of the established criteria necessary for diagnosing malnutrition but is at risk for malnutrition    Previous Goals   Diet adv v nutrition support within 2-3 days.  Evaluation: Met (diet adv next day)    Previous Nutrition Diagnosis  Inadequate oral intake related to altered mentation as evidenced by NPO status x 1 day pending SLP evaluation.     Evaluation: No longer applicable, nutrition diagnosis changed below    CURRENT NUTRITION DIAGNOSIS  Inadequate protein-energy intake related to AMS, dysphagia, frequent missed meals as evidenced by insignificant pascual ct data, I/O/room service review, intermittent lethargy inhibiting adequate PO, nutrition support candidate if unable to improve PO intake with ONS and/or meal trays.      INTERVENTIONS  Implementation  Collaboration with other providers - Primary team MD, bedside RN  Enteral Nutrition - Recs in note, suspect will need FT   Medical food supplement therapy - Continue, increase frequency     Goals  Goal for pt to consume at least ~1300 Kcals and 65 gm protein PO (~65% low end est needs). vs need to consider alternate source of nutrition over next 24-72 hours.    Monitoring/Evaluation  Progress toward goals will be monitored and evaluated per protocol.    Kamran Sanchez RDN, LD, CNSC  6B RD pager: [phone]   6B work-room RD phone: *[phone]    Weekend/Holiday RD pager [phone]

## 2022-12-12 NOTE — CODE/RAPID RESPONSE
Brief Rapid Response Team Note    RRT called for LA of 2.4 which is slightly elevated but consistent with recent values, seems stable. No significant change in patient clinical condition. On antibiotics (Linezolid, cefazolin, cipro and tobramycin neb) for suspected endocarditis, and daily blood culture, ID following.   Will Keep current management, further changes per primary and ID team.   Repeat LA ordered.     Areli Clark PA-C on 12/12/2022 at 8:00 AM

## 2022-12-12 NOTE — PROGRESS NOTES
[facility]    Medicine Progress Note - Hospitalist Service, GOLD TEAM 9    Date of Admission:  12/4/2022  Assessment & Plan   Mr. Fletcher is a 66-year-old gentleman with a past medical history of hypertension, hyperlipidemia, Hodgkin's lymphoma status post radiation and MOPP chemotherapy, in remission, testicular cancer status post a left orchiectomy and chemotherapy, in remission, prostate cancer ([place] 4+3, diagnosed April 2021) status post radiation and Lupron, aortic stenosis status post bioprosthetic aortic valve in (2014) and coronary artery disease status post PCI to the LAD in (2012) who on December 4, 2022 developed a left-sided gaze with right-sided weakness and was noted to have a fever and brought to an outside hospital where he was recognized to be in septic shock and transferred to [facility] ICU.  Head CT and CTA of the head and neck did not display any acute abnormality or stenosis.  MRI brain displayed scattered foci of acute ischemia involving the bilateral anterior and posterior cerebral artery distributions.  Neurology was consulted and concerned that the these might represent embolic strokes.  Further infectious work-up was done including a CT chest abdomen and pelvis which revealed groundglass opacities in the left upper lobe and left lower lobe with tree-in-bud findings in the setting of interstitial pulmonary edema and a left pleural effusion.  He was started on broad-spectrum antibiotics including nafcillin and ceftriaxone.  Lumbar puncture was performed and CSF was positive for gram-positive cocci, final cultures pending.  Cardiology was consulted with concerns for possible endocarditis.  TTE was performed which revealed severe mitral calcification with moderate insufficiency and a bioprosthetic aortic valve which was well-seated with no vegetations present. ID was consulted and he was adjusted to Nafcillin with treatment for likely  endocarditis in the setting of a high grade blood stream infection from MSSA that resulted in pneumonia, embolic strokes, acute kidney injury and encephalopathy. 12/6, he is transfered out of the ICU.    Changes today:  - cardiology following, not concerned with previous read of 3rd degree heart block.  Appears to be more likely 1st degree.  No intervention at this time.    - Continue intermittent diuresis to maintain goal net- 1-2 L with goal weight of 178. Holding lasix today with significant output in last 24 hours.  Repeat chest xr as needed  - Neurology following for MRI to eval for possible abscess - preference lumbar and cervical spine based off current exam and symptoms.  - ID recs - continue cefazolin, linezolid. 3 days of ciprofloxacin, 3 days of elias nebs, rifampin starting at some point in the near future.  12/11 culture negative x28 hours at least (still pending - this is the longest period to positivity yet)  - heme cleared for ppx heparin, DAP to start pending course  - nutrition consulted to consider timing/need of tube feeds  - please avoid narcotic pain medications unless clear pain indication (received x1 overnight for air hunger and is quite sedate today)    #septic shock (fever, leukocytosis, tachycardia, tachypnea, end organ damage) secondary to MSSA high-grade bloodstream infection  #Acute embolic strokes with right-sided weakness  #Acute encephalopathy, improving  #Bacterial community-acquired pneumonia  He was initially started on IV Vancomycin/ Zosyn at OSH. Blood cultures x 2 from 12/4/22 grew positive for pansensitive Staph aureus. There were no repeat cultures on 12/5. CSF culture Repeat cultures done 12/6 so far are without growth. Antibiotic regimen has been adjusted to Nafcillin, per ID. His encephalopathy is improving and Neurology is following.  -Infectious Disease is following and per recommendations, likely treatment for 4-6 weeks for suspected endocarditis see their note for abx  changes  -Continue daily blood cultures, CRP and CBC, CMP  -Neurology following for possible epidural abscess - MRI pending  - Low threshold to repeat a head CT if he develops any acute neurologic changes.   -Speech therapy consulted, appreciate recs    #Demand ischemia  #Aortic stenosis status post bioprosthetic aortic valve replacement in 2014  #Coronary artery disease status post PCI to the LAD in 2012  #Radiation induced heart disease  #Hypertension  #Hyperlipidemia  #AHRF 2/2 volume overload  -Mr. Fletcher follows at [facility] with cardiology with Dr. Winnie Roth and was last seen November 16, 2022.  He is planned in February 2023 to undergo updated cardiac surgery for his significant calcifications and valve disease.  -TTE EEG was performed without any evidence of vegetations.    -Continue to hold Plavix and aspirin in the setting of bleeding risk given his recent stroke (as above)  -Cardiology following    #acute thrombocytopenia, improving - initial concern for possible HIT given score >3 and recent heparin exposure in November. HIT screen negative, fibrinogen not low.  DIC/HIT less likely. Supsect related to medications, sepsis, etc  - hematology consulted appreciate recs    #Acute Kidney Injury, non-oliguric - baseline around 0.9, appears to be peaking around 4. Likely related to medications, hypotension, vasoplegia  - US collecting system without hydro  - follow UOP, monitor closely for post-ATN diuresis  - avoid nephrotoxic agents  - nephrology consult    # Generalized weakness secondary to critical illness  -PT/OT consults to assess safety to return home and assist with strength training    # Hodgkin's lymphoma status post radiation and MOPP chemotherapy, in remission  -No acute issues.    # Testicular cancer status post a left orchiectomy and chemotherapy, in remission  -No acute issues.     # Prostate cancer ([place] 4+3, diagnosed April 2021) s/p radiation and Lupron  -Follows with Urology and  "Oncology as an outpatient     Diet: Snacks/Supplements Adult: Ensure Enlive; Between Meals  Snacks/Supplements Adult: Magic Cup; With Meals  Calorie Counts  Soft & Bite Sized Diet (level 6) Thin Liquids (level 0)    DVT Prophylaxis: Heparin SQ  [place] Catheter: PRESENT, indication: Obstruction;Retention  Central Lines: None  Cardiac Monitoring: None  Code Status: Full Code      Disposition Plan  From home, will discharge likely to TCU for need of IV antibiotics once medically stable   Expected discharge date: 12/19/22      Jericho Santos MD  Hospitalist Service, GOLD TEAM 9  [facility]  Securely message with the Vocera Web Console (learn more here)  Text page via [facility] Paging/Directory   Please see signed in provider for up to date coverage information    Clinically Significant Risk Factors        # Hypokalemia: Lowest K = 2.8 mmol/L in last 2 days, will replace as needed      # Anion Gap Metabolic Acidosis: Highest Anion Gap = 20 mmol/L in last 2 days, will monitor and treat as appropriate  # Hypoalbuminemia: Lowest albumin = 2.3 g/dL at 12/7/2022  4:34 AM, will monitor as appropriate         # DMII: A1C = 6.6 % (Ref range: <5.7 %) within past 3 months   # Overweight: Estimated body mass index is 28.63 kg/m  as calculated from the following:    Height as of an earlier encounter on 12/4/22: 1.727 m (5' 8\").    Weight as of this encounter: 85.4 kg (188 lb 4.4 oz).      _____________________________________________________________________    Interval History   Breathing stable this morning, not as good as two days ago, but stable.  Denies cough, no fevers. No chest pain, back pain, abdominal pain. remains thirsty.  No other concerns this morning. Family at bed and updated on plan.    Data reviewed today: I reviewed all medications, new labs and imaging results over the last 24 hours.     Physical Exam   Vital Signs: Temp: 98.1  F (36.7  C) Temp src: Axillary BP: " 99/54 Pulse: 104   Resp: 22 SpO2: 96 % O2 Device: Nasal cannula Oxygen Delivery: 2 LPM  Weight: 188 lbs 4.37 oz  General Appearance: 66 year old gentleman resting in bed, no acute distress, denies pain  Respiratory: mild increase WOB, on room air, rare crackles bilaterally, moderate aeration  Cardiovascular: regular rate and rhythm, systolic murmur present, no heaves or lifts  GI: bowel sounds active, soft, non-tender to palpation throughout, no rebound or guarding  Skin: warm, dry, no open sores, lesions or ulcerations, edema in upper and lower extremities  Neuro: strength 4/5 in upper extremities bilaterally, not able to lift lower legs off bed, but does have strength distally.  Sensation intact.    Data    Recent Labs   Lab 12/12/22  0717 12/12/22  0551 12/12/22  0531 12/12/22  0228 12/11/22  1908 12/11/22  1702 12/11/22  1320 12/11/22  0508   WBC  --  24.5*  --   --   --  24.7*  --  25.8*   HGB  --  9.4*  --   --   --  10.1*  --  11.0*   MCV  --  84  --   --   --  84  --  84   PLT  --  192  --   --   --  157  --  166   NA  --   --   --  137  --  136  --  136   POTASSIUM 3.3*  --   --  3.3*  3.3* 3.3* 3.4   < > 2.8*  2.8*   CHLORIDE  --   --   --  99  --  99  --  99   CO2  --   --   --  18*  --  18*  --  20*   BUN  --   --   --  97.5*  --  94.5*  --  93.5*   CR  --   --   --  3.50*  --  3.43*  --  3.46*   ANIONGAP  --   --   --  20*  --  19*  --  17*   YUNIOR  --   --   --  8.1*  --  7.9*  --  8.0*   GLC  --   --   --  115*  --  172*  --  148*   ALBUMIN  --   --  2.5*  --   --  2.5*  --  2.7*   PROTTOTAL  --   --  5.7*  --   --  5.5*  --  5.8*   BILITOTAL  --   --  1.5*  --   --  1.3*  --  1.9*   ALKPHOS  --   --  150*  --   --  157*  --  167*   ALT  --   --  18  --   --  22  --  28   AST  --   --  70*  --   --  68*  --  67*    < > = values in this interval not displayed.

## 2022-12-13 NOTE — CODE/RAPID RESPONSE
"   12/12/22 1636   Call Information   Date of Call 12/12/22   Time of Call 1622   Name of person requesting the team Cynkirby Elizalde   Title of person requesting team RN   RRT Arrival time 1628   Time RRT ended 1635   Reason for call   Type of RRT Adult   Primary reason for call Sepsis suspected   Sepsis Suspected Elevated Lactate level;Heart Rate > 100   Was patient transferred from the ED, ICU, or PACU within last 24 hours prior to RRT call? No   SBAR   Situation Lactic Acid 3.3   Background Per medicine note \"Mr. Fletcher is a 66-year-old gentleman with a past medical history of hypertension, hyperlipidemia, Hodgkin's lymphoma status post radiation and MOPP chemotherapy, in remission, testicular cancer status post a left orchiectomy and chemotherapy, in remission, prostate cancer ([place] 4+3, diagnosed April 2021) status post radiation and Lupron, aortic stenosis status post bioprosthetic aortic valve in (2014) and coronary artery disease status post PCI to the LAD in (2012) who on December 4, 2022 developed a left-sided gaze with right-sided weakness and was noted to have a fever and brought to an outside hospital where he was recognized to be in septic shock and transferred to [facility] ICU.  Head CT and CTA of the head and neck did not display any acute abnormality or stenosis.  MRI brain displayed scattered foci of acute ischemia involving the bilateral anterior and posterior cerebral artery distributions.  Neurology was consulted and concerned that the these might represent embolic strokes.  Further infectious work-up was done including a CT chest abdomen and pelvis which revealed groundglass opacities in the left upper lobe and left lower lobe with tree-in-bud findings in the setting of interstitial pulmonary edema and a left pleural effusion.  He was started on broad-spectrum antibiotics including nafcillin and ceftriaxone.  Lumbar puncture was performed and CSF was positive for gram-positive cocci, " "final cultures pending.  Cardiology was consulted with concerns for possible endocarditis.  TTE was performed which revealed severe mitral calcification with moderate insufficiency and a bioprosthetic aortic valve which was well-seated with no vegetations present. ID was consulted and he was adjusted to Nafcillin with treatment for likely endocarditis in the setting of a high grade blood stream infection from MSSA that resulted in pneumonia, embolic strokes, acute kidney injury and encephalopathy. 12/6, he is transfered out of the ICU.\"   Notable History/Conditions Cancer;Cardiac;Hypertension   Assessment Drowsy but easily arousable to voice. VSS. No acute changes/concerns since this AM   Interventions Fluid bolus   Patient Outcome   Patient Outcome Stabilized on unit   RRT Team   Attending/Primary/Covering Physician Gold 9   Date Attending Physician notified 12/12/22   Time Attending Physician notified 1630   Physician(s) Carissa aSba PA-C   Lead RN Romario Woods, FRANCES   RN Cyn Elizalde, FRANCES   RT N/A   Post RRT Intervention Assessment   Post RRT Assessment Stable/Improved   Date Follow Up Done 12/12/22   Time Follow Up Done 1838     "

## 2022-12-13 NOTE — PROGRESS NOTES
[facility]    Medicine Progress Note - Hospitalist Service, GOLD TEAM 9    Date of Admission:  12/4/2022    Assessment & Plan    Mr. Fletcher is a 66-year-old gentleman with a past medical history of hypertension, hyperlipidemia, Hodgkin's lymphoma status post radiation and MOPP chemotherapy, in remission, testicular cancer status post a left orchiectomy and chemotherapy, in remission, prostate cancer ([place] 4+3, diagnosed April 2021) status post radiation and Lupron, aortic stenosis status post bioprosthetic aortic valve in (2014) and coronary artery disease status post PCI to the LAD in (2012) who on December 4, 2022 developed a left-sided gaze with right-sided weakness and was noted to have a fever and brought to an outside hospital where he was recognized to be in septic shock and transferred to [facility] ICU.  Head CT and CTA of the head and neck did not display any acute abnormality or stenosis.  MRI brain displayed scattered foci of acute ischemia involving the bilateral anterior and posterior cerebral artery distributions.  Neurology was consulted and concerned that the these might represent embolic strokes.  Further infectious work-up was done including a CT chest abdomen and pelvis which revealed groundglass opacities in the left upper lobe and left lower lobe with tree-in-bud findings in the setting of interstitial pulmonary edema and a left pleural effusion.  He was started on broad-spectrum antibiotics including nafcillin and ceftriaxone.  Lumbar puncture was performed and CSF was positive for gram-positive cocci, final cultures pending.  Cardiology was consulted with concerns for possible endocarditis.  TTE was performed which revealed severe mitral calcification with moderate insufficiency and a bioprosthetic aortic valve which was well-seated with no vegetations present. ID was consulted and he was adjusted to Nafcillin with treatment for likely  endocarditis in the setting of a high grade blood stream infection from MSSA that resulted in pneumonia, embolic strokes, acute kidney injury and encephalopathy. 12/6, he is transfered out of the ICU.     Main Plans for Today:  - Unable to complete full cervical and thoracic MRI, no significant findings on lumbar MRI  - Increased respiratory distress overnight, given 1 dose IV furosemide  - Intermittent Bipap for increased respiratory support  - Continue intermittent diuresis to maintain goal net- 1-2 L with goal weight of 178.   - Repeat dose of IV furosemide at noon  - ID recommendations - change cefazolin to ceftriaxone for better GNR coverage  - Continue linezolid.   - 12/11 culture remains negative  - please avoid narcotic pain medications unless clear pain indication      #septic shock (fever, leukocytosis, tachycardia, tachypnea, end organ damage) secondary to MSSA high-grade bloodstream infection  #Acute embolic strokes with right-sided weakness  #Acute encephalopathy, improving  #Bacterial community-acquired pneumonia  He was initially started on IV Vancomycin/ Zosyn at OSH. Blood cultures x 2 from 12/4/22 grew positive for pansensitive Staph aureus. There were no repeat cultures on 12/5. CSF culture Repeat cultures done 12/6 so far are without growth. His encephalopathy is improving and Neurology is following.  - Infectious Disease is following and per recommendations, likely treatment for 4-6 weeks for suspected endocarditis see their note for abx changes  - Change cefazolin to ceftriaxone for better GNR coverage per ID  - Continue linezolid  - Continue elias nebs for total 3 days  - Continue daily blood cultures, CRP and CBC, CMP  - Neurology following for possible epidural abscess - MRI pending  - Low threshold to repeat a head CT if he develops any acute neurologic changes.   - Speech therapy consulted, appreciate recs  - Revisit MRI vs CT when respiratory status improve  - Intermittent Bipap for  respiratory support     #Demand ischemia  #Aortic stenosis status post bioprosthetic aortic valve replacement in 2014  #Coronary artery disease status post PCI to the LAD in 2012  #Radiation induced heart disease  #Hypertension  #Hyperlipidemia  #AHRF 2/2 volume overload  -Mr. Fletcher follows at [facility] with cardiology with Dr. Winnie Roth and was last seen November 16, 2022.  He is planned in February 2023 to undergo updated cardiac surgery for his significant calcifications and valve disease.  -TTE EEG was performed without any evidence of vegetations.    -Continue to hold Plavix and aspirin in the setting of bleeding risk given his recent stroke (as above)  -Cardiology consulted and signed off     #acute thrombocytopenia, improving - initial concern for possible HIT given score >3 and recent heparin exposure in November. HIT screen negative, fibrinogen not low.  DIC/HIT less likely. Supsect related to medications, sepsis, etc  - hematology consulted appreciate recs     #Acute Kidney Injury, non-oliguric - baseline around 0.9, appears to be peaking around 4. Likely related to medications, hypotension, vasoplegia  - US collecting system without hydro  - follow UOP, monitor closely for post-ATN diuresis  - avoid nephrotoxic agents  - nephrology consult     # Generalized weakness secondary to critical illness  -PT/OT consults to assess safety to return home and assist with strength training     # Hodgkin's lymphoma status post radiation and MOPP chemotherapy, in remission  -No acute issues.     # Testicular cancer status post a left orchiectomy and chemotherapy, in remission  -No acute issues.      # Prostate cancer ([place] 4+3, diagnosed April 2021) s/p radiation and Lupron  -Follows with Urology and Oncology as an outpatient       Diet: Snacks/Supplements Adult: Magic Cup; With Meals  Soft & Bite Sized Diet (level 6) Thin Liquids (level 0)  Calorie Counts  Snacks/Supplements Adult: Ensure Enlive; Between  "Meals    DVT Prophylaxis: Heparin SQ  Medina Catheter: PRESENT, indication: Obstruction;Retention  Central Lines: None  Cardiac Monitoring: None  Code Status: Full Code      Disposition Plan    Unknown     The patient's care was discussed with the Bedside Nurse, Care Coordinator/, Patient, Patient's Family and ID Consultant.    Tommie Heck MD  Hospitalist Service, GOLD TEAM [address] [place] [address]  Securely message with the Vocera Web Console (learn more here)  Text page via [facility] Paging/Directory   Please see signed in provider for up to date coverage information      Clinically Significant Risk Factors        # Hypokalemia: Lowest K = 3.3 mmol/L in last 2 days, will replace as needed      # Anion Gap Metabolic Acidosis: Highest Anion Gap = 21 mmol/L in last 2 days, will monitor and treat as appropriate  # Hypoalbuminemia: Lowest albumin = 2.3 g/dL at 12/7/2022  4:34 AM, will monitor as appropriate          # DMII: A1C = 6.6 % (Ref range: <5.7 %) within past 3 months   # Overweight: Estimated body mass index is 28.96 kg/m  as calculated from the following:    Height as of an earlier encounter on 12/4/22: 1.727 m (5' 8\").    Weight as of this encounter: 86.4 kg (190 lb 7.6 oz).          ______________________________________________________________________    Interval History   Increased respiratory distress overnight. CXR with increased left sided opacities concerning for increased pulmonary edema. This morning reports feeling better after IV diuretics. Tired. Did not sleep well.     4 Point ROS otherwise negative.     Data reviewed today: I reviewed all medications, new labs and imaging results over the last 24 hours. I personally reviewed the chest x-ray image(s) showing increased left sided opacities, edema vs infection.    Physical Exam   /55 (BP Location: Right arm)   Pulse 118   Temp (!) 101.2  F (38.4  C) (Axillary)   Resp 24   Wt 86.4 kg (190 " lb 7.6 oz)   SpO2 98%   BMI 28.96 kg/m    General: awake and alert, appears somnolent, ill appearing  Skin: no rashes or bruising  CV: tachycardic, normal S1S2, no murmur  Resp: mild respiratory distress, CTAB, no wheeze, rhonchi   Abd: Soft, nontender, nondistended, BS+, no masses appreciated  Extremities: warm and well perfused, minimal peripheral edema

## 2022-12-13 NOTE — PROGRESS NOTES
Medicine cross cover note -     I called to follow up on MRI scans - patient apparently was able to tolerate a single scan but could not remain flat for follow up scans.  Will need to re-attempt cervical and thoracic spine studies in the am.

## 2022-12-13 NOTE — PROGRESS NOTES
[facility]    Stroke Progress Note    Interval EventsProgressive acute hypoxemic respiratory failure overnight, now on BiPAP.  Pt endorses weakness is stable, with no notable changes day over day (although he does say it is hard to tell given fatigue, SOB).    MR L spine returned w/ significant degenerative changes, but no cord pathology which could explain LE weakness, no abcess.  Continue to recommend MR C/T spine when able.    Patient Summary  Mr Fletcher is a 66M PMHx HTN, HLD, CAD s/p PCI to LAD 2012, AS s/p bioprosthetic AVR 2014, Hodgkins s/p thoracic radiation, testicular cancer s/p L orchiectomy and chemotherapy, prostate cancer s/p radiation and lupron who presented 12/4 w/ vomiting, fever, generalized weakness more prominent on the right and waxing/waning encephalopathy.  FTH MSSA bacteremia felt 2/2 endocarditis per ID and small diffuse infarcts on MR, c/w septic embolization.      Impression     #Acute septic cardioembolic stroke (five total)  #Endocarditis  #MSSA bacteremia  #MSSA meningitis  #Concern for epidural abscess vs discitis  66 M with multiple risk factors and sequale of vascular disease, presenting with multiple punctate infarcts in multiple vascular territories c/w embolic stroke.  No Hx of Afib or evidence of same this admission.  In the setting of suspected endocarditis and MSSA bacteremia and meningitis, without evidence of Hx of Afib or evidence of same this admission, his presentation is c/w stroke 2/2 septic embolization.    -Antibiosis and general cares per primary team    #Concern for epidural abscess vs discitis  Patient with ongoing weakness, most prominent on R, felt somewhat out of proportion to very small diffuse punctate infarcts.  In setting of known MSSA bacteremia and CSF colonization, we are recommending rule of compressive spinal pathologies with MR C/T/L spine.   -MRI C/T spine w/ and w/o contrast when  "able    Neurology will continue to follow pending return of MRI.    Patient discussed with my supervising physician, Dr. Sutton, who has independently confirmed and agrees with the critical findings, assessment, and plan as documented in the note herein, or as otherwise noted in their attestation or staff note.      Bean Subramanian MD, MS  PGY-1, Neurology        ~~~    Clinically Significant Risk Factors        # Hypokalemia: Lowest K = 3.3 mmol/L in last 2 days, will replace as needed      # Anion Gap Metabolic Acidosis: Highest Anion Gap = 21 mmol/L in last 2 days, will monitor and treat as appropriate  # Hypoalbuminemia: Lowest albumin = 2.3 g/dL at 12/7/2022  4:34 AM, will monitor as appropriate          # DMII: A1C = 6.6 % (Ref range: <5.7 %) within past 3 months   # Overweight: Estimated body mass index is 28.96 kg/m  as calculated from the following:    Height as of an earlier encounter on 12/4/22: 1.727 m (5' 8\").    Weight as of this encounter: 86.4 kg (190 lb 7.6 oz).            Medications   Scheduled Meds    acetylcysteine  2 mL Nebulization BID     cefTRIAXone  2 g Intravenous Q24H     furosemide  40 mg Intravenous Once     guaiFENesin  600 mg Oral BID     heparin ANTICOAGULANT  5,000 Units Subcutaneous Q12H     linezolid  600 mg Intravenous Q12H     pantoprazole  40 mg Oral QAM AC     sodium chloride (PF)  3 mL Intracatheter Q8H     tobramycin (PF)  300 mg Nebulization 2 times daily       Infusion Meds    - MEDICATION INSTRUCTIONS -       - MEDICATION INSTRUCTIONS -       sodium chloride 150 mL/hr (12/05/22 1330)       PRN Meds  acetaminophen, acetaminophen, acetylcysteine, albuterol, albuterol, artificial tears, ipratropium - albuterol 0.5 mg/2.5 mg/3 mL, lidocaine 4%, lidocaine (buffered or not buffered), - MEDICATION INSTRUCTIONS -, ondansetron **OR** ondansetron, - MEDICATION INSTRUCTIONS -, polyethylene glycol, prochlorperazine **OR** prochlorperazine **OR** prochlorperazine, senna-docusate **OR** " senna-docusate, sodium chloride (PF), sodium chloride, sodium chloride bacteriostatic       PHYSICAL EXAMINATION  Temp:  [98.6  F (37  C)-100.3  F (37.9  C)] 98.6  F (37  C)  Pulse:  [107-113] 112  Resp:  [12-30] 24  BP: ()/(54-59) 106/55  FiO2 (%):  [40 %] 40 %  SpO2:  [90 %-98 %] 98 %   General Exam  General:  patient lying in bed without any apparent acute distress    HEENT:  normocephalic/atraumatic  Cardio: Tachycardic per vitals   Pulmonary:  no respiratory distress  Abdomen:  soft  Extremities:  no edema  Skin:  intact, warm/dry     Neuro Exam  Mental Status:  Somnolent, but oriented x 3, follows complex commands.  Re-examined later in the morning: More somnolent, less engaged.    Cranial Nerves:  PERRL, EOMI with normal smooth pursuit, facial movements symmetric, hearing not formally tested but intact to conversation, palate elevation symmetric and uvula midline, mildly dysarthric   shoulder shrug strong bilaterally, tongue protrusion midline  Motor:  normal muscle tone and bulk, no abnormal movements,  Antigravity effort in all 4 extremities.  Given fatiuge, SOB limited cooperation w/ more formal motor exam.  Reflexes:  toes down-going  Sensory:  light touch sensation intact and symmetric throughout upper and lower extremities, no extinction on double simultaneous stimulation   Coordination:  Deferred today.  FNF HS previously WNL  Station/Gait:  deferred    Imaging  I personally reviewed all imaging; relevant findings per HPI.     Lab Results Data   CBC  Recent Labs   Lab 12/13/22  1014 12/12/22  0551 12/11/22  1702   WBC 21.6* 24.5* 24.7*   RBC 3.16* 3.15* 3.32*   HGB 9.5* 9.4* 10.1*   HCT 26.5* 26.5* 27.9*    192 157     Basic Metabolic Panel    Recent Labs   Lab 12/13/22  0545 12/12/22  1344 12/12/22  0717 12/12/22  0228 12/11/22  1908 12/11/22  1702     --   --  137  --  136   POTASSIUM 4.4 3.7 3.3* 3.3*  3.3*   < > 3.4   CHLORIDE 97*  --   --  99  --  99   CO2 18*  --   --  18*  --   18*   .0*  --   --  97.5*  --  94.5*   CR 3.17*  --   --  3.50*  --  3.43*   *  --   --  115*  --  172*   YUNIOR 8.1*  --   --  8.1*  --  7.9*    < > = values in this interval not displayed.     Liver Panel  Recent Labs   Lab 12/13/22  0545 12/12/22  0531 12/11/22  1702   PROTTOTAL 6.1* 5.7* 5.5*   ALBUMIN 2.5* 2.5* 2.5*   BILITOTAL 1.3* 1.5* 1.3*   ALKPHOS 175* 150* 157*   AST 93* 70* 68*   ALT 16 18 22     INR    Recent Labs   Lab Test 12/04/22  1341   INR 1.23*      Lipid Profile    Recent Labs   Lab Test 12/07/22  0434   CHOL 101   HDL 14*   LDL 35   TRIG 260*     A1C    Recent Labs   Lab Test 12/07/22  1359   A1C 6.6*     Troponin    Recent Labs   Lab 12/07/22  0434   CTROPT 1,634*          Data         MRI/Head CT CT Head w/o contrast 12/4  1.  No acute intracranial process.     MRI Brain w/o contrast 12/4  1.  5 scattered foci of acute ischemia involving the bilateral anterior and posterior cerebral artery distributions. Constellation of findings is suspicious for central embolic phenomena      Intracranial Vasculature CTA Head w/ contrast 12/4  1.  No significant stenosis, aneurysm, or high flow vascular malformation identified.  2.  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   Cervical Vasculature CTA Neck w/ contrast 12/4  1.  No hemodynamically significant stenosis in the neck vessels.   2.  No evidence for dissection.         Echocardiogram ILDEFONSO 12/5  - Known radiation induced valve disease with severe calcific mitral disease. No definite endocarditis noted.  - Severe mitral annular calcification is present with multiple calcific nodules, no definite vegetation seen.  - S/p bioprosthetic AVR (23 mm Cordell-Hubbard Perimount Magna/Magna Ease pericardial prosthesis). - Valve appears well seated with good leaflet opening.  - No vegetations  visualized.  - Global and regional left ventricular function is normal with an EF of 60-65%.   EKG/Telemetry Sinus tachycardia   Right bundle branch block   Left anterior fascicular block   Bifascicular block    Abnormal ECG   When compared with ECG of 20-JUL-2012 20:12,   (RBBB and left anterior fascicular block) is now Present    Other Testing        LDL  12/7/2022: 35 mg/dL   A1C  No lab value available in past 90 days   Troponin 12/7/2022: 1,634 ng/L

## 2022-12-13 NOTE — PLAN OF CARE
"Neuro: A&Ox3-4. Disoriented to time. Able to make needs known.  Cardiac: ST with HR's 110-120's. T-MAX this .0F. PRN Tylenol given. Recheck 98.1F. T-MAX this evening, 100.3F. PRN Tylenol given. Recheck 99.0F.  Respiratory: Sating >90% on RA - 3L NC. LS course. Tachypneic (low 30's) w/abd and accessory muscle use. Fair, nonproductive cough.  GI/: AUOP via chan. Lasix IV 40mg given x1. No BM.   Diet/appetite: Tolerating Soft & Bite Sized Diet (Level 6) Thin Liquids (Level 0) + snacks/supps. Drank one Ensure Alive. Poor appetite. Total feed. Strict I/O's. Endorses thirst. On pascual counts (12/13 - 12/16). SLP following.  Activity: Assist of 2 + lift, repositioned q2hrs PRN. Encouraged OOB activity. PT/OT consulted.  Pain: At acceptable level on current regimen.   Skin: No new deficits noted.   LDA's: L PIV - Abx/TKO.    K+ (3.3), replaced. Recheck 3.7. Triggered LA BPA multiple times (2.4, 2.3, 3.3). Compass done/IT ticket placed. Left for MRI spine 1700, returned at 1730. Did not tolerate MRI. Per pt, unable to lay flat, was SOB, and became anxious due to \"loud noises.\" For BPA LA 3.3, Albumin 5% 12.5g given. LA recheck scheduled for 1900.    Plan: Will continue with POC and notify primary team with any changes.    "

## 2022-12-13 NOTE — PROGRESS NOTES
"[facility] Medicine Cross Cover Note  2022 - 12:18 AM    RN called me - ie / regarding patient sounds \"wet\" with some decrease in oxygen saturation(s) - though only on oxygen 1.5LPM.  Cr stable.  ? Volume overload - seems the most likely.    /58 (BP Location: Right arm, Patient Position: Semi-Roca's)   Pulse 112   Temp 99.8  F (37.7  C) (Axillary)   Resp 20   Wt 85.4 kg (188 lb 4.4 oz)   SpO2 95%   BMI 28.63 kg/m      Assessment / Plan:    Trial of furosemide 40mg IV X 1 --- if still gurgling, ? Trial of robinul or scopolamine patch.  Discussed with RN.    Addendum:  1:43 AM 2022  Called urgently to the bedside by RN.  I examined the patient - he is stating \"I'm uncomfortable\".  Appears with marked labored breathing.  Lungs with diffuse severe coarse breathing - upper airway sounds obscuring any listening to the lower airway sounds    CXR A/P portable:  My reading --- left lung ? Collapse with appearance of lobar collapse    Assessment:  ? Mucous plug with left lung partial collapse.    Plan:  nasotrach suctioning trial.  If not successful, then consider bronch for mucous plug removal.  I will also add Mucomyst nebs and guaifenacin.    Umang Wolfe MD  [phone]   "

## 2022-12-13 NOTE — PROGRESS NOTES
[facility] Progress Note     Patient:  Eloy Fletcher   YOB: 1955, MRN: [id number]  Date of Visit: 12/13/2022  Date of Admission: 12/4/2022  Consult Requester: Lauri Friend MD       Assessment:   1. Sepsis: MSSA bacteremia  -  Suspected endocarditis, with CNS emboli and CSF culture positivity. ILDEFONSO did not reveal vegetations, but that does not fully exclude endocarditis.  Last positive culture 12/10 AM (~24 hrs to grow), (12/11: no growth to date)  2. Pneumonia - Possible Aspiration, possible MSSA bacteremic seeding of lung. +MSSA from sputum. At risk for aspiration pneumonia due to altered mental status.     -GNR coverage added 12/10 with ciprofloxacin.  3. Encephalopathy; CNS multiple Septic emboli . Assume MSSA  Acute embolic CVA  CSF Culture positive MSSA (also +RBC. Waxing/waning. Improved on 12/10-11    - Continued on Linezolid given (CNS tissue and CSF penetration).   4. Cholestasis / cholestatic hepatitis.  Critical illness cholestasis vs. Nafcillin as a cause of cholestatic hepatitis -- although no peripheral eosinophilia is present in blood .  At risk for ascending cholangitis  -- Stopped nafcillin on 12/10 has improved LFTs.  T-bili from 4.2 to 1.5  5. Acute kidney injury, creatinine beginning to decrease (Cr = 4.1 -> 3.9 -> 3.6-->3.2mg/dL) - multi-factorial -> sepsis, hypotension, meds.  Creatinine rise started before Nafcillin was started (and before Gent was given).    6. Biprosthetic Aortic valve 2014; mitral stenosis with calcifications - no evidence of vegetations on ILDEFONSO. (yet septic emboli to CNS make endocarditis / endovascular still probable)  7.  Asplenic -- further complicates ability to clear bacteremia.      ID Recommendations:    1. Change Ancef to Ceftriaxone 2g q24h - broaden for gram negative coverage with recent CXR, increased CRP/Lactic acid. Possible aspiration pneumonia  2. Start Metronidazole 500 mg BID for anaerobic coverage in setting of  "aspiration pneumonia  3. Continue: Linezolid (started 12/9) for CNS MSSA coverage.    ID Will follow.         Plan and Discussion:  Summary: 66 year-old male with PMHx notable for biprosthetic AVR 2014, hodgkin's lymphoma with thoracic radiation Evaluated for Staphylococcus Aures bacteremia with encephalopathy and acute embolic CVA. Since admission, CRP down trending. Blood and CSF cultures positive for staph aureus MSSA 12/4 .  Continued Nafcillin.  Provided gentamicin on the 5th, Gent level 0.4 on the 8th.  Cultures 12/7 and 12/8 positive for gram positive cocci in clusters.    On Linezolid (12/9), Cefazolin (12/10), Ciprofloxacin (12/10)     1. MSSA bacteremia with suspected endocarditis given multifocal CNS emboli. ILDEFONSO not demonstrative of \"definite\" valvular lesions, bubble study reported non-diagnostic therefore PFO still technically on differential.     Given the findings, reasonable to treat with current endocarditis guidelines with daily cultures until negative for 48-72 hours, then starting 6 week course of antibiotics at that point.     If persistently positive blood cultures, further work-up for possible source of infection is reasonable. Unfortunately, patient unable to tolerate thoracic/cervical spine. Lumbar MRI not noting specific abscess.  Given prior reported epidural steroid injection, may consider CT if unable to tolerate and bacteremia persists. Presentation may be resultant from septic emboli from endocarditis and possible aspiration pneumonia    2. Pneumonia - Possible Aspiration, possible MSSA bacteremic seeding of lung. +MSSA from sputum. At risk for aspiration pneumonia due to altered mental status.  Worsening Lactic acid and CRP, prior ground glass opacities on CT chest on the Left, now worsening Chest XR. Agree plausible concomitant fluid overload, possible atelectesis.   In the setting of the clinical picture with altered mental status and worsening inflammatory markers, broadened to " Ceftriaxone and Metronidazole for possible aspiration pneumonia       Will continue to follow for further recommendations and overall course.      Thank you for the consult. ID will continue to follow with you.     Ifrah Weiner, MS4   Pager [phone]  12/13/2022               Interval History and Events:   Overnight, acute events include shortness of breath and continued neck pain. Furosemide provided.No additional complaints except breathing/ uncomfortable in general.  Tmax (100.8F past 24hrs) .  No significant improvement over a few days per report.     Linzeolid started on 12/9.  T-bili improved. Renal function improving. Last positive culture on 12/10. Ciprofloxacin added 12/10 and Cefazolin.  Tobramicin nebulization started 12/11    Culture 12/11 NGTD.       Pertinent cultures include:  Culture   Date Value Ref Range Status   12/10/2022 Positive on the 1st day of incubation (A)  Preliminary   12/10/2022 Gram positive cocci in clusters (AA)  Preliminary     Comment:     1 of 2 bottles   12/09/2022 Positive on the 1st day of incubation (A)  Preliminary   12/09/2022 Staphylococcus aureus (AA)  Preliminary     Comment:     1 of 2 bottlesSusceptibilities done on previous cultures   12/08/2022 Positive on the 1st day of incubation (A)  Final   12/08/2022 Staphylococcus aureus (AA)  Final     Comment:     1 of 2 bottlesSusceptibilities done on previous cultures       Recent Inflammatory Biomarkers:   Recent Labs   Lab Test 12/11/22  0508 12/10/22  0545 12/09/22  0814 12/09/22  0532 12/08/22  0812 12/07/22  1359 12/07/22  0434 12/06/22  0422 12/05/22  1141 12/04/22  2353   .00* 220.00*  --  219.00*  --   --  254.00* 387.00*  --   --    PCAL  --   --   --   --   --   --  24.80*  --   --  54.20*   WBC 25.8* 21.3* 21.4*  --  15.4* 13.6* 12.9* 12.3*   < > 11.4*    < > = values in this interval not displayed.            Antimicrobial Treatment:   Cefazolin 12/10->  Ciproflox 400mg QDay 12/10 ->  Linezolid 12/9  "->  Nafcillin 12/5-12/10 stopped due to possible cholestatic hepatitis   Received one dose Gentamicin (5mg/kg) on 12/5 follow up level 12/8: 0.4     Stopped: 12/6: Ceftriaxone   Stopped 12/5:  vancomycin, acyclovir, ampicillin;          Review of Systems:   Targeted 4 point ROS was completed with pertinent positives and negatives are detailed above.         HPI:   Adopted from initial consult note on 12/05/2022     Summary:       66 year-old male with PMHx notable for Aortic stenosis s/p bioprosthetic AVR 2014, CAD s/p stent 2012, moderate mitral regurgitation/stenosis on ILDEFONSO 11/2022, with prior chemotherapy/radiation with Hodgkin's Lymphoma 1979, testicular cancer and prostate cancer admitted overnight on 12/04 for sepsis, encephalopathy, and acute embolic CVA. Evaluated for possible endocarditis MSSA Bacteremia       \"History obtained from review of chart and discussion with patient/nursing staff.      Eloy Fletcher is a 66 year old male who presented with new left lateral ocular gaze and right sided hemineglect, weakness and fevers onset 12/02/2022.  PMHx notable for Aortic Stenosis s/p bioprosthetic AVR 2014, CAD s/p PCI and stent 2012, moderate mitral regurgitation/stenosis on ILDEFONSO 11/2022, with prior chemotherapy/radiation with Hodgkin's Lymphoma 1979, testicular cancer and prostate cancer admitted overnight on 12/04 for sepsis, encephalopathy, and acute embolic CVA. Evaluated for possible endocarditis.     Course: Initial presentation to [facility] ED with confusion and unresponsiveness 12/4, discussion with his ex-wife,  initial onset of symptoms 12/1/22 multiple episodes of vomiting with a fever. Improved overnight, recurred and worsened 12/3.  He presented with left gaze preference, right-sided neglect, weakness, and fevers. Initial findings significant for showing tachycardia, tachypnea, and fevers, initial eukocytosis of 11.4, lactic acid 4.2. At that time he was confused and not responding appropriately.   " "He was evaluated by neurology and found to have MRI findings with non-specific small multifocal CVA suspected embolic in origin. He was provided empiric antibiotics, Blood cultures were drawn.    Due to concern for CNS infection, a LP was performed at the [facility] and was further transferred to [facility] ICU for work-up of endocarditis in the setting of hypotension. Initially provided   Acyclovir, Ampicillin, Ceftriaxone, Piperacillin Tazobactam and Vancomycin.   CT Chest also showing GGO in the MIGUEL ANGEL and LLL with tree-in-bud findings concerning for pneumonia (CAP vs. Aspiration). LP performed showing WBC of 230 with elevated protein at 59 and glucose at 82.                  Today, the patient was seen and examined at bedside, mild aphasia/ difficult word finding on exam. Patient is able to answer yes/no questions. Interval changes,  appears improved since 12/4.  Associated symptoms at this time positive for cough, with green/yellow sputum. No shortness of breath. Denies CV complaints of chest pain, diaphoresis, no abdominal pain. Denies new rashes or lesions. Denies new numbness or subjective weakness.      Pertinent Cardiac History: Aortic stenosis S/P bioprosthetic AVR in 2014, and CAD s/p PCI   2012.  Of note, recently evaluated with Cardiology at [facility]. Possible planned early next year, \"redo median sternotomy and mitral valve replacement with a tissue valve. We may have to replace the ascending aorta at the same time. The patient has right bundle branch block and a left bob fascicular block\" per evaluation with Dr. Gurrola 11/16/2022.  Last ILDEFONSO 11/11/2022, with LVEF 65%, Moderate-severe mitral valve regurgitation with moderate mitral stenosis and severe annular mitral calcification, demonstrated normal aortic valve tissue prosthesis.   Pertinent Oncology History: Prostate cancer (Elly 4+3, TNM: cT1c) treated with chemo/radiation (last dose 3/30/2022), good response at that time. Past history in " "remission: Testicular cancer (30 years prior  s/p chemo & Left orchiectomy) and Hodgkins lymphoma (1970s treated with MOPP & radiation)\"         Physical Examination:   Temp:  [98.1  F (36.7  C)-100.3  F (37.9  C)] 99.1  F (37.3  C)  Pulse:  [104-113] 111  Resp:  [12-30] 30  BP: ()/(54-62) 110/59  SpO2:  [90 %-98 %] 93 %    I/O last 3 completed shifts:  In: 1813 [P.O.:1560; I.V.:3]  Out: 3045 [Urine:3045]    Vitals:    12/09/22 0556 12/11/22 0615 12/12/22 0618 12/12/22 1044   Weight: 86.4 kg (190 lb 7.6 oz) 85.8 kg (189 lb 2.5 oz) 85.2 kg (187 lb 13.3 oz) 85.4 kg (188 lb 4.4 oz)    12/13/22 0453   Weight: 86.4 kg (190 lb 7.6 oz)       Constitutional: Adult male seen and examined at bedside.  Tired appearing  Respiratory: increased work of breathing, diminished breath sounds on the left. On 1.5L NC  Cardiovascular: Tachycardic, regular rhythm, grade IV systolic ejection murmur noted. 1+ pedal edema.   Skin: Warm, dry, well-perfused. No bruising, bleeding, rashes, or lesions on limited exam.   Musculoskeletal: Extremities grossly normal, non-tender   Neurologic: pupils equal and reactive to light, moves extremities spotaneously  Vascular access:  12/4 LUE pIV RUE pIV, 12/5 Right UE pIV  CDI, non-tender, no surrounding erythema.          Medications:       acetylcysteine  2 mL Nebulization Q4H     ceFAZolin  2 g Intravenous Q12H     furosemide  40 mg Intravenous Once     guaiFENesin  600 mg Oral BID     heparin ANTICOAGULANT  5,000 Units Subcutaneous Q12H     linezolid  600 mg Intravenous Q12H     pantoprazole  40 mg Oral QAM AC     sodium chloride (PF)  3 mL Intracatheter Q8H     tobramycin (PF)  300 mg Nebulization 2 times daily       Antiinfectives:  Anti-infectives (From now, onward)    Start     Dose/Rate Route Frequency Ordered Stop    12/11/22 2000  tobramycin (PF) (NATALYA) neb solution 300 mg         300 mg Nebulization 2 TIMES DAILY RT 12/11/22 1234 12/14/22 1959 12/11/22 1512  ceFAZolin (ANCEF) 2 g in " 100 mL D5W intermittent infusion        Note to Pharmacy: ID recs q8 for 24 hours then q12 based on current renal function, but defer to pharmacy after first 24 hours    2 g  200 mL/hr over 30 Minutes Intravenous EVERY 12 HOURS 12/11/22 0739      12/09/22 1600  linezolid (ZYVOX) infusion 600 mg         600 mg  over 60 Minutes Intravenous EVERY 12 HOURS 12/09/22 1529            Infusions/Drips:    - MEDICATION INSTRUCTIONS -       - MEDICATION INSTRUCTIONS -       sodium chloride 150 mL/hr (12/05/22 1330)            Laboratory Data:     Microbiology:      Summary: 12/4 Blood and CSF cultures with Staph Aureus. Verigene with no evidence of MecA      12/7 Prelim Gram Positive Cocci 1/2 bottles  Blood Culture Hand, Right     Collected 12/7/2022  4:34 AM      Status: Preliminary result      Visible to patient: No (not released)     Specimen Information: Hand, Right; Blood    0 Result Notes  Culture Positive on the 1st day of incubation Abnormal        Gram positive cocci in clusters Panic     1 of 2 bottles        Resulting Agency: [facility]             Details:      Cerebrospinal fluid Aerobic Bacterial Culture Routine    Culture   Lab   1+ Staphylococcus aureus Panic   [facility]          Gram Stain quantification of host cells and microbiological organisms was done on a cytocentrifuged preparation.              Collected 12/4/2022  5:38 PM        12/04/2022 1601 12/07/2022 0644 Blood Culture Peripheral Blood [[id number]]   (Abnormal)   Peripheral Blood    Final result Component Value   Culture Positive on the 1st day of incubation Abnormal     Staphylococcus aureus Panic     2 of 2 bottles  Susceptibilities done on previous cultures             12/04/2022 1534 12/07/2022 0815 Blood Culture Line, venous [[id number]]    (Abnormal)   Blood from Line, venous    Final result Component Value   Culture Positive on the 1st day of incubation Abnormal     Staphylococcus aureus Panic     2 of 2 bottles       Susceptibility      Staphylococcus aureus     DEJA     Clindamycin <=0.25 ug/mL Susceptible     Erythromycin <=0.25 ug/mL Susceptible     Gentamicin <=0.5 ug/mL Susceptible     Oxacillin 0.5 ug/mL Susceptible     Tetracycline <=1 ug/mL Susceptible     Trimethoprim/Sulfamethoxazole <=0.5/9.5 u... Susceptible     Vancomycin <=0.5 ug/mL Susceptible                  12/04/2022 1534 12/07/2022 0645 Blood Culture Line, venous [[id number]]   (Abnormal)   Blood from Line, venous    Final result Component Value   Culture Positive on the 1st day of incubation Abnormal     Staphylococcus aureus Panic     2 of 2 bottles  Susceptibilities done on previous cultures             12/04/2022 1534 12/05/2022 0551 Verigene GP Panel [[id number]]    (Abnormal)   Blood from Line, venous    Final result Component Value   Staphylococcus aureus Detected Abnormal    Positive for Staphylococcus aureus and negative for the mecA gene (not resistant to methicillin) by [facility] multiplex nucleic acid test. Final identification and antimicrobial susceptibility testing will be verified by standard methods.   Staphylococcus epidermidis Not Detected   Staphylococcus lugdunensis Not Detected   Enterococcus faecalis Not Detected   Enterococcus faecium Not Detected   Streptococcus species Not Detected   Streptococcus agalactiae Not Detected   Streptococcus anginosus group Not Detected   Streptococcus pneumoniae Not Detected   Streptococcus pyogenes Not Detected   Listeria species                    CSF Findings:   *High: RBC *78, total nucleated cells *230, Glucose CSF *82  Abnormal CSF appearance Hazy.   Otherwise, colorless        Meningitis/Encephalitis Panel Qual PCR CSF:  Order: [id number]   Status: Final result      Visible to patient: Yes (not seen)      1 Result Note    Component Ref Range & Units 1 d ago   (12/4/22) 1 d ago   (12/4/22)    Escherichia coli K1 Negative Negative       Haemophilus influenzae Negative Negative       Listeria monocytogenes Negative  Negative       Neisseria meningitidis Negative Negative       Streptococcus agalactiae (GBS) Negative Negative       Streptococcus pneumoniae Negative Negative  Not Detected R     Cytomegalovirus Negative Negative       Enterovirus Negative Negative       Herpes simplex virus 1 Negative Negative      Comment: Recommend testing with another molecular method if clinical suspicion for infection is high.    Herpes simplex virus 2 Negative Negative      Comment: Recommend testing with another molecular method if clinical suspicion for infection is high.    Human Herpes Virus 6 Negative Negative       Human parechovirus Negative Negative       Varicella zoster virus Negative Negative       Cryptococcus neoformans/gattii Negative Negative      Comment: Recommend testing for Cryptococcal antigen and fungal culture if clinical suspicion for infection is high.         Strep pneumo Agn Ur greater or equal to 13yrs or CSF any age (1 mL minimum)  Order: [id number]   Status: Final result      Visible to patient: Yes (not seen)     Specimen Information: Lumbar Puncture; Cerebrospinal fluid    1 Result Note    Component Ref Range & Units 1 d ago     Streptococcus pneumoniae antigen Negative Negative    Comment: A negative Streptococcus pneumoniae antigen result does not rule out infection with Streptococcus pneumoniae.               Inflammatory Markers    Recent Labs   Lab Test 12/13/22  0545 12/12/22  0531 12/11/22  0508   .00* 253.00* 225.00*   Procalcitonin: 54.20 (12/4/22)  Lactic Acid: 4.6 (12/4), 4.2 (12/4), 4.6(12/5) 1.7 (12/6)  Cardiac: Troponin: 610 (12/4), 983 (12/4), 1,229 (12/5)    Metabolic Studies     Recent Labs   Lab Test 12/13/22  0552 12/13/22  0545 12/12/22  1600 12/12/22  1344 12/12/22  0936 12/12/22  0717 12/12/22  0531 12/12/22  0228 12/11/22  1908 12/11/22  1702 12/07/22  1650 12/07/22  1359   NA  --  136  --   --   --   --   --  137  --  136   < >  --    POTASSIUM  --  4.4  --  3.7  --  3.3*  --  3.3*   3.3*   < > 3.4   < >  --    CHLORIDE  --  97*  --   --   --   --   --  99  --  99   < >  --    CO2  --  18*  --   --   --   --   --  18*  --  18*   < >  --    ANIONGAP  --  21*  --   --   --   --   --  20*  --  19*   < >  --    BUN  --  102.0*  --   --   --   --   --  97.5*  --  94.5*   < >  --    CR  --  3.17*  --   --   --   --   --  3.50*  --  3.43*   < >  --    GFRESTIMATED  --  21*  --   --   --   --   --  18*  --  19*   < >  --    GLC  --  113*  --   --   --   --   --  115*  --  172*   < >  --    A1C  --   --   --   --   --   --   --   --   --   --   --  6.6*   YUNIOR  --  8.1*  --   --   --   --   --  8.1*  --  7.9*   < >  --    PHOS  --  8.1*  --   --   --   --    < >  --   --   --    < >  --    MAG  --  2.6*  --   --   --   --    < >  --   --   --    < >  --    LACT 3.1*  --    < >  --    < > 2.4*  --   --    < >  --    < >  --     < > = values in this interval not displayed.       Hepatic Studies    Recent Labs   Lab Test 12/13/22  0545 12/12/22  0531 12/11/22  1702 12/06/22  0422 12/05/22  1008   BILITOTAL 1.3* 1.5* 1.3*   < > 1.1   ALKPHOS 175* 150* 157*   < > 58   ALBUMIN 2.5* 2.5* 2.5*   < > 2.9*   AST 93* 70* 68*   < > 131*   ALT 16 18 22   < > 61*   LDH  --   --   --   --  468*    < > = values in this interval not displayed.       Pancreatitis testing    Recent Labs   Lab Test 12/07/22  0434   TRIG 260*       Hematology Studies      Recent Labs   Lab Test 12/12/22  0551 12/11/22  1702 12/11/22  0508 12/10/22  0545   WBC 24.5* 24.7* 25.8* 21.3*   ANEU  --   --   --  20.0*   ALYM  --   --   --  1.3   LATONYA  --   --   --  0.0   AEOS  --   --   --  0.0   HGB 9.4* 10.1* 11.0* 10.8*   HCT 26.5* 27.9* 30.8* 30.4*    157 166 138*       Arterial Blood Gas Testing    Recent Labs   Lab Test 12/09/22  1147   O2PER 2        Urine Studies     Recent Labs   Lab Test 12/07/22  1509 12/05/22  0538 12/04/22  1534   URINEPH 5.5 5.5 5.0   NITRITE Negative Negative Negative   LEUKEST Trace* Negative Negative   WBCU  6* 8* 3       Vancomycin Levels     No lab results found.    Tobramycin levels     No lab results found.    Gentamicin levels    Recent Labs   Lab Test 12/08/22  0812   GENT 0.4       CSF testing     Recent Labs   Lab Test 12/04/22  1738   CWBC 230*   CNEU 86   CLYM 1   CMONO 4   COTH 9   CRBC 78*   CGLU 82*   CTP 59.7*       Last check of C difficile  C Difficile Toxin B by PCR   Date Value Ref Range Status   12/05/2022 Negative Negative Final     Comment:     A negative result does not exclude actual disease due to C. difficile and may be due to improper collection, handling and storage of the specimen or the number of organisms in the specimen is below the detection limit of the assay.            Imaging:   Reviewed available imaging reports and correlated clinically.         XR CHEST PORT 1 VIEW  12/13/2022 1:45 AM IMPRESSION:   1. Further increase in left mid and retrocardiac patchy mixed airspace  interstitial and airspace opacities, may represent pulmonary edema  and/or atelectasis; however, cannot exclude component of infection.  2. Stable mixed interstitial and patchy airspace opacities within the  right lung, mild.      MRI w/o lumbar spine 12/12/2022: 1. No findings to suggest lumbar spinal abscess on this noncontrast MRI.  2. Widening of the left L4-5 facet with a small effusion likely  represents degenerative changes when compared to CT 12/14/2022,  potentially inflammatory arthritis. Infection is less likely given the  lack of STIR hyperintense marrow surrounding the facet joint.  3. Heterogeneous T1 and T2 hyperintensities throughout the lumbar  spine vertebrae and sacrum bilaterally likely represents a comminution  of degenerative and post radiation changes.    XR CHEST 12/11/2022  Increased left greater than right mixed opacities, likely  worsening pulmonary edema. Infection is not excluded.      EKG     Component Ref Range & Units 12/9/22 11:28 AM 12/7/22  5:02 AM    Systolic Blood Pressure mmHg        Diastolic Blood Pressure mmHg       Ventricular Rate   102     Atrial Rate   102     SC Interval ms  176     QRS Duration ms 144  144     QT ms 382  400     QTc ms 551  521     P Axis degrees  53     R AXIS degrees -79  -75     T Axis degrees 23  22     Interpretation ECG  Wide QRS tachycardia   Right bundle branch block   Left anterior fascicular block     Bifascicular block     Abnormal ECG   When compared with ECG of 07-DEC-2022 05:02,   Wide QRS tachycardia has replaced Sinus rhythm  Sinus tachycardia   Right bundle branch block   Left anterior fascicular block    Bifascicular block    Abnormal ECG   When compared with ECG of 20-JUL-2012 20:12,   (RBBB and left anterior fascicular block) is now Present   Confirmed by Jackson Kong ([id number]) on 12/7/2022 10:12:14 AM          Echo ILDEFONSO Study Date: 12/05/2022 01:14 PM   Interpretation Summary  Known radiation induced valve disease with severe calcific mitral disease. No  definite endocarditis noted.  Severe mitral annular calcification is present with multiple calcific nodules,  no definite vegetation seen.  S/p bioprosthetic AVR (23 mm Cordell-Hubbard Perimount Magna/Magna Ease  pericardial prosthesis). Valve appears well seated with good leaflet opening.  No vegetations visualized.  Global and regional left ventricular function is normal with an EF of 60-65%.     TTE: 12/5 Interpretation Summary  Would consider a transesophageal echocardiogram if clinically indicated.  Technically difficult study.Extremely poor acoustic windows.  Limited information was obtained during study.  Global and regional left ventricular function is normal with an EF of 60-65%.  Right ventricular function, chamber size, wall motion, and thickness are  normal.  Severe mitral annular calcification is present.  Mitral mean gradient 11mmHg at heart rate 131 BPM.  H/O Bioprosthetic AVR in 2014. Valve not well visualized.    MRI of the brain 12/4/22: IMPRESSION: 5 scattered  foci of acute ischemia involving the bilateral anterior and posterior cerebral artery distributions. Constellation of findings is suspicious for central embolic phenomena.           CT Abdomen and Pelvis: 12/4/22  IMPRESSION:  1.  Patchy groundglass opacities in the left upper and lower lobes, and cluster of tree-in-bud nodularity in the left upper lobe, likely infectious or inflammatory.  2.  Interstitial pulmonary edema.  3.  Trace left pleural effusion.  4.  No acute findings in the abdomen or pelvis.        CT head and CTA head/neck 12/4/22, were not suggestive of an acute abnormality, no significant stenosis of carotids or vertebral arteries.      CXR 12/7/22:  IMPRESSION: Diffuse interstitial markings bilaterally and streaky  perihilar and bibasilar opacities likely represent pulmonary edema  versus atelectasis. No focal consolidation.    Attending Physician Attestation:  I, Lauri Branham MD have seen and evaluated Eloy Fletcher. I have discussed with the primary provider team Dr Whitaker multiple times, and I agree with the above documented findings and plan in this note. I have reviewed today's vital signs, medications, labs and imaging.    Floor time: 30 minutes  Face-to-face time: 15 minutes  Total time: 45 minutes     Lauri Branham MD MPH  Professor of Medicine  Division of Infectious Diseases & International Medicine  Department of Medicine

## 2022-12-13 NOTE — PROVIDER NOTIFICATION
"Provider: Balke   Time: 2351  Message:    From FRANCES Rodgers [id number]  [id number] GD-- his lungs are sounding more coarse and wet, hes SOB, accessory muscles. resps are 35-40.   maybe some Lasix would help? maybe chest xray? maybe ABGs if he continues to have resps that high?    Response: 40 lasix IV and reassess/monitor        Provider: Balke   Time: 0125  Message:     Had respiratory come assess patient, he agrees his respiratory status is declining.     Paged again, \"please call [phone]\" at 0125   Will ask for MD to see pt at bedside     Response: provider came to bedside, ordered nebs(Mucomyst),  Mucinex, nasopharyngeal suctioning, chest xray.     Suctioned (nasopharyngeal)  Small amount of old blood clots, pt tolerated well. After suctioning and RT giving nebs, pts respiratory status improved.   On 2L NC with humidity.      Chest x ray states    \"Further increase in left mid and retrocardiac mixed patchy  airspace, interstitial and consolidative opacities, likely pulmonary edema and atelectasis, however cannot exclude component of infection. Stable subtle right next opacities\"    Vishal Bustos RN on 12/13/2022 at 6:41 AM      "

## 2022-12-13 NOTE — PLAN OF CARE
Neuro- A&O x4  Cardiac- -120s  VS Blood pressure 103/54, pulse 112, temperature 98.8  F (37.1  C), temperature source Axillary, resp. rate 22, weight 86.4 kg (190 lb 7.6 oz), SpO2 98 %.  Pain-denied pain   O2-  had respiratory issues please see provider notification note for more details. Now on 2L NC   GI/-  chan in place. Had a small BM. On soft and bite sized and thin liquids. Able to swallow pills whole.   Lines-  PIV was replaced. New PIV right forearm, chan.   Activity- awake all night with respiratory issues. MD aware.     No other concerns will continue to monitor and follow POC.   Vishal Bustos RN on 12/13/2022 at 6:46 AM

## 2022-12-13 NOTE — PROGRESS NOTES
Calorie Count  Intake recorded for: 12/12  Total Kcals: 0 Total Protein: 0g  Kcals from Hospital Food: 0   Protein: 0g  Kcals from Outside Food (average):0 Protein: 0g  # Meals Ordered from Kitchen: no meals ordered from kitchen.   # Meals Recorded: no intake recorded.   # Supplements Recorded: no intake recorded.

## 2022-12-14 NOTE — PLAN OF CARE
Neuro: A&Ox3-4. Ocassionally disoriented to time and place. Was drowsy at start of shift but got better as the shift progressed.   Cardiac: ST with HR  100-120s.  Respiratory: Sating above 94% on BIPAP 40% FiO2. Respiratory got worst towards the end of the shift which has been a known issue for him. Provider was notified and plan is to monitor to see if he gets worst due to sating well on BIPAP despite audible worsening in resp.  GI/: Adequate urine output via chan. 1 Incontinence BM.  Diet/appetite: soft and bite sized diet (lvl 6) thin liquids (lvl 0)  Activity:  Pt was not oob. Slides down constantly in bed. 2 assist for repo.  Pain: Denied pain  Skin: No new deficits noted.  LDA's: 1 PIV currently infusing metronidazole @ 100ml/hr    Plan: Continue with POC. Notify primary team with changes.

## 2022-12-14 NOTE — PROVIDER NOTIFICATION
"   12/13/22 2100   Call Information   Date of Call 12/13/22   Time of Call 2137   Name of person requesting the team Zora   Title of person requesting team RN   RRT Arrival time 2138   Time RRT ended 2146   Reason for call   Type of RRT Adult   Primary reason for call Sepsis suspected   Sepsis Suspected Elevated Lactate level;Heart Rate > 100;WBC <4 or >12   Was patient transferred from the ED, ICU, or PACU within last 24 hours prior to RRT call? No   SBAR   Situation LA = 2.7   Background per provider H&P \"Eloy Fletcher is a 66-year-old male with a past medical history of HTN, HLD, Hodgkin's lymphoma (s/p radiation and MOPP chemo in remission), Testicular cancer (s/p left orchiectomy and chemo, in remission), prostate cancer (Elly 4+3, diagnosed in 4/2021 and s/p radiation and lupron), aortic stenosis S/P bioprosthetic AVR in 2014, and CAD s/p PCI to LAD in 2012 that is admitted to the ICU for sepsis and encephalopathy.\"   Notable History/Conditions Cancer;Cardiac;Hypertension   Assessment Drowsy on the Bipap-easily aroused, already been poked 3x today for lactic acids, lactic acid lower than previous one of 3.0   Interventions No interventions   Patient Outcome   Patient Outcome Stabilized on unit   RRT Team   Attending/Primary/Covering Physician Murphy Juarez   Physician(s) Carissa Saba PA-C   Lead RN Janessa Finch   RT na   Post RRT Intervention Assessment   Post RRT Assessment Stable/Improved   Date Follow Up Done 12/14/22   Time Follow Up Done 0005     "

## 2022-12-14 NOTE — PROGRESS NOTES
[facility] Medicine Cross Cover Note  2022 - 5:06 AM    Paged by FRANCES Moore ie / regarding patient's increased RR and increased work of breathing - and wondering about more furosemide.  Chart reviewed - patient known to me from similar episodes previously.  Oxygen saturation(s) are good.    /63 (BP Location: Right arm)   Pulse 108   Temp 99  F (37.2  C) (Axillary)   Resp 17   Wt 85.2 kg (187 lb 13.3 oz)   SpO2 97%   BMI 28.56 kg/m      Assessment / Plan:    Intermittent increased work of breathing and difficulty with respirations.  ? Continued underlying metabolic stressors with staph sepsis / endocarditis.    Plan:  Continue close observation and monitoring and BIPAP as tolerated.  I relayed this plan to FRANCES Moore.    Umang Wolfe MD  [phone]

## 2022-12-14 NOTE — PROGRESS NOTES
Calorie Count  Intake recorded for: 12/13  Total Kcals: 180 Total Protein: 6g  Kcals from Hospital Food: 180  Protein: 6g  Kcals from Outside Food (average):0 Protein: 0g  # Meals Ordered from Kitchen: 1 meal   # Meals Recorded: 50% pudding   # Supplements Recorded: 50% 1 Ensure Clear

## 2022-12-14 NOTE — CONSULTS
[facility] Inpatient Consult Dermatology Note    Impression/Plan:  1. Pustules and palpable purpura on the trunk and flexor surfaces of the extremities  On the differential diagnosis would be acute generalized exanthematous pustulosis, morbiliform drug eruption with RBC extravasation, v much less likely vasculitis or septic emboli. Discussed that a biopsy would assist in diagnosis of this condition. Patient's daughter was agreeable to having a biopsy done to assist in diagnostic workup. Favor AGEP given presence of pustules on exam. LFTs mildly elevated, JILLIAN likely unrelated to dermatologic manifestations. No edema or mucosal involvement to suggest DIHS or SJS/TEN. Typically these conditions occur as a result of medications (antibiotics, antifungals, calcium channel blockers); however, given the multiple medications that have been started over the past couple weeks, it would be difficult to pinpoint a culprit medication. Typically in all these conditions, a rash appears within 2 weeks of starting the medication, sooner if it is a repeat exposure.  - Given the difficulty of identifying the culprit medication and severity of underlying infection/ongoing illness, would continue antibiotic therapy per ID recommendations  - Punch biopsy: After discussion of benefits and risks including but not limited to bleeding, infection, scar, incomplete removal, recurrence, and non-diagnostic biopsy, written consent and photographs were obtained. Time-out was performed. The area was cleaned with isopropyl alcohol. 3mL of 1% lidocaine with 1:100,000 epinephrine was injected to obtain adequate anesthesia of the lesion on the R abdomen. A 4 mm punch biopsy was performed.  4-0 prolene sutures were utilized to approximate the epidermal edges.  White petroleum jelly/VaselineTM and a bandage was applied to the wound.  - Wound care: Leave initial bandage in place for 24 hours, then wash gently with soap and water and  cover with vaseline and clean bandage. Repeat daily until suture removal in 14 days.   - Recommend triamcinolone 0.1% ointment under saran wrap occlusion to rash areas; can be repeated twice daily until rash resolves  - Recommend checking CBC w/ diff in AM    Thank you for the dermatology consultation. Please do not hesitate to contact the dermatology resident/faculty on call for any additional questions or concerns. We will continue to follow.     Patient seen and evaluated with attending physician, Dr. Anthony Wadsworth MD MPH  PGY4 Medicine/Dermatology Resident    I have seen and examined this patient and agree with the assessment and plan as documented in the resident's note, and was present for the key elements of all procedures.    Kevin Cruz MD  Dermatology Attending      Dermatology Problem List:  1. Pustules and palpable purpura on the trunk and flexor surfaces of the extremities  - biopsied 12/14/22  - TAC 0.1% ointment BID    Date of Admission: Dec 4, 2022   Encounter Date: 12/14/2022    Reason for Consultation:   rash    History of Present Illness:  Mr. Eloy Fletcher is a 66 year old male with PMH of Hodgkins lymphoma s/p radiation and MOPP chemotherapy, testicular cancer s/p L orchiectomy and chemotherapy, prostate cancer s/p radiation and Lupron, aortic stenosis s/p bioprosthetic aortic valve, CAD s/p PCI to LAD, HTN, HLD who was admitted 12/4/22 for septic shock and acute R sided weakness. He was found to have MSSA bacteremia c/b pneumonia, septic emboli to the brain, JILLIAN and encephalopathy. Transferred out of ICU on 12/6 to the general medicine floor.    Antibtiotics:  Nafcillin started 12/5  Linezolid started 12/9  Ciprofloxacin started 12/10  Cefazolin started 12/10  CTX started 12/13  Metronidazole started 12/13  Meropenem started 12/14    Other medications:  Pantoprazole started 12/4    Dermatology was consulted for assistance with management of rash. Started on 12/13. No previous  history of similar rashes. Limited history available from patient. Appears fatigued.    Past Medical History:   Patient Active Problem List   Diagnosis     Acquired asplenia     Aortic valve replaced     Atherosclerosis of both carotid arteries     Chemotherapy-induced peripheral neuropathy (H)     Essential hypertension     History of Hodgkin's disease     History of testicular cancer     Hyperlipidemia     Prostate cancer (H)     Stented coronary artery     Endocarditis     Acute kidney failure, unspecified (H)     No past medical history on file.  No past surgical history on file.      Social History:  Patient reports that he has never smoked. He has never used smokeless tobacco. He reports that he does not currently use alcohol. He reports that he does not use drugs.    Family History:  No family history on file.    Medications:  Current Facility-Administered Medications   Medication     acetaminophen (TYLENOL) Suppository 650 mg     acetaminophen (TYLENOL) tablet 500 mg     acetylcysteine (MUCOMYST) 10 % nebulizer solution 4 mL     acetylcysteine (MUCOMYST) 20 % nebulizer solution 2 mL     albuterol (PROVENTIL HFA/VENTOLIN HFA) inhaler     albuterol (PROVENTIL) neb solution 2.5 mg     carboxymethylcellulose PF (REFRESH PLUS) 0.5 % ophthalmic solution 1 drop     guaiFENesin (MUCINEX) 12 hr tablet 600 mg     heparin ANTICOAGULANT injection 5,000 Units     ipratropium - albuterol 0.5 mg/2.5 mg/3 mL (DUONEB) neb solution 3 mL     lidocaine (LMX4) cream     lidocaine 1 % 1 mL     linezolid (ZYVOX) infusion 600 mg     No lozenges or gum should be given while patient on BIPAP/AVAPS/AVAPS AE     ondansetron (ZOFRAN ODT) ODT tab 4 mg    Or     ondansetron (ZOFRAN) injection 4 mg     pantoprazole (PROTONIX) EC tablet 40 mg     Patient may continue current oral medications     polyethylene glycol (MIRALAX) Packet 17 g     prochlorperazine (COMPAZINE) injection 5 mg    Or     prochlorperazine (COMPAZINE) tablet 5 mg    Or  "    prochlorperazine (COMPAZINE) suppository 12.5 mg     senna-docusate (SENOKOT-S/PERICOLACE) 8.6-50 MG per tablet 1 tablet    Or     senna-docusate (SENOKOT-S/PERICOLACE) 8.6-50 MG per tablet 2 tablet     sodium chloride (PF) 0.9% PF flush 3 mL     sodium chloride (PF) 0.9% PF flush 3 mL     sodium chloride 0.9% infusion     sodium chloride bacteriostatic 0.9 % flush 9.5 mL          Allergies   Allergen Reactions     Crestor [Rosuvastatin] Other (See Comments)     \"Funny feeling in both legs\" all statins         Review of Systems:  -As per HPI      Physical exam:  Vitals: /59 (BP Location: Right arm)   Pulse 112   Temp 99.2  F (37.3  C) (Axillary)   Resp 20   Wt 85.2 kg (187 lb 13.3 oz)   SpO2 100%   BMI 28.56 kg/m    GEN: This is a well developed, well-nourished male who appears acutely ill.  SKIN: Total skin excluding the undergarment areas was performed. The exam included the head/face, neck, both arms, chest, back, abdomen, both legs, digits and/or nails.   -Bonilla skin type: II  -Several monomorphic pustules and purpura scattered on trunk (abdomen, back), extremities (flexural surfaces)  -No mucosal edema, ulcerations  -No other lesions of concern on areas examined.     Laboratory:  Results for orders placed or performed during the hospital encounter of 12/04/22 (from the past 24 hour(s))   Lactic Acid STAT   Result Value Ref Range    Lactic Acid 2.7 (H) 0.7 - 2.0 mmol/L   Lactic acid whole blood   Result Value Ref Range    Lactic Acid 3.0 (H) 0.7 - 2.0 mmol/L   Lactic Acid STAT   Result Value Ref Range    Lactic Acid 2.7 (H) 0.7 - 2.0 mmol/L   CRP inflammation   Result Value Ref Range    CRP Inflammation 292.00 (H) <5.00 mg/L   Magnesium   Result Value Ref Range    Magnesium 2.7 (H) 1.7 - 2.3 mg/dL   Phosphorus   Result Value Ref Range    Phosphorus 7.5 (H) 2.5 - 4.5 mg/dL   Comprehensive metabolic panel   Result Value Ref Range    Sodium 136 136 - 145 mmol/L    Potassium 5.2 3.4 - 5.3 " mmol/L    Chloride 98 98 - 107 mmol/L    Carbon Dioxide (CO2) 20 (L) 22 - 29 mmol/L    Anion Gap 18 (H) 7 - 15 mmol/L    Urea Nitrogen 111.0 (H) 8.0 - 23.0 mg/dL    Creatinine 2.98 (H) 0.67 - 1.17 mg/dL    Calcium 7.8 (L) 8.8 - 10.2 mg/dL    Glucose 118 (H) 70 - 99 mg/dL    Alkaline Phosphatase 175 (H) 40 - 129 U/L     (H) 10 - 50 U/L    ALT 15 10 - 50 U/L    Protein Total 6.2 (L) 6.4 - 8.3 g/dL    Albumin 2.5 (L) 3.5 - 5.2 g/dL    Bilirubin Total 1.2 <=1.2 mg/dL    GFR Estimate 22 (L) >60 mL/min/1.73m2       Dr. Cruz staffed the patient.    Staff Involved:  Resident/Staff

## 2022-12-14 NOTE — PLAN OF CARE
Neuro: A&Ox2/3 waxes and wanes. Become more lethargic throughout night  Cardiac: ST. Bps 90s-110s systolic   Respiratory: initated bipap to help with work of breathing. 40% FiO2  GI/: Adequate urine output via chan. 40mg Lasix given. 1 BM   Diet/appetite: level 6 diet. Had a few bites of pudding this morning otherwise only a few sips of water . On calorie count.   Activity:  Ax2 lift   Pain: no indications of pain   Skin: new diffuse rash noted on back and abdomen. Doctor notified.   LDA's: tmax 101.2. tylenol suppository given without improvement. PIV right arm and chan     Plan: hold off on MRI for now due to respiratory status. Noted some blood around rectum and slightly tarry stools this am.  Notified at bedside.  Continue with POC. Notify primary team with changes.

## 2022-12-14 NOTE — PROGRESS NOTES
[facility] Progress Note     Patient:  Eloy Fletcher   YOB: 1955, MRN: [id number]  Date of Visit: 12/14/2022  Date of Admission: 12/4/2022  Consult Requester: Lauri Friend MD         ID Recommendations:  1. Stop Ceftriaxone & Stop Metronidazole -Biggest interval change since onset of flank/abdominal rash, blood cultures began remaining negative after the initial treatment combination of Linezolid, and subsequent Cefazolin, risks and benefits if it is a drug reaction, cephalosporins would be more likely and Linezolid should cover adequately.     2. Start Meropenem 1g IV Q12hr for nosocomial pneumonia based on worsened chest CT. (ordered).  This is the dose if GFR was 25-50 (and his estimated GFR is 22 and improving).  ID will monitor renal function and dose adjustment in the future.Today we would dose as ordered.    3. Continue: Linezolid (started 12/9) - Linezolid is less likely to be a culprit. Achieves good CNS penetration.  If rash/fever are ongoing, would stop linezolid on 12/15.     4. Ordered CBC with differential for % eosinophilia, as well as Pro calcitonin;  mg/L possible elevation from reaction mixed with infection.       Assessment:     1. Sepsis: MSSA bacteremia  -  Suspected endocarditis, with CNS emboli and CSF culture positivity. ILDEFONSO did not reveal vegetations, but that does not fully exclude endocarditis.  Last positive culture 12/10 AM (~24 hrs to grow), (12/11: no growth to date)    2. Pneumonia - Possible Aspiration, possible MSSA bacteremic seeding of lung. +MSSA from sputum. At risk for aspiration pneumonia due to altered mental status.       3. New confluent rash- suspected drug reaction      4. Encephalopathy; CNS multiple Septic emboli . Assume MSSA  Acute embolic CVA  CSF Culture positive MSSA (also +RBC. Waxing/waning. Improved on 12/10-11    - Continued on Linezolid given (CNS tissue and CSF penetration).     5. Acute kidney injury,  creatinine beginning to decrease (Cr = 4.1 -> 3.9 -> 3.6-->2.98 mg/dL) - multi-factorial -> sepsis, hypotension, meds.  GFR = 22 and slowly improving.   6. Biprosthetic Aortic valve 2014; mitral stenosis with calcifications - no evidence of vegetations on ILDEFONSO. (yet septic emboli to CNS make endocarditis / endovascular still probable)  7.  Asplenic -- further complicates ability to clear bacteremia.     8. Resolved Cholestasis / cholestatic hepatitis.  Critical illness cholestasis vs. Nafcillin as a cause of cholestatic hepatitis -- although no peripheral eosinophilia is present in blood.  At risk for ascending cholangitis  -- Stopped nafcillin on 12/10 has improved LFTs.  T-bili from 4.2 to 1.2      Plan and Discussion:  Summary: 66 year-old male with PMHx notable for biprosthetic AVR 2014, hodgkin's lymphoma with evaluated for MSSA bacteremia with endocarditis and embolic CVA. Recent Course: Transitioned to Linezolid (12/9), Cefazolin (12/10), Ciprofloxacin (12/10). Cultures 12/10 positive for Staph. Aureus. No change in sensitivities. NGTD from 12/11th blood cultures.     Increased shortness of breath and work of breathing, provided BiPap.Transitioned to Ceftriaxone/Metronidazole (12/13), Continued on Linezolid (12/9).     1. New Confluent Purpuric rash: Suspicious for drug reaction: in the setting of :   MSSA bacteremia with suspected endocarditis   Plan to continue Linezolid, at this point, if anti-microbials are the primary etiology, cephalosporins are more likely. Given the onset around yesterday afternoon, reasonable to discontinue Ceftriaxone and Metronidazole at this time.     Plan for CBC with differential and Procalcitonin to see if elevated CRP is from MSSA bacteremia or possible drug reaction.  Technically  (from 299) and Lactic acid 2.7 (from 3.1). If it is a cephalosporin, some of the respiratory distress could have been exacerbated, however see plan below if suspected aspiration pneumonia.       Advise caution in the context of bacteremia/endocarditis/CNS emboli (especially with his persistence, last negative cultures 12/11), in theory would avoid corticosteroids but understand if the situation evolves.      2. Pneumonia - Possible Aspiration,  at risk for aspiration pneumonia due to altered mental status. Prior ground glass opacities on CT chest on the Left, now worsening Chest XR.    Agree plausible concomitant fluid overload, possible atelectesis. Given the new reaction, reasonable to continue Linezolid, however, if suspicion for aspirated pneumonia arises, reasonable to give Clindamycin as an alternative. (May consider Daptomycin as well, given septic emboli technically have shown to bioactive but that would be if Clindamycin is not an option)         Will continue to follow for further recommendations and overall course.      Thank you for the consult. ID will continue to follow with you.     Ifrah Weiner, MS4   Pager [phone]  12/13/2022      Resident/Fellow Attestation   Case discussed with Dr. Branham who agrees with the above assessment and plan.     Tasha Salamanca MD  Internal Medicine PGY2           Interval History and Events:   Overnight, acute events include shortness of breath and new development of a rash. Per report from the daughter, she did not notice it when she left at 3pm, per step mother, it was present that afternoon and evolved from the trunk to the abdomen and flexor regions. Continued on BiPap. Non-communicative today.  Tmax (101.2 F past 24hrs) .  No significant improvement over a few days per report. Still labored breathing. Spoke with the nursing staff, biggest concern overnight was breathing, provided furosemide.         Linzeolid started on 12/9.   Last positive culture on 12/10. Ciprofloxacin added 12/10 and Cefazolin.Tobramicin nebulization started 12/11. T-bili improved. Renal function improving.    12/14 Chest CT with markedly worsen L>R infiltrates.  Pneumonia + possible  pulm edema.     Culture 12/11 NGTD.  12/13 no growth in 12 hours      Pertinent cultures include:  Culture   Date Value Ref Range Status   12/10/2022 Positive on the 1st day of incubation (A)  Preliminary   12/10/2022 Gram positive cocci in clusters (AA)  Preliminary     Comment:     1 of 2 bottles   12/09/2022 Positive on the 1st day of incubation (A)  Preliminary   12/09/2022 Staphylococcus aureus (AA)  Preliminary     Comment:     1 of 2 bottlesSusceptibilities done on previous cultures   12/08/2022 Positive on the 1st day of incubation (A)  Final   12/08/2022 Staphylococcus aureus (AA)  Final     Comment:     1 of 2 bottlesSusceptibilities done on previous cultures       Recent Inflammatory Biomarkers:   Recent Labs   Lab Test 12/11/22  0508 12/10/22  0545 12/09/22  0814 12/09/22  0532 12/08/22  0812 12/07/22  1359 12/07/22  0434 12/06/22  0422 12/05/22  1141 12/04/22  2353   .00* 220.00*  --  219.00*  --   --  254.00* 387.00*  --   --    PCAL  --   --   --   --   --   --  24.80*  --   --  54.20*   WBC 25.8* 21.3* 21.4*  --  15.4* 13.6* 12.9* 12.3*   < > 11.4*    < > = values in this interval not displayed.            Antimicrobial Treatment:   Linezolid 12/9 ->    Metronidazole 12/13-12/14 0700  Ceftriaxone 12/13    Cefazolin 12/10->12/13  Ciproflox 400mg QDay 12/10 ->12/12    Nafcillin 12/5-12/10 stopped due to possible cholestatic hepatitis   Received one dose Gentamicin (5mg/kg) on 12/5 follow up level 12/8: 0.4     Stopped: 12/6: Ceftriaxone   Stopped 12/5:  vancomycin, acyclovir, ampicillin;          Review of Systems:   Targeted 4 point ROS was completed with pertinent positives and negatives are detailed above.         HPI:   Adopted from initial consult note on 12/05/2022     Summary:       66 year-old male with PMHx notable for Aortic stenosis s/p bioprosthetic AVR 2014, CAD s/p stent 2012, moderate mitral regurgitation/stenosis on ILDEFONSO 11/2022, with prior chemotherapy/radiation with Hodgkin's  "Lymphoma 1979, testicular cancer and prostate cancer admitted overnight on 12/04 for sepsis, encephalopathy, and acute embolic CVA. Evaluated for possible endocarditis MSSA Bacteremia       \"History obtained from review of chart and discussion with patient/nursing staff.      Eloy Fletcher is a 66 year old male who presented with new left lateral ocular gaze and right sided hemineglect, weakness and fevers onset 12/02/2022.  PMHx notable for Aortic Stenosis s/p bioprosthetic AVR 2014, CAD s/p PCI and stent 2012, moderate mitral regurgitation/stenosis on ILDEFONSO 11/2022, with prior chemotherapy/radiation with Hodgkin's Lymphoma 1979, testicular cancer and prostate cancer admitted overnight on 12/04 for sepsis, encephalopathy, and acute embolic CVA. Evaluated for possible endocarditis.     Course: Initial presentation to OSH ED with confusion and unresponsiveness 12/4, discussion with his ex-wife,  initial onset of symptoms 12/1/22 multiple episodes of vomiting with a fever. Improved overnight, recurred and worsened 12/3.  He presented with left gaze preference, right-sided neglect, weakness, and fevers. Initial findings significant for showing tachycardia, tachypnea, and fevers, initial eukocytosis of 11.4, lactic acid 4.2. At that time he was confused and not responding appropriately.   He was evaluated by neurology and found to have MRI findings with non-specific small multifocal CVA suspected embolic in origin. He was provided empiric antibiotics, Blood cultures were drawn.    Due to concern for CNS infection, a LP was performed at the OSH ED and was further transferred to [facility] ICU for work-up of endocarditis in the setting of hypotension. Initially provided   Acyclovir, Ampicillin, Ceftriaxone, Piperacillin Tazobactam and Vancomycin.   CT Chest also showing GGO in the MIGUEL ANGEL and LLL with tree-in-bud findings concerning for pneumonia (CAP vs. Aspiration). LP performed showing WBC of 230 with elevated " "protein at 59 and glucose at 82.                  Today, the patient was seen and examined at bedside, mild aphasia/ difficult word finding on exam. Patient is able to answer yes/no questions. Interval changes,  appears improved since 12/4.  Associated symptoms at this time positive for cough, with green/yellow sputum. No shortness of breath. Denies CV complaints of chest pain, diaphoresis, no abdominal pain. Denies new rashes or lesions. Denies new numbness or subjective weakness.      Pertinent Cardiac History: Aortic stenosis S/P bioprosthetic AVR in 2014, and CAD s/p PCI   2012.  Of note, recently evaluated with Cardiology at [facility]. Possible planned early next year, \"redo median sternotomy and mitral valve replacement with a tissue valve. We may have to replace the ascending aorta at the same time. The patient has right bundle branch block and a left bob fascicular block\" per evaluation with Dr. Gurrola 11/16/2022.  Last ILDEFONSO 11/11/2022, with LVEF 65%, Moderate-severe mitral valve regurgitation with moderate mitral stenosis and severe annular mitral calcification, demonstrated normal aortic valve tissue prosthesis.   Pertinent Oncology History: Prostate cancer (Elly 4+3, TNM: cT1c) treated with chemo/radiation (last dose 3/30/2022), good response at that time. Past history in remission: Testicular cancer (30 years prior  s/p chemo & Left orchiectomy) and Hodgkins lymphoma (1970s treated with MOPP & radiation)\"         Physical Examination:   Temp:  [98.7  F (37.1  C)-101.2  F (38.4  C)] 99.2  F (37.3  C)  Pulse:  [102-118] 102  Resp:  [15-35] 15  BP: ()/(46-68) 113/57  FiO2 (%):  [3 %-40 %] 40 %  SpO2:  [95 %-100 %] 100 %    I/O last 3 completed shifts:  In: 490 [P.O.:490]  Out: 3600 [Urine:3600]    Vitals:    12/11/22 0615 12/12/22 0618 12/12/22 1044 12/13/22 0453   Weight: 85.8 kg (189 lb 2.5 oz) 85.2 kg (187 lb 13.3 oz) 85.4 kg (188 lb 4.4 oz) 86.4 kg (190 lb 7.6 oz)    12/14/22 6380 "   Weight: 85.2 kg (187 lb 13.3 oz)       Constitutional: Adult male seen and examined at bedside.  Tired, breathing labored through BiPap on exam   Respiratory: increased work of breathing, course breath sounds L> R anterior portion.   Cardiovascular: Tachycardic, regular rhythm, grade IV systolic ejection murmur noted. 1+ pedal edema.   Skin: Purpuric, non blanchable, coalescent rash, mildly raised.: diffuse on the lumbar/thoracic, abdomen, extending to the first 1/4 of the dara/lateral thighs. And flexor regions. Worse Proximal> distal   Musculoskeletal: Extremities grossly normal, non-tender   Neurologic: pupils equal and reactive to light, moves extremities spotaneously  Vascular access:   12/5 Right UE pIV  CDI, extension about 1 inch above and below, purpuric rash as above          Medications:       acetylcysteine  2 mL Nebulization BID     guaiFENesin  600 mg Oral BID     heparin ANTICOAGULANT  5,000 Units Subcutaneous Q12H     linezolid  600 mg Intravenous Q12H     pantoprazole  40 mg Oral QAM AC     sodium chloride (PF)  3 mL Intracatheter Q8H       Antiinfectives:  Anti-infectives (From now, onward)    Start     Dose/Rate Route Frequency Ordered Stop    12/09/22 1600  linezolid (ZYVOX) infusion 600 mg         600 mg  over 60 Minutes Intravenous EVERY 12 HOURS 12/09/22 1529            Infusions/Drips:    - MEDICATION INSTRUCTIONS -       - MEDICATION INSTRUCTIONS -       sodium chloride 150 mL/hr (12/05/22 1330)            Laboratory Data:     Microbiology:      Summary: 12/4 Blood and CSF cultures with Staph Aureus. Verigene with no evidence of MecA    12/7-1210  Gram positive cocci in clusters Panic      Details:   Initial cultures   Cerebrospinal fluid Aerobic Bacterial Culture Routine 1+ Staphylococcus aureus Panic       Blood Cultures 12/4(1534):Staphylococcus aureus Panic  Positive 0815 12/7    Susceptibility     Staphylococcus aureus     DEJA     Clindamycin <=0.25 ug/mL Susceptible      Erythromycin <=0.25 ug/mL Susceptible     Gentamicin <=0.5 ug/mL Susceptible     Oxacillin 0.5 ug/mL Susceptible     Tetracycline <=1 ug/mL Susceptible     Trimethoprim/Sulfamethoxazole <=0.5/9.5 u... Susceptible     Vancomycin <=0.5 ug/mL Susceptible                       CSF Findings:   *High: RBC *78, total nucleated cells *230, Glucose CSF *82  Abnormal CSF appearance Hazy.   Otherwise, colorless        Meningitis/Encephalitis Panel Qual PCR CSF:  Order: [id number]   Status: Final result      Visible to patient: Yes (not seen)      1 Result Note    Component Ref Range & Units 1 d ago   (12/4/22) 1 d ago   (12/4/22)    Escherichia coli K1 Negative Negative       Haemophilus influenzae Negative Negative       Listeria monocytogenes Negative Negative       Neisseria meningitidis Negative Negative       Streptococcus agalactiae (GBS) Negative Negative       Streptococcus pneumoniae Negative Negative  Not Detected R     Cytomegalovirus Negative Negative       Enterovirus Negative Negative       Herpes simplex virus 1 Negative Negative      Comment: Recommend testing with another molecular method if clinical suspicion for infection is high.    Herpes simplex virus 2 Negative Negative      Comment: Recommend testing with another molecular method if clinical suspicion for infection is high.    Human Herpes Virus 6 Negative Negative       Human parechovirus Negative Negative       Varicella zoster virus Negative Negative       Cryptococcus neoformans/gattii Negative Negative      Comment: Recommend testing for Cryptococcal antigen and fungal culture if clinical suspicion for infection is high.         Strep pneumo Agn Ur greater or equal to 13yrs or CSF any age (1 mL minimum)  Order: [id number]   Status: Final result      Visible to patient: Yes (not seen)     Specimen Information: Lumbar Puncture; Cerebrospinal fluid    1 Result Note    Component Ref Range & Units 1 d ago     Streptococcus pneumoniae antigen Negative  Negative    Comment: A negative Streptococcus pneumoniae antigen result does not rule out infection with Streptococcus pneumoniae.               Inflammatory Markers    Recent Labs   Lab Test 12/14/22  0535 12/13/22  0545 12/12/22  0531   .00* 299.00* 253.00*   Procalcitonin: 54.20 (12/4/22)  Lactic Acid: 4.6 (12/4), 4.2 (12/4), 4.6(12/5) 1.7 (12/6)  Cardiac: Troponin: 610 (12/4), 983 (12/4), 1,229 (12/5)    Metabolic Studies     Recent Labs   Lab Test 12/14/22  0535 12/13/22  2116 12/13/22  0552 12/13/22  0545 12/12/22  1600 12/12/22  1344 12/12/22  0531 12/12/22  0228 12/07/22  1650 12/07/22  1359     --   --  136  --   --   --  137   < >  --    POTASSIUM 5.2  --   --  4.4  --  3.7   < > 3.3*  3.3*   < >  --    CHLORIDE 98  --   --  97*  --   --   --  99   < >  --    CO2 20*  --   --  18*  --   --   --  18*   < >  --    ANIONGAP 18*  --   --  21*  --   --   --  20*   < >  --    .0*  --   --  102.0*  --   --   --  97.5*   < >  --    CR 2.98*  --   --  3.17*  --   --   --  3.50*   < >  --    GFRESTIMATED 22*  --   --  21*  --   --   --  18*   < >  --    *  --   --  113*  --   --   --  115*   < >  --    A1C  --   --   --   --   --   --   --   --   --  6.6*   YUNIOR 7.8*  --   --  8.1*  --   --   --  8.1*   < >  --    PHOS 7.5*  --   --  8.1*  --   --    < >  --    < >  --    MAG 2.7*  --   --  2.6*  --   --    < >  --    < >  --    LACT  --  2.7*   < >  --    < >  --    < >  --    < >  --     < > = values in this interval not displayed.       Hepatic Studies    Recent Labs   Lab Test 12/14/22  0535 12/13/22  0545 12/12/22  0531 12/06/22  0422 12/05/22  1008   BILITOTAL 1.2 1.3* 1.5*   < > 1.1   ALKPHOS 175* 175* 150*   < > 58   ALBUMIN 2.5* 2.5* 2.5*   < > 2.9*   * 93* 70*   < > 131*   ALT 15 16 18   < > 61*   LDH  --   --   --   --  468*    < > = values in this interval not displayed.       Pancreatitis testing    Recent Labs   Lab Test 12/07/22  0434   TRIG 260*       Hematology  Studies      Recent Labs   Lab Test 12/13/22  1014 12/12/22  0551 12/11/22  1702 12/11/22  0508 12/10/22  0545   WBC 21.6* 24.5* 24.7*   < > 21.3*   ANEU  --   --   --   --  20.0*   ALYM  --   --   --   --  1.3   LATONYA  --   --   --   --  0.0   AEOS  --   --   --   --  0.0   HGB 9.5* 9.4* 10.1*   < > 10.8*   HCT 26.5* 26.5* 27.9*   < > 30.4*    192 157   < > 138*    < > = values in this interval not displayed.       Arterial Blood Gas Testing    Recent Labs   Lab Test 12/13/22  1014   O2PER 2        Urine Studies     Recent Labs   Lab Test 12/07/22  1509 12/05/22  0538 12/04/22  1534   URINEPH 5.5 5.5 5.0   NITRITE Negative Negative Negative   LEUKEST Trace* Negative Negative   WBCU 6* 8* 3       Vancomycin Levels     No lab results found.    Tobramycin levels     No lab results found.    Gentamicin levels    Recent Labs   Lab Test 12/08/22  0812   GENT 0.4       CSF testing     Recent Labs   Lab Test 12/04/22  1738   CWBC 230*   CNEU 86   CLYM 1   CMONO 4   COTH 9   CRBC 78*   CGLU 82*   CTP 59.7*       Last check of C difficile  C Difficile Toxin B by PCR   Date Value Ref Range Status   12/05/2022 Negative Negative Final     Comment:     A negative result does not exclude actual disease due to C. difficile and may be due to improper collection, handling and storage of the specimen or the number of organisms in the specimen is below the detection limit of the assay.            Imaging:   Reviewed available imaging reports and correlated clinically.         XR CHEST PORT 1 VIEW  12/13/2022 1:45 AM IMPRESSION:   1. Further increase in left mid and retrocardiac patchy mixed airspace  interstitial and airspace opacities, may represent pulmonary edema  and/or atelectasis; however, cannot exclude component of infection.  2. Stable mixed interstitial and patchy airspace opacities within the  right lung, mild.      MRI w/o lumbar spine 12/12/2022: 1. No findings to suggest lumbar spinal abscess on this noncontrast  MRI.  2. Widening of the left L4-5 facet with a small effusion likely  represents degenerative changes when compared to CT 12/14/2022,  potentially inflammatory arthritis. Infection is less likely given the  lack of STIR hyperintense marrow surrounding the facet joint.  3. Heterogeneous T1 and T2 hyperintensities throughout the lumbar  spine vertebrae and sacrum bilaterally likely represents a comminution  of degenerative and post radiation changes.    XR CHEST 12/11/2022  Increased left greater than right mixed opacities, likely  worsening pulmonary edema. Infection is not excluded.      EKG     Component Ref Range & Units 12/9/22 11:28 AM 12/7/22  5:02 AM    Systolic Blood Pressure mmHg       Diastolic Blood Pressure mmHg       Ventricular Rate   102     Atrial Rate   102     FL Interval ms  176     QRS Duration ms 144  144     QT ms 382  400     QTc ms 551  521     P Axis degrees  53     R AXIS degrees -79  -75     T Axis degrees 23  22     Interpretation ECG  Wide QRS tachycardia   Right bundle branch block   Left anterior fascicular block     Bifascicular block     Abnormal ECG   When compared with ECG of 07-DEC-2022 05:02,   Wide QRS tachycardia has replaced Sinus rhythm  Sinus tachycardia   Right bundle branch block   Left anterior fascicular block    Bifascicular block    Abnormal ECG   When compared with ECG of 20-JUL-2012 20:12,   (RBBB and left anterior fascicular block) is now Present   Confirmed by Jackson Kong ([id number]) on 12/7/2022 10:12:14 AM          Echo ILDEFONSO Study Date: 12/05/2022 01:14 PM   Interpretation Summary  Known radiation induced valve disease with severe calcific mitral disease. No  definite endocarditis noted.  Severe mitral annular calcification is present with multiple calcific nodules,  no definite vegetation seen.  S/p bioprosthetic AVR (23 mm Cordell-Hubbard Perimount Magna/Magna Ease  pericardial prosthesis). Valve appears well seated with good leaflet opening.  No  vegetations visualized.  Global and regional left ventricular function is normal with an EF of 60-65%.     TTE: 12/5 Interpretation Summary  Would consider a transesophageal echocardiogram if clinically indicated.  Technically difficult study.Extremely poor acoustic windows.  Limited information was obtained during study.  Global and regional left ventricular function is normal with an EF of 60-65%.  Right ventricular function, chamber size, wall motion, and thickness are  normal.  Severe mitral annular calcification is present.  Mitral mean gradient 11mmHg at heart rate 131 BPM.  H/O Bioprosthetic AVR in 2014. Valve not well visualized.    MRI of the brain 12/4/22: IMPRESSION: 5 scattered foci of acute ischemia involving the bilateral anterior and posterior cerebral artery distributions. Constellation of findings is suspicious for central embolic phenomena.           CT Abdomen and Pelvis: 12/4/22  IMPRESSION:  1.  Patchy groundglass opacities in the left upper and lower lobes, and cluster of tree-in-bud nodularity in the left upper lobe, likely infectious or inflammatory.  2.  Interstitial pulmonary edema.  3.  Trace left pleural effusion.  4.  No acute findings in the abdomen or pelvis.        CT head and CTA head/neck 12/4/22, were not suggestive of an acute abnormality, no significant stenosis of carotids or vertebral arteries.      CXR 12/7/22:  IMPRESSION: Diffuse interstitial markings bilaterally and streaky  perihilar and bibasilar opacities likely represent pulmonary edema  versus atelectasis. No focal consolidation.      Attending Physician Attestation:  ILauri MD have seen and evaluated Eloy Fletcher. I have discussed with the primary provider team, and I agree with the above documented findings and plan in this note. I have reviewed today's vital signs, medications, labs and imaging.    Floor time: 30 minutes  Face-to-face time: 15 minutes  Total time: 45 minutes     Lauri Branham  MD MPH  Professor of Medicine  Division of Infectious Diseases & International Medicine  Department of Medicine

## 2022-12-14 NOTE — PROGRESS NOTES
Rapid Response Team Note    Assessment   In assessment a rapid response was called on Eloy Fletcher due to lactic acidosis. This presentation is likely due to severe sepsis 2/2 MSSA bacteremia.      Plan   -  Agree with current antimicrobial regimen  -  Will hold on IVF given stable vitals, worsening O2 needs today, active diuresis needs. Volume status has been tenuous.   -  The Internal Medicine primary team was able to be reached and they are in agreement with the above plan.  -  Disposition: The patient will remain on the current unit. We will continue to monitor this patient closely.  -  Reassessment and plan follow-up will be performed by the primary team      Carissa Saba PA-C  [facility] RRT [facility] Job Code Contact #[phone]  [facility] Paging/Directory    Hospital Course   Brief Summary of events leading to rapid response:   Sepsis BPA firing multiple times today per RN. Lactate is currently 2.7, stable compared to prior checks today. Worsening respiratory status overnight. Received IV Lasix and started on BiPAP support today. Upon my arrival patient found resting comfortably in bed in no apparent distress, hemodynamically stable.     Admission Diagnosis:   Endocarditis [I38]    Physical Exam   Temp: 99.7  F (37.6  C) Temp  Min: 98.6  F (37  C)  Max: 101.2  F (38.4  C)  Resp: 20 Resp  Min: 19  Max: 30  SpO2: 97 % SpO2  Min: 93 %  Max: 98 %  Pulse: 106 Pulse  Min: 106  Max: 118    No data recorded  BP: 100/55 Systolic (24hrs), Av , Min:92 , Max:133   Diastolic (24hrs), Av, Min:46, Max:68     I/Os: I/O last 3 completed shifts:  In: 1703 [P.O.:1450; I.V.:3]  Out: 3270 [Urine:3270]     Exam:   General: chronically ill appearing  Mental Status: baseline mental status.      Significant Results and Procedures   Lactic Acid:   Recent Labs   Lab Test 22  1741   LACT 2.7* 3.0* 2.7*     CBC:   Recent Labs   Lab Test 22  1014 22  0551 22  1702   WBC  21.6* 24.5* 24.7*   HGB 9.5* 9.4* 10.1*   HCT 26.5* 26.5* 27.9*    192 157        Sepsis Evaluation   The patient is known to have an infection.  Eloy Fletcher meets SIRS criteria AND has a lactate >2 or other evidence of acute organ damage.  These vital signs, lab and physical exam findings constitute a diagnosis of SEVERE SEPSIS, based on: Lactate resulted, and the level was > 2.0          Anti-infectives (From now, onward)    Start     Dose/Rate Route Frequency Ordered Stop    12/13/22 1700  metroNIDAZOLE (FLAGYL) infusion 500 mg        Note to Pharmacy: Metronidazole IV may be dosed more frequently than Q12h for bacteremia, CNS infection and Clostridium difficile.    500 mg  over 60 Minutes Intravenous EVERY 12 HOURS 12/13/22 1612 12/18/22 0659    12/13/22 1000  cefTRIAXone (ROCEPHIN) 2 g vial to attach to  ml bag for ADULTS or NS 50 ml bag for PEDS         2 g  over 30 Minutes Intravenous EVERY 24 HOURS 12/13/22 0951      12/11/22 2000  tobramycin (PF) (NATALYA) neb solution 300 mg         300 mg Nebulization 2 TIMES DAILY RT 12/11/22 1234 12/14/22 1959    12/09/22 1600  linezolid (ZYVOX) infusion 600 mg         600 mg  over 60 Minutes Intravenous EVERY 12 HOURS 12/09/22 1529          Current antibiotic coverage is appropriate for source of infection.    3 Hour Severe Sepsis Bundle Completion:  1. Initial Lactic Acid result shown above. Repeat lactic acid is not indicated.  2. Blood Cultures before Antibiotics: Yes  3. Broad Spectrum Antibiotics Administered: yes  4. Is hypotension present? No (IV fluid bolus NOT required).

## 2022-12-14 NOTE — CONSULTS
[facility] Nurse Inpatient Assessment     Consulted for: face     Patient History (according to provider note(s):       Mr. Fletcher is a 66-year-old gentleman with a past medical history of hypertension, hyperlipidemia, Hodgkin's lymphoma status post radiation and MOPP chemotherapy, in remission, testicular cancer status post a left orchiectomy and chemotherapy, in remission, prostate cancer ([employer] [employer] 4+3, diagnosed April 2021) status post radiation and Lupron, aortic stenosis status post bioprosthetic aortic valve in (2014) and coronary artery disease status post PCI to the LAD in (2012) who on December 4, 2022 developed a left-sided gaze with right-sided weakness and was noted to have a fever and brought to an outside hospital where he was recognized to be in septic shock and transferred to [facility] ICU.  Head CT and CTA of the head and neck did not display any acute abnormality or stenosis.  MRI brain displayed scattered foci of acute ischemia involving the bilateral anterior and posterior cerebral artery distributions.  Neurology was consulted and concerned that the these might represent embolic strokes.  Further infectious work-up was done including a CT chest abdomen and pelvis which revealed groundglass opacities in the left upper lobe and left lower lobe with tree-in-bud findings in the setting of interstitial pulmonary edema and a left pleural effusion.  He was started on broad-spectrum antibiotics including nafcillin and ceftriaxone.  Lumbar puncture was performed and CSF was positive for gram-positive cocci, final cultures pending.  Cardiology was consulted with concerns for possible endocarditis.  TTE was performed which revealed severe mitral calcification with moderate insufficiency and a bioprosthetic aortic valve which was well-seated with no vegetations present. ID was consulted and he was adjusted to Nafcillin with treatment for likely  "endocarditis in the setting of a high grade blood stream infection from MSSA that resulted in pneumonia, embolic strokes, acute kidney injury and encephalopathy. 12/6, he is transfered out of the ICU.    Areas Assessed:      Areas visualized during today's visit: Face and neck    Pressure Injury Location: bridge of nose     Last photo: 12/14  Wound type: Pressure Injury     Pressure Injury Stage: Deep Tissue Pressure Injury (DTPI), hospital acquired      This is a Medical Device Related Pressure Injury (MDRPI) due to BiPAP mask  Wound history/plan of care:   Found by bedside RN and switched masks.   Wound base: 100 % erythema, maroon, purple and epidermis     Palpation of the wound bed: normal      Drainage: none     Description of drainage: none     Measurements (length x width x depth, in cm) 0.4  x 0.3  x  0 cm      Tunneling N/A     Undermining N/A  Periwound skin: Erythema- blanchable      Color: pink      Temperature: normal   Odor: none  Pain: no grimacing or signs of discomfort, none  Pain intervention prior to dressing change: N/A  Treatment goal: Heal  and Protection  STATUS: initial assessment  Supplies ordered: supplies stored on unit     Treatment Plan:     Bridge of nose wound(s): Daily  cleanse with saline and pat dry. If wound is offloaded, okay to leave open to air. If requiring BiPAP that goes over wound utilize mepilex 2 x 1\" (order#[id number])- in supply room.     Orders: Written    RECOMMEND PRIMARY TEAM ORDER: None, at this time  Education provided: plan of care and wound progress  Discussed plan of care with: Family and Nurse  WOC nurse follow-up plan: twice weekly  Notify WOC if wound(s) deteriorate.  Nursing to notify the Provider(s) and re-consult the WOC Nurse if new skin concern.    DATA:     Current support surface: Standard  Low air loss (MARLENI pump, Isolibrium, Pulsate, skin guard, etc)  Containment of urine/stool: Incontinent pad in bed  BMI: Body mass index is 28.56 kg/m .   Active diet " order: Orders Placed This Encounter      Soft & Bite Sized Diet (level 6) Thin Liquids (level 0)     Output: I/O last 3 completed shifts:  In: 490 [P.O.:490]  Out: 3600 [Urine:3600]     Labs: Recent Labs   Lab 12/14/22  0535 12/13/22  1014 12/08/22  0812 12/07/22  1359   ALBUMIN 2.5*  --    < >  --    HGB  --  9.5*   < > 10.8*   WBC  --  21.6*   < > 13.6*   A1C  --   --   --  6.6*   .00*  --    < >  --     < > = values in this interval not displayed.     Pressure injury risk assessment:   Sensory Perception: 3-->slightly limited  Moisture: 3-->occasionally moist  Activity: 2-->chairfast  Mobility: 2-->very limited  Nutrition: 1-->very poor  Friction and Shear: 2-->potential problem  Herber Score: 13    Addie Moncada RN CWOCN   Dept. Pager: [phone]  Dept. Office Number: [phone]

## 2022-12-14 NOTE — PROGRESS NOTES
[facility]    Medicine Progress Note - Hospitalist Service, GOLD TEAM 9    Date of Admission:  12/4/2022    Assessment & Plan   Mr. Fletcher is a 66-year-old gentleman with a past medical history of hypertension, hyperlipidemia, Hodgkin's lymphoma status post radiation and MOPP chemotherapy, in remission, testicular cancer status post a left orchiectomy and chemotherapy, in remission, prostate cancer ([place] 4+3, diagnosed April 2021) status post radiation and Lupron, aortic stenosis status post bioprosthetic aortic valve in (2014) and coronary artery disease status post PCI to the LAD in (2012) who on December 4, 2022 developed a left-sided gaze with right-sided weakness and was noted to have a fever and brought to an outside hospital where he was recognized to be in septic shock and transferred to [facility] ICU.  Head CT and CTA of the head and neck did not display any acute abnormality or stenosis.  MRI brain displayed scattered foci of acute ischemia involving the bilateral anterior and posterior cerebral artery distributions.  Neurology was consulted and concerned that the these might represent embolic strokes.  Further infectious work-up was done including a CT chest abdomen and pelvis which revealed groundglass opacities in the left upper lobe and left lower lobe with tree-in-bud findings in the setting of interstitial pulmonary edema and a left pleural effusion.  He was started on broad-spectrum antibiotics including nafcillin and ceftriaxone.  Lumbar puncture was performed and CSF was positive for gram-positive cocci, final cultures pending.  Cardiology was consulted with concerns for possible endocarditis.  TTE was performed which revealed severe mitral calcification with moderate insufficiency and a bioprosthetic aortic valve which was well-seated with no vegetations present. ID was consulted and he was adjusted to Nafcillin with treatment for likely  endocarditis in the setting of a high grade blood stream infection from MSSA that resulted in pneumonia, embolic strokes, acute kidney injury and encephalopathy. 12/6, he is transfered out of the ICU.     Main Plans for Today:  - Stop ceftriaxone and metronidazole    - Start meropenem  - Worsening rash, likely drug related  - Consult dermatology  - CT scan of neck, chest, abdomen, pelvis   - Repeat furosemide dose     #septic shock (fever, leukocytosis, tachycardia, tachypnea, end organ damage) secondary to MSSA high-grade bloodstream infection  #Acute embolic strokes with right-sided weakness  #Acute encephalopathy, improving  #Bacterial community-acquired pneumonia  He was initially started on IV Vancomycin/ Zosyn at OSH. Blood cultures x 2 from 12/4/22 grew positive for pansensitive Staph aureus. There were no repeat cultures on 12/5. CSF culture Repeat cultures done 12/6 so far are without growth. His encephalopathy is improving and Neurology is following. Transitioned from cefazolin to ceftriaxone for better GNR coverage on 12/13. Metronidazole added on 12/13 as well for anaerobic coverage of possible aspiration pneumonia. Developed significant maculopapular rash on 12/13, likely drug related.   - Infectious Disease is following and per recommendations, likely treatment for 4-6 weeks for suspected endocarditis see their note for abx changes  - Discontinue ceftriaxone (potentially causing rash)  - Start meropenem per ID  - Continue linezolid  - Continue elias nebs for total 3 days  - Continue daily blood cultures, CRP and CBC, CMP  - Neurology following for possible epidural abscess - MRI pending  - Low threshold to repeat a head CT if he develops any acute neurologic changes.   - Speech therapy consulted, appreciate recs  - CT neck, chest, abdomen, and pelvis   - Intermittent Bipap for respiratory support  - Repeat dose of IV furosemide 40 mg today    #Maculopapular Rash  Concerning for drug reaction/eruption  potentially cephalosporins (initially on cefazolin, given one dose of ceftriaxone on 12/13). Photos uploaded to Media Tab on 12/14  - Stop ceftriaxone   - Consult dermatology, appreciate recommendations     #Demand ischemia  #Aortic stenosis status post bioprosthetic aortic valve replacement in 2014  #Coronary artery disease status post PCI to the LAD in 2012  #Radiation induced heart disease  #Hypertension  #Hyperlipidemia  #AHRF 2/2 volume overload  -Mr. Fletcher follows at [facility] with cardiology with Dr. Winnie Roth and was last seen November 16, 2022.  He is planned in February 2023 to undergo updated cardiac surgery for his significant calcifications and valve disease.  -TTE EEG was performed without any evidence of vegetations.    -Continue to hold Plavix and aspirin in the setting of bleeding risk given his recent stroke (as above)  -Cardiology consulted and signed off     #Acute thrombocytopenia, improving - initial concern for possible HIT given score >3 and recent heparin exposure in November. HIT screen negative, fibrinogen not low.  DIC/HIT less likely. Supsect related to medications, sepsis, etc  - Hematology consulted appreciate recs     #Acute Kidney Injury, non-oliguric - baseline around 0.9, appears to be peaking around 4. Likely related to medications, hypotension, vasoplegia  - US collecting system without hydro  - follow UOP, monitor closely for post-ATN diuresis  - avoid nephrotoxic agents  - nephrology consult  - Furosemide prn     # Generalized weakness secondary to critical illness  -PT/OT consults to assess safety to return home and assist with strength training     # Hodgkin's lymphoma status post radiation and MOPP chemotherapy, in remission  -No acute issues.     # Testicular cancer status post a left orchiectomy and chemotherapy, in remission  -No acute issues.      # Prostate cancer ([place] 4+3, diagnosed April 2021) s/p radiation and Lupron  -Follows with Urology and Oncology  "as an outpatient         Diet: Snacks/Supplements Adult: Magic Cup; With Meals  Soft & Bite Sized Diet (level 6) Thin Liquids (level 0)  Calorie Counts  Snacks/Supplements Adult: Ensure Enlive; Between Meals    DVT Prophylaxis: Heparin SQ  Medina Catheter: PRESENT, indication: Obstruction;Retention  Central Lines: None  Cardiac Monitoring: None  Code Status: Full Code      Disposition Plan      Expected Discharge Date: 12/19/2022      Destination: home  Discharge Comments: Infectious workup/management on going        The patient's care was discussed with the Bedside Nurse, Care Coordinator/, Patient, Patient's Family and ID Consultant.    Tommie Heck MD  Hospitalist Service, [address]  Securely message with the Vocera Web Console (learn more here)  Text page via [facility] Paging/Directory   Please see signed in provider for up to date coverage information      Clinically Significant Risk Factors             # Anion Gap Metabolic Acidosis: Highest Anion Gap = 21 mmol/L in last 2 days, will monitor and treat as appropriate  # Hypoalbuminemia: Lowest albumin = 2.3 g/dL at 12/7/2022  4:34 AM, will monitor as appropriate          # DMII: A1C = 6.6 % (Ref range: <5.7 %) within past 3 months   # Overweight: Estimated body mass index is 28.56 kg/m  as calculated from the following:    Height as of an earlier encounter on 12/4/22: 1.727 m (5' 8\").    Weight as of this encounter: 85.2 kg (187 lb 13.3 oz).          ______________________________________________________________________    Interval History   No acute events overnight. Continues to have high work of breathing. Reports feeling tired. No chest pain. Rash developed in past 24 hours.     4 Point ROS unable to be performed due to patient condition.     Data reviewed today: I reviewed all medications, new labs and imaging results over the last 24 hours. I personally reviewed the chest CT " image(s) showing worsening L sided infiltrates.    Physical Exam   /57 (BP Location: Left arm)   Pulse 102   Temp 99  F (37.2  C) (Axillary)   Resp 15   Wt 85.2 kg (187 lb 13.3 oz)   SpO2 98%   BMI 28.56 kg/m    General: Asleep, awakes to voice, ill appearing  Skin: diffuse maculopapular rash on back and trunk  CV: RRR, normal S1S2, no murmur appreciated   Resp: coarse breath sounds throughout, no wheezing appreciated, good air movement  Abd: Soft, nontender, nondistended, BS+, no masses appreciated  Extremities: warm and well perfused, mild edema        Data

## 2022-12-15 NOTE — CODE STATUS AND ADVANCE DIRECTIVES
Code sepsis called for lactic acid 2.6. RRT at bedside . Notified primary team also about the result .

## 2022-12-15 NOTE — PROGRESS NOTES
Neurology Brief Progress Note    We continue to recommend MRI C/T spine w & w/o contrast when able, but understand that the primary team is managing multiple more urgent problems.  Neurology will sign off.  Should MRI C/T spine show abnormalities you would like us to evaluate please page the Stroke pager, and we would be happy to assist.    Patient discussed with Dr. Lesley Subramanian MD, MS  PGY-1, Neurology

## 2022-12-15 NOTE — PROGRESS NOTES
[facility]    Medicine Progress Note - Hospitalist Service, GOLD TEAM 9    Date of Admission:  12/4/2022    Assessment & Plan   Mr. Fletcher is a 66-year-old gentleman with a past medical history of hypertension, hyperlipidemia, Hodgkin's lymphoma status post radiation and MOPP chemotherapy, in remission, testicular cancer status post a left orchiectomy and chemotherapy, in remission, prostate cancer (Elly 4+3, diagnosed April 2021) status post radiation and Lupron, aortic stenosis status post bioprosthetic aortic valve in (2014) and coronary artery disease status post PCI to the LAD in (2012) who on December 4, 2022 developed a left-sided gaze with right-sided weakness and was noted to have a fever and brought to an outside hospital where he was recognized to be in septic shock and transferred to [facility] ICU.  Head CT and CTA of the head and neck did not display any acute abnormality or stenosis.  MRI brain displayed scattered foci of acute ischemia involving the bilateral anterior and posterior cerebral artery distributions.  Neurology was consulted and concerned that the these might represent embolic strokes.  Further infectious work-up was done including a CT chest abdomen and pelvis which revealed groundglass opacities in the left upper lobe and left lower lobe with tree-in-bud findings in the setting of interstitial pulmonary edema and a left pleural effusion.  He was started on broad-spectrum antibiotics including nafcillin and ceftriaxone.  Lumbar puncture was performed and CSF was positive for gram-positive cocci, final cultures pending.  Cardiology was consulted with concerns for possible endocarditis.  TTE was performed which revealed severe mitral calcification with moderate insufficiency and a bioprosthetic aortic valve which was well-seated with no vegetations present. ID was consulted and he was adjusted to Nafcillin with treatment for likely  endocarditis in the setting of a high grade blood stream infection from MSSA that resulted in pneumonia, embolic strokes, acute kidney injury and encephalopathy. 12/6, he is transfered out of the ICU.     Main Plans for Today:  - Significant improvement in mental and respiratory status  - Continue meropenem and linezolid per ID  - Encourage PO intake  - Work with PT/OT  - Repeat furosemide dose     #septic shock (fever, leukocytosis, tachycardia, tachypnea, end organ damage) secondary to MSSA high-grade bloodstream infection  #Acute embolic strokes with right-sided weakness  #Acute encephalopathy, improving  #Bacterial community-acquired pneumonia  He was initially started on IV Vancomycin/ Zosyn at OSH. Blood cultures x 2 from 12/4/22 grew positive for pansensitive Staph aureus. There were no repeat cultures on 12/5. CSF culture Repeat cultures done 12/6 so far are without growth. His encephalopathy is improving and Neurology is following. Transitioned from cefazolin to ceftriaxone for better GNR coverage on 12/13. Metronidazole added on 12/13 as well for anaerobic coverage of possible aspiration pneumonia. Developed significant maculopapular rash on 12/13, likely drug related.   - Infectious Disease is following and per recommendations, likely treatment for 4-6 weeks for suspected endocarditis see their note for abx changes  - Discontinue ceftriaxone (potentially causing rash)  - Continue meropenem per ID  - Continue linezolid  - Completed elias nebs for total 3 days  - Discontinue daily blood cultures,   - Continue to trend CRP and CBC, CMP  - Neurology following for possible epidural abscess - MRI unable to be performed, can revisit as status improves  - Low threshold to repeat a head CT if he develops any acute neurologic changes.   - Speech therapy consulted, appreciate recs  - CT neck, chest, abdomen, and pelvis yesterday with worsening L infiltrates  - Intermittent Bipap for respiratory support  - Repeat dose  of IV furosemide 40 mg today    #Drug Reaction  Concerning for drug reaction/eruption potentially cephalosporins (initially on cefazolin, given one dose of ceftriaxone on 12/13). Photos uploaded to Media Tab on 12/14  - Stop ceftriaxone   - Consult dermatology, appreciate recommendations  - Biopsy pending  - Triamcinalone 0.1% ointment under saran wrap     #Demand ischemia  #Aortic stenosis status post bioprosthetic aortic valve replacement in 2014  #Coronary artery disease status post PCI to the LAD in 2012  #Radiation induced heart disease  #Hypertension  #Hyperlipidemia  #AHRF 2/2 volume overload  -Mr. Fletcher follows at [facility] with cardiology with Dr. Winnie Roth and was last seen November 16, 2022.  He is planned in February 2023 to undergo updated cardiac surgery for his significant calcifications and valve disease.  -TTE EEG was performed without any evidence of vegetations.    -Continue to hold Plavix and aspirin in the setting of bleeding risk given his recent stroke (as above)  -Cardiology consulted and signed off     #Acute thrombocytopenia, improving - initial concern for possible HIT given score >3 and recent heparin exposure in November. HIT screen negative, fibrinogen not low.  DIC/HIT less likely. Supsect related to medications, sepsis, etc  - Hematology consulted appreciate recs     #Acute Kidney Injury, non-oliguric - baseline around 0.9, appears to be peaking around 4. Likely related to medications, hypotension, vasoplegia  - US collecting system without hydro  - follow UOP, monitor closely for post-ATN diuresis  - avoid nephrotoxic agents  - nephrology consult  - Furosemide prn     # Generalized weakness secondary to critical illness  -PT/OT consults to assess safety to return home and assist with strength training     # Hodgkin's lymphoma status post radiation and MOPP chemotherapy, in remission  -No acute issues.     # Testicular cancer status post a left orchiectomy and chemotherapy,  "in remission  -No acute issues.      # Prostate cancer (Sidney 4+3, diagnosed April 2021) s/p radiation and Lupron  -Follows with Urology and Oncology as an outpatient           Diet: Snacks/Supplements Adult: Magic Cup; With Meals  Soft & Bite Sized Diet (level 6) Thin Liquids (level 0)  Calorie Counts  Calorie Counts  Snacks/Supplements Adult: Ensure Enlive; Between Meals    DVT Prophylaxis: Enoxaparin (Lovenox) SQ  Medina Catheter: PRESENT, indication: Retention  Central Lines: None  Cardiac Monitoring: None  Code Status: Full Code      Disposition Plan      Expected Discharge Date: 12/21/2022      Destination: home  Discharge Comments: Infectious workup on going        The patient's care was discussed with the Bedside Nurse, Care Coordinator/, Patient and Patient's Family.    Tommie Heck MD  Hospitalist Service, [address]  Securely message with the Vocera Web Console (learn more here)  Text page via [facility] Paging/Directory   Please see signed in provider for up to date coverage information      Clinically Significant Risk Factors        # Hypokalemia: Lowest K = 3.3 mmol/L in last 2 days, will replace as needed       # Hypoalbuminemia: Lowest albumin = 2.2 g/dL at 12/15/2022  5:17 AM, will monitor as appropriate          # DMII: A1C = 6.6 % (Ref range: <5.7 %) within past 3 months   # Overweight: Estimated body mass index is 27.99 kg/m  as calculated from the following:    Height as of an earlier encounter on 12/4/22: 1.727 m (5' 8\").    Weight as of this encounter: 83.5 kg (184 lb 1.4 oz).          ______________________________________________________________________    Interval History   No acute events overnight. Significantly better this morning from a respiratory and mental status standpoint. No significant concerns. Hungry so wants to defer feeding tube discussion.     Data reviewed today: I reviewed all medications, new labs " and imaging results over the last 24 hours. I personally reviewed no images or EKG's today.    Physical Exam   BP 98/50   Pulse 98   Temp 98.3  F (36.8  C) (Axillary)   Resp 20   Wt 83.5 kg (184 lb 1.4 oz)   SpO2 97%   BMI 27.99 kg/m    General: AAOx3, well appearing man in NAD  Skin: palpable purpura and pustular rash on trunk, back and extremities  CV: RRR, normal S1S2, no murmur  Resp: CTAB, no wheeze, rhonchi   Abd: Soft, nontender, nondistended, BS+, no masses appreciated  Extremities: warm and well perfused, mild edema        Data   Recent Labs   Lab 12/15/22  1122 12/15/22  0517 12/14/22  1332 12/14/22  1225 12/14/22  0535 12/13/22  1014 12/13/22  0545   WBC  --  12.0*  --  15.7*  --  21.6*  21.6*  --    HGB  --  8.1*  --  8.8*  --  9.5*  --    MCV  --  83  --  85  --  84  --    PLT  --  195  --  208  --  220  --    NA  --  143  --   --  136  --  136   POTASSIUM 3.3* 3.4  --   --  5.2  --  4.4   CHLORIDE  --  104  --   --  98  --  97*   CO2  --  24  --   --  20*  --  18*   BUN  --  112.0*  --   --  111.0*  --  102.0*   CR  --  2.83*  --   --  2.98*  --  3.17*   ANIONGAP  --  15  --   --  18*  --  21*   YUNIOR  --  7.9*  --   --  7.8*  --  8.1*   GLC  --  128* 96  --  118*  --  113*   ALBUMIN  --  2.2*  --   --  2.5*  --  2.5*   PROTTOTAL  --  5.9*  --   --  6.2*  --  6.1*   BILITOTAL  --  1.1  --   --  1.2  --  1.3*   ALKPHOS  --  212*  --   --  175*  --  175*   ALT  --  12  --   --  15  --  16   AST  --  82*  --   --  109*  --  93*     No results found for this or any previous visit (from the past 24 hour(s)).

## 2022-12-15 NOTE — PROGRESS NOTES
[facility] Progress Note     Patient:  Eloy Fletcher   YOB: 1955, MRN: [id number]  Date of Visit: 12/15/2022  Date of Admission: 12/4/2022  Consult Requester: Lauri Friend MD     ID Recommendations:  1.  Continue Meropenem 1g IV Q12hr (started 12/14), suspected nosocomial pneumonia based on worsened chest CT with GNR which are resistant to fluoroquinolones.   -- This is the dose will be for GFR was 25-50 (and his estimated GFR is 24 and improving).  -- ID will monitor renal function and dose adjustment in the future.    2. Continue Linezolid (started 12/9) - Linezolid achieves good CNS penetration. Plan for 14 day course for MSSA bacteremia and CNS septic emboli with MSSA CSF isolates.   - Anticipate last dose 12/24 since first negative blood culture 12/11    3. Continue trend of CBC and CRP, likely mixed drug reaction with infection.     4. Drug Fever / Drug Rash -- cephalosporins are the most probable etiology.    Future ID General Plans: Tentative duration for pneumonia: Plan at least 8 day course of Meropenem for nosocomial pneumonia; last dose through 12/22. Thereafter could transition to Ertapenem IV 1g every day to finish MSSA bacteremia with possible endocarditis treatment through 1/22/2023. Addition of rifabutin 300mg PO QDay would be good at that time of transitioning to eratpenem (12/22) and continued through 1/22/2023.        Assessment:   1. Sepsis: MSSA bacteremia  -  Suspected endocarditis; CNS multiple Septic emboli s/p AVR 2014 CSF MSSA culture positivity. ILDEFONSO did not reveal vegetations, but that does not fully exclude endocarditis.  Last positive culture 12/10 AM (~24 hrs to grow), (12/11 and subsequent cultures: no growth to date). Continued on Linezolid given (CNS tissue and CSF penetration).   2. Pneumonia - Possible Aspiration CT chest 12/15 increased left GGO and consolidative opacities, possible MSSA seeding vs. Nosocomial infection. Continued on  Meropenem   3. Drug Rash -- Suspected beta-lactam allergy. Rash and eosinophilia (abs 600 eos on 12/13) started before Ceftriaxone given. Thus cefazolin more likely (or multiple).   4. Acute kidney injury, creatinine beginning to decrease (Cr = 4.1 -> 3.9 -> 3.6-->2.83 mg/dL) - multi-factorial -> sepsis, hypotension, meds.  GFR = 24 and slowly improving.   5. Biprosthetic Aortic valve 2014; mitral stenosis with calcifications - no evidence of vegetations on ILDEFONSO. (yet septic emboli to CNS make endocarditis / endovascular still probable)  6.  Asplenic -- further complicates ability to clear bacteremia.  7. Resolved Cholestasis / cholestatic hepatitis.  Critical illness cholestasis vs. Nafcillin as a cause of cholestatic hepatitis -- although no peripheral eosinophilia was present in blood when switching (however later was on 12/13 with rash on 12/14).  At risk for ascending cholangitis- Stopped nafcillin on 12/10 has improved LFTs.  T-bili from 4.2 to 1.2      Plan and Discussion:   Summary: 66 year-old male with PMHx notable for biprosthetic AVR 2014, hodgkin's lymphoma with evaluated for MSSA bacteremia with endocarditis and embolic CVA. Recent Course: Transitioned to Linezolid (12/9), Cefazolin (12/10), Ciprofloxacin (12/10). Cultures 12/10 positive for Staph. Aureus. No change in sensitivities. NGTD from 12/11th blood cultures.     Increased shortness of breath and work of breathing, provided BiPap.Transitioned to Ceftriaxone/Metronidazole (12/13), stopped on 12/14 given rash suspected from drug reaction. Added Meropenem for suspected nosocomial pneumonia and continued on Linezolid (12/9).    Impression: Clinically, Mr. Fletcher has improved overnight, he has transitioned off BiPap to NC, is able to converse and has an appetite today. CRP trending down 292->233, Lactic acid 2.7--> 2.6, WBC down from 15.7-->12.0, Procalcitonin elevated 12.8, CT C/A/P 12/15/2022 notable for increase in diffuse left ground glass  and consolidative opacities and, to a lesser extent, right upper lobe opacities. The clinical picture is likely mixed from with bacteremia, hypersensitivity drug reaction and suspected pneumonia. There did not appear to be significant evolution of the drug reaction over the past 24 hours and agree with conservative modalities per Derm and avoidance of Ceftriaxone, as well as avoiding changes of antimicrobial regimen at this time. As eosinophilia present before ceftriaxone given, this may actually be cefazolin driven.  Overall, Meropenem and Linezolid appear to be reasonable options.     Pneumonia - Possible Aspiration CT chest 12/15 increased left GGO and consolidative opacities, possible MSSA seeding vs. Nosocomial infection. Continued on Meropenem, renally dosed at 1g q12hr, GFR 24 today (Cr improved 2.83), will dose adjust during the course: plan for at least 8day treatment with Meropenem (12/14- 12/22)    Regarding his MSSA bacteremia, suspected endocarditis and CNS septic emboli in the setting of: Suspected drug reaction  His vitals and oxygen requirements are improving, WBC and CRP are trending down. Blood cultures from the 11th, 13th, and 14th NGTD (Last positive culture 12/10 with MSSA detected). Ultimately it is reasonable to continue Linezolid at this time, with a plan for 14 days for adequate CNS penetration (12/9-12/24 anticipated at this time).   For the bacteremia/suspected endocarditis, may consider transition to Ertapenem after Pneumonia duration: will likely anticipate 6 weeks of total duration, but will re-address in the context of presumed cephalosporin hypersensitivity.       Will continue to follow for further recommendations and overall course.      Thank you for the consult. ID will continue to follow with you.     Ifrah Weiner, MS4   Pager [phone]  12/15/2022      Resident/Fellow Attestation   Case discussed with the medical student and attending physician Dr. Branham who agrees with the above  assessment and plan.   Anitra Hager MD  Internal Medicine PGY1         Interval History and Events:   Overnight he was able to titrate onto NC from BiPap in the evening. Vitals have remained stable, Tmax (99.1 F) .His overall mentation imroved, as did his repiratory effort. He endorsed an appetite today and enjoys his boost drinks.   Per Azeem and his ex wife, there was not worsening or changing of his rash. He endorsed mild itchiness but no pain and not bothersome at this time. No chest pain, still cough with sputum expectoration but improved SOB, adequate UOP.      Blood Culture 12/11 NGTD.  12/10 MSSA detected. Linzeolid started on 12/9.      12/14 Chest CT with markedly worsen L>R infiltrates.  Pneumonia + possible pulm edema.  Meropenem started 12/14.  Discontinued Ceftriaxone and Metronidazole 12/14 given new rash consistent with hypersensitivity.         Pertinent cultures include:  Culture   Date Value Ref Range Status   12/10/2022 Positive on the 1st day of incubation (A)  Preliminary   12/10/2022 Gram positive cocci in clusters (AA)  Preliminary     Comment:     1 of 2 bottles   12/09/2022 Positive on the 1st day of incubation (A)  Preliminary   12/09/2022 Staphylococcus aureus (AA)  Preliminary     Comment:     1 of 2 bottlesSusceptibilities done on previous cultures   12/08/2022 Positive on the 1st day of incubation (A)  Final   12/08/2022 Staphylococcus aureus (AA)  Final     Comment:     1 of 2 bottlesSusceptibilities done on previous cultures       Recent Inflammatory Biomarkers:   Recent Labs   Lab Test 12/11/22  0508 12/10/22  0545 12/09/22  0814 12/09/22  0532 12/08/22  0812 12/07/22  1359 12/07/22  0434 12/06/22  0422 12/05/22  1141 12/04/22  2353   .00* 220.00*  --  219.00*  --   --  254.00* 387.00*  --   --    PCAL  --   --   --   --   --   --  24.80*  --   --  54.20*   WBC 25.8* 21.3* 21.4*  --  15.4* 13.6* 12.9* 12.3*   < > 11.4*    < > = values in this interval not displayed.            " Antimicrobial Treatment:   Meropenem 12/14-->  Linezolid 12/9 ->    Metronidazole 12/13-12/14 0700  Ceftriaxone 12/13    Cefazolin 12/10->12/13  Ciproflox 400mg QDay 12/10 ->12/12    Nafcillin 12/5-12/10 stopped due to possible cholestatic hepatitis   Received one dose Gentamicin (5mg/kg) on 12/5 follow up level 12/8: 0.4     Stopped: 12/6: Ceftriaxone   Stopped 12/5:  vancomycin, acyclovir, ampicillin;          Review of Systems:   Targeted 4 point ROS was completed with pertinent positives and negatives are detailed above.         HPI:   Adopted from initial consult note on 12/05/2022     Summary:       66 year-old male with PMHx notable for Aortic stenosis s/p bioprosthetic AVR 2014, CAD s/p stent 2012, moderate mitral regurgitation/stenosis on ILDEFONSO 11/2022, with prior chemotherapy/radiation with Hodgkin's Lymphoma 1979, testicular cancer and prostate cancer admitted overnight on 12/04 for sepsis, encephalopathy, and acute embolic CVA. Evaluated for possible endocarditis MSSA Bacteremia       \"History obtained from review of chart and discussion with patient/nursing staff.      Eloy Fletcher is a 66 year old male who presented with new left lateral ocular gaze and right sided hemineglect, weakness and fevers onset 12/02/2022.  PMHx notable for Aortic Stenosis s/p bioprosthetic AVR 2014, CAD s/p PCI and stent 2012, moderate mitral regurgitation/stenosis on ILDEFONSO 11/2022, with prior chemotherapy/radiation with Hodgkin's Lymphoma 1979, testicular cancer and prostate cancer admitted overnight on 12/04 for sepsis, encephalopathy, and acute embolic CVA. Evaluated for possible endocarditis.     Course: Initial presentation to OSH ED with confusion and unresponsiveness 12/4, discussion with his ex-wife,  initial onset of symptoms 12/1/22 multiple episodes of vomiting with a fever. Improved overnight, recurred and worsened 12/3.  He presented with left gaze preference, right-sided neglect, weakness, and fevers. Initial " "findings significant for showing tachycardia, tachypnea, and fevers, initial eukocytosis of 11.4, lactic acid 4.2. At that time he was confused and not responding appropriately.   He was evaluated by neurology and found to have MRI findings with non-specific small multifocal CVA suspected embolic in origin. He was provided empiric antibiotics, Blood cultures were drawn.    Due to concern for CNS infection, a LP was performed at the [facility] ED and was further transferred to [facility] ICU for work-up of endocarditis in the setting of hypotension. Initially provided   Acyclovir, Ampicillin, Ceftriaxone, Piperacillin Tazobactam and Vancomycin.   CT Chest also showing GGO in the MIGUEL ANGEL and LLL with tree-in-bud findings concerning for pneumonia (CAP vs. Aspiration). LP performed showing WBC of 230 with elevated protein at 59 and glucose at 82.                  Today, the patient was seen and examined at bedside, mild aphasia/ difficult word finding on exam. Patient is able to answer yes/no questions. Interval changes,  appears improved since 12/4.  Associated symptoms at this time positive for cough, with green/yellow sputum. No shortness of breath. Denies CV complaints of chest pain, diaphoresis, no abdominal pain. Denies new rashes or lesions. Denies new numbness or subjective weakness.      Pertinent Cardiac History: Aortic stenosis S/P bioprosthetic AVR in 2014, and CAD s/p PCI   2012.  Of note, recently evaluated with Cardiology at [facility]. Possible planned early next year, \"redo median sternotomy and mitral valve replacement with a tissue valve. We may have to replace the ascending aorta at the same time. The patient has right bundle branch block and a left bob fascicular block\" per evaluation with Dr. Gurrola 11/16/2022.  Last ILDEFONSO 11/11/2022, with LVEF 65%, Moderate-severe mitral valve regurgitation with moderate mitral stenosis and severe annular mitral calcification, demonstrated normal aortic valve tissue " "prosthesis.   Pertinent Oncology History: Prostate cancer (Elly 4+3, TNM: cT1c) treated with chemo/radiation (last dose 3/30/2022), good response at that time. Past history in remission: Testicular cancer (30 years prior  s/p chemo & Left orchiectomy) and Hodgkins lymphoma (1970s treated with MOPP & radiation)\"         Physical Examination:   Temp:  [97.2  F (36.2  C)-99.1  F (37.3  C)] 97.8  F (36.6  C)  Pulse:  [] 95  Resp:  [14-28] 22  BP: ()/(49-63) 100/55  FiO2 (%):  [30 %-40 %] 35 %  SpO2:  [94 %-99 %] 98 %    I/O last 3 completed shifts:  In: 1000 [P.O.:600; I.V.:400]  Out: 3250 [Urine:3250]    Vitals:    12/12/22 0618 12/12/22 1044 12/13/22 0453 12/14/22 0400   Weight: 85.2 kg (187 lb 13.3 oz) 85.4 kg (188 lb 4.4 oz) 86.4 kg (190 lb 7.6 oz) 85.2 kg (187 lb 13.3 oz)    12/15/22 0245   Weight: 83.5 kg (184 lb 1.4 oz)       Constitutional: Adult male seen and examined at bedside.  Tired, but able to converse on exam  Respiratory: mild increased work of breathing, NC,  course breath sounds L> R (however improved from 12/14)  Cardiovascular: RRR, grade IV systolic ejection murmur noted. 1+ pedal edema.   Skin: Purpuric, non blanchable, coalescing rash, mildly raised: diffuse on the lumbar/thoracic, abdomen, extending to the first 1/4 of the dara/lateral thighs. And flexor regions.   Musculoskeletal: Extremities grossly normal, non-tender   Neurologic: pupils equal and reactive to light, moves extremities spotaneously  Vascular access:   12/5 Right UE pIV  CDI, extension about 1 inch above and below, purpuric rash as above          Medications:       acetylcysteine  2 mL Nebulization BID     guaiFENesin  600 mg Oral BID     heparin ANTICOAGULANT  5,000 Units Subcutaneous Q12H     linezolid  600 mg Intravenous Q12H     meropenem  1 g Intravenous Q12H     pantoprazole  40 mg Oral QAM AC     sodium chloride (PF)  3 mL Intracatheter Q8H     triamcinolone   Topical BID "       Antiinfectives:  Anti-infectives (From now, onward)    Start     Dose/Rate Route Frequency Ordered Stop    12/14/22 1230  meropenem (MERREM) 1 g vial to attach to  mL bag        Note to Pharmacy: GFR is 22 and improving. This would be appropriate dose if GFR was 25-50. Carrol (ID Staff). ID will monitor renal function and if declines dose adjust.    1 g  over 30 Minutes Intravenous EVERY 12 HOURS 12/14/22 1210 12/22/22 1359    12/09/22 1600  linezolid (ZYVOX) infusion 600 mg         600 mg  over 60 Minutes Intravenous EVERY 12 HOURS 12/09/22 1529            Infusions/Drips:    - MEDICATION INSTRUCTIONS -       - MEDICATION INSTRUCTIONS -       sodium chloride 150 mL/hr (12/05/22 1330)            Laboratory Data:     Microbiology:      Summary: 12/4 Blood and CSF cultures with Staph Aureus. Verigene with no evidence of MecA    12/7-1210  Gram positive cocci in clusters Panic      Details:   Initial cultures   Cerebrospinal fluid Aerobic Bacterial Culture Routine 1+ Staphylococcus aureus Panic       Blood Cultures 12/4(1534):Staphylococcus aureus Panic  Positive 0815 12/7    Susceptibility     Staphylococcus aureus     DEJA     Clindamycin <=0.25 ug/mL Susceptible     Erythromycin <=0.25 ug/mL Susceptible     Gentamicin <=0.5 ug/mL Susceptible     Oxacillin 0.5 ug/mL Susceptible     Tetracycline <=1 ug/mL Susceptible     Trimethoprim/Sulfamethoxazole <=0.5/9.5 u... Susceptible     Vancomycin <=0.5 ug/mL Susceptible                       CSF Findings:   *High: RBC *78, total nucleated cells *230, Glucose CSF *82  Abnormal CSF appearance Hazy.   Otherwise, colorless        Meningitis/Encephalitis Panel Qual PCR CSF:  Order: [id number]   Status: Final result      Visible to patient: Yes (not seen)      1 Result Note    Component Ref Range & Units 1 d ago   (12/4/22) 1 d ago   (12/4/22)    Escherichia coli K1 Negative Negative       Haemophilus influenzae Negative Negative       Listeria monocytogenes Negative  Negative       Neisseria meningitidis Negative Negative       Streptococcus agalactiae (GBS) Negative Negative       Streptococcus pneumoniae Negative Negative  Not Detected R     Cytomegalovirus Negative Negative       Enterovirus Negative Negative       Herpes simplex virus 1 Negative Negative      Comment: Recommend testing with another molecular method if clinical suspicion for infection is high.    Herpes simplex virus 2 Negative Negative      Comment: Recommend testing with another molecular method if clinical suspicion for infection is high.    Human Herpes Virus 6 Negative Negative       Human parechovirus Negative Negative       Varicella zoster virus Negative Negative       Cryptococcus neoformans/gattii Negative Negative      Comment: Recommend testing for Cryptococcal antigen and fungal culture if clinical suspicion for infection is high.         Strep pneumo Agn Ur greater or equal to 13yrs or CSF any age (1 mL minimum)  Order: [id number]   Status: Final result      Visible to patient: Yes (not seen)     Specimen Information: Lumbar Puncture; Cerebrospinal fluid    1 Result Note    Component Ref Range & Units 1 d ago     Streptococcus pneumoniae antigen Negative Negative    Comment: A negative Streptococcus pneumoniae antigen result does not rule out infection with Streptococcus pneumoniae.               Inflammatory Markers    Recent Labs   Lab Test 12/15/22  0517 12/14/22  0535 12/13/22  0545   .00* 292.00* 299.00*   Procalcitonin: 54.20 (12/4/22)  Lactic Acid: 4.6 (12/4), 4.2 (12/4), 4.6(12/5) 1.7 (12/6)  Cardiac: Troponin: 610 (12/4), 983 (12/4), 1,229 (12/5)    Metabolic Studies     Recent Labs   Lab Test 12/15/22  0517 12/14/22  2206 12/14/22  1332 12/14/22  0535 12/13/22  0552 12/13/22  0545 12/07/22  1650 12/07/22  1359     --   --  136  --  136   < >  --    POTASSIUM 3.4  --   --  5.2  --  4.4   < >  --    CHLORIDE 104  --   --  98  --  97*   < >  --    CO2 24  --   --  20*  --  18*    < >  --    ANIONGAP 15  --   --  18*  --  21*   < >  --    .0*  --   --  111.0*  --  102.0*   < >  --    CR 2.83*  --   --  2.98*  --  3.17*   < >  --    GFRESTIMATED 24*  --   --  22*  --  21*   < >  --    *  --    < > 118*  --  113*   < >  --    A1C  --   --   --   --   --   --   --  6.6*   YUNIOR 7.9*  --   --  7.8*  --  8.1*   < >  --    PHOS 5.8*  --   --  7.5*  --  8.1*   < >  --    MAG 2.6*  --   --  2.7*  --  2.6*   < >  --    LACT  --  2.6*  --   --    < >  --    < >  --     < > = values in this interval not displayed.       Hepatic Studies    Recent Labs   Lab Test 12/15/22  0517 12/14/22  0535 12/13/22  0545 12/06/22  0422 12/05/22  1008   BILITOTAL 1.1 1.2 1.3*   < > 1.1   ALKPHOS 212* 175* 175*   < > 58   ALBUMIN 2.2* 2.5* 2.5*   < > 2.9*   AST 82* 109* 93*   < > 131*   ALT 12 15 16   < > 61*   LDH  --   --   --   --  468*    < > = values in this interval not displayed.       Pancreatitis testing    Recent Labs   Lab Test 12/07/22  0434   TRIG 260*       Hematology Studies      Recent Labs   Lab Test 12/15/22  0517 12/14/22  1225 12/13/22  1014   WBC 12.0* 15.7* 21.6*  21.6*   ANEU  --   --  13.2*   ALYM  --   --  6.7*   LATONYA  --   --  0.9   AEOS  --   --  0.6   HGB 8.1* 8.8* 9.5*   HCT 22.8* 25.1* 26.5*    208 220       Arterial Blood Gas Testing    Recent Labs   Lab Test 12/13/22  1014   O2PER 2        Urine Studies     Recent Labs   Lab Test 12/07/22  1509 12/05/22  0538 12/04/22  1534   URINEPH 5.5 5.5 5.0   NITRITE Negative Negative Negative   LEUKEST Trace* Negative Negative   WBCU 6* 8* 3       Vancomycin Levels     No lab results found.    Tobramycin levels     No lab results found.    Gentamicin levels    Recent Labs   Lab Test 12/08/22  0812   GENT 0.4       CSF testing     Recent Labs   Lab Test 12/04/22  1738   CWBC 230*   CNEU 86   CLYM 1   CMONO 4   COTH 9   CRBC 78*   CGLU 82*   CTP 59.7*       Last check of C difficile  C Difficile Toxin B by PCR   Date Value Ref  Range Status   12/05/2022 Negative Negative Final     Comment:     A negative result does not exclude actual disease due to C. difficile and may be due to improper collection, handling and storage of the specimen or the number of organisms in the specimen is below the detection limit of the assay.            Imaging:   Reviewed available imaging reports and correlated clinically.     CT C/A/P 12/15/2022: IMPRESSION: Compared to CT 12/4/2022:   1. Significant increase in diffuse left ground glass and consolidative  opacities and, to a lesser extent, right upper lobe opacities,  concerning for infection given MSSA bacteremia, but superimposed  pulmonary edema is also possible.  2. Slightly increased small left and trace right pleural effusions.  3. No CT evidence of thoracic osteomyelitis/discitis.  4. Hepatic steatosis.  5. Increased anasarca.     CT CERVICAL SPINE W/O CONTRAST  12/14/2022 IMPRESSION: Overall limited evaluation due to motion.  1. No acute findings in the cervical spine to suggest infection on  this noncontrast exam.  2. Degenerative changes as detailed above, greatest at C2-3, C3-4 and  C6-7.  3. Partially visualized consolidative opacities in the apex of the  left lung. Please refer to same day CT of the chest.    XR CHEST PORT 1 VIEW  12/13/2022 1:45 AM IMPRESSION:   1. Further increase in left mid and retrocardiac patchy mixed airspace  interstitial and airspace opacities, may represent pulmonary edema  and/or atelectasis; however, cannot exclude component of infection.  2. Stable mixed interstitial and patchy airspace opacities within the  right lung, mild.      MRI w/o lumbar spine 12/12/2022: 1. No findings to suggest lumbar spinal abscess on this noncontrast MRI.  2. Widening of the left L4-5 facet with a small effusion likely  represents degenerative changes when compared to CT 12/14/2022,  potentially inflammatory arthritis. Infection is less likely given the  lack of STIR hyperintense  marrow surrounding the facet joint.  3. Heterogeneous T1 and T2 hyperintensities throughout the lumbar  spine vertebrae and sacrum bilaterally likely represents a comminution  of degenerative and post radiation changes.    XR CHEST 12/11/2022  Increased left greater than right mixed opacities, likely  worsening pulmonary edema. Infection is not excluded.    Echo ILDEFONSO Study Date: 12/05/2022 01:14 PM   Interpretation Summary  Known radiation induced valve disease with severe calcific mitral disease. No  definite endocarditis noted.  Severe mitral annular calcification is present with multiple calcific nodules,  no definite vegetation seen.  S/p bioprosthetic AVR (23 mm Cordell-Hubbard Perimount Magna/Magna Ease  pericardial prosthesis). Valve appears well seated with good leaflet opening.  No vegetations visualized.  Global and regional left ventricular function is normal with an EF of 60-65%.     TTE: 12/5 Interpretation Summary  Would consider a transesophageal echocardiogram if clinically indicated.  Technically difficult study.Extremely poor acoustic windows.  Limited information was obtained during study.  Global and regional left ventricular function is normal with an EF of 60-65%.  Right ventricular function, chamber size, wall motion, and thickness are  normal.  Severe mitral annular calcification is present.  Mitral mean gradient 11mmHg at heart rate 131 BPM.  H/O Bioprosthetic AVR in 2014. Valve not well visualized.    MRI of the brain 12/4/22: IMPRESSION: 5 scattered foci of acute ischemia involving the bilateral anterior and posterior cerebral artery distributions. Constellation of findings is suspicious for central embolic phenomena.           CT Abdomen and Pelvis: 12/4/22  IMPRESSION:  1.  Patchy groundglass opacities in the left upper and lower lobes, and cluster of tree-in-bud nodularity in the left upper lobe, likely infectious or inflammatory.  2.  Interstitial pulmonary edema.  3.  Trace left  pleural effusion.  4.  No acute findings in the abdomen or pelvis.        CT head and CTA head/neck 12/4/22, were not suggestive of an acute abnormality, no significant stenosis of carotids or vertebral arteries.      CXR 12/7/22:  IMPRESSION: Diffuse interstitial markings bilaterally and streaky  perihilar and bibasilar opacities likely represent pulmonary edema  versus atelectasis. No focal consolidation.      Attending Physician Attestation:  I, Lauri Branham MD have seen and evaluated Eloy Fletcher. I have discussed with the primary provider team, and I agree with the above documented findings and plan in this note. I have reviewed today's vital signs, medications, labs and imaging.    Floor time: 25 minutes  Face-to-face time: 10 minutes  Total time: 35 minutes     Lauri Branham MD MPH  Professor of Medicine  Division of Infectious Diseases & International Medicine  Department of Medicine

## 2022-12-15 NOTE — PROGRESS NOTES
Rapid Response Team Note    Assessment   In assessment a rapid response was called on Eloy Fletcher due to lactic acidosis. This presentation is likely due to severe sepsis 2/2 MSSA bacteremia.      Plan   -  Agree with current antimicrobial regimen  -  Will hold on IVF d/t tenuous volume status and hemodynamic stability  -  The Internal Medicine primary team was able to be reached and they are in agreement with the above plan.  -  Disposition: The patient will remain on the current unit. We will continue to monitor this patient closely.  -  Reassessment and plan follow-up will be performed by the primary team    Carissa Saba PA-C  [facility] RRT [facility] Job Code Contact #[phone]  [facility] Paging/Directory    Hospital Course   Brief Summary of events leading to rapid response:   Sepsis BPA triggered. Lactate 2.6 - stable to improve from prior checks. No acute clinical changes this evening. Upon my arrival patient found resting comfortably in bed in no apparent distress, vitals stable.    Admission Diagnosis:   Endocarditis [I38]    Physical Exam   Temp: 98  F (36.7  C) Temp  Min: 97.2  F (36.2  C)  Max: 99.2  F (37.3  C)  Resp: 28 Resp  Min: 14  Max: 35  SpO2: 97 % SpO2  Min: 94 %  Max: 100 %  Pulse: 104 Pulse  Min: 100  Max: 112    No data recorded  BP: 106/61 Systolic (24hrs), Av , Min:94 , Max:120   Diastolic (24hrs), Av, Min:49, Max:63     I/Os: I/O last 3 completed shifts:  In: 240 [P.O.:240]  Out: 4300 [Urine:4300]     Exam:   General: chronically ill appearing  Mental Status: baseline mental status.      Significant Results and Procedures   Lactic Acid:   Recent Labs   Lab Test 22  2116 22  1951   LACT 2.6* 2.7* 3.0*     CBC:   Recent Labs   Lab Test 22  1225 22  1014 22  0551   WBC 15.7* 21.6*  21.6* 24.5*   HGB 8.8* 9.5* 9.4*   HCT 25.1* 26.5* 26.5*    220 192        Sepsis Evaluation   The patient is known to have an infection.  Eloy PATIÑO  Delgiudice meets SIRS criteria AND has a lactate >2 or other evidence of acute organ damage.  These vital signs, lab and physical exam findings constitute a diagnosis of SEVERE SEPSIS, based on: Lactate resulted, and the level was > 2.0          Anti-infectives (From now, onward)    Start     Dose/Rate Route Frequency Ordered Stop    12/14/22 1230  meropenem (MERREM) 1 g vial to attach to  mL bag        Note to Pharmacy: GFR is 22 and improving. This would be appropriate dose if GFR was 25-50. Carrol (ID Staff). ID will monitor renal function and if declines dose adjust.    1 g  over 30 Minutes Intravenous EVERY 12 HOURS 12/14/22 1210 12/22/22 1359    12/09/22 1600  linezolid (ZYVOX) infusion 600 mg         600 mg  over 60 Minutes Intravenous EVERY 12 HOURS 12/09/22 1529          Current antibiotic coverage is appropriate for source of infection.    3 Hour Severe Sepsis Bundle Completion:  1. Initial Lactic Acid result shown above. Repeat lactic acid is not indicated.  2. Blood Cultures before Antibiotics: Yes  3. Broad Spectrum Antibiotics Administered: yes  4. Is hypotension present? No (IV fluid bolus NOT required).

## 2022-12-15 NOTE — PLAN OF CARE
Neuro: Mental status waxes and wanes . While awake . Oriented x3 . Follows commands .   Cardiac: SR/ST . VSS.MAP > 65   Respiratory: Tachypenic . Able to maintain his sat > 94% . alternating between NC 2 LPM and BIPAP FIO2 30% .Weaking coughing , breath sound coarse..   GI/: Adequate urine output.no BM during the shift .  Diet/appetite: Poor PO intakes . On calorie count .   Activity: Lift ,turned every 2 hrs .   Pain: one time tylenol given for generalized pain .  Skin: Generalized Rash . Triamcinolone ointment applied .  Pressure ulcer on nose bridge followed with WOC RN . RLQ Skin biopsy site    LDA's: chan cath . Right PIV     Plan: Continue with POC. Notify primary team with changes.

## 2022-12-15 NOTE — PROVIDER NOTIFICATION
"   12/14/22 2200   Call Information   Date of Call 12/14/22   Time of Call 2239   Name of person requesting the team Simon   Title of person requesting team RN   RRT Arrival time 2243   Time RRT ended 2252   Reason for call   Type of RRT Adult   Primary reason for call Sepsis suspected   Sepsis Suspected Elevated Lactate level;Heart Rate > 100;RR > 20, SaO2 <90% OR increasing O2 need;WBC <4 or >12   Was patient transferred from the ED, ICU, or PACU within last 24 hours prior to RRT call? No   SBAR   Situation LA - 2.6   Background per provider H&P \"Eloy Fletcher is a 66-year-old male with a past medical history of HTN, HLD, Hodgkin's lymphoma (s/p radiation and MOPP chemo in remission), Testicular cancer (s/p left orchiectomy and chemo, in remission), prostate cancer (Elly 4+3, diagnosed in 4/2021 and s/p radiation and lupron), aortic stenosis S/P bioprosthetic AVR in 2014, and CAD s/p PCI to LAD in 2012 that is admitted to the ICU for sepsis and encephalopathy.\"   Notable History/Conditions Cancer;Cardiac;Hypertension   Assessment Drowsy-on Bipap, VSS   Interventions No interventions   Patient Outcome   Patient Outcome Stabilized on unit   RRT Team   Attending/Primary/Covering Physician Murphy Juarez   Physician(s) Carissa Saba PA-C   Lead RN Janessa Maynard   RT na   Post RRT Intervention Assessment   Post RRT Assessment Stable/Improved   Date Follow Up Done 12/15/22   Time Follow Up Done 0140     "

## 2022-12-15 NOTE — PLAN OF CARE
Goal Outcome Evaluation:      Plan of Care Reviewed With: patient, child          Pt A/Ox3, this morning pt bipap was removed, pt was placed on 3L oxiplus, pts breathing was very labored and audibly wet/course sounding, RT notified neb given to see if breathing improved.  Lung sounds did not improve, Dr. Heck made aware of labored breathing.  Pt also noted to have a pressure injury on bridge of nose from bipap use, bipap mask switched to one that did not apply to pressure to bridge of nose.  Pt also has pin point papule rash to abd, back, arms, groin and back of knees.  Dr Heck made aware of pressure ulcer and showed rash during assessment at bedside, photos taken and posted in chart.  Derm consulted, punch biopsy done.  CT cervical spine, chest/pelvis done, abx broadened & Merrem started d/t worsening chest CT per MD.  ID also observed rash, rash started yesterday 12/13 per RN notes, pt was started on new abx yesterday as well 12/13, therefore rash thought to be med reaction related, rocephin and flagyl discontinued.  Pts resp status improved this afternoon and pt able to be taken off bipap and placed on 3L nasal cannula, pts daughter and brother at bedside t/o shift.  Pt was more alert later afternoon and able to have brief conversations with family and able to have some PO intake, pt tolerated 1 ensure drink safely.  Dr. Heck briefly discussed possibility of feeding tube with pts daughter and pt for nutrition if pts respiratory status keeps improving as pt has not had consistent or complete nutrition for numerous days.  Kenalog cream with saran dressing changes ordered BID for rash, requested saran wrap from  for dressings.  See flowsheets for VS.

## 2022-12-15 NOTE — PROVIDER NOTIFICATION
Notified Nino Lopez via Text procalcitonin level with am lab was  12.8 , previous level was 11.50 .

## 2022-12-15 NOTE — PROGRESS NOTES
Calorie Count  Intake recorded for: 12/14  Total Kcals: 350 Total Protein: 20g  Kcals from Hospital Food: 350  Protein: 20g  Kcals from Outside Food (average):0 Protein: 0g  # Meals Ordered from Kitchen: no meals ordered from kitchen.   # Meals Recorded: no food intake recorded.   # Supplements Recorded: 100% 1 Ensure Plus High Protein

## 2022-12-15 NOTE — PROVIDER NOTIFICATION
BP low at 1537 99/54, pt was being turned right and repositioned right before this and pt asymptomatic, therefore BP recheck a couple hours later.  BP still soft 94/53, MAP 64, pt remains asymptomatic, pt was diuresed earlier this morning due to increased respiratory distress, requiring pt to be on bipap for work of breathing, pt has had good urine output since, respiratory distress has improved and pt has been able to be off bipap since about 1400 visiting with family intermittently.  Pt remains asymptomatic of soft BP's, has been confused to time and intermittently lethargic, but is more alert since 1400 than he was this morning.  Cindy HI made aware of pts BP, that he is asymptomatic.  Pt will have a recheck of BP with next assessment between [id number], per Cindy HI, page her with results of BP recheck at this time.

## 2022-12-15 NOTE — PROGRESS NOTES
CLINICAL NUTRITION SERVICES - BRIEF NOTE     Nutrition Prescription     RECOMMENDATIONS FOR MDs/PROVIDERS TO ORDER  Addendum 10:15:   MD has assessed pt and reports he is appearing significantly better than previous exams. Currently on LFNC this AM. Holding off on FT or TPN at this time. Family to assist with placing meal orders per diet allowance and oral nutrition supplement frequency to increase. Aubrey cts to continue.     Goal for pt to consume at least ~1300 Kcals and 65 gm protein PO (~65% low end est needs) vs need to consider alternate source of nutrition if no improvements.    Recommendations/interventions already ordered by RD:  1. Extend aubrey cts 12/16-12/18    2. Increase frequency of Ensure supplement (set up as below in collaboration with pt family)  - Continue Ensure Enlive BID at 10/2 (chocolate)  - Continue Magic Cup with each meal tray   - Add Ensure mixed with Ice Cream (strawberry) with lunch/dinner trays    3. Family to begin ordering meals for pt, goal for meals TID now that pt more alert, resp status improving. Instructed on ordering meals from outside of hospital if needed.    Future/Additional Recommendations:   - Monitor for improvement to PO status/ONS acceptance.   If fails to meet nutrition goals noted above, then recommend initiation of nutrition support, given minimal PO intake for >1 week   - Recommend FT placement if able to obtain EN access (respiratory status complicates)  - If unable to obtain enteral access, recommend central IV access for TPN.    If POC is to place FT and initiate enteral nutrition support:  Use dosing weight 81 kg (admit wt)  EN access: TBD   Regimen: (renal formula given hyperphosphatemia, though is also lower K+)   Novasource Renal @ 50 ml/hr (1200 ml) provides 2400 kcal (30 Kcal/kg), 109 g pro (1.3), 220 g CHO, 860 ml free water, and 0 g fiber daily.   - Do not advance TF rate unless Mg++ >1.5, K+ is >3, and phos >2   (given phos and Mg currently elevated, more  "concern to monitor K+)  - Initiate @ 10 ml/hr and advance by 10 ml q8hr pending pt's tolerance.  - Recommend 30-60 ml q4hr fluid flushes for tube patency. Additional fluids and/or adjustments per MD.    - Order multivitamin/mineral to help ensure micronutrient needs being met with suspected hypermetabolic demands and potential interruptions to TF infusions.   - HOB >30-45 degrees if FT is gastric.              -Send a nutrition consult for \"Registered Dietitian to Order TF per Medical Nutrition Therapy Guidelines\" if desire RD to order TFs.      If POC involves central line placement for initiation of parenteral nutrition support:  Dosing weight: 81 kg (admit wt)   PN Access: none, would need central line for TPN   Do not initiate or advance unless Mg++ >1.5, K+ is >3, and phos >2  Volume 2000 mL or per phamD     - Initial macro goals: 115 gm dextrose (GIR 1 mg/kg/minute), 120 gm AA (1.5 gm/kg), and 250 mL 20% lipids 7 days per week    - Monitor lytes (K/Mg/Phos) and if within acceptable parameters (Mg++ >1.5, K+ is >3, and phos >2) Increase dextrose by ~65 gm/day to eventual goal of 305 gm dextrose total.     - Goal PN concentration provides 305 gm dextrose, 120 gm AA, and 250 mL 20% ILE 7 days per week for total provision of 2017 Kcals (25 Kcal/kg), 1.5 gm/kg PRO, GIR 2.6 mg/kg/minute, and 25% fat kcals   For last full RD assessment, see note dated 12/12/22    NEW FINDINGS   - Aubrey ct completion with the following results:   12/12: 0 Kcals, 0 gm protein No meals ordered from kitchen, no intake documented   12/13: 180 Kcals, 6 gm protein with 1 meal ordered from kitchen, 50% intake pudding, 50% ensure  12/14: 350 Kcals, 20 gm protein - no meals ordered from kitchen; 100% intake of 1 Ensure drink.   Avg 3-day intake: <10 Kcals and <10 gm protein; minimal nutrition ongoing, now >1 week.     - Pt intermittently requiring BiPAP; otherwise HFNC.   - Pressure injury developed on bridge of nose from BiPAP   - Dr. Heck " started conversation with family yesterday regarding possible/likely need for FT.   - Respiratory status complicates ability to obtain enteral access, and therefore, need to consider central line access for parenteral nutrition. Will place recs in note for both and paged primary MD.     INTERVENTIONS  Implementation  Collaboration with other providers - Primary team MD   EN/PN recs pending POC    Monitoring/Evaluation  Will continue to monitor and evaluate per protocol.    Kamran Sanchez RDN, LD, CNSC  6B RD pager: [phone]   6B work-room RD phone: *[phone]    Weekend/Holiday RD pager [phone]

## 2022-12-16 NOTE — PROGRESS NOTES
Calorie Count  Intake recorded for: 12/15  Total Kcals: 884 Total Protein: 50g  Kcals from Hospital Food: 884   Protein: 50g  Kcals from Outside Food (average):0 Protein: 0g  # Meals Ordered from Kitchen: 1 meal  # Meals Recorded: 1 meal ( 100% 2 applesauce, yogurt)  # Supplements Recorded: 100% 2 Ensure Enlive

## 2022-12-16 NOTE — PROGRESS NOTES
[facility] Progress Note     Patient:  Eloy Fletcher   YOB: 1955, MRN: [id number]  Date of Visit: 12/16/2022  Date of Admission: 12/4/2022  Consult Requester: Lauri Friend MD     ID Recommendations:  1.  Continue Meropenem 1g IV Q12hr (started 12/14- tentative 12/22), suspected nosocomial pneumonia based on worsened chest CT with GNR which are resistant to fluoroquinolones.   -- This is the dose will be for GFR was 25-50 (and his estimated GFR is 29 and improving).  -- When GFR >50, increase Meropenem 1g IV Q8hr  2. Continue Linezolid 600mg IV q12hr (started 12/9- tentative 12/24) - Linezolid achieves good CNS penetration. Plan for 14 day course of linezolid for MSSA bacteremia and CNS septic emboli with MSSA CSF isolates.   - Anticipate last dose 12/24 since first negative blood culture 12/11  3. Continue trend of CBC and CRP, likely mixed drug reaction with infection: improving with current course  4. Drug Fever / Drug Rash -- . 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   --Thereafter could transition to Ertapenem IV 1g every day to finish MSSA bacteremia with possible endocarditis treatment through 1/22/2023. Addition of rifabutin 300mg PO QDay would be good at that time of transitioning to eratpenem (12/22) and continued through 1/22/2023.        Assessment:   1. Sepsis: MSSA bacteremia  -  Suspected endocarditis; CNS multiple Septic emboli s/p  AVR 2014 CSF MSSA culture positivity. ILDEFONSO did not reveal vegetations, but that does not fully exclude endocarditis.  Last positive culture 12/10 AM (~24 hrs to grow), (12/11 and subsequent cultures: no growth to date). Continued on Linezolid given (CNS tissue and CSF penetration).   2. Pneumonia - Possible Aspiration CT chest 12/15 increased left GGO and consolidative opacities, possible MSSA seeding vs. Nosocomial infection. Continued on Meropenem   3. Drug Rash -- Suspected beta-lactam allergy. Rash and eosinophilia (abs 600 eos on 12/13) started before Ceftriaxone given. Thus cefazolin more likely (or multiple).   4. Acute kidney injury, creatinine beginning to decrease (Cr = 4.1 -> 3.9 -> 3.6-->2.83 -->2.41 mg/dL) - multi-factorial -> sepsis, hypotension, meds.  GFR = 24 and slowly improving.   5. Biprosthetic Aortic valve 2014; mitral stenosis with calcifications - no evidence of vegetations on ILDEFONSO. (yet septic emboli to CNS make endocarditis / endovascular still probable)  6.  Asplenic -- further complicates ability to clear bacteremia.  7. Resolved Cholestasis / cholestatic hepatitis.  Critical illness cholestasis vs. Nafcillin as a cause of cholestatic hepatitis -- although no peripheral eosinophilia was present in blood when switching (however later was on 12/13 with rash on 12/14).  At risk for ascending cholangitis- Stopped nafcillin on 12/10 has improved LFTs.  T-bili from 4.2 to 1.2      Plan and Discussion:   Summary: 66 year-old male with PMHx notable for biprosthetic AVR 2014, hodgkin's lymphoma with evaluated for MSSA bacteremia with endocarditis and embolic CVA. Recent Course: Transitioned to Linezolid (12/9), Cefazolin (12/10), Ciprofloxacin (12/10). Cultures 12/10 positive for Staph. Aureus. No change in sensitivities. NGTD from 12/11th blood cultures.     Increased shortness of breath and work of breathing, provided BiPap.Transitioned to Ceftriaxone/Metronidazole (12/13), stopped on 12/14 given  rash suspected from drug reaction. Added Meropenem for suspected nosocomial pneumonia and continued on Linezolid (12/9).    Impression: Clinically, Mr. Fletcher is overall improving, no BiPap required, continued on NC. He is able to converse and has an appetite today. CRP trending down 233->179,  WBC down from 15.7-->9.6, Procalcitonin 12.8-> 8.84.    The clinical picture is likely mixed from with bacteremia, nosocomial pneumonia and suspected hypersensitivity drug reaction manifesting as leukocytoclastic vasculitis. Counseled Eloy and his daughter today on expected slow course and anticipated duration of antibiotics.     Agree with conservative modalities per Derm with triamcinolone and avoidance of Cefazolin/Ceftriaxone.  As eosinophilia present before ceftriaxone given,  may be cefazolin driven.  Leukocytoclastic vasculitis also plausible delayed neutrophilic mechanism, both reasonable to avoid at this time.  Overall, Meropenem and Linezolid appear to be reasonable options given the reaction and improved symptomatology, would avoid changes of antimicrobial regimen at this time.    Pneumonia - Possible Aspiration CT chest 12/15 increased left GGO and consolidative opacities, possible MSSA seeding vs. Nosocomial infection.   Continued on Meropenem, renally dosed at 1g q12hr, GFR 29 today (Cr improved 2.41), will dose adjust during the course: plan for at least 8 day treatment with Meropenem (12/14- 12/22)    Regarding his MSSA bacteremia, suspected endocarditis and CNS septic emboli in the setting of: Suspected drug reaction  His vitals and oxygen requirements are improving, WBC and CRP are trending down. Blood cultures from the 11th, 13th, and 14th NGTD (Last positive culture 12/10 with MSSA detected). Recommend continuing Linezolid at this time, with a plan for 14 days for adequate CNS penetration (12/9-12/24 anticipated at this time).   For the bacteremia/suspected endocarditis, may consider transition to  Ertapenem after Pneumonia duration: will likely anticipate 6 weeks of total duration, but will re-address in the context of presumed cephalosporin hypersensitivity.       Please call prior to discharge, plan to follow ID outpatient. ID will sign off at this time otherwise, please call if further questions arise.     Thank you for the consult. ID will continue to follow with you.     Ifrah Burgosmercedes, MS4   Pager [phone]  12/16/2022    Resident/Fellow Attestation   I reviewed this patient with the medical student and the attending physician, Dr. Branham, and agree with the assessment and plan as stated above.    Anitra Hager MD  Internal Medicine PGY1         Interval History and Events:   Overnight, no acute events. Continued O2 on NC. No BiPap necessary at this time. Vitals have remained stable, Tmax (99.1 F). His overall mentation imroved, as did his repiratory effort.  No worsening or changing of his rash. Some itching, and overall reported hand pain, seen right after physical therapy. No pain of the primary torso or flexor rash.    He denies chest pain, still cough with sputum expectoration but improved SOB, adequate UOP.      Blood Culture 12/11 NGTD.  12/10 MSSA detected. Linzeolid started on 12/9.      12/14 Chest CT with markedly worsen L>R infiltrates.  Pneumonia + possible pulm edema.  Meropenem started 12/14.  Discontinued Ceftriaxone and Metronidazole 12/14 given new rash consistent with hypersensitivity.         Pertinent cultures include:  Culture   Date Value Ref Range Status   12/10/2022 Positive on the 1st day of incubation (A)  Preliminary   12/10/2022 Gram positive cocci in clusters (AA)  Preliminary     Comment:     1 of 2 bottles   12/09/2022 Positive on the 1st day of incubation (A)  Preliminary   12/09/2022 Staphylococcus aureus (AA)  Preliminary     Comment:     1 of 2 bottlesSusceptibilities done on previous cultures   12/08/2022 Positive on the 1st day of incubation (A)  Final   12/08/2022  "Staphylococcus aureus (AA)  Final     Comment:     1 of 2 bottlesSusceptibilities done on previous cultures       Recent Inflammatory Biomarkers:   Recent Labs   Lab Test 12/11/22  0508 12/10/22  0545 12/09/22  0814 12/09/22  0532 12/08/22  0812 12/07/22  1359 12/07/22  0434 12/06/22  0422 12/05/22  1141 12/04/22  2353   .00* 220.00*  --  219.00*  --   --  254.00* 387.00*  --   --    PCAL  --   --   --   --   --   --  24.80*  --   --  54.20*   WBC 25.8* 21.3* 21.4*  --  15.4* 13.6* 12.9* 12.3*   < > 11.4*    < > = values in this interval not displayed.            Antimicrobial Treatment:   Meropenem 12/14-->  Linezolid 12/9 ->    Metronidazole 12/13-12/14 0700  Ceftriaxone 12/13    Cefazolin 12/10->12/13  Ciproflox 400mg QDay 12/10 ->12/12    Nafcillin 12/5-12/10 stopped due to possible cholestatic hepatitis   Received one dose Gentamicin (5mg/kg) on 12/5 follow up level 12/8: 0.4     Stopped: 12/6: Ceftriaxone   Stopped 12/5:  vancomycin, acyclovir, ampicillin;          Review of Systems:   Targeted 4 point ROS was completed with pertinent positives and negatives are detailed above.         HPI:   Adopted from initial consult note on 12/05/2022     Summary:       66 year-old male with PMHx notable for Aortic stenosis s/p bioprosthetic AVR 2014, CAD s/p stent 2012, moderate mitral regurgitation/stenosis on ILDEFONSO 11/2022, with prior chemotherapy/radiation with Hodgkin's Lymphoma 1979, testicular cancer and prostate cancer admitted overnight on 12/04 for sepsis, encephalopathy, and acute embolic CVA. Evaluated for possible endocarditis MSSA Bacteremia       \"History obtained from review of chart and discussion with patient/nursing staff.      Eloy Fletcher is a 66 year old male who presented with new left lateral ocular gaze and right sided hemineglect, weakness and fevers onset 12/02/2022.  PMHx notable for Aortic Stenosis s/p bioprosthetic AVR 2014, CAD s/p PCI and stent 2012, moderate mitral " regurgitation/stenosis on ILDEFONSO 11/2022, with prior chemotherapy/radiation with Hodgkin's Lymphoma 1979, testicular cancer and prostate cancer admitted overnight on 12/04 for sepsis, encephalopathy, and acute embolic CVA. Evaluated for possible endocarditis.     Course: Initial presentation to [facility] ED with confusion and unresponsiveness 12/4, discussion with his ex-wife,  initial onset of symptoms 12/1/22 multiple episodes of vomiting with a fever. Improved overnight, recurred and worsened 12/3.  He presented with left gaze preference, right-sided neglect, weakness, and fevers. Initial findings significant for showing tachycardia, tachypnea, and fevers, initial eukocytosis of 11.4, lactic acid 4.2. At that time he was confused and not responding appropriately.   He was evaluated by neurology and found to have MRI findings with non-specific small multifocal CVA suspected embolic in origin. He was provided empiric antibiotics, Blood cultures were drawn.    Due to concern for CNS infection, a LP was performed at the OSH ED and was further transferred to [facility] ICU for work-up of endocarditis in the setting of hypotension. Initially provided   Acyclovir, Ampicillin, Ceftriaxone, Piperacillin Tazobactam and Vancomycin.   CT Chest also showing GGO in the MIGUEL ANGEL and LLL with tree-in-bud findings concerning for pneumonia (CAP vs. Aspiration). LP performed showing WBC of 230 with elevated protein at 59 and glucose at 82.                  Today, the patient was seen and examined at bedside, mild aphasia/ difficult word finding on exam. Patient is able to answer yes/no questions. Interval changes,  appears improved since 12/4.  Associated symptoms at this time positive for cough, with green/yellow sputum. No shortness of breath. Denies CV complaints of chest pain, diaphoresis, no abdominal pain. Denies new rashes or lesions. Denies new numbness or subjective weakness.      Pertinent Cardiac History: Aortic stenosis S/P  "bioprosthetic AVR in 2014, and CAD s/p PCI   2012.  Of note, recently evaluated with Cardiology at [facility]. Possible planned early next year, \"redo median sternotomy and mitral valve replacement with a tissue valve. We may have to replace the ascending aorta at the same time. The patient has right bundle branch block and a left bob fascicular block\" per evaluation with Dr. Gurrola 11/16/2022.  Last ILDEFONSO 11/11/2022, with LVEF 65%, Moderate-severe mitral valve regurgitation with moderate mitral stenosis and severe annular mitral calcification, demonstrated normal aortic valve tissue prosthesis.   Pertinent Oncology History: Prostate cancer ([place] 4+3, TNM: cT1c) treated with chemo/radiation (last dose 3/30/2022), good response at that time. Past history in remission: Testicular cancer (30 years prior  s/p chemo & Left orchiectomy) and Hodgkins lymphoma (1970s treated with MOPP & radiation)\"         Physical Examination:   Temp:  [98.3  F (36.8  C)-99.6  F (37.6  C)] 98.9  F (37.2  C)  Pulse:  [] 108  Resp:  [18-22] 18  BP: ()/(50-64) 123/56  SpO2:  [95 %-98 %] 96 %    I/O last 3 completed shifts:  In: 2103 [P.O.:1800; I.V.:303]  Out: 4030 [Urine:4030]    Vitals:    12/12/22 1044 12/13/22 0453 12/14/22 0400 12/15/22 0245   Weight: 85.4 kg (188 lb 4.4 oz) 86.4 kg (190 lb 7.6 oz) 85.2 kg (187 lb 13.3 oz) 83.5 kg (184 lb 1.4 oz)    12/16/22 0447   Weight: 83.8 kg (184 lb 11.9 oz)       Constitutional: Adult male seen and examined at bedside.  Tired, but able to converse on exam  Respiratory: mild increased work of breathing, NC,  course breath sounds left anterior field, lower portion (60% improved) good aeration. Right clear.   Cardiovascular: RRR, grade IV systolic ejection murmur noted. 0-1+ pedal edema wrapped on exam.    Skin: Purpuric, non blanchable, coalescing rash, mildly raised: diffuse on the lumbar/thoracic, abdomen, extending to the first 1/4 of the dara/lateral thighs. And flexor " regions. Purpura predominant in distal regions (central rash diminished with less discoloration, wrapped on exam)  Musculoskeletal: Extremities grossly normal, non-tender   Neurologic: pupils equal and reactive to light, moves extremities spotaneously  Vascular access:   12/5 Right UE pIV  CDI, extension about 1 inch above and below, purpuric rash as above          Medications:       furosemide  40 mg Intravenous Once     guaiFENesin  600 mg Oral BID     heparin ANTICOAGULANT  5,000 Units Subcutaneous Q12H     linezolid  600 mg Intravenous Q12H     meropenem  1 g Intravenous Q12H     pantoprazole  40 mg Oral QAM AC     sodium chloride (PF)  3 mL Intracatheter Q8H     triamcinolone   Topical BID       Antiinfectives:  Anti-infectives (From now, onward)    Start     Dose/Rate Route Frequency Ordered Stop    12/14/22 1230  meropenem (MERREM) 1 g vial to attach to  mL bag        Note to Pharmacy: GFR is 22 and improving. This would be appropriate dose if GFR was 25-50. Carrol (ID Staff). ID will monitor renal function and if declines dose adjust.    1 g  over 30 Minutes Intravenous EVERY 12 HOURS 12/14/22 1210 12/22/22 1359    12/09/22 1600  linezolid (ZYVOX) infusion 600 mg         600 mg  over 60 Minutes Intravenous EVERY 12 HOURS 12/09/22 1529            Infusions/Drips:    - MEDICATION INSTRUCTIONS -       - MEDICATION INSTRUCTIONS -       sodium chloride 150 mL/hr (12/05/22 1330)            Laboratory Data:     Microbiology:      Summary: 12/4 Blood and CSF cultures with Staph Aureus. Verigene with no evidence of MecA    12/7-1210  Gram positive cocci in clusters Panic      Details:   Initial cultures   Cerebrospinal fluid Aerobic Bacterial Culture Routine 1+ Staphylococcus aureus Panic       Blood Cultures 12/4(1534):Staphylococcus aureus Panic  Positive 0815 12/7    Susceptibility     Staphylococcus aureus     DEJA     Clindamycin <=0.25 ug/mL Susceptible     Erythromycin <=0.25 ug/mL Susceptible      Gentamicin <=0.5 ug/mL Susceptible     Oxacillin 0.5 ug/mL Susceptible     Tetracycline <=1 ug/mL Susceptible     Trimethoprim/Sulfamethoxazole <=0.5/9.5 u... Susceptible     Vancomycin <=0.5 ug/mL Susceptible                       CSF Findings:   *High: RBC *78, total nucleated cells *230, Glucose CSF *82  Abnormal CSF appearance Hazy.   Otherwise, colorless       Dermatological path Leukocytoclastic vasculitis - (see comment and description)   12/14/22   Microscopic Description  UUMAYO   The specimen exhibits a perivascular infiltrate in the visualized superficial dermis with numerous neutrophils with extensive leukocytoclastic debris and fewer eosinophils, lymphocytes, and histiocytes. There is extensive dermal hemorrhage and focal vascular necrosis is seen. There is no deep plexus nor medium sized vessel involvement noted.            Meningitis/Encephalitis Panel Qual PCR CSF:  Order: [id number]   Status: Final result      Visible to patient: Yes (not seen)      1 Result Note    Component Ref Range & Units 1 d ago   (12/4/22) 1 d ago   (12/4/22)    Escherichia coli K1 Negative Negative       Haemophilus influenzae Negative Negative       Listeria monocytogenes Negative Negative       Neisseria meningitidis Negative Negative       Streptococcus agalactiae (GBS) Negative Negative       Streptococcus pneumoniae Negative Negative  Not Detected R     Cytomegalovirus Negative Negative       Enterovirus Negative Negative       Herpes simplex virus 1 Negative Negative      Comment: Recommend testing with another molecular method if clinical suspicion for infection is high.    Herpes simplex virus 2 Negative Negative      Comment: Recommend testing with another molecular method if clinical suspicion for infection is high.    Human Herpes Virus 6 Negative Negative       Human parechovirus Negative Negative       Varicella zoster virus Negative Negative       Cryptococcus neoformans/gattii Negative Negative      Comment:  Recommend testing for Cryptococcal antigen and fungal culture if clinical suspicion for infection is high.         Strep pneumo Agn Ur greater or equal to 13yrs or CSF any age (1 mL minimum)  Order: [id number]   Status: Final result      Visible to patient: Yes (not seen)     Specimen Information: Lumbar Puncture; Cerebrospinal fluid    1 Result Note    Component Ref Range & Units 1 d ago     Streptococcus pneumoniae antigen Negative Negative    Comment: A negative Streptococcus pneumoniae antigen result does not rule out infection with Streptococcus pneumoniae.               Inflammatory Markers    Recent Labs   Lab Test 12/16/22  0511 12/15/22  0517 12/14/22  0535   .00* 233.00* 292.00*   Procalcitonin: 54.20 (12/4/22)  Lactic Acid: 4.6 (12/4), 4.2 (12/4), 4.6(12/5) 1.7 (12/6)  Cardiac: Troponin: 610 (12/4), 983 (12/4), 1,229 (12/5)    Metabolic Studies     Recent Labs   Lab Test 12/16/22  0511 12/15/22  1814 12/15/22  1122 12/15/22  0517 12/14/22  2206 12/14/22  1332 12/14/22  0535 12/07/22  1650 12/07/22  1359     --   --  143  --   --  136   < >  --    POTASSIUM 3.4 4.6 3.3* 3.4  --   --  5.2   < >  --    CHLORIDE 102  --   --  104  --   --  98   < >  --    CO2 24  --   --  24  --   --  20*   < >  --    ANIONGAP 16*  --   --  15  --   --  18*   < >  --    .0*  --   --  112.0*  --   --  111.0*   < >  --    CR 2.41*  --   --  2.83*  --   --  2.98*   < >  --    GFRESTIMATED 29*  --   --  24*  --   --  22*   < >  --    GLC 99  --   --  128*  --    < > 118*   < >  --    A1C  --   --   --   --   --   --   --   --  6.6*   YUNIOR 7.9*  --   --  7.9*  --   --  7.8*   < >  --    PHOS 4.5  --   --  5.8*  --   --  7.5*   < >  --    MAG 2.4*  --   --  2.6*  --   --  2.7*   < >  --    LACT  --   --   --   --  2.6*  --   --    < >  --     < > = values in this interval not displayed.       Hepatic Studies    Recent Labs   Lab Test 12/16/22  0511 12/15/22  0517 12/14/22  0535 12/06/22  0422 12/05/22  1008    BILITOTAL 1.4* 1.1 1.2   < > 1.1   ALKPHOS 199* 212* 175*   < > 58   ALBUMIN 2.5* 2.2* 2.5*   < > 2.9*   AST 72* 82* 109*   < > 131*   ALT 12 12 15   < > 61*   LDH  --   --   --   --  468*    < > = values in this interval not displayed.       Pancreatitis testing    Recent Labs   Lab Test 12/07/22  0434   TRIG 260*       Hematology Studies      Recent Labs   Lab Test 12/16/22  0511 12/15/22  0517 12/14/22  1225 12/13/22  1014   WBC 9.6 12.0* 15.7* 21.6*  21.6*   ANEU  --   --   --  13.2*   ALYM  --   --   --  6.7*   LATONYA  --   --   --  0.9   AEOS  --   --   --  0.6   HGB 9.7* 8.1* 8.8* 9.5*   HCT 27.0* 22.8* 25.1* 26.5*    195 208 220       Arterial Blood Gas Testing    Recent Labs   Lab Test 12/13/22  1014   O2PER 2        Urine Studies     Recent Labs   Lab Test 12/15/22  1748 12/07/22  1509 12/05/22  0538 12/04/22  1534   URINEPH 5.0 5.5 5.5 5.0   NITRITE Negative Negative Negative Negative   LEUKEST Moderate* Trace* Negative Negative   WBCU  --  6* 8* 3       Vancomycin Levels     No lab results found.    Tobramycin levels     No lab results found.    Gentamicin levels    Recent Labs   Lab Test 12/08/22  0812   GENT 0.4       CSF testing     Recent Labs   Lab Test 12/04/22  1738   CWBC 230*   CNEU 86   CLYM 1   CMONO 4   COTH 9   CRBC 78*   CGLU 82*   CTP 59.7*       Last check of C difficile  C Difficile Toxin B by PCR   Date Value Ref Range Status   12/05/2022 Negative Negative Final     Comment:     A negative result does not exclude actual disease due to C. difficile and may be due to improper collection, handling and storage of the specimen or the number of organisms in the specimen is below the detection limit of the assay.            Imaging:   Reviewed available imaging reports and correlated clinically.     CT C/A/P 12/15/2022: IMPRESSION: Compared to CT 12/4/2022:   1. Significant increase in diffuse left ground glass and consolidative  opacities and, to a lesser extent, right upper lobe  opacities,  concerning for infection given MSSA bacteremia, but superimposed  pulmonary edema is also possible.  2. Slightly increased small left and trace right pleural effusions.  3. No CT evidence of thoracic osteomyelitis/discitis.  4. Hepatic steatosis.  5. Increased anasarca.     CT CERVICAL SPINE W/O CONTRAST  12/14/2022 IMPRESSION: Overall limited evaluation due to motion.  1. No acute findings in the cervical spine to suggest infection on  this noncontrast exam.  2. Degenerative changes as detailed above, greatest at C2-3, C3-4 and  C6-7.  3. Partially visualized consolidative opacities in the apex of the  left lung. Please refer to same day CT of the chest.    XR CHEST PORT 1 VIEW  12/13/2022 1:45 AM IMPRESSION:   1. Further increase in left mid and retrocardiac patchy mixed airspace  interstitial and airspace opacities, may represent pulmonary edema  and/or atelectasis; however, cannot exclude component of infection.  2. Stable mixed interstitial and patchy airspace opacities within the  right lung, mild.      MRI w/o lumbar spine 12/12/2022: 1. No findings to suggest lumbar spinal abscess on this noncontrast MRI.  2. Widening of the left L4-5 facet with a small effusion likely  represents degenerative changes when compared to CT 12/14/2022,  potentially inflammatory arthritis. Infection is less likely given the  lack of STIR hyperintense marrow surrounding the facet joint.  3. Heterogeneous T1 and T2 hyperintensities throughout the lumbar  spine vertebrae and sacrum bilaterally likely represents a comminution  of degenerative and post radiation changes.    XR CHEST 12/11/2022  Increased left greater than right mixed opacities, likely  worsening pulmonary edema. Infection is not excluded.    Echo ILDEFONSO Study Date: 12/05/2022 01:14 PM   Interpretation Summary  Known radiation induced valve disease with severe calcific mitral disease. No  definite endocarditis noted.  Severe mitral annular calcification is  present with multiple calcific nodules,  no definite vegetation seen.  S/p bioprosthetic AVR (23 mm Cordell-Hubbard Perimount Magna/Magna Ease  pericardial prosthesis). Valve appears well seated with good leaflet opening.  No vegetations visualized.  Global and regional left ventricular function is normal with an EF of 60-65%.     TTE: 12/5 Interpretation Summary  Would consider a transesophageal echocardiogram if clinically indicated.  Technically difficult study.Extremely poor acoustic windows.  Limited information was obtained during study.  Global and regional left ventricular function is normal with an EF of 60-65%.  Right ventricular function, chamber size, wall motion, and thickness are  normal.  Severe mitral annular calcification is present.  Mitral mean gradient 11mmHg at heart rate 131 BPM.  H/O Bioprosthetic AVR in 2014. Valve not well visualized.    MRI of the brain 12/4/22: IMPRESSION: 5 scattered foci of acute ischemia involving the bilateral anterior and posterior cerebral artery distributions. Constellation of findings is suspicious for central embolic phenomena.           CT Abdomen and Pelvis: 12/4/22  IMPRESSION:  1.  Patchy groundglass opacities in the left upper and lower lobes, and cluster of tree-in-bud nodularity in the left upper lobe, likely infectious or inflammatory.  2.  Interstitial pulmonary edema.  3.  Trace left pleural effusion.  4.  No acute findings in the abdomen or pelvis.        CT head and CTA head/neck 12/4/22, were not suggestive of an acute abnormality, no significant stenosis of carotids or vertebral arteries.      CXR 12/7/22:  IMPRESSION: Diffuse interstitial markings bilaterally and streaky  perihilar and bibasilar opacities likely represent pulmonary edema  versus atelectasis. No focal consolidation.    Attending Physician Attestation:  I, Lauri Branham MD have seen and evaluated Eloy Fletcher. I have discussed with the primary provider team, and I agree  with the above documented findings and plan in this note. I have reviewed today's vital signs, medications, labs and imaging.    Floor time: 20 minutes  Face-to-face time: 10 minutes  Total time: 30 minutes     Lauri Branham MD MPH  Professor of Medicine  Division of Infectious Diseases & International Medicine  Department of Medicine

## 2022-12-16 NOTE — PROGRESS NOTES
Pt refused bipap and is on 2L NC. Please call RT if needed to be put on.     Eliceo Santamaria, RT on 12/16/2022 at 12:13 AM

## 2022-12-16 NOTE — PROGRESS NOTES
[facility] Nurse Inpatient Assessment     Consulted for: face     Patient History (according to provider note(s):       Mr. Fletcher is a 66-year-old gentleman with a past medical history of hypertension, hyperlipidemia, Hodgkin's lymphoma status post radiation and MOPP chemotherapy, in remission, testicular cancer status post a left orchiectomy and chemotherapy, in remission, prostate cancer ([place] 4+3, diagnosed April 2021) status post radiation and Lupron, aortic stenosis status post bioprosthetic aortic valve in (2014) and coronary artery disease status post PCI to the LAD in (2012) who on December 4, 2022 developed a left-sided gaze with right-sided weakness and was noted to have a fever and brought to an outside hospital where he was recognized to be in septic shock and transferred to [facility] ICU.  Head CT and CTA of the head and neck did not display any acute abnormality or stenosis.  MRI brain displayed scattered foci of acute ischemia involving the bilateral anterior and posterior cerebral artery distributions.  Neurology was consulted and concerned that the these might represent embolic strokes.  Further infectious work-up was done including a CT chest abdomen and pelvis which revealed groundglass opacities in the left upper lobe and left lower lobe with tree-in-bud findings in the setting of interstitial pulmonary edema and a left pleural effusion.  He was started on broad-spectrum antibiotics including nafcillin and ceftriaxone.  Lumbar puncture was performed and CSF was positive for gram-positive cocci, final cultures pending.  Cardiology was consulted with concerns for possible endocarditis.  TTE was performed which revealed severe mitral calcification with moderate insufficiency and a bioprosthetic aortic valve which was well-seated with no vegetations present. ID was consulted and he was adjusted to Nafcillin with treatment for likely  "endocarditis in the setting of a high grade blood stream infection from MSSA that resulted in pneumonia, embolic strokes, acute kidney injury and encephalopathy. 12/6, he is transfered out of the ICU.    Areas Assessed:      Areas visualized during today's visit: Face and neck    Pressure Injury Location: bridge of nose     Last photo: 12/16  Wound type: Pressure Injury     Pressure Injury Stage: Deep Tissue Pressure Injury (DTPI), hospital acquired      This is a Medical Device Related Pressure Injury (MDRPI) due to BiPAP mask  Wound history/plan of care:   Found by bedside RN and switched masks.   Wound base: 100 % erythema, maroon, purple and epidermis     Palpation of the wound bed: normal      Drainage: none     Description of drainage: none     Measurements (length x width x depth, in cm) 1.2  x 0.8  x  0 cm      Tunneling N/A     Undermining N/A  Periwound skin: Erythema- blanchable      Color: pink      Temperature: normal   Odor: none  Pain: no grimacing or signs of discomfort, none  Pain intervention prior to dressing change: N/A  Treatment goal: Heal  and Protection  STATUS: evolving  Supplies ordered: supplies stored on unit     Treatment Plan:     Bridge of nose wound(s): Daily  cleanse with saline and pat dry. If wound is offloaded, okay to leave open to air. If requiring BiPAP that goes over wound utilize mepilex 2 x 1\" (order#[id number])- in supply room.     Orders: Reviewed    RECOMMEND PRIMARY TEAM ORDER: None, at this time  Education provided: plan of care and wound progress  Discussed plan of care with: Family and Nurse  WOC nurse follow-up plan: twice weekly  Notify WOC if wound(s) deteriorate.  Nursing to notify the Provider(s) and re-consult the WOC Nurse if new skin concern.    DATA:     Current support surface: Standard  Low air loss (MARLENI pump, Isolibrium, Pulsate, skin guard, etc)  Containment of urine/stool: Incontinent pad in bed  BMI: Body mass index is 28.09 kg/m .   Active diet order: " Orders Placed This Encounter      Soft & Bite Sized Diet (level 6) Thin Liquids (level 0)     Output: I/O last 3 completed shifts:  In: 2103 [P.O.:1800; I.V.:303]  Out: 4030 [Urine:4030]     Labs:   Recent Labs   Lab 12/16/22  0511   ALBUMIN 2.5*   HGB 9.7*   WBC 9.6   .00*     Pressure injury risk assessment:   Sensory Perception: 3-->slightly limited  Moisture: 3-->occasionally moist  Activity: 1-->bedfast  Mobility: 2-->very limited  Nutrition: 1-->very poor  Friction and Shear: 1-->problem  Herber Score: 11    Addie Moncada RN CWOCN   Dept. Pager: [phone]  Dept. Office Number: [phone]

## 2022-12-16 NOTE — PLAN OF CARE
Neuro: A&Ox1-2 disoriented to time, situation and place. Mentation can wax and wane.  Cardiac: SR. VSS.   Respiratory: Tachypenic. On NC 2L O2 during shift. Previous shift pt was going between BiPAP 35% and 2L NC.   GI/: Adequate urine output, chan catheter in place. Liquid BM x2  Diet/appetite: Tolerating soft diet. Pt is a total feed and needs to be sitting upright when eating and drinking b/c he is a high aspiration risk. Pt is a calorie count.  Activity:  Total lift  Pain: PRN tylenol provided x2 for generalized body aches with some relief.   Skin: Generalized rash. Ointment and saran wrap applied. Pressure ulcer on bridge of nose.   LDA's: Right PIV. Chan catheter.     Plan: Continue with POC. Notify primary team with changes.

## 2022-12-16 NOTE — PLAN OF CARE
Neuro- A&O x3-4  Cardiac- ST with 1st degree AV   VS Blood pressure 134/64, pulse 98, temperature 98.8  F (37.1  C), temperature source Axillary, resp. rate 20, weight 83.8 kg (184 lb 11.9 oz), SpO2 97 %.  Pain-denied pain, gave tylenol for slight fever of 99.6f and came down to 98.  O2- 2L NC <95% course sounding with wheezes   GI/- chan with adequate UOP, no BM this shift. Seemed to be coughing when drinking from a straw, without a straw does fine.   Lines- PIV, infused abx  Activity- turned and repositioned q2h. Ointment applied to rash and wrapped in saran wrap.     No other concerns will continue to monitor and follow POC.     Vishal Bustos RN on 12/16/2022 at 6:22 AM

## 2022-12-16 NOTE — PROVIDER NOTIFICATION
"1715    Pt has been lethargic this shift, sometimes difficult to arouse, having trouble keeping eyes open, and unable to stay awake long enough to take meds. According to dtr Carissa, he had more energy yesterday. Continues on 2L O2 via NC, satting >90%. Bipap PRN. VSS.     Provider notified: Gold 9  \"Double checking, continue to monitor pt's lethargy or do you want to draw ABGs/BBGs?\"    No intervention at this time, continue to monitor.   "

## 2022-12-16 NOTE — PROGRESS NOTES
[facility]    Medicine Progress Note - Hospitalist Service, GOLD TEAM 9    Date of Admission:  12/4/2022    Assessment & Plan   Mr. Fletcher is a 66-year-old gentleman with a past medical history of hypertension, hyperlipidemia, Hodgkin's lymphoma status post radiation and MOPP chemotherapy, in remission, testicular cancer status post a left orchiectomy and chemotherapy, in remission, prostate cancer ([place] 4+3, diagnosed April 2021) status post radiation and Lupron, aortic stenosis status post bioprosthetic aortic valve in (2014) and coronary artery disease status post PCI to the LAD in (2012) who on December 4, 2022 developed a left-sided gaze with right-sided weakness and was noted to have a fever and brought to an outside hospital where he was recognized to be in septic shock and transferred to [facility] ICU.  Head CT and CTA of the head and neck did not display any acute abnormality or stenosis.  MRI brain displayed scattered foci of acute ischemia involving the bilateral anterior and posterior cerebral artery distributions.  Neurology was consulted and concerned that the these might represent embolic strokes.  Further infectious work-up was done including a CT chest abdomen and pelvis which revealed groundglass opacities in the left upper lobe and left lower lobe with tree-in-bud findings in the setting of interstitial pulmonary edema and a left pleural effusion.  He was started on broad-spectrum antibiotics including nafcillin and ceftriaxone.  Lumbar puncture was performed and CSF was positive for gram-positive cocci, final cultures pending.  Cardiology was consulted with concerns for possible endocarditis.  TTE was performed which revealed severe mitral calcification with moderate insufficiency and a bioprosthetic aortic valve which was well-seated with no vegetations present. ID was consulted and he was adjusted to Nafcillin with treatment for likely  endocarditis in the setting of a high grade blood stream infection from MSSA that resulted in pneumonia, embolic strokes, acute kidney injury and encephalopathy. 12/6, he is transfered out of the ICU.     Main Plans for Today:  - Continue meropenem and linezolid  - Repeat furosemide dose      #septic shock (fever, leukocytosis, tachycardia, tachypnea, end organ damage) secondary to MSSA high-grade bloodstream infection  #Acute embolic strokes with right-sided weakness  #Acute encephalopathy, improving  #Bacterial community-acquired pneumonia  He was initially started on IV Vancomycin/ Zosyn at OSH. Blood cultures x 2 from 12/4/22 grew positive for pansensitive Staph aureus. There were no repeat cultures on 12/5. CSF culture Repeat cultures done 12/6 so far are without growth. His encephalopathy is improving and Neurology is following. Transitioned from cefazolin to ceftriaxone for better GNR coverage on 12/13. Metronidazole added on 12/13 as well for anaerobic coverage of possible aspiration pneumonia. Developed significant maculopapular rash on 12/13, likely drug related.   - Infectious Disease is following and per recommendations, likely treatment for 4-6 weeks for suspected endocarditis see their note for abx changes  - Discontinue ceftriaxone (potentially causing rash)  - Continue meropenem per ID  - Continue linezolid  - Completed elias nebs for total 3 days  - Discontinue daily blood cultures,   - Continue to trend CRP and CBC, CMP  - Neurology following for possible epidural abscess - MRI unable to be performed, can revisit as status improves  - Low threshold to repeat a head CT if he develops any acute neurologic changes.   - Speech therapy consulted, appreciate recs  - CT neck, chest, abdomen, and pelvis yesterday with worsening L infiltrates  - Intermittent Bipap for respiratory support  - Repeat dose of IV furosemide 40 mg today    #Drug Reaction  Concerning for drug reaction/eruption potentially  cephalosporins (initially on cefazolin, given one dose of ceftriaxone on 12/13). Photos uploaded to Media Tab on 12/14  - Stop ceftriaxone   - Consult dermatology, appreciate recommendations  - Biopsy pending  - Triamcinalone 0.1% ointment under saran wrap     #Demand ischemia  #Aortic stenosis status post bioprosthetic aortic valve replacement in 2014  #Coronary artery disease status post PCI to the LAD in 2012  #Radiation induced heart disease  #Hypertension  #Hyperlipidemia  #AHRF 2/2 volume overload  -Mr. Fletcher follows at [facility] with cardiology with Dr. Winnie Roth and was last seen November 16, 2022.  He is planned in February 2023 to undergo updated cardiac surgery for his significant calcifications and valve disease.  -TTE EEG was performed without any evidence of vegetations.    -Continue to hold Plavix and aspirin in the setting of bleeding risk given his recent stroke (as above)  -Cardiology consulted and signed off     #Acute thrombocytopenia, improving - initial concern for possible HIT given score >3 and recent heparin exposure in November. HIT screen negative, fibrinogen not low.  DIC/HIT less likely. Supsect related to medications, sepsis, etc  - Hematology consulted appreciate recs     #Acute Kidney Injury, non-oliguric - baseline around 0.9, appears to be peaking around 4. Likely related to medications, hypotension, vasoplegia  - US collecting system without hydro  - follow UOP, monitor closely for post-ATN diuresis  - avoid nephrotoxic agents  - nephrology consult  - Furosemide prn     # Generalized weakness secondary to critical illness  -PT/OT consults to assess safety to return home and assist with strength training     # Hodgkin's lymphoma status post radiation and MOPP chemotherapy, in remission  -No acute issues.     # Testicular cancer status post a left orchiectomy and chemotherapy, in remission  -No acute issues.      # Prostate cancer ([place] 4+3, diagnosed April 2021) s/p  "radiation and Lupron  -Follows with Urology and Oncology as an outpatient             Diet: Soft & Bite Sized Diet (level 6) Thin Liquids (level 0)  Calorie Counts  Snacks/Supplements Adult: Ensure Enlive; Between Meals    DVT Prophylaxis: Enoxaparin  Medina Catheter: PRESENT, indication: Retention  Central Lines: None  Cardiac Monitoring: None  Code Status: Full Code      Disposition Plan     Expected Discharge Date: 12/21/2022      Destination: home  Discharge Comments: Infectious workup on going        The patient's care was discussed with the Bedside Nurse, Care Coordinator/ and Patient.    Tommie Heck MD  Hospitalist Service, [address]  Securely message with the Vocera Web Console (learn more here)  Text page via [facility] Paging/Directory   Please see signed in provider for up to date coverage information      Clinically Significant Risk Factors        # Hypokalemia: Lowest K = 3.3 mmol/L in last 2 days, will replace as needed       # Hypoalbuminemia: Lowest albumin = 2.2 g/dL at 12/15/2022  5:17 AM, will monitor as appropriate          # DMII: A1C = 6.6 % (Ref range: <5.7 %) within past 3 months   # Overweight: Estimated body mass index is 28.09 kg/m  as calculated from the following:    Height as of an earlier encounter on 12/4/22: 1.727 m (5' 8\").    Weight as of this encounter: 83.8 kg (184 lb 11.9 oz).          ______________________________________________________________________    Interval History   No acute events overnight. Continues to feel better. Breathing improved. Denies any chest pain or worsening shortness of breath.     4 Point ROS otherwise negative.     Data reviewed today: I reviewed all medications, new labs and imaging results over the last 24 hours. I personally reviewed no images or EKG's today.    Physical Exam   /59 (BP Location: Left arm)   Pulse 111   Temp 99.6  F (37.6  C) (Axillary)   Resp 19   " Wt 83.8 kg (184 lb 11.9 oz)   SpO2 97%   BMI 28.09 kg/m    General: AAOx3, ill appearing man in NAD  Skin: purpuric and pustular rash improving, no new rashes or bruising  CV: RRR, normal S1S2, no murmur  Resp: CTAB, no wheeze, rhonchi   Abd: Soft, nontender, nondistended, BS+, no masses appreciated  Extremities: warm and well perfused, mild pitting edema bilaterally

## 2022-12-17 NOTE — PROGRESS NOTES
Writer informed RT that pt was not able to pull off the BiPAP if he needed to, he is too weak. He would need a sitter. He can tolerate the BiPAP/CPAP, only did not need it overnight at this moment.

## 2022-12-17 NOTE — PROGRESS NOTES
"Calorie Count  Intake recorded for: 12/16  Total Kcals: 1956 Total Protein: 94g  Kcals from Hospital Food: 1954   Protein: 94g  Kcals from Outside Food (average):0 Protein: 0g  # Meals Ordered from Kitchen: 2 \"meals\"  # Meals Recorded: 2 (first - 8oz orange juice, applesauce)  (second - 8oz orange juice, applesauce, yogurt)  # Supplements Recorded: 100% 3 Ensure Enlive, 1 Enlive + Ice Cream    "

## 2022-12-17 NOTE — PLAN OF CARE
Status [id number]:    D: Admitted 12/4/22 from outside hospital for septic shock, emoblic stroke, and pneumonia.     /49 (BP Location: Left arm)   Pulse 111   Temp 97.7  F (36.5  C) (Oral)   Resp 18   Wt 83.8 kg (184 lb 11.9 oz)   SpO2 97%   BMI 28.09 kg/m      A/I: Pt lethargic this shift. Often falls asleep before he can answer questions, however A&O x4 when awake. Continues on 2L O2 via NC, satting >90%. Bipap not needed this shift. Sinus tach. Potassium replaced per protocol, recheck scheduled for AM. Neuros intact, however  and ankle strength weak. Minimal PO intake due to fatigue and lack of appetite, continues on calorie count. Remains on IV abx. Able to take pills crushed in applesauce, other meds switched to liquid as appropriate. Encouraged water one sip at a time with straw. Medina patent, voiding adequately. Tylenol given x1 for chronic neck pain with some effect. Dtr Carissa and other family visited throughout the day, involved with pt cares.     P: Continue to monitor, encourage activity and PO intake. Update Gold 9 with any changes.     Problem: Fall Injury Risk  Goal: Absence of Fall and Fall-Related Injury  Outcome: Progressing  Intervention: Identify and Manage Contributors  Recent Flowsheet Documentation  Taken 12/16/2022 1637 by Karlie Pablo, RN  Medication Review/Management: medications reviewed  Taken 12/16/2022 1205 by Karlie Pablo, RN  Medication Review/Management: medications reviewed  Taken 12/16/2022 0834 by Karlie Pablo, RN  Medication Review/Management: medications reviewed  Intervention: Promote Injury-Free Environment  Recent Flowsheet Documentation  Taken 12/16/2022 1637 by Karlie Pablo, RN  Safety Promotion/Fall Prevention:   activity supervised   assistive device/personal items within reach   bed alarm on   clutter free environment maintained   fall prevention program maintained   increased rounding and observation   increase visualization of patient    lighting adjusted   mobility aid in reach   nonskid shoes/slippers when out of bed   patient and family education   safety round/check completed   supervised activity   treat reversible contributory factors   treat underlying cause  Taken 12/16/2022 1205 by Karlie Pablo, RN  Safety Promotion/Fall Prevention:   activity supervised   assistive device/personal items within reach   bed alarm on   clutter free environment maintained   fall prevention program maintained   increased rounding and observation   increase visualization of patient   lighting adjusted   mobility aid in reach   nonskid shoes/slippers when out of bed   patient and family education   safety round/check completed   supervised activity   treat reversible contributory factors   treat underlying cause  Taken 12/16/2022 0834 by Karlie Pablo, RN  Safety Promotion/Fall Prevention:   activity supervised   assistive device/personal items within reach   bed alarm on   clutter free environment maintained   fall prevention program maintained   increased rounding and observation   increase visualization of patient   lighting adjusted   mobility aid in reach   nonskid shoes/slippers when out of bed   patient and family education   safety round/check completed   supervised activity   treat reversible contributory factors   treat underlying cause     Problem: Stroke, Ischemic (Includes Transient Ischemic Attack)  Goal: Effective Oxygenation and Ventilation  Outcome: Progressing  Intervention: Optimize Oxygenation and Ventilation  Recent Flowsheet Documentation  Taken 12/16/2022 0834 by Karlie Pablo, RN  Head of Bed (HOB) Positioning: HOB at 30-45 degrees  Goal: Optimal Eating and Swallowing without Aspiration  Outcome: Progressing

## 2022-12-17 NOTE — PLAN OF CARE
Neuro: A&Ox2-3, lethargic and sleepy most of the time this shift but when awake able to follow commands and answer orientation questions.     Cardiac: ST with BBB on tele.  Respiratory: Sating >92% on 2lpm.  Tachypneic.  GI/: Adequate urine output via chan catheter, BM X 2 this shift.  Diet/appetite: Tolerating soft bite diet however, needs assistance in eating and with poor appetite.  On calorie count.  Activity:  Bedrest and turned q2hr.  Pain: At acceptable level on current regimen.   Skin: No new deficits noted.  Continues to have petechiae, cream and wrap applied.  LDA's:  Right PIV, infusing.    Plan: Continue with POC. Notify primary team with changes.

## 2022-12-17 NOTE — PROVIDER NOTIFICATION
1401 Provider notification :  Critical result of  procalcitonin 5.20 and increase in WBC - noted by provider

## 2022-12-17 NOTE — PLAN OF CARE
Neuro- lethargic at times but awake all night. Oriented x4  Cardiac- ST  VS  Blood pressure 132/67, pulse 116, temperature 97.9  F (36.6  C), temperature source Axillary, resp. rate 20, weight 83.4 kg (183 lb 13.8 oz), SpO2 96 %.  Pain-co aches soreness, he got tylenol once and put Voltaren gel on. He declined any medication at 4am assessment.   O2-  2L NC, explained to RT that pt cannot pull off his BiPAP hisself (contraindicated) that he would need a sitter. Requested RT to not remove BiPAP from room because pt may need it another time/night, he did not need it this evening. Pt is ABLE to tolerate CPAP/would need sitter if he were to wear it.   GI/-  had 2 Bms. Medina with adequate UOP. Soft and bite sized diet with pascual counts continued.   Lines-  PIV, infusing abx.   Activity- awake a majority of the night. Turned and repositioned q2h. Ointment applied to rash and wrapped in saran wrap for healing per wound care orders.     No other concerns will continue to monitor and follow POC.     Vishal Bustos RN on 12/17/2022 at 6:02 AM

## 2022-12-17 NOTE — PROGRESS NOTES
[facility]    Medicine Progress Note - Hospitalist Service, GOLD TEAM 9    Date of Admission:  12/4/2022    Assessment & Plan   Mr. Fletcher is a 66-year-old gentleman with a past medical history of hypertension, hyperlipidemia, Hodgkin's lymphoma status post radiation and MOPP chemotherapy, in remission, testicular cancer status post a left orchiectomy and chemotherapy, in remission, prostate cancer (Elly 4+3, diagnosed April 2021) status post radiation and Lupron, aortic stenosis status post bioprosthetic aortic valve in (2014) and coronary artery disease status post PCI to the LAD in (2012) who on December 4, 2022 developed a left-sided gaze with right-sided weakness and was noted to have a fever and brought to an outside hospital where he was recognized to be in septic shock and transferred to [facility] ICU.  Head CT and CTA of the head and neck did not display any acute abnormality or stenosis.  MRI brain displayed scattered foci of acute ischemia involving the bilateral anterior and posterior cerebral artery distributions.  Neurology was consulted and concerned that the these might represent embolic strokes.  Further infectious work-up was done including a CT chest abdomen and pelvis which revealed groundglass opacities in the left upper lobe and left lower lobe with tree-in-bud findings in the setting of interstitial pulmonary edema and a left pleural effusion.  He was started on broad-spectrum antibiotics including nafcillin and ceftriaxone.  Lumbar puncture was performed and CSF was positive for gram-positive cocci, final cultures pending.  Cardiology was consulted with concerns for possible endocarditis.  TTE was performed which revealed severe mitral calcification with moderate insufficiency and a bioprosthetic aortic valve which was well-seated with no vegetations present. ID was consulted and he was adjusted to Nafcillin with treatment for likely  endocarditis in the setting of a high grade blood stream infection from MSSA that resulted in pneumonia, embolic strokes, acute kidney injury and encephalopathy. 12/6, he is transfered out of the ICU.     Main Plans for Today:  - Continue antibiotics  - Check VBG  - Continue diuresis     #septic shock (fever, leukocytosis, tachycardia, tachypnea, end organ damage) secondary to MSSA high-grade bloodstream infection  #Acute embolic strokes with right-sided weakness  #Acute encephalopathy, improving  #Bacterial community-acquired pneumonia  He was initially started on IV Vancomycin/ Zosyn at OSH. Blood cultures x 2 from 12/4/22 grew positive for pansensitive Staph aureus. There were no repeat cultures on 12/5. CSF culture Repeat cultures done 12/6 so far are without growth. His encephalopathy is improving and Neurology is following. Transitioned from cefazolin to ceftriaxone for better GNR coverage on 12/13. Metronidazole added on 12/13 as well for anaerobic coverage of possible aspiration pneumonia. Developed significant maculopapular rash on 12/13, likely drug related.   - Infectious Disease is following and per recommendations, likely treatment for 4-6 weeks for suspected endocarditis see their note for abx changes  - Discontinue ceftriaxone (potentially causing rash)  - Continue meropenem per ID  - Continue linezolid  - Completed elias nebs for total 3 days  - Discontinue daily blood cultures,   - Continue to trend CRP and CBC, CMP  - Neurology following for possible epidural abscess - MRI unable to be performed, can revisit as status improves  - Low threshold to repeat a head CT if he develops any acute neurologic changes.   - Speech therapy consulted, appreciate recs  - CT neck, chest, abdomen, and pelvis on 12/14 with worsening L infiltrates   - Intermittent Bipap for respiratory support  - Repeat dose of IV furosemide 40 mg    #Drug Reaction - Improving  Concerning for drug reaction/eruption potentially  cephalosporins (initially on cefazolin, given one dose of ceftriaxone on 12/13). Photos uploaded to Media Tab on 12/14  - Stop ceftriaxone   - Consult dermatology, appreciate recommendations  - Biopsy pending  - Triamcinalone 0.1% ointment under saran wrap     #Demand ischemia  #Aortic stenosis status post bioprosthetic aortic valve replacement in 2014  #Coronary artery disease status post PCI to the LAD in 2012  #Radiation induced heart disease  #Hypertension  #Hyperlipidemia  #AHRF 2/2 volume overload  -Mr. Fletcher follows at [facility] with cardiology with Dr. Winnie Roth and was last seen November 16, 2022.  He is planned in February 2023 to undergo updated cardiac surgery for his significant calcifications and valve disease.  -TTE EEG was performed without any evidence of vegetations.    -Continue to hold Plavix and aspirin in the setting of bleeding risk given his recent stroke (as above)  -Cardiology consulted and signed off     #Acute thrombocytopenia, improving - initial concern for possible HIT given score >3 and recent heparin exposure in November. HIT screen negative, fibrinogen not low.  DIC/HIT less likely. Supsect related to medications, sepsis, etc  - Hematology consulted appreciate recs     #Acute Kidney Injury, non-oliguric - baseline around 0.9, appears to be peaking around 4. Likely related to medications, hypotension, vasoplegia  - US collecting system without hydro  - follow UOP, monitor closely for post-ATN diuresis  - avoid nephrotoxic agents  - nephrology consult  - Furosemide prn     # Generalized weakness secondary to critical illness  -PT/OT consults to assess safety to return home and assist with strength training     # Hodgkin's lymphoma status post radiation and MOPP chemotherapy, in remission  -No acute issues.     # Testicular cancer status post a left orchiectomy and chemotherapy, in remission  -No acute issues.      # Prostate cancer ([place] 4+3, diagnosed April 2021) s/p  "radiation and Lupron  -Follows with Urology and Oncology as an outpatient       Diet: Soft & Bite Sized Diet (level 6) Thin Liquids (level 0)  Calorie Counts  Snacks/Supplements Adult: Ensure Enlive; Between Meals    DVT Prophylaxis: Enoxaparin (Lovenox) SQ  Medina Catheter: PRESENT, indication: Retention  Central Lines: None  Cardiac Monitoring: None  Code Status: Full Code      Disposition Plan      Expected Discharge Date: 12/22/2022      Destination: home  Discharge Comments: Infectious workup on going. TCU at [place]        The patient's care was discussed with the Bedside Nurse, Patient and Patient's Family.    Tommie Heck MD  Hospitalist Service, [address]  Securely message with the Vocera Web Console (learn more here)  Text page via [facility] Paging/Directory   Please see signed in provider for up to date coverage information      Clinically Significant Risk Factors        # Hypokalemia: Lowest K = 3.3 mmol/L in last 2 days, will replace as needed       # Hypoalbuminemia: Lowest albumin = 2.2 g/dL at 12/15/2022  5:17 AM, will monitor as appropriate          # DMII: A1C = 6.6 % (Ref range: <5.7 %) within past 3 months   # Overweight: Estimated body mass index is 27.96 kg/m  as calculated from the following:    Height as of an earlier encounter on 12/4/22: 1.727 m (5' 8\").    Weight as of this encounter: 83.4 kg (183 lb 13.8 oz).          ______________________________________________________________________    Interval History   No acute events overnight. Mental status continues to wax and wane. No significant complaints. Able to be up and eat breakfast this morning. Denies any concerns.    4 Point ROS otherwise negative.     Data reviewed today: I reviewed all medications, new labs and imaging results over the last 24 hours. I personally reviewed no images or EKG's today.    Physical Exam   /55 (BP Location: Left arm)   Pulse 105   Temp 97.5 "  F (36.4  C) (Axillary)   Resp 22   Wt 83.4 kg (183 lb 13.8 oz)   SpO2 97%   BMI 27.96 kg/m    General: somnolent but wakes easily to voice, ill appearing man in NAD  Skin: pustular and purpuric rash improving, no new rashes or bruising  CV: RRR, normal S1S2, no murmur  Resp: CTAB, no wheeze, rhonchi   Abd: Soft, nontender, nondistended, BS+, no masses appreciated  Extremities: warm and well perfused, improving bilateral lower extremity edema

## 2022-12-18 NOTE — CARE PLAN
"[id number]    No acute events overnight.  Patient slept soundly for approx 4-5 hours: 9pm-130a and then intermittently resting the rest of the shift.  Patient states that he is in \"agony\" but refuses any pain medication - seems to be ok for a couple of hours after a reposition.  Wore bipap for 4-5 hours while sleeping.  Vitals stable.  Adequate urine output. Bowel movement x2.  Ida Brooks RN      "

## 2022-12-18 NOTE — PROGRESS NOTES
Brief Medicine Note:    Notified this evening that earlier around 1527 patient had 8 runs of VTach. RN called tele at that time who called it artifact however RN later called to inform that it had been Vtach. Currently in sinus tachycardia.    Discussed with RN who will notify IM if further episodes concerning for VTach. Will check lytes and attempt to optimize as able.    Cindy Villa PA-C  Internal Medicine NOEMI Hospitalist  [facility]

## 2022-12-18 NOTE — PROGRESS NOTES
[facility]    Medicine Progress Note - Hospitalist Service, GOLD TEAM 9    Date of Admission:  12/4/2022    Assessment & Plan   Mr. Fletcher is a 66-year-old gentleman with a past medical history of hypertension, hyperlipidemia, Hodgkin's lymphoma status post radiation and MOPP chemotherapy, in remission, testicular cancer status post a left orchiectomy and chemotherapy, in remission, prostate cancer (Elly 4+3, diagnosed April 2021) status post radiation and Lupron, aortic stenosis status post bioprosthetic aortic valve in (2014) and coronary artery disease status post PCI to the LAD in (2012) who on December 4, 2022 developed a left-sided gaze with right-sided weakness and was noted to have a fever and brought to an outside hospital where he was recognized to be in septic shock and transferred to [facility] ICU.  Head CT and CTA of the head and neck did not display any acute abnormality or stenosis.  MRI brain displayed scattered foci of acute ischemia involving the bilateral anterior and posterior cerebral artery distributions.  Neurology was consulted and concerned that the these might represent embolic strokes.  Further infectious work-up was done including a CT chest abdomen and pelvis which revealed groundglass opacities in the left upper lobe and left lower lobe with tree-in-bud findings in the setting of interstitial pulmonary edema and a left pleural effusion.  He was started on broad-spectrum antibiotics including nafcillin and ceftriaxone.  Lumbar puncture was performed and CSF was positive for gram-positive cocci, final cultures pending.  Cardiology was consulted with concerns for possible endocarditis.  TTE was performed which revealed severe mitral calcification with moderate insufficiency and a bioprosthetic aortic valve which was well-seated with no vegetations present. ID was consulted and he was adjusted to Nafcillin with treatment for likely  endocarditis in the setting of a high grade blood stream infection from MSSA that resulted in pneumonia, embolic strokes, acute kidney injury and encephalopathy. 12/6, he is transfered out of the ICU.     Main Plans for Today:  - Repeat furosemide dose  - Encourage PO  - Continue antibiotics     #septic shock (fever, leukocytosis, tachycardia, tachypnea, end organ damage) secondary to MSSA high-grade bloodstream infection  #Acute embolic strokes with right-sided weakness  #Acute encephalopathy, improving  #Bacterial community-acquired pneumonia  He was initially started on IV Vancomycin/ Zosyn at OSH. Blood cultures x 2 from 12/4/22 grew positive for pansensitive Staph aureus. There were no repeat cultures on 12/5. CSF culture Repeat cultures done 12/6 so far are without growth. His encephalopathy is improving and Neurology is following. Transitioned from cefazolin to ceftriaxone for better GNR coverage on 12/13. Metronidazole added on 12/13 as well for anaerobic coverage of possible aspiration pneumonia. Developed significant maculopapular rash on 12/13, likely drug related.   - Infectious Disease is following and per recommendations, likely treatment for 4-6 weeks for suspected endocarditis see their note for abx changes  - Discontinue ceftriaxone (potentially causing rash)  - Continue meropenem per ID (12/14 - tentative 12/22) - Increase dose when GFR > 50  - Continue linezolid (12/9 - tentative 12/24)  - Completed elias nebs for total 3 days  - Discontinue daily blood cultures  - Continue to trend CRP and CBC, CMP  - Neurology following for possible epidural abscess - MRI unable to be performed, can revisit as status improves  - Low threshold to repeat a head CT if he develops any acute neurologic changes.   - Speech therapy consulted, appreciate recs  - CT neck, chest, abdomen, and pelvis on 12/14 with worsening L infiltrates   - Intermittent Bipap for respiratory support  - Repeat dose of IV furosemide 40  mg    #Drug Reaction - Improving  Concerning for drug reaction/eruption potentially cephalosporins (initially on cefazolin, given one dose of ceftriaxone on 12/13). Photos uploaded to Media Tab on 12/14  - Stop ceftriaxone   - Consult dermatology, appreciate recommendations  - Biopsy pending  - Triamcinalone 0.1% ointment under saran wrap     #Demand ischemia  #Aortic stenosis status post bioprosthetic aortic valve replacement in 2014  #Coronary artery disease status post PCI to the LAD in 2012  #Radiation induced heart disease  #Hypertension  #Hyperlipidemia  #AHRF 2/2 volume overload  -Mr. Fletcher follows at [facility] with cardiology with Dr. Winnie Roth and was last seen November 16, 2022.  He is planned in February 2023 to undergo updated cardiac surgery for his significant calcifications and valve disease.  -TTE EEG was performed without any evidence of vegetations.    -Continue to hold Plavix and aspirin in the setting of bleeding risk given his recent stroke (as above)  -Cardiology consulted and signed off     #Acute thrombocytopenia, improving - initial concern for possible HIT given score >3 and recent heparin exposure in November. HIT screen negative, fibrinogen not low.  DIC/HIT less likely. Supsect related to medications, sepsis, etc  - Hematology consulted appreciate recs     #Acute Kidney Injury, non-oliguric - baseline around 0.9, peaked around 4. Likely related to medications, hypotension, vasoplegia  - US collecting system without hydro  - follow UOP, monitor closely for post-ATN diuresis  - avoid nephrotoxic agents  - nephrology consult  - Furosemide prn     # Generalized weakness secondary to critical illness  -PT/OT consults to assess safety to return home and assist with strength training     # Hodgkin's lymphoma status post radiation and MOPP chemotherapy, in remission  -No acute issues.     # Testicular cancer status post a left orchiectomy and chemotherapy, in remission  -No acute  "issues.      # Prostate cancer (Elly 4+3, diagnosed April 2021) s/p radiation and Lupron  -Follows with Urology and Oncology as an outpatient         Diet: Soft & Bite Sized Diet (level 6) Thin Liquids (level 0)  Calorie Counts  Snacks/Supplements Adult: Ensure Enlive; Between Meals    DVT Prophylaxis: Enoxaparin (Lovenox) SQ  Medina Catheter: PRESENT, indication: Retention  Central Lines: None  Cardiac Monitoring: None  Code Status: Full Code      Disposition Plan     Expected Discharge Date: 12/22/2022      Destination: home  Discharge Comments: Infectious workup on going. TCU at [place]        The patient's care was discussed with the Bedside Nurse, Patient and Patient's Family.    Tommie Heck MD  Hospitalist Service, [address]  Securely message with the Vocera Web Console (learn more here)  Text page via [facility] Paging/Directory   Please see signed in provider for up to date coverage information      Clinically Significant Risk Factors              # Hypoalbuminemia: Lowest albumin = 2.2 g/dL at 12/15/2022  5:17 AM, will monitor as appropriate          # DMII: A1C = 6.6 % (Ref range: <5.7 %) within past 3 months   # Overweight: Estimated body mass index is 27.96 kg/m  as calculated from the following:    Height as of an earlier encounter on 12/4/22: 1.727 m (5' 8\").    Weight as of this encounter: 83.4 kg (183 lb 13.8 oz).          ______________________________________________________________________    Interval History   No acute events overnight. More awake this morning. Asking questions about previously planned heart surgery at [facility]. No other questions. Recognizes how sick he has been.     4 Point ROS otherwise negative.       Data reviewed today: I reviewed all medications, new labs and imaging results over the last 24 hours. I personally reviewed no images or EKG's today.    Physical Exam   /59 (BP Location: Left arm)   Pulse 104  "  Temp 97.7  F (36.5  C) (Axillary)   Resp 20   Wt 83.4 kg (183 lb 13.8 oz)   SpO2 98%   BMI 27.96 kg/m    General: AAOx3, well appearing man in NAD  Skin: diffuse purpuric rash improving, no new rashes or bruising  CV: RRR, normal S1S2, no murmur  Resp: CTAB, no wheeze, rhonchi   Abd: Soft, nontender, nondistended, BS+, no masses appreciated  Extremities: warm and well perfused, trace edema        Data   Recent Labs   Lab 12/18/22  0453 12/17/22  0600 12/16/22  1536 12/16/22  0511 12/15/22  1122 12/15/22  0517   WBC 11.6* 14.0*  --  9.6  --  12.0*   HGB 9.4* 9.1*  --  9.7*  --  8.1*   MCV 87 85  --  83  --  83   * 150  --  173  --  195    142  --  142  --  143   POTASSIUM 4.3 4.6  4.5 4.0 3.4   < > 3.4   CHLORIDE 102 103  --  102  --  104   CO2 24 24  --  24  --  24   .0* 106.0*  --  101.0*  --  112.0*   CR 2.03* 2.24*  --  2.41*  --  2.83*   ANIONGAP 16* 15  --  16*  --  15   YUNIOR 8.2* 7.9*  --  7.9*  --  7.9*   * 176*  --  99  --  128*   ALBUMIN  --   --   --  2.5*  --  2.2*   PROTTOTAL  --   --   --  6.3*  --  5.9*   BILITOTAL  --   --   --  1.4*  --  1.1   ALKPHOS  --   --   --  199*  --  212*   ALT  --   --   --  12  --  12   AST  --   --   --  72*  --  82*    < > = values in this interval not displayed.

## 2022-12-18 NOTE — PROGRESS NOTES
[facility] Inpatient Dermatology Progress Note    Assessment and Plan:  1. Leukocytoclastic vasculitis  Biopsy result from 12/14 demonstrating evidence of LCV. LCV is an immune-complex mediated disease that is a reactive process and only signals to the underlying problems that are at hand. It is often secondary to infection or medication. Typically occurs 7-10 days after the offending culprit.  In this patient, it is unclear which medication may have may fit the timeline of expected presentation given that so many new antibiotics were started and stopped in that time frame.  LCV can also be idiopathic, secondary to connective tissue disease, or malignancy (which would be lower likelihood in this scenario.    Treatment of acute or subacute LCV is unnecessary and often not helpful. Systemic corticosteroids are usually not prescribed. It is acceptable for the rash to continue to spread and this is expected to some extent. It may sometimes last a couple of months, but very rarely becomes chronic. Post-inflammatory hyperpigmentation may take months to resolve.      Recommendations:  - topical triamcinolone 0.1% ointment under saran wrap occlusion (~1-2 hours) to encourage deeper penetration to the superficial vascular plexus    We will continue to follow. Please do not hesitate to contact the dermatology resident/faculty on call for any additional questions or concerns.     Patient seen and evaluated with attending physician, Dr. Dc Wadsworth MD MPH  PGY4 Medicine/Dermatology Resident       I have seen and examined this patient.  I agree with the resident's documentation and plan of care.  I have reviewed and amended the note above.  The documentation accurately reflects my clinical observations, diagnoses, treatment and follow-up plans.      Katelyn Irwin MD  Pediatric Dermatology Staff      Dermatology Problem List:  1. Pustules and palpable purpura on the trunk and flexor surfaces  of the extremities  - biopsied 12/14/22 with path c/w LCV  - TAC 0.1% ointment BID    Date of Admission: Dec 4, 2022   Encounter Date: 12/18/2022    Interval history:  Family notes improvement in Eloy's rash. They report he is significantly less itchy and less swollen than previously.    Medications:  Current Facility-Administered Medications   Medication     acetaminophen (TYLENOL) Suppository 650 mg     acetaminophen (TYLENOL) tablet 500 mg     acetylcysteine (MUCOMYST) 20 % nebulizer solution 2 mL     albuterol (PROVENTIL HFA/VENTOLIN HFA) inhaler     carboxymethylcellulose PF (REFRESH PLUS) 0.5 % ophthalmic solution 1 drop     diclofenac (VOLTAREN) 1 % topical gel 2 g     guaiFENesin (ROBITUSSIN) 20 mg/mL solution 15 mL     heparin ANTICOAGULANT injection 5,000 Units     ipratropium - albuterol 0.5 mg/2.5 mg/3 mL (DUONEB) neb solution 3 mL     lidocaine (LMX4) cream     lidocaine 1 % 1 mL     linezolid (ZYVOX) infusion 600 mg     meropenem (MERREM) 1 g vial to attach to  mL bag     No lozenges or gum should be given while patient on BIPAP/AVAPS/AVAPS AE     ondansetron (ZOFRAN ODT) ODT tab 4 mg    Or     ondansetron (ZOFRAN) injection 4 mg     pantoprazole (PROTONIX) EC tablet 40 mg     Patient may continue current oral medications     polyethylene glycol (MIRALAX) Packet 17 g     prochlorperazine (COMPAZINE) injection 5 mg    Or     prochlorperazine (COMPAZINE) tablet 5 mg    Or     prochlorperazine (COMPAZINE) suppository 12.5 mg     senna-docusate (SENOKOT-S/PERICOLACE) 8.6-50 MG per tablet 1 tablet    Or     senna-docusate (SENOKOT-S/PERICOLACE) 8.6-50 MG per tablet 2 tablet     sodium chloride (PF) 0.9% PF flush 3 mL     sodium chloride (PF) 0.9% PF flush 3 mL     sodium chloride 0.9% infusion     sodium chloride bacteriostatic 0.9 % flush 9.5 mL     triamcinolone (KENALOG) 0.1 % ointment        Physical exam:  /59 (BP Location: Left arm)   Pulse 104   Temp 97.7  F (36.5  C) (Axillary)    Resp 20   Wt 83.4 kg (183 lb 13.8 oz)   SpO2 98%   BMI 27.96 kg/m    GEN: Sitting up in bed, appears comfortable  SKIN: Focused examination of the extremities was performed.  -Several monomorphic papules and purpura scattered on trunk (abdomen, back), extremities (flexural surfaces)  -No mucosal edema, ulcerations    Laboratory:  Results for orders placed or performed during the hospital encounter of 12/04/22 (from the past 24 hour(s))   Basic metabolic panel   Result Value Ref Range    Sodium 142 136 - 145 mmol/L    Potassium 4.3 3.4 - 5.3 mmol/L    Chloride 102 98 - 107 mmol/L    Carbon Dioxide (CO2) 24 22 - 29 mmol/L    Anion Gap 16 (H) 7 - 15 mmol/L    Urea Nitrogen 101.0 (H) 8.0 - 23.0 mg/dL    Creatinine 2.03 (H) 0.67 - 1.17 mg/dL    Calcium 8.2 (L) 8.8 - 10.2 mg/dL    Glucose 137 (H) 70 - 99 mg/dL    GFR Estimate 35 (L) >60 mL/min/1.73m2   CBC with platelets   Result Value Ref Range    WBC Count 11.6 (H) 4.0 - 11.0 10e3/uL    RBC Count 3.22 (L) 4.40 - 5.90 10e6/uL    Hemoglobin 9.4 (L) 13.3 - 17.7 g/dL    Hematocrit 27.9 (L) 40.0 - 53.0 %    MCV 87 78 - 100 fL    MCH 29.2 26.5 - 33.0 pg    MCHC 33.7 31.5 - 36.5 g/dL    RDW 17.2 (H) 10.0 - 15.0 %    Platelet Count 141 (L) 150 - 450 10e3/uL       Staff Involved:  Resident/Staff

## 2022-12-18 NOTE — PROGRESS NOTES
Calorie Count  Intake recorded for: 12/17  Total Kcals: 1016 Total Protein: 41g  Kcals from Hospital Food: 1016   Protein: 41g  Kcals from Outside Food (average):0 Protein: 0g  # Meals Ordered from Kitchen: 3 meals  # Meals Recorded: 3 (first - 100% oatmeal w/ brown sugar, 4oz apple juice)  (second - few bites of beef pot roast)  (third - 25% tomato soup, applesauce. 20% mac and cheese)  # Supplements Recorded: 50% enlive + ice cream, ensure enlive

## 2022-12-19 PROBLEM — I63.40 CEREBROVASCULAR ACCIDENT (CVA) DUE TO EMBOLISM OF CEREBRAL ARTERY (H): Status: ACTIVE | Noted: 2022-01-01

## 2022-12-19 NOTE — PROGRESS NOTES
Calorie Count  Intake recorded for: 12/18  Total Kcals: 537 Total Protein: 20g  Kcals from Hospital Food: 537   Protein: 20g  Kcals from Outside Food (average):0 Protein: 0g  # Meals Ordered from Kitchen: 2 meals  # Meals Recorded: 2 meals  (First - 85% oatmeal w/ brown sugar)  (Second - 100% fruit ice)  # Supplements Recorded: 75% 1 Ensure Enlive

## 2022-12-19 NOTE — PLAN OF CARE
Neuro: A&Ox2-3, more awake than usual but had some lethargic episodes.  Cardiac: ST on tele, had 8 runs of Vtach this afternoon, provider notified with order to monitor and check of BMP and Mg.   Respiratory: Sating >92% on 2lpm.    GI/: Adequate urine output via chan catherter and had BM X3, soft.  Diet/appetite: Tolerating soft bite size diet. Poor appetite and needs encouragement and total assistance with meal times.  Family always at bedside to encourage patient to eat.  Activity:  Lift assist , up to chair and Asst x 2 with q2hr turns.  Pain: At acceptable level on current regimen.   Skin: No new deficits noted. Continue to have petechiae, cream and wrap in place.  LDA's:  1 Right PIV, infusing TKO, chan cath draining well.    Plan: Continue with POC. Notify primary team with changes.

## 2022-12-19 NOTE — PROGRESS NOTES
[facility]    Medicine Progress Note - Hospitalist Service, GOLD TEAM 9    Date of Admission:  12/4/2022    Assessment & Plan   Mr. Fletcher is a 66-year-old gentleman with a past medical history of hypertension, hyperlipidemia, Hodgkin's lymphoma status post radiation and MOPP chemotherapy, in remission, testicular cancer status post a left orchiectomy and chemotherapy, in remission, prostate cancer ([place] 4+3, diagnosed April 2021) status post radiation and Lupron, aortic stenosis status post bioprosthetic aortic valve in (2014) and coronary artery disease status post PCI to the LAD in (2012) who on December 4, 2022 developed a left-sided gaze with right-sided weakness and was noted to have a fever and brought to an outside hospital where he was recognized to be in septic shock and transferred to [facility] ICU.  Head CT and CTA of the head and neck did not display any acute abnormality or stenosis.  MRI brain displayed scattered foci of acute ischemia involving the bilateral anterior and posterior cerebral artery distributions.  Neurology was consulted and concerned that the these might represent embolic strokes.  Further infectious work-up was done including a CT chest abdomen and pelvis which revealed groundglass opacities in the left upper lobe and left lower lobe with tree-in-bud findings in the setting of interstitial pulmonary edema and a left pleural effusion.  He was started on broad-spectrum antibiotics including nafcillin and ceftriaxone.  Lumbar puncture was performed and CSF was positive for gram-positive cocci, final cultures pending.  Cardiology was consulted with concerns for possible endocarditis.  TTE was performed which revealed severe mitral calcification with moderate insufficiency and a bioprosthetic aortic valve which was well-seated with no vegetations present. ID was consulted and he was adjusted to Nafcillin with treatment for likely  endocarditis in the setting of a high grade blood stream infection from MSSA that resulted in pneumonia, embolic strokes, acute kidney injury and encephalopathy. 12/6, he is transfered out of the ICU.      Main Plans for Today:  - Flipping into and out of arrhythmia with rates fluctuating between low 50s to 110s  - BPs lower with lower HRs  - LR Bolus 500cc x2  - Cardiology consulted, appreciate recommendations  - STAT CT Angio Heart to evaluate bioprosthetic valve and possible abscess  - Repeat infectious workup  - DIscussed with CVICU and patient will transfer for further management     #septic shock (fever, leukocytosis, tachycardia, tachypnea, end organ damage) secondary to MSSA high-grade bloodstream infection  #Acute embolic strokes with right-sided weakness  #Acute encephalopathy, improving  #Bacterial community-acquired pneumonia  He was initially started on IV Vancomycin/ Zosyn at OSH. Blood cultures x 2 from 12/4/22 grew positive for pansensitive Staph aureus. There were no repeat cultures on 12/5. CSF culture Repeat cultures done 12/6 so far are without growth. His encephalopathy is improving and Neurology is following. Transitioned from cefazolin to ceftriaxone for better GNR coverage on 12/13. Metronidazole added on 12/13 as well for anaerobic coverage of possible aspiration pneumonia. Developed significant maculopapular rash on 12/13, likely drug related.   - Infectious Disease is following and per recommendations, likely treatment for 4-6 weeks for suspected endocarditis see their note for abx changes  - Discontinue ceftriaxone (potentially causing rash)  - Continue meropenem per ID (12/14 - tentative 12/22) - Increase dose when GFR > 50  - Continue linezolid (12/9 - tentative 12/24)  - Completed elias nebs for total 3 days  - Discontinue daily blood cultures  - Continue to trend CRP and CBC, CMP  - Neurology following for possible epidural abscess - MRI unable to be performed, can revisit as status  improves  - Low threshold to repeat a head CT if he develops any acute neurologic changes.   - Speech therapy consulted, appreciate recs  - CT neck, chest, abdomen, and pelvis on 12/14 with worsening L infiltrates   - Intermittent Bipap for respiratory support  - Repeat infectious workup on 12/19  - STAT CT Angio Heart to evaluate bioprosthetic valve  - Transfer to CVICU for further management 12/19    #Drug Reaction - Improving  Concerning for drug reaction/eruption potentially cephalosporins (initially on cefazolin, given one dose of ceftriaxone on 12/13). Photos uploaded to Media Tab on 12/14  - Stop ceftriaxone   - Consult dermatology, appreciate recommendations  - Biopsy pending  - Triamcinalone 0.1% ointment under saran wrap     #Demand ischemia  #Aortic stenosis status post bioprosthetic aortic valve replacement in 2014  #Coronary artery disease status post PCI to the LAD in 2012  #Radiation induced heart disease  #Hypertension  #Hyperlipidemia  #AHRF 2/2 volume overload  -Mr. Fletcher follows at [facility] with cardiology with Dr. Winnie Roth and was last seen November 16, 2022.  He is planned in February 2023 to undergo updated cardiac surgery for his significant calcifications and valve disease.  -TTE EEG was performed without any evidence of vegetations.    -Continue to hold Plavix and aspirin in the setting of bleeding risk given his recent stroke (as above)  -Cardiology reconsulted on 12/19  - STAT CT Angio Heart for evaluation of bioprosthetic valve     #Acute thrombocytopenia, improving - initial concern for possible HIT given score >3 and recent heparin exposure in November. HIT screen negative, fibrinogen not low.  DIC/HIT less likely. Supsect related to medications, sepsis, etc  - Hematology consulted appreciate recs     #Acute Kidney Injury, non-oliguric - baseline around 0.9, peaked around 4. Likely related to medications, hypotension, vasoplegia  - US collecting system without hydro  -  "follow UOP, monitor closely for post-ATN diuresis  - avoid nephrotoxic agents  - nephrology consult  - Furosemide prn     # Generalized weakness secondary to critical illness  -PT/OT consults to assess safety to return home and assist with strength training     # Hodgkin's lymphoma status post radiation and MOPP chemotherapy, in remission  -No acute issues.     # Testicular cancer status post a left orchiectomy and chemotherapy, in remission  -No acute issues.      # Prostate cancer ([place] 4+3, diagnosed April 2021) s/p radiation and Lupron  -Follows with Urology and Oncology as an outpatient           Diet: Soft & Bite Sized Diet (level 6) Thin Liquids (level 0)  Snacks/Supplements Adult: Ensure Enlive; Between Meals    DVT Prophylaxis: Heparin SQ  Medina Catheter: PRESENT, indication: Retention  Central Lines: None  Cardiac Monitoring: None  Code Status: Full Code      Disposition Plan     Expected Discharge Date: 12/23/2022      Destination: home  Discharge Comments: Infectious workup on going. TCU at [place]        The patient's care was discussed with the Bedside Nurse, Patient, Patient's Family, Cardiology Consultant and CVICU Team.    Tommie Heck MD  Hospitalist Service, [address]  Securely message with the Vocera Web Console (learn more here)  Text page via [facility] Paging/Directory   Please see signed in provider for up to date coverage information      Clinically Significant Risk Factors         # Hyponatremia: Lowest Na = 134 mmol/L in last 2 days, will monitor as appropriate      # Hypoalbuminemia: Lowest albumin = 2.2 g/dL at 12/15/2022  5:17 AM, will monitor as appropriate          # DMII: A1C = 6.6 % (Ref range: <5.7 %) within past 3 months   # Overweight: Estimated body mass index is 25.51 kg/m  as calculated from the following:    Height as of an earlier encounter on 12/4/22: 1.727 m (5' 8\").    Weight as of this encounter: 76.1 kg (167 lb " 12.3 oz).          ______________________________________________________________________    Interval History   Flipping into and out of bradycardia and tachycardia with unclear underlying rhythm concern for possible abscess formation at the bioprosthetic valve. Blood pressures lower with arrhythmia. Mental status continues to wax and wane.     4 Point ROS unable to be performed given patient condition.     Data reviewed today: I reviewed all medications, new labs and imaging results over the last 24 hours. I personally reviewed the CT image(s) showing pending.    Physical Exam   BP 93/58 (BP Location: Left arm)   Pulse 54   Temp 97.7  F (36.5  C) (Axillary)   Resp 21   Wt 76.1 kg (167 lb 12.3 oz)   SpO2 100%   BMI 25.51 kg/m    General: somnolent, ill appearing  Skin: diffuse purpuric rash but no new rashes or bruising  CV: irregular rhythm, intermittently bradycardic and tachycardic, no murmur appreciated  Resp: coarse breath sounds bilaterally, no wheeze, rhonchi   Abd: Soft, nontender, nondistended, BS+, no masses appreciated  Extremities: warm and well perfused, trace edema    \

## 2022-12-19 NOTE — CODE/RAPID RESPONSE
Rapid Response Team Note    Assessment   In assessment a rapid response was called on Eloy Fletcher due to hypotension and new onset afib Lactate is also elevated to 3.6. This presentation is likely due to intravascular volume depletion .    Comorbidities: hypertension, hyperlipidemia, Hodgkin's lymphoma status post radiation and MOPP chemotherapy, in remission, testicular cancer status post a left orchiectomy and chemotherapy, in remission, prostate cancer ([place] 4+3, diagnosed April 2021) status post radiation and Lupron, aortic stenosis status post bioprosthetic aortic valve in (2014) and coronary artery disease status post PCI to the LAD in (2012).     Plan    - EKG did not show Afib, but apparent on monitor  -  Blood cultures x 2 repeat  - BMP, CRP, Procal  - SLP repeat eval  - agree with cardiology consult  - agree with bipap for increased respiratory effort  - agree with 1L bolus  - sputum as able   - patient is currently full code, monitor closely, risks needing intubation if continues with increased WOB  -  The Internal Medicine primary team was at the bedside and also assessed and assisted during RRT.  -  Disposition: The patient will remain on the current unit. We will continue to monitor this patient closely.  -  Reassessment and plan follow-up will be performed by the rapid response team      Elizabeth Howard PA-C  [facility] RRT [facility] Job Code Contact #[phone]  [facility] Paging/Directory    Hospital Course   Brief Summary of events leading to rapid response:   RRT paged for hypotension and labile bp. During RRT lactate was drawn and is 3.6 RRT also notes afib on monitor not captured on 12-lead EKG    Patient has garbled speech and increased WOB and is not able to provide a history. Patient is admitted to medicine on 12/16 from ICU and was admitted in general to ICU 12/4 with weakness and signs c/w stroke and found to have sepsis and bacteremia with likely endocarditis (no vegetation on ILDEFONSO), as  well as pneumonia and has 5 septic emboli on imaging. Patient has been diuresing since admission and is down a net ~25kg.  Patient has a complex cardiac hx that includes hx CAD s/p PCI and bioprosthetic AV.     Patient is not observed to have overt pain.     Dermatology is following for petechial rash.  Multiple consultants are following including renal, ID, neurology,      Admission Diagnosis:   Endocarditis [I38]    Physical Exam   Temp: 97.5  F (36.4  C) Temp  Min: 97.5  F (36.4  C)  Max: 98.1  F (36.7  C)  Resp: 24 Resp  Min: 20  Max: 24  SpO2: 98 % SpO2  Min: 93 %  Max: 99 %  Pulse: 57 Pulse  Min: 57  Max: 115    No data recorded  BP: 94/51 Systolic (24hrs), Av , Min:94 , Max:135   Diastolic (24hrs), Av, Min:51, Max:72     I/Os: I/O last 3 completed shifts:  In: 305 [P.O.:200; I.V.:105]  Out: 2895 [Urine:2895]     Exam:   General: chronically ill appearing increased work of breathing  Mental Status: altered level of consciousness based on garbled responses to questions, awake and tracking.  CV: irregularly irregular + murmur.   RESPIRATORY: Effort increased on 2L NC. Lungs CTAB with no wheezing, rales, rhonchi.   GI: Abdomen soft, NT, NABS  EXTREMITIES: + anasarca. Intact bilateral pedal pulses.   SKIN: + jaundice and nonblanchable erythematous maculo-petechial rash on the upper and lower extremities bilaterally      Significant Results and Procedures   Lactic Acid:   Recent Labs   Lab Test 22  2206 22  2116 22  1951   LACT 2.6* 2.7* 3.0*     CBC:   Recent Labs   Lab Test 22  0452 22  0453 22  0600   WBC 11.5* 11.6* 14.0*   HGB 9.1* 9.4* 9.1*   HCT 27.2* 27.9* 26.2*   * 141* 150        Sepsis Evaluation   The patient is known to have an infection.  Eloy Fletcher meets SIRS criteria AND has a lactate >2 or other evidence of acute organ damage.  These vital signs, lab and physical exam findings constitute a diagnosis of SEVERE SEPSIS, based on: Lactate  resulted, and the level was > 2.0          Anti-infectives (From now, onward)    Start     Dose/Rate Route Frequency Ordered Stop    12/14/22 1230  meropenem (MERREM) 1 g vial to attach to  mL bag        Note to Pharmacy: GFR is 22 and improving. This would be appropriate dose if GFR was 25-50. Carrol (ID Staff). ID will monitor renal function and if declines dose adjust.    1 g  over 30 Minutes Intravenous EVERY 12 HOURS 12/14/22 1210 12/22/22 1359    12/09/22 1600  linezolid (ZYVOX) infusion 600 mg         600 mg  over 60 Minutes Intravenous EVERY 12 HOURS 12/09/22 1529          Current antibiotic coverage is appropriate for source of infection.    3 Hour Severe Sepsis Bundle Completion:  1. Initial Lactic Acid result shown above. Repeat lactic acid is not indicated.  2. Blood Cultures before Antibiotics: Yes  3. Broad Spectrum Antibiotics Administered: yes  4. Is hypotension present? Yes, gave 1L bolus

## 2022-12-19 NOTE — CONSULTS
Cardiology New Consult Note       Date of Service: 12/19/22    ASSESSMENT:   Eloy Fletcher is a 67 year old male with a history of severe calcific mitral stenosis, bioprosthetic aortic valve replacement in 2014, CAD w/ prior PCI of the LAD in 2012, HTN, HLD, Hodgkin's lymphoma s/p chest radiation in 1979, acquired asplenia (from splenectomy in 1979 from Hodkin's laparotomy), testicular cancer and prostate cancer who was admitted on 12/4/2022 for multifocal CVA involving the bilateral LIZZY and PCA territories and septic shock secondary to MSSA bacteremia.  CSF culture from 12/4 was also positive for MSSA.  Transesophageal echocardiogram on 12/5 did not show valvular vegetations but he is currently being treated with IV antibiotics for suspected endocarditis. Cardiology consulted due to concern for slow atrial fibrillation on cardiac monitor, associated with lower blood pressures.     #. 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 Mitral valve endocarditis  #. History of severe calcific mitral stenosis   #. MSSA bacteremia   #. Multifocal CVA involving LIZZY and PCA territories  #. History of bioprosthetic aortic valve replacement s/p AVR 2014  Review of EKG and telemetry shows sinus rhythm with first-degree AV block (new) and premature atrial complexes.  Prolongation of SD interval was concerning for aortic root abscess given his history of valve replacement and clinical picture so cardiac CT was obtained which showed diffuse leaflet thickening of the bioprosthetic aortic valve with wall thickening at the aortic root with an calcification) of the aorto mitral curtain.  The aortic valve leaflet and aortic root thickening is more prominent compared to his previous CT that was performed at the [facility] on 11/11/2022.  Today's study also showed extensive irregular noncalcified thickening along the posterior mitral annulus with a distinct mass that appears new compared to his previous  CT. Overall, these findings are suspicious for prosthetic aortic valve endocarditis, aortic root abscess and mitral valve endocarditis with involvement of the conduction system.    RECOMMENDATIONS:  Patient will be transferred to the cardiovascular ICU service for further cares, which will involve consultation with cardiovascular surgery and continued antibiotic treatment.  Case discussed with CVICU team.     Discussed with attending, Dr. Castro    Thank you for consulting the cardiovascular services at the [facility]. Please do not hesitate to call us with any questions.     Rachid Kramer MD  Cardiology Fellow  [phone]    ----------------------------------------------------------------------------  REASON FOR CONSULT: Slow afib with lower blood pressures     History of Present Illness   Eloy Fletcher is a 67 year old male with a history of severe calcific mitral stenosis, bioprosthetic aortic valve replacement in , CAD w/ prior PCI of the LAD in , HTN, HLD, Hodgkin's lymphoma s/p chest radiation in , acquired asplenia (from splenectomy in  from Hodkin's laparotomy), testicular cancer and prostate cancer who was admitted on 2022 for multifocal CVA involving the bilateral LIZZY and PCA territories and septic shock secondary to MSSA bacteremia.  CSF culture from  was also positive for MSSA.  Transesophageal echocardiogram on  did not show valvular vegetations but he is currently being treated with IV antibiotics for suspected endocarditis. Cardiology consulted due to concern for slow atrial fibrillation on cardiac monitor, associated with lower blood pressures.     Patient transferred out of the ICU on  and has been rather stable on IV linezolid and meropenem for presumed endocarditis. He's also been receiving IV lasix 40 mg daily for hypervolemia.     Pertinent cardiac testin2022 TTE:   Interpretation Summary  Would consider a  "transesophageal echocardiogram if clinically indicated.  Technically difficult study.Extremely poor acoustic windows.  Limited information was obtained during study.  Global and regional left ventricular function is normal with an EF of 60-65%.  Right ventricular function, chamber size, wall motion, and thickness are  normal.  Severe mitral annular calcification is present.  Mitral mean gradient 11mmHg at heart rate 131 BPM.  H/O Bioprosthetic AVR in 2014. Valve not well visualized.    12/05/2022 ILDEFONSO:  Interpretation Summary  Known radiation induced valve disease with severe calcific mitral disease. No  definite endocarditis noted.  Severe mitral annular calcification is present with multiple calcific nodules,  no definite vegetation seen.  S/p bioprosthetic AVR (23 mm Cordell-Hubbard Perimount Magna/Magna Ease  pericardial prosthesis). Valve appears well seated with good leaflet opening.  No vegetations visualized.  Global and regional left ventricular function is normal with an EF of 60-65%.    PAST MEDICAL HISTORY:  No past medical history on file.    CURRENT MEDICATIONS:  No current outpatient medications on file.     ALLERGIES     Allergies   Allergen Reactions     Crestor [Rosuvastatin] Other (See Comments)     \"Funny feeling in both legs\" all statins       FAMILY HISTORY:  No family history on file.    SOCIAL HISTORY:  Social History     Socioeconomic History     Marital status:    Tobacco Use     Smoking status: Never     Smokeless tobacco: Never   Substance and Sexual Activity     Alcohol use: Not Currently     Drug use: Never       Review of Systems:   10-point ROS reviewed, & found negative w/ exceptions noted in the HPI.    Physical Exam   Temp: 97.5  F (36.4  C) Temp src: Axillary BP: 94/51 Pulse: 57   Resp: 24 SpO2: 98 % O2 Device: Oxymask Oxygen Delivery: 2 LPM  Vital Signs with Ranges  Temp:  [97.5  F (36.4  C)-98.1  F (36.7  C)] 97.5  F (36.4  C)  Pulse:  [] 57  Resp:  [20-24] " 24  BP: ()/(51-72) 94/51  SpO2:  [93 %-99 %] 98 %  167 lbs 12.32 oz    GEN: Somnolent, mild respiratory distress  HEENT: no icterus, EOMI, MMM  CV: Irregular, no murmurs   CHEST: Increased work of breathing on BiPAP  ABD: soft, NT/ND   NEURO: Somnolent, motor grossly nonfocal    LABS:  Recent Labs   Lab 12/19/22  0452 12/18/22  1738 12/18/22  0453 12/17/22  0600 12/16/22  1536 12/16/22  0511 12/15/22  1122 12/15/22  0517   WBC 11.5*  --  11.6* 14.0*  --  9.6  --  12.0*   HGB 9.1*  --  9.4* 9.1*  --  9.7*  --  8.1*   MCV 88  --  87 85  --  83  --  83   *  --  141* 150  --  173  --  195    134* 142 142  --  142  --  143   POTASSIUM 4.8 3.9 4.3 4.6  4.5   < > 3.4   < > 3.4   CHLORIDE 101 96* 102 103  --  102  --  104   CO2 28 29 24 24  --  24  --  24   BUN 97.4* 99.1* 101.0* 106.0*  --  101.0*  --  112.0*   CR 1.88* 1.95* 2.03* 2.24*  --  2.41*  --  2.83*   ANIONGAP 12 9 16* 15  --  16*  --  15   YUNIOR 8.3* 8.1* 8.2* 7.9*  --  7.9*  --  7.9*   * 162* 137* 176*  --  99  --  128*   ALBUMIN  --   --   --   --   --  2.5*  --  2.2*   PROTTOTAL  --   --   --   --   --  6.3*  --  5.9*   BILITOTAL  --   --   --   --   --  1.4*  --  1.1   ALKPHOS  --   --   --   --   --  199*  --  212*   ALT  --   --   --   --   --  12  --  12   AST  --   --   --   --   --  72*  --  82*    < > = values in this interval not displayed.     EKG 12/19/2022:      12/05/2022 ILDEFONSO  Interpretation Summary  Known radiation induced valve disease with severe calcific mitral disease. No  definite endocarditis noted.  Severe mitral annular calcification is present with multiple calcific nodules,  no definite vegetation seen.  S/p bioprosthetic AVR (23 mm Cordell-Hubbard Perimount Magna/Magna Ease  pericardial prosthesis). Valve appears well seated with good leaflet opening.  No vegetations visualized.  Global and regional left ventricular function is normal with an EF of 60-65%.

## 2022-12-19 NOTE — PLAN OF CARE
[id number]    Patient slept with bipap on for approx 4hrs at start of shift. Was awake all midshift and sleeping again at the end of this shift.    Difficulty breathing early on and respiratory at bedside to administer nebulizer. Patient tolerated it well and felt better afterward.    He is now on oxymask as he is breathing through mouth and O2 increased to 3L.    Denies pain.    Bowel movement x2 tonight. Last night he was incontinent, tonight though he asked both times to get on bedpan.    Did not have anything to eat during this night shift.    Ida Brooks RN

## 2022-12-19 NOTE — PROVIDER NOTIFICATION
"   12/19/22 0900   Call Information   Date of Call 12/19/22   Time of Call 0840   Name of person requesting the team Melissa Stewart   Title of person requesting team RN   RRT Arrival time 0841   Time RRT ended 0922   Reason for call   Type of RRT Adult   Primary reason for call Cardiovascular   Cardiovascular EKG changes   Sepsis Suspected Elevated Lactate level;RR > 20, SaO2 <90% OR increasing O2 need;SPB <90 or MAP 40mmHG   Was patient transferred from the ED, ICU, or PACU within last 24 hours prior to RRT call? No   SBAR   Situation Lactic Acid 3.6, HR 58-60 with intermittent Afib.   Background PMH: HTN, HLD, Hodgkin's lymphoma (s/p radiation and MOPP chemo in remission), Testicular cancer (s/p left orchiectomy and chemo, in remission), prostate cancer ([place] 4+3, diagnosed in 4/2021 and s/p radiation and lupron), aortic stenosis S/P bioprosthetic AVR in 2014, and CAD s/p PCI to LAD in 2012 that is admitted to the ICU for sepsis and encephalopathy.\"   Notable History/Conditions Cancer;Cardiac;Hypertension   Assessment Drowsy, following commands, disoriented to time.  Denies pain, fever, chills, C.P., SOB, n/v.  Afebrile.  SB 60's to Afib. low 100's with labile BP / 50's with MAP 70. RR 20 with use of accesory muscles with 02 sats low 90's.  To change to BiPap.   Interventions Fluid bolus;Labs;Meds;ECG;O2 per N/C or mask;Portable monitor   Adjustments to Recommend Blood cultures, Cardiology consult   Patient Outcome   Patient Outcome Stabilized on unit   RRT Team   Attending/Primary/Covering Physician Dr. Tommie Whitaker   Date Attending Physician notified 12/19/22   Time Attending Physician notified 0840   Physician(s) Elizabeth Howard Pa-C   Lead RN Susannah Stewart   RT Jam Hernandez   Post RRT Intervention Assessment   Post RRT Assessment Other (see comment)   Date Follow Up Done 12/19/22   Time Follow Up Done 1138   Comments Continued SB upper 50's with bursts of Afib.  Cardiology consult " pending.  Remains on BiPap with 02 sats 100%.  Resting comfortably.

## 2022-12-19 NOTE — PROGRESS NOTES
[facility]    Cardiology Progress Note- CSI        Date of Admission:  12/4/2022     Assessment & Plan: HVSL    Mr Eloy Fletcher is a 67 year old gentleman with a history of Hodgkin Lymphoma s/p radiation (in remission) c/b CAD s/p PCI to LAD in 2012 and aortic stenosis s/p AVR in 2014 who presented to outside hospital 12/04 with neurologic changes and found to have multifocal CVAs and blood cultures positive for MSSA. He was transferred to [facility] with septic shock and his course is complicated by AHRF requiring BiPAP and new conduction disturbances c/f progressive endocarditis. The patient is transferred to the cardiac ICU for evaluation of suspected endocarditis with conduction system disturbance. The patient is hemodynamically stable however with borderline blood pressures, somnolent, and in no distress.     ===Neuro===  #Multifocal Emboli  #Meningitis  Presented with left gaze deviation and right-sided weakness. MR brain 12/04 demonstrating 5 foci of restricted diffusion involving bilateral anterior and posterior cerebral artery distributions. Concerning for embolic phenomena in setting of multiple vascular distributions and known MSSA bacteremia. CSF cultures 12/04 also positive for MSSA.   - Treatment of infection as below    #Pain, Analgesia, Sedation  APAP PRN, try to limit opioids given respiratory status.   No sedation at this time.     ===Cardiovascular===  #Infective Endocarditis c/b  #Septic Emboli  #First-Degree AV Block  #Premature Atrial Comlexes  MSSA bacteremia on presentation, multifocal CVA as above, and scattered pulmonary findings concerning for septic emboli. Valvular evaluation complicated in setting of known radiation-induced valve disease. TTE and subsequent ILDEFONSO 12/05 with severe MAC and multiple mitral valve nodules and aortic valve leaflet thickening uncertain to be vegetation vs calcific degeneration.   On review of his prior  echocardiogram at [facility] in 11/2022, these nodules and thickenings are all new, suggestive of infective endocarditis and possible perivalvular abscess. In the setting of prolonging ID interval and increased ectopy, there is high concern for conduction system involvement.   Admission  with down-trend to 102 with antibiotic therapy.   - CT angio pending for prosthetic aortic valve evaluation   - ID consulted by previous team, appreciate assistance  - Continue antibiotics: Meropenem 1g Q12hr, Linezolid 600mg Q12hr  - Trend CRP and CBC  - CT surgery consult for IE native and prosthetic valve disease  - Telemetry monitoring   - No indication for pacing at this time  - Stat CT head w/o contrast with focal neurologic changes    #MSSA Bacteremia c/b  #Vasoplegia  Blood cultures positive for MSSA on admission 12/04 and remained positive through 12/13/22. Currently on antibiotics as above. Borderline low blood pressures which are likely 2/2 vasoplegia given profound infection at multiple sites.   Lactic acid peaked 4.8 12/05 and normalization 12/07. Recent increase starting 12/11 reaching 3.6 today. Likely in setting of uncontrolled infectious sources.   - Antibiotics as above  - Small bolus fluids PRN, judicious given poor respiratory status    #Aortic Stenosis s/p AVR 2014  #Radiation-induced valve disease   Concern for IE as above with aortic valve involvement.     #CAD s/p PCI to LAD 2012  Continue holding ASA and clopidogrel given risk of hemorrhagic conversion of septic emboli    #HTN  Hold home meds in s/o vasoplegia, MSSA bacteremia     ===Pulmonary===  #Acute hypercarbic hypoxic respiratory failure   #Multifocal Pneumonia, left-lung predominant  #Septic Emboli  Currently on BiPAP for mild CO2 retention and hypoxia in the setting of dense left lung and RUL consolidations noted on prior imaging. Procalcitonin to 54.2 on arrival with down-trend to 2.45 on most recent evaluation. Concerning for pneumonia in  setting of MSSA bacteremia. Currently requiring BiPAP for respiratory support.   - Trend VBG for CO2 monitoring  - Wean O2 as able, nasal cannula pending repeat CO2  - Continue antibiotics as above  - Defer diuretics given concern for infectious vasoplegia as above    ===Infectious Disease===  #MSSA Bacteremia  #Positive CSF Culture  #Leukocytosis (resolved)  Antibiotics as above, ID consulted     #Drug Reaction (resolved)  Onset 12/14 with eruptions concerning for acute drug reaction to cephalosporins. Photos uploaded to Media Tab on 12/14.     ===Renal===  #Acute Kidney Injury   Baseline creatinine 0.9, peak on 12/08 at 4.16, currently 1.9.   Suspect pre-renal etiology in setting of bacteremia and distributive vasoplegia as above.   - Trend BMP  - Judicious fluids PRN as above    ===Hematology===  #Thrombocytopenia  Trend CBC, no indication for treatment at this time.     #Anemia  Suspect 2/2 bacteremia and critical illness, no e/o bleeding.  - Trend CBC, no acute interventions    #Hodgkin's lymphoma status post radiation and MOPP chemotherapy, in remission  -No acute issues    #Testicular cancer status post a left orchiectomy and chemotherapy, in remission  -No acute issues.      # Prostate cancer ([place] 4+3, diagnosed April 2021) s/p radiation and Lupron  -Follows with Urology and Oncology as an outpatient    F: NPO 2/2 mental status, NIPPV  A: APAP PRN  S: None  T: SQH  H: keep elevated  U: Pantoprazole  G:   S: NA  B: PRN  I: PIV, [place]  D: None    This patient's care was discussed with Dr Kavin Smith, attending cardiologist, who agrees with the assessment and plan unless otherwise indicated.    Param Jeronimo MD [facility], PGY-4  Fellow, Cardiovascular Disease    ______________________________________________________________________    Interval History   Transferred to ICU from armijo team. Somnolent but follows commands, on NIPPV.     Data reviewed today: I reviewed all medications, new labs and imaging results  over the last 24 hours. I personally reviewed all EKGs, echocardiograms, and other cardiology data from this visit.     Physical Exam   Vital Signs: Temp: 97.7  F (36.5  C) Temp src: Axillary BP: 93/58 Pulse: 54   Resp: 21 SpO2: 100 % O2 Device: BiPAP/CPAP Oxygen Delivery: 2 LPM  Weight: 167 lbs 12.32 oz  Exam:  Constitutional: Somnolent appearing, wearing BiPAP mask, no distress  Head: Normocephalic, no lesions  ENT: External ears and nose unremarkable   Cardiovascular: Normal rate, irregular rhythm, well-perfused   Respiratory: Breathing comfortably, no respiratory distress, normal respiratory rate   Musculoskeletal: Moves all extremities, no deformity   Skin: no suspicious lesions or rashes  Neurologic: Alert and oriented, moves all extremities, grossly non-focal  Psychiatric: mentation appears normal and affect normal/bright    Data   I personally reviewed all laboratory and imaging data from the last 24 hours in addition to all available cardiology data.

## 2022-12-19 NOTE — PROGRESS NOTES
CLINICAL NUTRITION SERVICES - REASSESSMENT NOTE     Nutrition Prescription    RECOMMENDATIONS FOR MDs/PROVIDERS TO ORDER:  Encourage oral intakes, offer/provide snacks/supplements PRN between meals    Malnutrition Status:    Patient does not meet two of the established criteria necessary for diagnosing malnutrition but is at risk for malnutrition    Recommendations already ordered by Registered Dietitian (RD):  If diet advanced, continue supplements as ordered.      Future/Additional Recommendations:  Monitor labs, intakes, and weight trends.  Consider increasing to 3-4 supplements per day to help meet needs     EVALUATION OF THE PROGRESS TOWARD GOALS   Diet: NPO, previously Soft & Bite Sized  Snacks/supplements: Ensure Enlive between and with meals  Intake/Tolerance: 0-50% of documented meals.     Calorie Counts  12/18 : 537 kcal and 20 g protein (2 meals, 1 supplements)  12/17: 1016 kcal and 41 g protein (3 meals, 2 supplements)  12/16: 1956 kcal and 94 g protein (2 meals, 4 supplements)  12/15: 884 kcal and 50 g protein (1 meals, 2 supplements)    4 day average PO intake = 1098 kcal (54% minimum energy needs) and 51 g protein (53% minimum protein needs)    Pt intakes seemed improving, with some decreases in last two days. Seems as though patient meets needs better on days with over 2 supplements.      NEW FINDINGS/REVIEW OF SYSTEMS    PMH: Hx of HTN, HLD, Hodgkin's lymphoma (s/p radiation and MOPP chemo in remission), Testicular cancer (s/p left orchiectomy and chemo, in remission), prostate cancer ([place] 4+3, diagnosed in 4/2021 and s/p radiation and lupron), aortic stenosis S/P bioprosthetic AVR in 2014, and CAD s/p PCI to LAD in 2012. Pt was admitted 12/4/22 for septic shock and acute R sided weakness. He was found to have MSSA bacteremia c/b pneumonia, septic emboli to the brain, JILLIAN and encephalopathy. Transferred out of ICU on 12/6 to the general medicine floor.    General:  Rapid response called this  morning for lactic acid 3.6, pt with sepsis    Nutrition/GI: Pt transferring to ICU during attempt to visit, unable to perform NFPE or converse with patient.     Weights: Pt with limited weight history prior to admission, with large weight fluctuations - 11 lb (6.5%) weight gain and subsequent 7 lb (4%) weight loss during admission. Unsure of accuracy of last weight. Patient down >1 L daily over last week per I/O, weight loss likely fluid related.   12/19/22 0600 76.1 kg (167 lb 12.3 oz) Bed scale   12/17/22 0138 83.4 kg (183 lb 13.8 oz) Bed scale   12/16/22 0447 83.8 kg (184 lb 11.9 oz) Bed scale   12/15/22 0245 83.5 kg (184 lb 1.4 oz) Bed scale   12/14/22 0400 85.2 kg (187 lb 13.3 oz) Bed scale   12/13/22 0453 86.4 kg (190 lb 7.6 oz) Bed scale   12/12/22 1044 85.4 kg (188 lb 4.4 oz) Bed scale   12/12/22 0618 85.2 kg (187 lb 13.3 oz) Bed scale   12/11/22 0615 85.8 kg (189 lb 2.5 oz) --   12/09/22 0556 86.4 kg (190 lb 7.6 oz) Bed scale   12/08/22 0345 84.2 kg (185 lb 10 oz) Bed scale   12/07/22 0152 82.4 kg (181 lb 10.5 oz) Bed scale   12/05/22 0400 81 kg (178 lb 9.2 oz) Bed scale   12/04/22 2200 81.1 kg (178 lb 12.7 oz) Bed scale     12/04/22 80.7 kg (178 lb)   08/09/21 80.7 kg (178 lb)   04/02/15 79.8 kg (176 lb)   03/26/15 80.3 kg (177 lb)   03/12/15 78.5 kg (173 lb)   02/16/15 78.5 kg (173 lb)   02/05/15 79.4 kg (175 lb)   01/29/15 79.4 kg (175 lb)   01/22/15 78.9 kg (174 lb)   01/15/15 78.5 kg (173 lb)   01/06/15 78.9 kg (174 lb)   01/02/15 78.5 kg (173 lb)   12/30/14 78.5 kg (173 lb)   12/23/14 78.9 kg (174 lb)     Skin: pressure injury nose     Labs reviewed:, Phos 5.8 (H), BUN 97.4 (H), Creatinine 1.88 (H), Glucose 159, A1C 6.6 on 12/7 ,  (H), Alk Phos 199  (H, on 12/16), AST 72 (H, on 12.16), T bili 1.4 (H, on 12/16)     Medications reviewed: lasix, LR, merem, protonix BID    MALNUTRITION  % Intake: < 75% for > 7 days (moderate)  % Weight Loss: Difficult to assess with fluid changes  Subcutaneous Fat  Loss: None observed on visual exam or per previous RD note  Muscle Loss: None observed on visual exam or previous RD note  Fluid Accumulation/Edema: Mild  Malnutrition Diagnosis: Patient does not meet two of the established criteria necessary for diagnosing malnutrition but is at risk for malnutrition    Previous Goals   Patient to consume % of nutritionally adequate meal trays TID, or the equivalent with supplements/snacks.  Evaluation: Not met per calorie counts    Previous Nutrition Diagnosis  Inadequate protein-energy intake related to AMS, dysphagia, frequent missed meals as evidenced by insignificant pascual ct data, I/O/room service review, intermittent lethargy inhibiting adequate PO, nutrition support candidate if unable to improve PO intake with ONS and/or meal trays.    Evaluation: updated    CURRENT NUTRITION DIAGNOSIS  Inadequate oral intake related to AMS, dysphagia, frequent missed meals as evidenced by calorie counts of pt meeting <65% of needs over last 4 days.     INTERVENTIONS  Implementation  Continue supplements as ordered if diet advanced from NPO    Goals  Patient to consume % of nutritionally adequate meal trays TID, or the equivalent with supplements/snacks.    Monitoring/Evaluation  Progress toward goals will be monitored and evaluated per protocol.    Tiffanie Buenrostro MS, RDN, LDN  6B RD pager [phone]  Weekend/Holiday RD pager [phone]

## 2022-12-19 NOTE — PLAN OF CARE
Neuro: A&Ox2-3, lethargic most of the time.  Cardiac: ST-SB with 1 deg AVB on tele.  This morning HR was 50s-60s sustaining but after a while goes up above 100s.  Hypotensive as well.  RRT was called and provider notified; Bolus LR given per order, labs were ordered and CTA was ordered as well.  BP and HR closely monitored.    Respiratory: Was put on bipap 35%Fi02 and sating above >92%.  GI/: Adequate urine output on chan catheter.  No BM today.  Diet/appetite: Was kept on NPO for procedure but was too lethargic to take meds..  Activity:  Lift asst with mobility and transfers.  Pain: At acceptable level on current regimen.   Skin: No new deficits noted.  Continues to have petechiae, ointment applied.   LDA's:  Right PIV, infusing.    Plan: Transfer to ICU for furthering monitoring and of BP and HR.  Nurse report given.  Family at bedside.

## 2022-12-20 NOTE — PROCEDURES
[facility]    Central line    Date/Time: 12/19/2022 7:05 PM  Performed by: Param Jeronimo MD  Authorized by: Param Jeronimo MD   Indications: vascular access      UNIVERSAL PROTOCOL   Site Marked: NA  Prior Images Obtained and Reviewed:  NA  Required items: Required blood products, implants, devices and special equipment available    Patient identity confirmed:  Verbally with patient and arm band  Patient was reevaluated immediately before administering moderate or deep sedation or anesthesia  Confirmation Checklist:  Patient's identity using two indicators, relevant allergies, procedure was appropriate and matched the consent or emergent situation and correct equipment/implants were available  Time out: Immediately prior to the procedure a time out was called    Universal Protocol: the Joint Commission Universal Protocol was followed    Preparation: Patient was prepped and draped in usual sterile fashion    ESBL (mL):  5     ANESTHESIA    Local Anesthetic: Lidocaine 1% without epinephrine  Anesthetic Total (mL):  5      SEDATION    Patient Sedated: No      Preparation: skin prepped with 2% chlorhexidine  Skin prep agent dried: skin prep agent completely dried prior to procedure  Sterile barriers: all five maximum sterile barriers used - cap, mask, sterile gown, sterile gloves, and large sterile sheet  Hand hygiene: hand hygiene performed prior to central venous catheter insertion  Patient position: flat  Catheter type: triple lumen  Catheter size: 7 Fr  Pre-procedure: landmarks identified  Ultrasound guidance: yes  Sterile ultrasound techniques: sterile gel and sterile probe covers were used  Number of attempts: 1  Successful placement: yes  Post-procedure: line sutured and dressing applied  Assessment: blood return through all ports,  free fluid flow and placement verified by x-ray      PROCEDURE    Patient Tolerance:  Patient tolerated the procedure well with no  immediate complications and patient tolerated the procedure well with no immediate complications  Length of time physician/provider present for 1:1 monitoring during sedation: 0

## 2022-12-20 NOTE — PROGRESS NOTES
[facility] Inpatient Dermatology Progress Note    Assessment and Plan:  1. Leukocytoclastic vasculitis  Biopsy result from 12/14 demonstrating evidence of LCV. LCV is an immune-complex mediated disease that is a reactive process and only signals to the underlying problems that are at hand. It is often secondary to infection or medication. Typically occurs 7-10 days after the offending culprit.  In this patient, it is unclear which medication may have may fit the timeline of expected presentation given that so many new antibiotics were started and stopped in that time frame.  LCV can also be idiopathic, secondary to connective tissue disease, or malignancy (which would be lower likelihood in this scenario.    Treatment of acute or subacute LCV is unnecessary and often not helpful. Systemic corticosteroids are usually not prescribed. It is acceptable for the rash to continue to spread and this is expected to some extent. It may sometimes last a couple of months, but very rarely becomes chronic. Post-inflammatory hyperpigmentation may take months to resolve.   - Primary management of LCV in this setting is of careful management of trigger (in this instance, likely 2/2 systemic infection)  - Continue topical triamcinolone 0.1% ointment BID until lesions flatten, then stop; pigmentary changes will take several weeks to resolve.  - No follow up needed unless rash is worsening    We will plan to sign off at this time. Please page on call resident if any further questions arise.    Patient seen and staffed with attending physician, Dr. Anthony Wadsworth MD MPH  PGY4 Medicine/Dermatology Resident    I have seen and examined this patient and agree with the assessment and plan as documented in the resident's note.    Kevin Cruz MD  Dermatology Attending      Dermatology Problem List:  1. Leukocytoclastic vasculitis  - biopsied 12/14/22 with path c/w LCV  - TAC 0.1% ointment BID    Date of  Admission: Dec 4, 2022   Encounter Date: 12/20/2022    Interval history:  Rash improving. Per daughters the rash is much less symptomatic than it used to be. No other skin concerns at this time.    Physical exam:  /54   Pulse 98   Temp 98.6  F (37  C) (Axillary)   Resp 20   Wt 80.9 kg (178 lb 5.6 oz)   SpO2 100%   BMI 27.12 kg/m    GEN: Sitting up in bed, appears comfortable  SKIN: Focused examination of the extremities was performed.  -Several petechiae and purpura scattered on trunk (abdomen, back), extremities (flexural surfaces)  -No mucosal edema, ulcerations    Laboratory:    Staff Involved:  Resident/Staff

## 2022-12-20 NOTE — PROGRESS NOTES
"Bronchoscopy Risk Assessment Guidelines      A. Patient symptoms to consider when assessing pulmonary TB risk are:    I. Cough greater than 3 weeks; and fever, hemoptysis, pleuritic chest    pain, weight loss greater than 10 lbs, night sweats, fatigue, infiltrates on    upper lobes or superior segments of lower lobes, cavitation on chest    x-ray.   B. Patient risk factors to consider when assessing pulmonary TB risk are:    I. Exposure to known TB case, foreign-born persons (within 5 years of    arrival to [place]), residence in a crowded setting (correctional facility,     long-term care center, etc.), persons with HIV or immunosuppression.    Patients with symptoms and risk factors should generally be considered \"suspect risk\" and bronchoscopies should be performed in airborne precautions.    This patient has NO KNOWN RISK of Tuberculosis (proceed with bronchoscopy)    Specimens sent: yes  Complications: None  Scope used: #2146564 Therapeutic  Attending Physician: Kavin Smith MD.    Sami Mittal, RT on 12/20/2022 at 5:41 PM  "

## 2022-12-20 NOTE — PROCEDURES
Cardiac ICU Procedure Note  Procedure:  Bronchoscopy  Indication:  Acute Hypoxic Respiratory Failure  Attending Provider:  Dr. Kavin Smith  Critical Care Fellow: Dr. Param Jeronimo  Medications:   continued on ICU gtt medications (see MAR)  Time Out:  Performed  Scope Used: Flexible bronchoscope    The patient's medical record has been reviewed.  The necessary history and physical examination was performed.  The risks, benefits and alternatives of the procedure were discussed with the the patient's representative (daughter, Carissa) in detail and shehad the opportunity to ask questions.  All questions were answered and verbal and written informed consent was obtained.  The proposed procedure and the patient's identification were verified prior to the procedure by the physician and the nurse.      The patient was assessed for the adequacy for the procedure and to receive medications.   Mental Status:  Sedated  Airway examination: ETT in place  Pulmonary:  Mechanical breath sounds  CV:  Tachycardic, on vasopressors  ASA thGthrthathdtheth:th th5th After clinical evaluation and reviewing the indication, risks, alternatives and benefits of the procedure the patient was deemed to be in satisfactory condition to undergo the procedure.  However, the patient was too unstable to move to a negative pressure room for the procedure.  The bronchoscopy was performed at the bedside.     Immediately before administration of medications the patient was re-assessed for adequacy to receive sedatives including the heart rate, respiratory rate, mental status, oxygen saturation, blood pressure and adequacy of pulmonary ventilation. These same parameters were continuously monitored throughout the procedure.     Maneuvers / Procedure:   The bronchoscope was inserted through the mouth via ETT.  The cords were anesthetized with lidocaine. Upper airway structures, vocal cords (anatomy and function) appear to be normal.  A complete airway examination was  performed from the distal trachea to the subsegmental level in each lobe of both lungs. There were no endobronchial lesions, there were thick secretions throughout greater in right lung, and the mucosa was friable.  Aspirates sent for laboratory analysis.     No BAL was performed.     Param Jeronimo MD [facility], PGY-4  Fellow, Cardiovascular Disease    Kavin Smith MD, PhD  Interventional/Critical Care Cardiology  [phone]    December 20, 2022

## 2022-12-20 NOTE — PLAN OF CARE
ICU End of Shift Summary. See flowsheets for vital signs and detailed assessment.    Changes this shift: Patient alert & oriented x 3, disoriented to time, denies pain, afebrile, tachypnea, tele SB/ST/BBB, Levophed started for MAP in 50's, blood pressure very liable, elevated lactic acid 6.2, 500 ml of sodium chloride bolus given twice, patient on BIPAP 18/6, 30%, NPO, incontinence of bowel, had 1 bowel movement, chan patent, initial K+ 6.1 MD notify, order a potassium redraw, recheck was 5.6 and 5.6, LFT also elevated team aware.    Plan: Continue to monitor per plan of care.    Problem: Sepsis/Septic Shock  Goal: Optimal Coping  Outcome: Progressing  Intervention: Optimize Psychosocial Adjustment to Illness  Recent Flowsheet Documentation  Taken 12/20/2022 0400 by James Malloy, RN  Family/Support System Care:   presence promoted   caregiver stress acknowledged  Taken 12/20/2022 0000 by James Malloy RN  Family/Support System Care:   presence promoted   caregiver stress acknowledged  Taken 12/19/2022 2000 by James Malloy, RN  Family/Support System Care:   presence promoted   caregiver stress acknowledged   Goal Outcome Evaluation:           Overall Patient Progress: no changeOverall Patient Progress: no change

## 2022-12-20 NOTE — PROCEDURES
[facility]    Insert arterial line    Date/Time: 12/20/2022 9:07 AM  Performed by: Param Jeronimo MD  Authorized by: Param Jeronimo MD       UNIVERSAL PROTOCOL   Site Marked: NA  Prior Images Obtained and Reviewed:  NA  Required items: Required blood products, implants, devices and special equipment available    Patient identity confirmed:  Verbally with patient and arm band  NA - No sedation, light sedation, or local anesthesia  Confirmation Checklist:  Patient's identity using two indicators, procedure was appropriate and matched the consent or emergent situation, correct equipment/implants were available and relevant allergies  Time out: Immediately prior to the procedure a time out was called    Universal Protocol: the Joint Commission Universal Protocol was followed    Preparation: Patient was prepped and draped in usual sterile fashion    ESBL (mL):  2  Indication: multiple ABGs respiratory failure hemodynamic monitoring  Location:  Right radial       ANESTHESIA    Local Anesthetic: Lidocaine 1% without epinephrine  Anesthetic Total (mL):  3      SEDATION    Patient Sedated: No      PROCEDURE DETAILS    Needle Gauge:  22  Seldinger technique: Seldinger technique used    Number of Attempts:  1  Post-procedure:  Line sutured and dressing applied  CMS: normal    PROCEDURE    Length of time physician/provider present for 1:1 monitoring during sedation: 0

## 2022-12-20 NOTE — PROGRESS NOTES
Admitted/transferred from: 6B  Reason for admission/transfer: Lethargy, increased O2 needs, hypotension  Patient status upon admission/transfer: Lethargic but stable  Interventions: BiPAP at 35%, plans to place central line  Plan: Repeat VBG and lactic, close monitoring of BP, line placement  2 RN skin assessment: completed by Timothy, FRANCES  Result of skin assessment and interventions/actions: Generalized rash, sharron to BLE and back, blanchable redness to coccyx and bridge of nose  Height, weight, drug calc weight: Done  Patient belongings (see Flowsheet - Adult Profile for details): Sent with patient  MDRO education (if applicable): N/a

## 2022-12-20 NOTE — PROGRESS NOTES
[facility] Nurse Inpatient Assessment     Consulted for: face     Patient History (according to provider note(s):       Mr. Fletcher is a 66-year-old gentleman with a past medical history of hypertension, hyperlipidemia, Hodgkin's lymphoma status post radiation and MOPP chemotherapy, in remission, testicular cancer status post a left orchiectomy and chemotherapy, in remission, prostate cancer ([place] 4+3, diagnosed April 2021) status post radiation and Lupron, aortic stenosis status post bioprosthetic aortic valve in (2014) and coronary artery disease status post PCI to the LAD in (2012) who on December 4, 2022 developed a left-sided gaze with right-sided weakness and was noted to have a fever and brought to an outside hospital where he was recognized to be in septic shock and transferred to [facility] ICU.  Head CT and CTA of the head and neck did not display any acute abnormality or stenosis.  MRI brain displayed scattered foci of acute ischemia involving the bilateral anterior and posterior cerebral artery distributions.  Neurology was consulted and concerned that the these might represent embolic strokes.  Further infectious work-up was done including a CT chest abdomen and pelvis which revealed groundglass opacities in the left upper lobe and left lower lobe with tree-in-bud findings in the setting of interstitial pulmonary edema and a left pleural effusion.  He was started on broad-spectrum antibiotics including nafcillin and ceftriaxone.  Lumbar puncture was performed and CSF was positive for gram-positive cocci, final cultures pending.  Cardiology was consulted with concerns for possible endocarditis.  TTE was performed which revealed severe mitral calcification with moderate insufficiency and a bioprosthetic aortic valve which was well-seated with no vegetations present. ID was consulted and he was adjusted to Nafcillin with treatment for likely  "endocarditis in the setting of a high grade blood stream infection from MSSA that resulted in pneumonia, embolic strokes, acute kidney injury and encephalopathy. 12/6, he is transfered out of the ICU.    Areas Assessed:      Areas visualized during today's visit: Face and neck    Pressure Injury Location: bridge of nose     Last photo: 12/20  Wound type: Pressure Injury     Pressure Injury Stage: Deep Tissue Pressure Injury (DTPI), hospital acquired      This is a Medical Device Related Pressure Injury (MDRPI) due to BiPAP mask  Wound history/plan of care:   Found by bedside RN and switched masks.   Wound base: 100 % blanchable , erythema, maroon and epidermis-slow to alexis     Palpation of the wound bed: normal      Drainage: none     Description of drainage: none     Measurements (length x width x depth, in cm) 1.5  x  2  x  0 cm      Tunneling N/A     Undermining N/A  Periwound skin: Dry/scaly      Color: normal and consistent with surrounding tissue       Temperature: normal   Odor: none  Pain: no grimacing or signs of discomfort, none  Pain intervention prior to dressing change: N/A  Treatment goal: Heal  and Protection  STATUS: improving  Supplies ordered: supplies stored on unit     Treatment Plan:     Bridge of nose wound(s): Daily  cleanse with saline and pat dry. If wound is offloaded, okay to leave open to air. If requiring BiPAP that goes over wound utilize mepilex 2 x 1\" (order#[id number])- in supply room.     Orders: Reviewed    RECOMMEND PRIMARY TEAM ORDER: None, at this time  Education provided: plan of care and wound progress  Discussed plan of care with: Family and Nurse  WOC nurse follow-up plan: weekly  Notify WOC if wound(s) deteriorate.  Nursing to notify the Provider(s) and re-consult the WOC Nurse if new skin concern.    DATA:     Current support surface: Standard  Low air loss (MARLENI pump, Isolibrium, Pulsate, skin guard, etc)  Containment of urine/stool: Incontinent pad in bed  BMI: Body mass " index is 27.12 kg/m .   Active diet order: Orders Placed This Encounter      NPO for Medical/Clinical Reasons Except for: Meds     Output: I/O last 3 completed shifts:  In: 3260.26 [I.V.:1760.26; IV Piggyback:1500]  Out: 1510 [Urine:1510]     Labs:   Recent Labs   Lab 12/20/22  0328 12/19/22  1026   ALBUMIN 2.8*  --    HGB 8.1*  --    WBC 8.7  --    CRP  --  102.00*     Pressure injury risk assessment:   Sensory Perception: 2-->very limited  Moisture: 3-->occasionally moist  Activity: 1-->bedfast  Mobility: 2-->very limited  Nutrition: 1-->very poor  Friction and Shear: 1-->problem  Herber Score: 10    Theresa Dasilva RN [employer] student   Dept. Pager: [phone]  Dept. Office Number: [phone]

## 2022-12-20 NOTE — ANESTHESIA PROCEDURE NOTES
Airway       Patient location during procedure: ICU  Staff -        Resident/Fellow: Zane Dunlap MD       Performed By: resident  Consent for Airway        Urgency: emergent       Consent: The procedure was performed in an emergent situation.  Report Obtained from Primary Care Team       History regarding most recent potassium obtained: Yes       History regarding presence/absence of renal failure obtained:Yes       History regarding stroke/CVA obtained:Yes       History regarding presence/absence of NM disorder: YesIndications and Patient Condition       Indications for airway management: airway protection, respiratory insufficiency and hemodynamic instability       Mallampati: Not Assessed     Induction type:RSI       Mask difficulty assessment: 0 - not attempted    Final Airway Details       Final airway type: endotracheal airway       Successful airway: ETT - single  Endotracheal Airway Details        ETT size (mm): 8.0       Cuffed: yes       Successful intubation technique: video laryngoscopy       VL Blade Size: MAC 4       Grade View of Cords: 1       Adjucts: stylet       Position: Center       Measured from: gums/teeth       Secured at (cm): 25       Bite block used: None    Post intubation assessment        ETT secured, Vent settings by primary/ICU team, Primary/ICU team to review CXR, Sedation to be ordered by primary/ICU team and No apparent complications       Placement verified by: capnometry, equal breath sounds and chest rise        Number of attempts at approach: 1       Number of other approaches attempted: 0       Secured with: commercial tube awad       Ease of procedure: easy       Dentition: Intact    Medication(s) Administered   etomidate (AMIDATE) injection - Intravenous   20 mg - 12/20/2022 1:25:00 PM  rocuronium (ZEMURON) 10 mg/mL injection - Intravenous   100 mg - 12/20/2022 1:25:00 PM

## 2022-12-20 NOTE — PROGRESS NOTES
[facility]    Cardiology Progress Note- CSI        Date of Admission:  12/4/2022     Assessment & Plan: HVSL    Mr Eloy Fletcher is a 67 year old gentleman with a history of Hodgkin Lymphoma s/p radiation (in remission) c/b CAD s/p PCI to LAD in 2012 and aortic stenosis s/p AVR in 2014 who presented to outside hospital 12/04 with neurologic changes and found to have multifocal CVAs and blood cultures positive for MSSA. He was transferred to [facility] with septic shock and his course is complicated by AHRF requiring BiPAP and new conduction disturbances c/f progressive endocarditis. The patient is transferred to the cardiac ICU for evaluation of suspected endocarditis with conduction system disturbance. The patient is requiring vasopressors and with worsening acidosis and evidence of shock.     ===Neuro===  #Multifocal Emboli  #Meningitis  Presented with left gaze deviation and right-sided weakness. MR brain 12/04 demonstrating 5 foci of restricted diffusion involving bilateral anterior and posterior cerebral artery distributions. Concerning for embolic phenomena in setting of multiple vascular distributions and known MSSA bacteremia. CSF cultures 12/04 also positive for MSSA.   - Treatment of infection as below    #Pain, Analgesia, Sedation  APAP PRN, try to limit opioids given respiratory status.   No sedation at this time.     ===Cardiovascular===  #Shock  Suspected distributive in setting of MSSA bacteremia (now with negative cultures) but without source control given c/f IE and abscess. Consider component of cardiogenic shock if valve disease has progressed in setting of IE.   - Vasopressors: Norepinephrine, goal MAP >65  - Volume: 2L in last 24 hours, continue PRN  - Repeat formal TTE today  - Continue ABX as below  - CT surgery consult as below    #Infective Endocarditis c/b  #Septic Emboli  #First-Degree AV Block  #Premature Atrial Comlexes  MSSA  bacteremia on presentation, multifocal CVA as above, and scattered pulmonary findings concerning for septic emboli. Valvular evaluation complicated in setting of known radiation-induced valve disease. TTE and subsequent ILDEFONSO 12/05 with severe MAC and multiple mitral valve nodules and aortic valve leaflet thickening uncertain to be vegetation vs calcific degeneration.   On review of his prior echocardiogram at [facility] in 11/2022, these nodules and thickenings are all new, suggestive of infective endocarditis and possible perivalvular abscess. In the setting of prolonging NH interval and increased ectopy, there is high concern for conduction system involvement.   Admission  with down-trend to 102 with antibiotic therapy.   - CT angio 12/19: concern for aortic prosthesis endocarditis with root abscess, MV mass concerning for vegetation.   - ID consulted by previous team, appreciate assistance  - Continue antibiotics: Meropenem 1g Q12hr, Linezolid 600mg Q12hr  - Trend CRP and CBC  - CT surgery consult for IE native and prosthetic valve disease  - Telemetry monitoring   - No indication for pacing at this time  - Stat CT head w/o contrast with focal neurologic changes    #MSSA Bacteremia c/b  #Shock, distributive  Blood cultures positive for MSSA on admission 12/04 and remained positive through 12/13/22. Currently on antibiotics as above. Borderline low blood pressures which are likely 2/2 vasoplegia given profound infection at multiple sites.   Lactic acid peaked 4.8 12/05 and normalization 12/07. Recent increase starting 12/11 reaching 3.6 today. Likely in setting of uncontrolled infectious sources.   - Antibiotics as above  - Small bolus fluids PRN, judicious given poor respiratory status    #Aortic Stenosis s/p AVR 2014  #Radiation-induced valve disease   Concern for IE as above with aortic valve involvement.     #CAD s/p PCI to LAD 2012  Continue holding ASA and clopidogrel given risk of hemorrhagic  conversion of septic emboli    #HTN  Hold home meds in s/o shock, MSSA bacteremia     ===Pulmonary===  #Acute hypercarbic hypoxic respiratory failure   #Multifocal Pneumonia, left-lung predominant  #Septic Emboli  Currently on BiPAP for mild CO2 retention and hypoxia in the setting of dense left lung and RUL consolidations noted on prior imaging. Procalcitonin to 54.2 on arrival with down-trend to 2.45 on most recent evaluation. Concerning for pneumonia in setting of MSSA bacteremia. Currently requiring BiPAP for respiratory support.   - Trend VBG for CO2 monitoring  - Wean O2 as able, nasal cannula pending repeat CO2  - Continue antibiotics as above  - Defer diuretics given concern for infectious vasoplegia as above    ===Renal===  #Acute Kidney Injury c/f  #ATN  #Hyperkalemia  Baseline creatinine 0.9, peak on 12/08 at 4.16, currently 2.6.   Suspect pre-renal etiology in setting of bacteremia and distributive shock as above. Mild hyperkalemia to 5.9 in setting of ATN, trend Q8 and shift PRN.   - Trend BMP  - Monitor UOP  - Vasopressors as above  - Judicious fluids PRN as above    ===Infectious Disease===  #MSSA Bacteremia  #Positive CSF Culture  #Leukocytosis (resolved)  Antibiotics as above, ID consulted     #Drug Reaction (resolved)  Onset 12/14 with eruptions concerning for acute drug reaction to cephalosporins. Photos uploaded to Media Tab on 12/14.     ===Gastroenterology===  #Shock Liver  In setting of MSSA bacteremia and distributive shock as above. LFTs elevated, AST 1847/  on 12/20.  - Trend with correction of shock    ===Hematology===  #Thrombocytopenia  Trend CBC, no indication for treatment at this time.     #Anemia  Suspect 2/2 bacteremia and critical illness, no e/o bleeding.  - Trend CBC, no acute interventions    #Hodgkin's lymphoma status post radiation and MOPP chemotherapy, in remission  -No acute issues    #Testicular cancer status post a left orchiectomy and chemotherapy, in  remission  -No acute issues.      # Prostate cancer ([place] 4+3, diagnosed April 2021) s/p radiation and Lupron  -Follows with Urology and Oncology as an outpatient    F: NPO 2/2 mental status, NIPPV  A: APAP PRN  S: None  T: SQH  H: keep elevated  U: Pantoprazole  G:   S: NA  B: PRN  I: PIV, [place], RIJ CVC  D: None    This patient's care was discussed with Dr Kavin Smith, attending cardiologist, who agrees with the assessment and plan unless otherwise indicated.    Param Jeronimo MD [facility], PGY-4  Fellow, Cardiovascular Disease    ______________________________________________________________________    Interval History   Transferred to ICU from armijo team. Somnolent but follows commands, on NIPPV.     Data reviewed today: I reviewed all medications, new labs and imaging results over the last 24 hours. I personally reviewed all EKGs, echocardiograms, and other cardiology data from this visit.     Physical Exam   Vital Signs: Temp: 97.9  F (36.6  C) Temp src: Axillary BP: 104/44 Pulse: 92   Resp: 27 SpO2: 99 % O2 Device: BiPAP/CPAP Oxygen Delivery: 2 LPM  Weight: 178 lbs 5.63 oz  Exam:  Constitutional: Somnolent appearing, wearing BiPAP mask, no distress  Head: Normocephalic, no lesions  ENT: External ears and nose unremarkable   Cardiovascular: Normal rate, irregular rhythm, well-perfused   Respiratory: Breathing comfortably, no respiratory distress, normal respiratory rate   Musculoskeletal: Moves all extremities, no deformity   Skin: no suspicious lesions or rashes  Neurologic: Alert and oriented, moves all extremities, grossly non-focal  Psychiatric: mentation appears normal and affect normal/bright    Data   I personally reviewed all laboratory and imaging data from the last 24 hours in addition to all available cardiology data.

## 2022-12-21 NOTE — PROGRESS NOTES
ICU End of Shift Summary. See flowsheets for vital signs and detailed assessment.    Changes this shift:   Patient had very labile blood pressure throughout the day. Pressor needs increased to running three pressors along with a bicarb drip this evening. Patient was intubated after becoming unresponsive and bronched shortly after. Patient on minimal sedation and still not following commands. Withdraws from pain. Steroids and continuous bicarb started.Potassium shifted x1, most recent potassium level is 6. Patient remains acidotic with ph less that 7.2.     Plan:   Talks of possible CT scan when more stable. Continue to monitor and assess. Follow treatment plan.

## 2022-12-21 NOTE — PROGRESS NOTES
[facility] Progress Note     Patient:  Eloy Fletcher   YOB: 1955, MRN: [id number]  Date of Visit: 12/21/2022  Date of Admission: 12/4/2022  Consult Requester: Kavin Smith MD       Assessment:   1. MSSA bacteremia Last positive culture 12/10  2. Suspected infective endocarditis- bioprosthetic aortic valve ILDEFONSO without vegetations, but multiple CNS septic emboli  3. Pneumonia- worsening L>R infiltrates- meropenem had been added 12/14  4. Drug rash- suspect beta-lactam allergy though exposed to many antibiotics before onset on 12/14.   5. Asplenic   6. Increased ectopy on telemetry   7. JILLIAN  8. Hepatitis- evidence of cholestasis improved with stopping nafcillin on 12/10, now significantly elevated transaminases most concerning for shock liver. Will also evaluate for hepatitis with no recent labs.    Patient admitted 12/4 with MSSA bacteremia with CNS emboli, has bioprosthetic aortic valve but without vegetations on ILDEFONSO, overall still concerning for MSSA endocarditis. Had been on linezolid/meropenem and clinically worsened on 12/19-12/20 with increasing pressor requirements, febrile, elevated lactate, most consistent with septic shock with elevated mixed venous O2, and increasing work of breathing on BIPAP/altered mental status prompting intubation. Repeat CT CAP with worsening pulmonary infiltrates, no obvious intraabdominal pathology. Repeat cultures pending.     Overall clinical worsening from likely infection with increased ectopy/rhythm disturbances most concerning for possible aortic root abscess, recommend repeating ILDEFONSO. Respiratory cultures from bronchoscopy 12/20/2022 with budding yeast, possibly consistent with candida species after discussion with microbiology lab, cultures pending. Possible breakthrough bloodstream infection, will add micafungin for fungal coverage. Less likely disseminated histoplasmosis/blastomyces but will evaluate serum antigens. If cultures  positive for gram negative bacteria, recommend covering for carbapenem resistance by switching meropenem to ceftazidime and avibactam with gentamycin.     Recommendations:  1. Start micafungin IV 100mg daily   2. Continue linezolid 600mg q12 hr  3. Continue meropenem 1g q12hr   4. If cultures growing gram negative bacteria, switch meropenem to ceftazidime/avibactam 2g q8hr with gentamycin 5mg/kg daily  5. Recommend repeating ILDEFONSO to evaluate for aortic root abscess  6. Repeat blood cultures today + fungal culture  7. Obtain- histo/blasto serum antigens, 1,3-Beta-d-glucan (ordered)  8. Obtain screening hepatitis labs, HIV, VLDR (ordered)   9. Follow cultures     This patient was discussed with Dr. Alexander, who agrees with the above assessment and plan. Thank you for the consult. ID will continue to follow with you.    Tasha Salamanca MD  Internal Medicine PGY2            Interval History and Events:   Required intubated yesterday. Respiratory culture from bronchoscopy showing budding yeast. Blood cultures NGTD since 12/10.         Antimicrobial Treatment:     Meropenem (12/14- **)  Linezolid (12/09- **)    Prior to current treatment:  Metronidazole 12/13-12/14   Ceftriaxone 12/13  Cefazolin 12/10->12/13  Ciproflox 400mg QDay 12/10 ->12/12  Nafcillin 12/5-12/10 stopped due to possible cholestatic hepatitis   Received one dose Gentamicin (5mg/kg) on 12/5 follow up level 12/8: 0.4           Review of Systems:   Targeted 4 point ROS was completed with pertinent positives and negatives are detailed above.         HPI:   Adopted from initial consult note:    Summary:       66 year-old male with PMHx notable for Aortic stenosis s/p bioprosthetic AVR 2014, CAD s/p stent 2012, moderate mitral regurgitation/stenosis on ILDEFONSO 11/2022, with prior chemotherapy/radiation with Hodgkin's Lymphoma 1979, testicular cancer and prostate cancer admitted overnight on 12/04 for sepsis, encephalopathy, and acute embolic CVA. Evaluated for possible  "endocarditis MSSA Bacteremia      \"History obtained from review of chart and discussion with patient/nursing staff. Eloy Fletcher is a 66 year old male who presented with new left lateral ocular gaze and right sided hemineglect, weakness and fevers onset 12/02/2022.  PMHx notable for Aortic Stenosis s/p bioprosthetic AVR 2014, CAD s/p PCI and stent 2012, moderate mitral regurgitation/stenosis on ILDEFONSO 11/2022, with prior chemotherapy/radiation with Hodgkin's Lymphoma 1979, testicular cancer and prostate cancer admitted overnight on 12/04 for sepsis, encephalopathy, and acute embolic CVA. Evaluated for possible endocarditis.     Course: Initial presentation to OSH ED with confusion and unresponsiveness 12/4, discussion with his ex-wife,  initial onset of symptoms 12/1/22 multiple episodes of vomiting with a fever. Improved overnight, recurred and worsened 12/3.  He presented with left gaze preference, right-sided neglect, weakness, and fevers. Initial findings significant for showing tachycardia, tachypnea, and fevers, initial eukocytosis of 11.4, lactic acid 4.2. At that time he was confused and not responding appropriately.   He was evaluated by neurology and found to have MRI findings with non-specific small multifocal CVA suspected embolic in origin. He was provided empiric antibiotics, Blood cultures were drawn.    Due to concern for CNS infection, a LP was performed at the OSH ED and was further transferred to [facility] ICU for work-up of endocarditis in the setting of hypotension. Initially provided   Acyclovir, Ampicillin, Ceftriaxone, Piperacillin Tazobactam and Vancomycin.   CT Chest also showing GGO in the MIGUEL ANGEL and LLL with tree-in-bud findings concerning for pneumonia (CAP vs. Aspiration). LP performed showing WBC of 230 with elevated protein at 59 and glucose at 82.                 Today, the patient was seen and examined at bedside, mild aphasia/ difficult word finding on exam. Patient is able to " "answer yes/no questions. Interval changes,  appears improved since 12/4.  Associated symptoms at this time positive for cough, with green/yellow sputum. No shortness of breath. Denies CV complaints of chest pain, diaphoresis, no abdominal pain. Denies new rashes or lesions. Denies new numbness or subjective weakness.      Pertinent Cardiac History: Aortic stenosis S/P bioprosthetic AVR in 2014, and CAD s/p PCI   2012.  Of note, recently evaluated with Cardiology at [facility]. Possible planned early next year, \"redo median sternotomy and mitral valve replacement with a tissue valve. We may have to replace the ascending aorta at the same time. The patient has right bundle branch block and a left bob fascicular block\" per evaluation with Dr. Gurrola 11/16/2022.  Last ILDEFONSO 11/11/2022, with LVEF 65%, Moderate-severe mitral valve regurgitation with moderate mitral stenosis and severe annular mitral calcification, demonstrated normal aortic valve tissue prosthesis.   Pertinent Oncology History: Prostate cancer (Elly 4+3, TNM: cT1c) treated with chemo/radiation (last dose 3/30/2022), good response at that time. Past history in remission: Testicular cancer (30 years prior  s/p chemo & Left orchiectomy) and Hodgkins lymphoma (1970s treated with MOPP & radiation)\"           Physical Examination:   Temp:  [100.2  F (37.9  C)-103.1  F (39.5  C)] 101.1  F (38.4  C)  Pulse:  [] 102  Resp:  [16-28] 28  BP: (122-134)/(57-69) 122/57  MAP:  [54 mmHg-101 mmHg] 74 mmHg  Arterial Line BP: ()/() 118/55  FiO2 (%):  [40 %-100 %] 40 %  SpO2:  [85 %-100 %] 99 %    I/O last 3 completed shifts:  In: 8638.77 [I.V.:4138.77; IV Piggyback:4500]  Out: 1245 [Urine:1245]    Vitals:    12/17/22 0138 12/19/22 0600 12/19/22 1745 12/20/22 0200   Weight: 83.4 kg (183 lb 13.8 oz) 76.1 kg (167 lb 12.3 oz) 81.8 kg (180 lb 5.4 oz) 80.9 kg (178 lb 5.6 oz)    12/21/22 0400   Weight: 89.2 kg (196 lb 10.4 oz)       Constitutional: " intubated, sedated  Respiratory: Coarse breath sounds throughout  Cardiovascular: tachycardic, unable to appreciate heart sounds with coarse breath sounds and mechanical ventilation   GI: Normal bowel sounds, soft  Skin: Warm, dry, well-perfused. Non-blanching rash worst on hands and feet, 3+ pitting edema bilateral upper and lower extremities          Medications:       guaiFENesin  15 mL Oral 4x Daily     [Held by provider] heparin ANTICOAGULANT  5,000 Units Subcutaneous Q12H     hydrocortisone sodium succinate PF  50 mg Intravenous Q6H     linezolid  600 mg Intravenous Q12H     meropenem  1 g Intravenous Q12H     pantoprazole  40 mg Per Feeding Tube QAM AC    Or     pantoprazole  40 mg Intravenous QAM AC     sodium chloride (PF)  3 mL Intracatheter Q8H     triamcinolone   Topical BID       Antiinfectives:  Anti-infectives (From now, onward)    Start     Dose/Rate Route Frequency Ordered Stop    12/14/22 1230  meropenem (MERREM) 1 g vial to attach to  mL bag        Note to Pharmacy: GFR is 22 and improving. This would be appropriate dose if GFR was 25-50. Carrol (ID Staff). ID will monitor renal function and if declines dose adjust.    1 g  over 30 Minutes Intravenous EVERY 12 HOURS 12/14/22 1210 12/22/22 1359    12/09/22 1600  linezolid (ZYVOX) infusion 600 mg         600 mg  over 60 Minutes Intravenous EVERY 12 HOURS 12/09/22 1529            Infusions/Drips:    EPINEPHrine 0.18 mcg/kg/min (12/21/22 1121)     fentaNYL 50 mcg/hr (12/21/22 1100)     midazolam Stopped (12/21/22 0115)     - MEDICATION INSTRUCTIONS -       norepinephrine 0.06 mcg/kg/min (12/21/22 1137)     - MEDICATION INSTRUCTIONS -       sodium bicabonate in 5% dextrose for infusion 100 mL/hr at 12/21/22 1000     sodium chloride 150 mL/hr (12/05/22 1330)     vasopressin 4 Units/hr (12/21/22 1100)            Laboratory Data:   No results found for: ACD4    Microbiology:  All cultures:  Recent Labs   Lab 12/19/22  8765 12/19/22  1026  12/15/22  0517   CULTURE No growth after 1 day No growth after 1 day No Growth     Inflammatory Markers    Recent Labs   Lab Test 12/19/22  1026 12/17/22  0600 12/16/22  0511   .00* 139.00* 179.00*       Metabolic Studies     Recent Labs   Lab Test 12/21/22  1132 12/21/22  0742 12/21/22  0733 12/21/22  0356 12/21/22  0345 12/21/22  0317 12/21/22  0206 12/21/22  0009 12/21/22  0002 12/07/22  1650 12/07/22  1359   NA  --   --  144  --  144  --   --   --  145   < >  --    POTASSIUM  --   --  6.2*  --  6.4*  --  6.3*   < > 6.4*   < >  --    CHLORIDE  --   --  99  --  98  --   --   --  99   < >  --    CO2  --   --  19*  --  16*  --   --   --  13*   < >  --    ANIONGAP  --   --  26*  --  30*  --   --   --  33*   < >  --    BUN  --   --  119.0*  --  118.0*  --   --   --  117.0*   < >  --    CR  --   --  2.85*  --  3.18*  --   --   --  2.79*   < >  --    GFRESTIMATED  --   --  23*  --  20*  --   --   --  24*   < >  --    *   < > 329*   < > 308*   < >  --    < > 246*   < >  --    A1C  --   --   --   --   --   --   --   --   --   --  6.6*   YUNIOR  --   --  7.8*  --  7.7*  --   --   --  7.9*   < >  --    PHOS  --   --   --   --  10.4*  --   --   --   --    < >  --    MAG  --   --   --   --  2.4*  --   --   --   --    < >  --    LACT  --   --  11.9*  --  16.0*  --   --   --  18.0*   < >  --     < > = values in this interval not displayed.       Hepatic Studies    Recent Labs   Lab Test 12/21/22  0733 12/21/22  0345 12/21/22  0002 12/06/22 0422 12/05/22  1008   BILITOTAL 1.7* 1.8* 1.8*   < > 1.1   ALKPHOS 215* 195* 178*   < > 58   ALBUMIN 2.2* 2.1* 2.1*   < > 2.9*   AST 9,763* 8,042* 6,311*   < > 131*   ALT 2,464* 2,090* 1,706*   < > 61*   LDH  --   --   --   --  468*    < > = values in this interval not displayed.     Hematology Studies      Recent Labs   Lab Test 12/21/22 0345 12/20/22 2252 12/20/22 1952   WBC 15.8* 14.6* 13.7*   ANEU  --  14.2*  --    ALYM  --  0.3*  --    LATONYA  --  0.1  --    AEOS  --  0.0   --    HGB 7.0* 7.2* 7.7*   HCT 22.5* 23.3* 23.8*   PLT 91* 106* 125*     Arterial Blood Gas Testing    Recent Labs   Lab Test 12/21/22  0733   PH 7.31*   PCO2 41   PO2 127*   HCO3 20*   O2PER 40      Urine Studies     Recent Labs   Lab Test 12/15/22  1748 12/07/22  1509 12/05/22  0538 12/04/22  1534   URINEPH 5.0 5.5 5.5 5.0   NITRITE Negative Negative Negative Negative   LEUKEST Moderate* Trace* Negative Negative   WBCU  --  6* 8* 3       Vancomycin Levels     No lab results found.    Tobramycin levels     No lab results found.    Gentamicin levels    Recent Labs   Lab Test 12/08/22  0812   GENT 0.4       CSF testing     Recent Labs   Lab Test 12/04/22  1738   CWBC 230*   CNEU 86   CLYM 1   CMONO 4   COTH 9   CRBC 78*   CGLU 82*   CTP 59.7*       Last check of C difficile  C Difficile Toxin B by PCR   Date Value Ref Range Status   12/05/2022 Negative Negative Final     Comment:     A negative result does not exclude actual disease due to C. difficile and may be due to improper collection, handling and storage of the specimen or the number of organisms in the specimen is below the detection limit of the assay.            Imaging:     Recent Results (from the past 48 hour(s))   CTA  Angiogram Heart    Narrative    Procedure: CTA ANGIOGRAM HEART  Indication: Rule out aortic root abscess  Examination Date: 12/19/2022 3:13 PM  Ordering Physician: MARCEL JEAN     TECHNIQUE: The patient was positioned in the scanner gantry and an IV  was started using an 18 gauge IV in the right antecubital fossa.  Utilizing 80 cc [id number]. Multi-slice computed tomography was  performed with a Siemens Dual Source Flash scanner without incident.  The total radiation exposure was calculated to be 933DLP and 1.3mSv,  The angiogram was performed in the Flash mode with care dose at 90  kVp. Images were reconstructed and analyzed on a [employer]  Workstation. Scan protocol was optimized to minimize radiation  exposure.      IMPRESSIONS:    1.  Bioprosthetic aortic valve with diffuse leaflet thickening without  calcification. No distinct masses noted. Wall thickening at the aortic  root, with calcification and thickening of the aorta mitral curtain.  Compared to CTA done at [facility] on 11/11/2022, aortic valve  leaflet and aortic root thickening is more prominent now. 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  2.  Known severe mitral annular calcification with calcific nodules.  There is also extensive irregular noncalcified thickening, with a  distinct mass along the posterior annulus. This mass also appears new  compared to recent CTA, and is suspicious for a vegetation.  3.  Please review Radiology report for incidental noncardiac findings  that will follow separately.    FINDINGS:     1. Bioprosthetic AVR (23 mm Cordell-Hubbard Perimount Magna/Magna  Ease pericardial prosthesis) placed in 2014. Bioprosthetic aortic  valve with diffuse leaflet thickening without calcification. No  distinct mobile masses noted. Wall thickening at the aortic root, with  calcification and thickening of the aorta mitral curtain. Compared to  CTA done at [facility] on 11/11/2022, aortic valve leaflet and aortic  root thickening is more prominent now. 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 There is  no associated pseudoaneurysm or fistula.  2. Known radiation induced valve disease with severe calcific mitral  disease. Severe mitral annular calcification is present with multiple  calcific nodules. There is also extensive irregular noncalcified  thickening, with a distinct mass along the posterior annulus. This  mass also appears new compared to recent CTA, and is suspicious for a  vegetation.  3. The aortic root is normal in size. The ascending aorta and  descending aorta are normal in diameter. Severe calcification of the  ascending aorta and  aortic arch.  4. The main and proximal branch pulmonary arteries are normal in size.  The systemic venous connections are normal.   5. The cardiac chambers demonstrate normal atrioventricular and  ventriculoarterial concordance.  6. Coronary arteries originate from their respective cusps. Severe  diffuse coronary artery calcifications.  7. The pericardium is unremarkable.  There is no pericardial effusion.      MAGGIE JIMENEZ MD         SYSTEM ID:  [id number]   Radiologist Consult For Cardiology   Result Value    Radiologist flags (Urgent)     incidental acute subsegmental pulmonary embolism right    Narrative    EXAMINATION: RADIOLOGIST CONSULT FOR CARDIOLOGY, 12/19/2022 2:06 PM     COMPARISON: CT 12/14/2022    HISTORY: Consult for cardiology    TECHNIQUE: Limited field of view CT images. Please refer to separate  dictation for the cardiology portion of the study.    FINDINGS:     Heart size is within normal limits. Prosthetic aortic valve and mitral  annular calcifications. Extensive atherosclerosis of the aorta.  Borderline dilated main pulmonary artery measuring 30 mm in diameter.  Mediastinal lymphadenopathy, similar to prior.    Layering secretions in the distal trachea and bilateral mainstem  bronchi. Small left and trace right pleural effusions. No pneumothorax  at this level. Left greater than right groundglass and consolidative  opacities with crazy paving pattern, not significantly changed from  prior.    Visualized upper abdomen demonstrates postsurgical changes of  splenectomy. Median sternotomy wires. No acute osseous lesions of the  visualized thorax.      Impression    IMPRESSION:  1. Extensive pulmonary opacities and mediastinal lymphadenopathy, not  significantly changed from prior CT dated 12/14/2022.  2. Incidental subsegmental pulmonary embolism in the right lower lobe.    3. Please refer to separate cardiology report for further information.  4. Mildly increased small left and trace right  pleural effusions.    [Access Center: incidental acute subsegmental pulmonary embolism right  lower lobe]    This report will be copied to the [facility] to ensure a  provider acknowledges the finding. [facility] is available Monday  through Friday 8am-3:30 pm.     I have personally reviewed the examination and initial interpretation  and I agree with the findings.    FARHEEN GRIMM MD         SYSTEM ID:  [id number]   XR Chest Port 1 View    Narrative    EXAM: XR CHEST PORT 1 VIEW  2022 7:29 PM     HISTORY:  central line placement       COMPARISON:  CT chest abdomen pelvis 2022 and chest x-ray  2022.    FINDINGS:   AP supine portable chest. Interval placement of right IJ central  venous catheter, tip terminating over the SVC-atrial junction.  Postsurgical changes of the chest with fractured proximal sternotomy  wire. Aortic valve replacement. Stable appearance of the  cardiomediastinal silhouette with midline trachea. Postsurgical  changes of prior right lung wedge resection. No significant change in  the mixed interstitial/airspace opacities, left lung predominant. No  significant pleural effusion or appreciable pneumothorax. Surgical  clips over the upper abdomen. No acute osseous abnormality.      Impression    IMPRESSION:  1. Interval placement of right IJ CVC with tip terminating over the  SVC-atrial junction.  2. No significant changes in the left lung predominant opacities.    I have personally reviewed the examination and initial interpretation  and I agree with the findings.    JAMEE ALVARADO MD         SYSTEM ID:  [id number]   Echo Limited   Result Value    LVEF  70%    Narrative    [id number]  [id number]  [id number]  [id number]^BUZZ^CORINNE     [facility],[place]  Echocardiography Laboratory  [address] [place] [id number]     Name: SO AVINA  MRN: [id number]  : 1955  Study Date: 2022 07:47 AM  Age: 67 yrs  Gender:  Male  Patient Location: [facility]  Reason For Study: Shock  Ordering Physician: JONEL GERBER  Referring Physician: MARCEL JEAN  Performed By: Rebecca Staley RDCS     BSA: 1.9 m2  Height: 68 in  Weight: 178 lb  HR: 110  BP: 93/58 mmHg  ______________________________________________________________________________  Procedure  Limited Portable Echo Adult. Technically difficult study.  ______________________________________________________________________________  Interpretation Summary  Global and regional left ventricular function is hyperkinetic with an EF >70%.  Global right ventricular function is normal.     Severe mitral annular calcification is present. Moderate mitral insufficiency  is present. transmitral valve gradient is elevated at 12 mmHg (mean).     Post bioprosthetic AVR (23 mm Cordell-Hubbard Perimount Magna/Magna Ease  pericardial prosthesis). Doppler interrogation of the aortic valve is normal.     Tricuspid regurgitation is atleast moderate. There is systolic flow reversal  in the hepatic veins.     Pulmonary hypertension is present. Estimated pulmonary artery systolic  pressure is 52 mmHg plus right atrial pressure. Patient on BiPAP, IVC is  dilated, cannot assess RA pressure.     No pericardial effusion is present.  ______________________________________________________________________________  Left Ventricle  Left ventricular size is normal. Global and regional left ventricular function  is hyperkinetic with an EF >70%.     Right Ventricle  The right ventricle is normal size. Global right ventricular function is  normal.     Atria  The atria cannot be assessed.     Mitral Valve  Severe mitral annular calcification is present. Moderate mitral insufficiency  is present. The mean mitral valve gradient is 12.5 mmHg.     Aortic Valve  The valve leaflets are not well visualized. A bioprosthetic aortic valve is  present. Post bioprosthetic AVR (23 mm Cordell-Hubbard Perimount  Magna/Magna Ease  pericardial prosthesis). Doppler interrogation of the aortic  valve is normal. The mean gradient is 10 mmHg.     Tricuspid Valve  Pulmonary hypertension is present. Estimated pulmonary artery systolic  pressure is 52 mmHg plus right atrial pressure. Tricuspid regurgitation is  atleast moderate. There is systolic flow reversal in the hepatic veins.     Pulmonic Valve  The pulmonic valve cannot be assessed.     Vessels  Patient on BiPAP, IVC is dilated.     Pericardium  No pericardial effusion is present.     Compared to Previous Study  This study was compared with the study from 22 . Biventricular function  is stable. The multivalvular dysfunction are unchanged. PH is now evident.  ______________________________________________________________________________  MMode/2D Measurements & Calculations  LVOT diam: 2.1 cm  LVOT area: 3.4 cm2     Doppler Measurements & Calculations  MV max P.8 mmHg  MV mean P.5 mmHg  MV V2 VTI: 70.8 cm  MVA(VTI): 0.83 cm2  MV P1/2t max suhas: 284.0 cm/sec  MV P1/2t: 74.8 msec  MVA(P1/2t): 2.9 cm2  MV dec slope: 1113 cm/sec2  Ao V2 max: 254.3 cm/sec  Ao max P.0 mmHg  Ao V2 mean: 145.5 cm/sec  Ao mean PG: 10.3 mmHg  Ao V2 VTI: 35.1 cm  CARLITO(I,D): 1.7 cm2  CARLITO(V,D): 1.5 cm2  LV V1 max P.3 mmHg  LV V1 max: 115.3 cm/sec  LV V1 VTI: 17.4 cm  SV(LVOT): 58.6 ml  SI(LVOT): 30.1 ml/m2  TR max suhas: 360.9 cm/sec  TR max P.1 mmHg  AV Suhas Ratio (DI): 0.45  CARLITO Index (cm2/m2): 0.86     ______________________________________________________________________________  Report approved by: Akanksha Todd 2022 09:44 AM         XR Chest Port 1 View    Narrative    EXAM: XR CHEST PORT 1 VIEW  2022 2:35 PM     HISTORY:  Endotracheal tube positioning       COMPARISON:  Chest x-ray 2022 and CT chest of the pelvis  2022.    FINDINGS:   AP portable chest, 30 degrees. Interval placement of endotracheal tube  with tip projecting over the upper/proximal mid thoracic  trachea.  Right IJ catheter tip in similar position over the SVC-atrial  junction. Enteric tube course inferior to the diaphragm with tip out  of view. Postoperative changes of the chest with fractured upper most  median sternotomy wire. Stable appearance of the cardiomediastinal  silhouette. Stable mixed interstitial and airspace opacities within  the left lung diffusely, and to a lesser degree involvement of the  right lung. Postoperative changes of prior right lung wedge resection.  No appreciable pneumothorax or significant pleural effusion. Surgical  clips over the epigastrium and left upper quadrant. Chronic left  clavicle fracture deformity.      Impression    IMPRESSION:  1. Interval endotracheal tube placement with tip in the upper to mid  thoracic trachea.  2. Stable mixed interstitial/airspace opacities, left lung  predominant.    I have personally reviewed the examination and initial interpretation  and I agree with the findings.    PABLO RDO DO         SYSTEM ID:  [id number]   XR Abdomen Port 1 View    Narrative    EXAM: XR ABDOMEN PORT 1 VIEW  12/20/2022 2:35 PM     HISTORY:  OG placement       TECHNIQUE: Single frontal radiograph of the abdomen    COMPARISON:  CT chest abdomen and pelvis 12/14/2022.    FINDINGS:   AP portable supine radiograph, 30 degrees. Surgical clips over the  epigastric and left upper quadrant regions. Partial visualization  right IJ catheter tip projects over the SVC-atrial junction. Partial  visualization of left prominent mixed interstitial/airspace opacities.  Interval placement of enteric tube with tip and sidehole projecting  over the stomach. Nonobstructive bowel gas pattern without pneumatosis  or portal venous gas seen.      Impression    IMPRESSION:   Enteric tube courses inferior to the diaphragm with tip and side hole  both projecting over the stomach fundus.    I have personally reviewed the examination and initial interpretation  and I agree with the  findings.    PABLO ROD DO         SYSTEM ID:  [id number]   CT Head w/o Contrast   Result Value    Radiologist flags Acute subarachnoid hemorrhage (AA)    Narrative    CT HEAD W/O CONTRAST 12/20/2022 9:11 PM    History: altered mental status, refractory shock with prior embolic  strokes   ICD-10:    Comparison: MR and CT 12/4/2022    Technique: Using multidetector thin collimation helical acquisition  technique, axial, coronal and sagittal CT images from the skull base  to the vertex were obtained without intravenous contrast.   (topogram) image(s) also obtained and reviewed.    Findings: There is a small amount of subarachnoid hemorrhage in the  left superior frontal sulcus. There is no mass effect or midline  shift. Gray/white matter differentiation in both cerebral hemispheres  is preserved. Ventricles are proportionate to the cerebral sulci. The  basal cisterns are clear.    The bony calvaria and the bones of the skull base are normal. The  visualized portions of the paranasal sinuses and mastoid air cells are  clear.      Impression    Impression:  Small amount of acute subarachnoid hemorrhage over the posterior left  frontal lobe. No CT evidence of acute infarct.     [Critical Result: Acute subarachnoid hemorrhage]    Finding was identified on 12/20/2022 9:30 PM.     Dr. Smith was contacted by Dr. Herbert at 12/20/2022 9:43 PM and  verbalized understanding of the urgent finding.     I have personally reviewed the examination and initial interpretation  and I agree with the findings.    MARYBEL POSEY MD         SYSTEM ID:  [id number]   CT Chest Abdomen Pelvis w/o Contrast    Narrative    EXAMINATION: CT CHEST ABDOMEN PELVIS W/O CONTRAST, 12/20/2022 9:12 PM    TECHNIQUE: Helical CT images from the thoracic inlet through the  symphysis pubis were obtained without intravenous contrast.     COMPARISON: Same day chest x-ray, CT 12/19/2022    HISTORY: refractory shock, concern for embolic  infarct    FINDINGS:    Lines/tubes: Endotracheal tube tip in the mid thoracic trachea. Right  IJ central venous catheter tip in the right atrium. Gastric tube tip  in the stomach. Medina catheter.    Chest: The central tracheobronchial tree is patent. There is increased  consolidative appearance of the mixed opacities throughout the left  greater than right lungs compared to 12/19/2022. Small left and trace  right pleural effusions are unchanged. No pneumothorax.    Heterogenous appearance of the thyroid similar to previous. Heart size  is normal. Prosthetic aortic valve and mitral annular calcifications.  Extensive atherosclerotic calcification of the aorta and major  branching vessels. Mediastinal lymphadenopathy appears slightly  improved compared to previous. For example right paratracheal lymph  node measuring 9 mm in short axis, previously 12 mm (series 3 image  64).    Abdomen and pelvis: Noncontrast appearance of the liver is  unremarkable. Vicarious excretion of contrast within the gallbladder  lumen. The pancreas is unremarkable. The spleen is surgically absent.  The adrenal glands are normal. Hyperdense appearance of the renal  parenchyma with multiple exophytic renal cysts similar to previous  suggesting delayed nephrograms. No hydronephrosis. Retroaortic left  renal vein. The urinary bladder is decompressed with contrast in the  lumen and Medina catheter in place. The prostate is normal.    Normal caliber small and large bowel. No intra-abdominal free air or  free fluid. No lymphadenopathy. Presacral edema.    Bones and soft tissues: Median sternotomy wires appear intact. No  acute or aggressive osseous lesions. Mild diffuse anasarca.      Impression    IMPRESSION:   1. Extensive pulmonary opacities which appear similar to 12/19/2022.  2. Stable small left and trace right pleural effusions.  3. Limited evaluation of the abdomen and pelvis due to lack of  contrast. No definite acute pathology.     I have  personally reviewed the examination and initial interpretation  and I agree with the findings.    JAMEE ALVARADO MD         SYSTEM ID:  [id number]

## 2022-12-21 NOTE — PLAN OF CARE
Neuro: CT @2100-see results. PERRL. Sedated w/ fent, versed off since 0100. Cough/gag reflex. General motor movement unresponsive to stimuli.  CV: Junctional tachycardia (100-110s) with frequent PVCs. Map goal of >65 met w/ titrations of levo (max 0.4), vaso (max 4.8), epi (max 0.25). Tmax 101.3-acetaminophen given. K consistently in 6s-shifted 3 times. 60mg Lasix given. 2L LR bolus, 500mL NS bolus. CVP 14 @2000, 18 @0400.  Pulm: /40/28/5. Red-tinged secretions. ABG pH consistently low (7.15s)-4 amps of bicarb given.   GI/: Medina producing moderate UOP. OG@60 open to gravity. NPO. Small BM 10/21.   Gtts: Fent@50. Epi@0.18. Vaso@4.8. Levo@0.28. Bicarb@100. D10 @75.   Labs: Lactic 16-19. See flow sheets for all critical values.  Plan: Continue to closely monitor potassium level, renal, cardiovascular, and neuro functioning.     For complete assessments and vital signs, please refer to flowsheets.     Goal Outcome Evaluation:      Plan of Care Reviewed With: patient, child    Overall Patient Progress: decliningOverall Patient Progress: declining         Kendy RAI RN

## 2022-12-21 NOTE — PROGRESS NOTES
Vascular Access Services Notes:    Ultrasound-guided lab draw done without complication. Attempted x1 in the left upper forearm using a 20 gauge x 1.75 inch BB PIV needle.  was present to assist.    Time spent with pt - 30 minutes      DAVID Zabala, RN [facility]

## 2022-12-21 NOTE — PROGRESS NOTES
Neurocritical Care Progress Note    Reason for critical care admission: Sepsis & Encephalopathy  Admitting Team: Medical ICU  Date of Service:  12/21/2022  Date of Admission:  12/4/2022  Hospital Day: 18    Assessment  67 year old male with PMH of HTN, HLD, CAD s/p PCI to LAD (2012), bioproshetic Aortic stenosis s/p AOV replacement (2014), Hodgkin's disease s/p radiation therapy, testicular & prostate cancer who presented 12/4 with left gaze preference, right sided weakness, found to have multiple embolic appearing punctate infarcts, LP proven meningitis, MSSA sepsis with septic shock. 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 They also considering CV surgery consult.  Patient was seen on 12/5 by NCC when consulted for encephalopathy in the setting of multiple embolic strokes. Our impression was meningoencephalitis is likely the reason for encephalopathy rather than the embolic stroke. EEG showed no seizure. Patient clinically improved.    Interval history;  Patient was transferred to CVICU on 12/19 due to worsening resp status and hypotension  Patient was no BiPAP when he went unresponsive yesterday evening and had to be intubated. He was minimally sedated and not following commands. CT scan was done overnight which showed SAH decompensated yesterday and had to be intubated with borderline BP.     Patient is currently intubated, ventilated and sedated on fentanyl 50 (was on midaz before). He is also on Meropenem 1g Q12hr, Linezolid 600mg Q12hr, levo and vasopressin.    Impression;  -Small subarachnoid hemorrhage in the setting of thrombocytopenia and infective endocarditis  -Given recent contrast study, Need to rule out contrast as the cause of CT head changes    Recommendations  - please obtain STAT dual energy CT head.  - please repeat INR and APTT.  -  Neurochecks Q2H.  -Neurology team will follow.    -This patient was discussed with the attending faculty, Karolina Diaz.    Noelle Paiz MD  NCC fellow    2022 1:12 PM      24 Hour Vital Signs Summary:  Temp: 99.7  F (37.6  C) Temp  Min: 99.7  F (37.6  C)  Max: 103.1  F (39.5  C)  Resp: 28 Resp  Min: 17  Max: 28  SpO2: 99 % SpO2  Min: 94 %  Max: 100 %  Pulse: 117 Pulse  Min: 93  Max: 153    No data recorded  BP: 122/57 Systolic (24hrs), Av , Min:122 , Max:134   Diastolic (24hrs), Av, Min:57, Max:69        Respiratory monitoring:   Vent Mode: CMV/AC  (Continuous Mandatory Ventilation/ Assist Control)  FiO2 (%): 40 %  Resp Rate (Set): 28 breaths/min  Tidal Volume (Set, mL): 350 mL  PEEP (cm H2O): 5 cmH2O  Resp: 28      I/O last 3 completed shifts:  In: 8638.77 [I.V.:4138.77; IV Piggyback:4500]  Out: 1245 [Urine:1245]    Current Medications:    guaiFENesin  15 mL Oral 4x Daily     [Held by provider] heparin ANTICOAGULANT  5,000 Units Subcutaneous Q12H     hydrocortisone sodium succinate PF  50 mg Intravenous Q6H     linezolid  600 mg Intravenous Q12H     meropenem  1 g Intravenous Q12H     micafungin  100 mg Intravenous Q24H     pantoprazole  40 mg Per Feeding Tube QAM AC    Or     pantoprazole  40 mg Intravenous QAM AC     sodium chloride (PF)  3 mL Intracatheter Q8H     triamcinolone   Topical BID       PRN Medications:  acetaminophen, acetaminophen, acetylcysteine, albuterol, artificial tears, dextrose, glucose **OR** dextrose **OR** glucagon, diclofenac, fentaNYL, ipratropium - albuterol 0.5 mg/2.5 mg/3 mL, lidocaine 4%, lidocaine (buffered or not buffered), midazolam, naloxone **OR** naloxone **OR** naloxone **OR** naloxone, - MEDICATION INSTRUCTIONS -, ondansetron **OR** ondansetron, - MEDICATION INSTRUCTIONS -, polyethylene glycol, prochlorperazine **OR** prochlorperazine **OR** prochlorperazine, senna-docusate **OR** senna-docusate, sodium chloride (PF), sodium chloride, sodium chloride  "bacteriostatic    Infusions:    dextrose       EPINEPHrine 0.18 mcg/kg/min (12/21/22 1300)     fentaNYL 50 mcg/hr (12/21/22 1300)     insulin regular       midazolam Stopped (12/21/22 0115)     - MEDICATION INSTRUCTIONS -       norepinephrine 0.06 mcg/kg/min (12/21/22 1300)     - MEDICATION INSTRUCTIONS -       sodium bicabonate in 5% dextrose for infusion 100 mL/hr at 12/21/22 1400     sodium chloride 150 mL/hr (12/05/22 1330)     vasopressin 4 Units/hr (12/21/22 1300)       Allergies   Allergen Reactions     Crestor [Rosuvastatin] Other (See Comments)     \"Funny feeling in both legs\" all statins       Physical Examination:  Vitals: /57 (BP Location: Left arm)   Pulse 117   Temp 99.7  F (37.6  C)   Resp 28   Wt 89.2 kg (196 lb 10.4 oz)   SpO2 99%   BMI 29.90 kg/m    General: Adult male patient, lying in bed, critically-ill- intubated and sedated  HEENT: Normocephalic, atraumatic, no icterus, oral cavity/oropharynx pink and moist  Cardiac: RRR, s1/s2 auscultated without murmur  Pulm: CIntubated  Abdomen: Soft, non-distended, bowel sounds present  Extremities: Warm, no edema, distal pulses +2, well perfused  Skin: No rash or lesion  Psych: Calm and cooperative  Neuro:  Mental status: sedated  Cranial nerves: PERRL,  face symmetric, shoulder shrug strong,  Motor: Normal bulk. Withdraws on lowers only. No response to noxious stimuli on upper  Sensory:deferred  Coordination: deferred.  Gait: RICHIE, deferred.      NIHSS    Labs:  Recent Labs   Lab 12/21/22  0733 12/21/22  0345 12/21/22  0206 12/21/22  0100 12/21/22  0002 12/20/22  2248    144  --   --  145 145   POTASSIUM 6.2* 6.4* 6.3* 6.3* 6.4* 6.1*  6.6*  6.6*   CHLORIDE 99 98  --   --  99 100   CO2 19* 16*  --   --  13* 10*   .0* 118.0*  --   --  117.0* 118.0*   CR 2.85* 3.18*  --   --  2.79* 2.72*   YUNIOR 7.8* 7.7*  --   --  7.9* 7.7*   BILITOTAL 1.7* 1.8*  --   --  1.8* 1.8*   ALKPHOS 215* 195*  --   --  178* 195*   ALT 2,464* 2,090*  --   -- "  1,706* 1,637*   AST 9,763* 8,042*  --   --  6,311* 5,809*       Recent Labs   Lab 12/21/22  1135 12/21/22  0345 12/20/22  2252 12/20/22  1952   WBC 13.5* 15.8* 14.6* 13.7*   HGB 6.9* 7.0* 7.2* 7.7*   PLT 87* 91* 106* 125*       Recent Labs   Lab 12/21/22  0733 12/21/22  0345 12/21/22  0002 12/20/22  2248   PH 7.31* 7.21* 7.16* 7.16*   PCO2 41 42 38 34*   PO2 127* 150* 147* 155*   HCO3 20* 17* 14* 12*       All cultures:  Recent Labs   Lab 12/20/22  1436 12/19/22  1729 12/19/22  1026 12/15/22  0517   CULTURE Culture in progress No growth after 1 day No growth after 2 days No Growth       Imaging:

## 2022-12-21 NOTE — PROGRESS NOTES
[facility]    Cardiology Progress Note- [facility]        Date of Admission:  12/4/2022     Assessment & Plan: HVSL    Mr Eloy Fletcher is a 67 year old gentleman with a history of Hodgkin Lymphoma s/p radiation (in remission) c/b CAD s/p PCI to LAD in 2012 and aortic stenosis s/p AVR in 2014 who presented to outside hospital 12/04 with neurologic changes and found to have multifocal CVAs and blood cultures positive for MSSA. He was transferred to [facility] with septic shock and his course is complicated by AHRF requiring BiPAP and new conduction disturbances c/f progressive endocarditis. The patient is transferred to the cardiac ICU for evaluation of suspected endocarditis with conduction system disturbance. The patient is requiring vasopressors and with worsening acidosis and evidence of shock.     ===Neuro===  #Multifocal Emboli  #Meningitis  Presented with left gaze deviation and right-sided weakness. MR brain 12/04 demonstrating 5 foci of restricted diffusion involving bilateral anterior and posterior cerebral artery distributions. Concerning for embolic phenomena in setting of multiple vascular distributions and known MSSA bacteremia. CSF cultures 12/04 also positive for MSSA.   - Treatment of infection as below    #SAH  Small left frontal SAH noted on CT scan 12/20/22. No shift or edema. Unclear if producing deficits as patient sedated and with poor mental status due to severe sepsis.   - Hold [facility]  - Neurocrit consult, question utility of re-imaging and additional evaluation    #Pain, Analgesia, Sedation  Fentanyl infusion while on ventilator, APAP PRN  Midazolam for sedation, daily weans    ===Cardiovascular===  #Shock  Suspected distributive in setting of MSSA bacteremia (now with negative cultures) but without source control given c/f IE and abscess. Consider component of cardiogenic shock however CVO2 elevated and no significant change in valve pathology on  repeat echo.   - Vasopressors: Norepinephrine, Epinephrine, Vasopressin; goal MAP >65  - Volume: net positive 6L in last 24 hours, hold additional fluids, monitor CVP  - Continue ABX as below    #Infective Endocarditis c/b  #Septic Emboli  #First-Degree AV Block  #Premature Atrial Comlexes  MSSA bacteremia on presentation, multifocal CVA as above, and scattered pulmonary findings concerning for septic emboli. Valvular evaluation complicated in setting of known radiation-induced valve disease. TTE and subsequent ILDEFONSO 12/05 with severe MAC and multiple mitral valve nodules and aortic valve leaflet thickening uncertain to be vegetation vs calcific degeneration.   On review of his prior echocardiogram at [facility] in 11/2022, these nodules and thickenings are all new, suggestive of infective endocarditis and possible perivalvular abscess. In the setting of prolonging LA interval and increased ectopy, there is high concern for conduction system involvement.   Admission  with down-trend to 102 with antibiotic therapy.   - CT angio 12/19: concern for aortic prosthesis endocarditis with root abscess, MV mass concerning for vegetation.   - ID consulted, appreciate assistance  - Continue antibiotics: Meropenem 500mg Q12hr, Linezolid 600mg Q12hr   > Add Micafungin 100mg Q24 per ID recommendations  - Trend CRP and CBC  - CT surgery consult for IE native and prosthetic valve disease  - Telemetry monitoring   - No indication for pacing at this time  - Stat CT head w/o contrast with focal neurologic changes    #MSSA Bacteremia c/b  #Shock, distributive  Blood cultures positive for MSSA on admission 12/04 and remained positive through 12/13/22. Currently on antibiotics as above. Initially with borderline low blood pressures prompting ICU transfer 12/19 PM now with profound distributive shock requiring 3 vasopressors.    Lactic acid peaked 4.8 12/05 and normalization 12/07. Recurrent increase starting 12/11 reaching peak of  16 overnight 12/20-12/21. Most recent 10.1.   - Antibiotics as above  - Significant fluid volumes in setting of several infusions  - Stress dose steroids, hydrocortisone IV 50mg Q6hr started 12/20  - Bicarbonate infusion started 12/20    #Aortic Stenosis s/p AVR 2014  #Radiation-induced valve disease   Concern for IE as above with aortic valve involvement.     #CAD s/p PCI to LAD 2012  Continue holding ASA and clopidogrel given risk of hemorrhagic conversion of septic emboli    #HTN  Hold home meds in s/o shock, infection    ===Pulmonary===  #Acute hypercarbic hypoxic respiratory failure   #Multifocal Pneumonia, left-lung predominant  #Septic Emboli  Currently on BiPAP for mild CO2 retention and hypoxia in the setting of dense left lung and RUL consolidations noted on prior imaging. Procalcitonin to 54.2 on arrival with down-trend to 2.45 on most recent evaluation. Concerning for pneumonia in setting of MSSA bacteremia. Initially on BiPAP when transferred to ICU with intubation 12/20 for poor mental status.  - Trend VBG for CO2 monitoring  - Wean vent as able  - Continue antibiotics as above    ===Renal===  #Acute Kidney Injury c/f  #ATN  #Hyperkalemia  Baseline creatinine 0.9, peak 12/08 4.16, currently 3.1. UOP last 24 hrs: 1.5L   Suspect pre-renal etiology in setting of bacteremia and distributive shock as above. Moderate hyperkalemia 6-6.5 requiring shifting x3 in last 24 hours in setting of ATN, trend Q8 and shift PRN. Consented for CRRT, consider pending next labs.   - Trend BMP  - Monitor UOP  - Vasopressors as above  - Judicious fluids PRN as above    ===Infectious Disease===  #MSSA Bacteremia  #Positive CSF Culture  #Leukocytosis (resolved)  Antibiotics as above, ID consulted     #Drug Reaction (resolved)  Onset 12/14 with eruptions concerning for acute drug reaction to cephalosporins. Photos uploaded to Media Tab on 12/14.     ===Gastroenterology===  #Shock Liver  In setting of MSSA bacteremia and  distributive shock as above. LFTs elevated, AST 1847/  on 12/20 with rapid climb to 9763/2464 as of 12/21  - Trend with correction of shock    ===Hematology===  #Thrombocytopenia  Trend CBC, no indication for treatment at this time.     #Anemia  Suspect 2/2 bacteremia and critical illness, no e/o bleeding.  - Trend CBC, no acute interventions  - Transfuse for Hgb <7    #Hodgkin's lymphoma status post radiation and MOPP chemotherapy, in remission  -No acute issues    #Testicular cancer status post a left orchiectomy and chemotherapy, in remission  -No acute issues.      # Prostate cancer ([place] 4+3, diagnosed April 2021) s/p radiation and Lupron  -Follows with Urology and Oncology as an outpatient    F: NPO in s/o high dose pressor  A: Fent infusion  S: Midaz infusion  T: Held in s/o SAH  H: keep elevated  U: Pantoprazole  G: Insulin infusion   S: Pending correction of underlying conditions   B: PRN  I: PIV, Medina, RIJ CVC, ETT, OG  D: None    This patient's care was discussed with Dr Kavin Smith, attending cardiologist, who agrees with the assessment and plan unless otherwise indicated.    Param Jeronimo MD MPP, PGY-4  Fellow, Cardiovascular Disease    ______________________________________________________________________    Interval History   Continued profound shock on 3 pressors, CT overnight with new SAH.      Data reviewed today: I reviewed all medications, new labs and imaging results over the last 24 hours. I personally reviewed all EKGs, echocardiograms, and other cardiology data from this visit.     Physical Exam   Vital Signs: Temp: 99.7  F (37.6  C) Temp src: Esophageal BP: 122/57 Pulse: (!) 130   Resp: 28 SpO2: 99 % O2 Device: Mechanical Ventilator    Weight: 196 lbs 10.41 oz  Exam:  Constitutional: Somnolent appearing, wearing BiPAP mask, no distress  Head: Normocephalic, no lesions  ENT: External ears and nose unremarkable   Cardiovascular: Normal rate, irregular rhythm, well-perfused    Respiratory: Breathing comfortably, no respiratory distress, normal respiratory rate   Musculoskeletal: Moves all extremities, no deformity   Skin: no suspicious lesions or rashes  Neurologic: Alert and oriented, moves all extremities, grossly non-focal  Psychiatric: mentation appears normal and affect normal/bright    Data   I personally reviewed all laboratory and imaging data from the last 24 hours in addition to all available cardiology data.

## 2022-12-21 NOTE — PROVIDER NOTIFICATION
CSI notified about critical lab values.    12/20/22 2000   Critical Test Results/Notification   Critical Lab Result (Lab Name and Value) Lactic 17.0, pH (art) 7.15, pH (tobi) 7.12     2 amp of bicarb and 2L LR boluses given.     Kendy RAI RN

## 2022-12-21 NOTE — PROCEDURES
[facility]    Insert arterial line    Date/Time: 12/21/2022 4:47 PM  Performed by: Param Jeronimo MD  Authorized by: Param Jeronimo MD       UNIVERSAL PROTOCOL   Site Marked: NA  Prior Images Obtained and Reviewed:  NA  Required items: Required blood products, implants, devices and special equipment available    Patient identity confirmed:  Verbally with patient and arm band  Patient was reevaluated immediately before administering moderate or deep sedation or anesthesia  Confirmation Checklist:  Patient's identity using two indicators, procedure was appropriate and matched the consent or emergent situation, correct equipment/implants were available and relevant allergies  Time out: Immediately prior to the procedure a time out was called    Universal Protocol: the Joint Commission Universal Protocol was followed    Preparation: Patient was prepped and draped in usual sterile fashion    ESBL (mL):  2  Indication: multiple ABGs respiratory failure hemodynamic monitoring  Location:  Left radial       ANESTHESIA    Local Anesthetic: Lidocaine 1% without epinephrine  Anesthetic Total (mL):  3      SEDATION  Patient Sedated: Yes    Sedation Type:  Deep  Sedation:  Fentanyl and midazolam  Vital signs: Vital signs monitored during sedation      PROCEDURE DETAILS    Needle Gauge:  22      Number of Attempts:  1  Post-procedure:  Line sutured and dressing applied  CMS: normal    PROCEDURE    Patient Tolerance:  Patient tolerated the procedure well with no immediate complications  Length of time physician/provider present for 1:1 monitoring during sedation: 0

## 2022-12-21 NOTE — PLAN OF CARE
Speech Language Therapy Discharge Summary    Reason for therapy discharge:    Change in medical status.    Progress towards therapy goal(s). See goals on Care Plan in [facility] electronic health record for goal details.  Goals not met.  Barriers to achieving goals:   change in status..    Therapy recommendation(s):    Pt transferred to ICU and intubated; please reconsult when pt is extubated and appropriate for swallow eval.

## 2022-12-22 NOTE — PLAN OF CARE
Major Shift Events: Neuros slightly improved. Not responding to stimuli in any extremities at start of shift, only grimaced to pain. Fentanyl turned off, pt became slightly more responsive through the night. BUE and BLE extremities flexing to painful stimuli at 0400. PERRL intact w/ 2mm pupils. RASS remains -4, not opening eyes or following commands. Afib 110-160s throughout the night, ECG showed AV block and RBBB. MAP goal >65, able to wean pressors; epi @0.10 and vaso @3.2, levo turned off. CVP 10. Tmax 100.6. Remains on CMV 30%//RR 28/ PEEP 5. Coarse lung sounds w/ red-streaked, thick, creamy secretions. OG remains in place, patient NPO. Medina in place w/ UOP ~100/hr. Large BM overnight. Insulin @3 and bicarb turned off this AM r/t bicarb lab 26. Lactic (6.1), Hbg (7.3), and K+ (5.1) all improving. Ionized Ca 3.6 this evening , replaced 2g IV Ca+. Tolerating Q2 repos.     Plan: Continue to wean pressors as able. Monitor neurological status. Continue with POC and notify team of any changes.     For vital signs and complete assessments, please see documentation flowsheets.

## 2022-12-22 NOTE — PLAN OF CARE
ICU End of Shift Summary. See flowsheets for vital signs and detailed assessment.    Changes this shift: Pt unresponsive to stimuli except slight twitches in LE. Pupils responsive to light with pupillometer. In a.fib, rate of 120-150 throughout day. Tmax 103.1, progressed down to 99.7. Tylenol given q6. MAP goal of 65, levo titrated down from 0.28 to 0.06. Epi at 0.18 and vaso down from 4.8 to 4. Art line dampened and discontinued. New art line placed in left radial. Vent settings unchanged. LS coarse to diminished. Good urine output  an hour. Fentanyl at 50. Insulin drip started, in algorithm 3 at 7 units. BCs sent from internal jugular line. K+ high overnight, down to 5.6, CRRT considered but decided not to initiate. 1 unit of PRBCs running this evening for Hgb of 6.9. Lactate trending down. CT of the head completed at 1830. Neurocrit and infectious disease consulted. Neuro recommends Q2 neuro checks, INR and aPTT sent.     Plan: Continue plan of care, wean pressors as able, watch for BC results.

## 2022-12-22 NOTE — PROGRESS NOTES
[facility]    Cardiology Progress Note- CSI        Date of Admission:  12/4/2022     Assessment & Plan: HVSL    Mr Eloy Fletcher is a 67 year old gentleman with a history of Hodgkin Lymphoma s/p radiation (in remission) c/b CAD s/p PCI to LAD in 2012 and aortic stenosis s/p AVR in 2014 who presented to outside hospital 12/04 with neurologic changes and found to have multifocal CVAs and blood cultures positive for MSSA. He was transferred to [facility] with septic shock and his course is complicated by AHRF requiring BiPAP and new conduction disturbances c/f progressive endocarditis. The patient is transferred to the cardiac ICU for evaluation of suspected endocarditis with conduction system disturbance and severe shock.     ===Neuro===  #Multifocal Emboli  #Meningitis  Presented with left gaze deviation and right-sided weakness. MR brain 12/04 demonstrating 5 foci of restricted diffusion involving bilateral anterior and posterior cerebral artery distributions. Concerning for embolic phenomena in setting of multiple vascular distributions and known MSSA bacteremia. CSF cultures 12/04 also positive for MSSA.   - Treatment of infection as below    #SAH  Small left frontal SAH noted on CT scan 12/20/22. No shift or edema. Unclear if producing deficits as patient sedated and with poor mental status due to severe sepsis. Repeat CTH per NeuroCrit 12/21 with improvement in SAH.   - Hold SQH  - Neurocrit consulted, appreciate recs  - Repeat CTH 12/21 with improvement in SAH  - Stat CT head w/o contrast with focal neurologic changes    #Pain, Analgesia, Sedation  Fentanyl infusion while on ventilator, APAP PRN  Midazolam for sedation, daily weans    ===Cardiovascular===  #Shock  Suspected distributive in setting of MSSA bacteremia (now with negative cultures) but without source control given c/f IE and abscess. Consider component of cardiogenic shock however CVO2 elevated  and no significant change in valve pathology on repeat echo.   - Vasopressors: Norepinephrine, Epinephrine, Vasopressin; goal MAP >65  - Volume: net positive 4L in last 24 hours, hold additional fluids, monitor CVP (10)  - Continue ABX as below    #Infective Endocarditis c/b  #Septic Emboli  #First-Degree AV Block  #Premature Atrial Comlexes  MSSA bacteremia on presentation, multifocal CVA as above, and scattered pulmonary findings concerning for septic emboli. Valvular evaluation complicated in setting of known radiation-induced valve disease. TTE and subsequent ILDEFONSO 12/05 with severe MAC and multiple mitral valve nodules and aortic valve leaflet thickening uncertain to be vegetation vs calcific degeneration.   On review of his prior echocardiogram at [facility] in 11/2022, these nodules and thickenings are all new, suggestive of infective endocarditis and possible perivalvular abscess. In the setting of prolonging CT interval and increased ectopy, there is high concern for conduction system involvement.   Admission  with down-trend to 102 with antibiotic therapy.   - CT angio 12/19: concern for aortic prosthesis endocarditis with root abscess, MV mass concerning for vegetation.   - ID consulted, appreciate assistance  - Continue antibiotics: Meropenem 1000mg Q12hr (renal dosing), Linezolid 600mg Q12hr, and Micafungin 100mg Q24  - Will discuss ability to restart Nafcillin with allergy immunology given prior drug eruption   - Trend CRP and CBC  - CT surgery consult for IE native and prosthetic valve disease  - Telemetry monitoring   - No indication for pacing at this time  - Stat CT head w/o contrast with focal neurologic changes    #MSSA Bacteremia c/b  #Shock, distributive  Blood cultures positive for MSSA on admission 12/04 and remained positive through 12/13/22. Currently on antibiotics as above. Initially with borderline low blood pressures prompting ICU transfer 12/19 PM now with profound distributive  shock requiring 3 vasopressors.    Lactic acid peaked 4.8 12/05 and normalization 12/07. Recurrent increase starting 12/11 reaching peak of 16 overnight 12/20-12/21. Most recent 6.1  - Antibiotics as above  - Significant fluid volumes in setting of several infusions  - Stress dose steroids, hydrocortisone IV 50mg Q6hr started 12/20  - Bicarbonate infusion stopped 12/21 PM    #Aortic Stenosis s/p AVR 2014  #Radiation-induced valve disease   Concern for IE as above with aortic valve involvement.     #CAD s/p PCI to LAD 2012  Continue holding ASA and clopidogrel given SAH as above    #HTN  Hold home meds in s/o shock, infection    ===Pulmonary===  #Acute hypercarbic hypoxic respiratory failure   #Multifocal Pneumonia, left-lung predominant  #Septic Emboli  Currently on BiPAP for mild CO2 retention and hypoxia in the setting of dense left lung and RUL consolidations noted on prior imaging. Procalcitonin to 54.2 on arrival with down-trend to 2.45 on most recent evaluation. Concerning for pneumonia in setting of MSSA bacteremia. Initially on BiPAP when transferred to ICU with intubation 12/20 for poor mental status.  - Trend blood gas for CO2 monitoring  - Wean vent as able  - Continue antibiotics as above    ===Renal===  #Acute Kidney Injury c/f  #ATN  #Hyperkalemia  Baseline creatinine 0.9, peak 12/08 4.16, currently 3.1. UOP last 24 hrs: 1.5L   Suspect pre-renal etiology in setting of bacteremia and distributive shock as above. Moderate hyperkalemia 6-6.5 requiring shifting x3 in last 24 hours in setting of ATN, trend Q8 and shift PRN. Consented for CRRT although UOP and labs improving.   - Trend BMP  - Monitor UOP  - Vasopressors as above  - Judicious fluids PRN as above    ===Infectious Disease===  #MSSA Bacteremia  #Positive CSF Culture  #Leukocytosis (resolved)  Antibiotics as above, ID consulted     #Drug Reaction   Onset 12/14 with eruptions concerning for acute drug reaction to cephalosporins. Photos uploaded to  Media Tab on 12/14.     ===Gastroenterology===  #Shock Liver  In setting of MSSA bacteremia and distributive shock as above. LFTs elevated, AST 1847/  on 12/20 with rapid climb to 9763/2464 12/21, now down-trending.   - Trend with correction of shock    ===Hematology===  #Thrombocytopenia  Trend CBC, no indication for treatment at this time.     #Anemia  Suspect 2/2 bacteremia and critical illness, no e/o bleeding.  - Trend CBC, no acute interventions  - Transfuse for Hgb <7  - 1u PRBC 12/21    #Hodgkin's lymphoma status post radiation and MOPP chemotherapy, in remission  -No acute issues    #Testicular cancer status post a left orchiectomy and chemotherapy, in remission  -No acute issues.      # Prostate cancer (Elly 4+3, diagnosed April 2021) s/p radiation and Lupron  -Follows with Urology and Oncology as an outpatient    F: NPO in s/o high dose pressor  A: Fent infusion  S: Midaz infusion  T: Held in s/o SAH  H: keep elevated  U: Pantoprazole  G: Insulin infusion   S: SAT today  B: PRN  I: PIV, Medina, RIJ CVC, ETT, OG, L radial Art  D: None    This patient's care was discussed with Dr Kavin Smith, attending cardiologist, who agrees with the assessment and plan unless otherwise indicated.    Param Jeronimo MD [facility], PGY-4  Fellow, Cardiovascular Disease    ______________________________________________________________________    Interval History   Reduction in pressors, starting to move spontaneously with reduced sedation.      Data reviewed today: I reviewed all medications, new labs and imaging results over the last 24 hours. I personally reviewed all EKGs, echocardiograms, and other cardiology data from this visit.     Physical Exam   Vital Signs: Temp: 99.1  F (37.3  C) Temp src: Esophageal BP: 95/59 Pulse: (!) 132   Resp: 28 SpO2: 95 % O2 Device: Mechanical Ventilator    Weight: 199 lbs 8.26 oz  Exam:  Constitutional: Intubated, sedated, does make spontaneous movement in all extremities.   Head:  Normocephalic, no lesions  ENT: External ears and nose unremarkable   Cardiovascular: Tachycardic and irregular, well-perfused   Respiratory: Mechanical breath sounds  Musculoskeletal: Spontaneously moving all extremities, no deformity   Skin: no suspicious lesions or rashes  Neurologic: Sedated, not following commands, moves all extremities spontaneously    Data   I personally reviewed all laboratory and imaging data from the last 24 hours in addition to all available cardiology data.

## 2022-12-22 NOTE — CONSULTS
Cardiothoracic Surgery   History and Physical     Eloy Fletcher   1955   MRN: [id number]           Cardiothoracic Consult Note   Reason for Consult:  Bioprosthetic AVR endocarditis, possible root abscess         Assessment and Plan:     Eloy Fletcher is a 67 year old male with a history of HTN, HLD, CAD (Hx PCI), acquired aplenia, S/P tissue AVR, testicular cancer, Hodgkins Disease s/p mediastinal radiation (in remission), and admitted 12/4/22 with concerns for stroke. Found to have sepsis with encephalopathy, bacteremia, and small multifocal cerebral infarcts. Intubated 12/20 for respiratory insufficiency and transferred to ICU. CVTS consulted for bioprosthetic AV endocarditis and concern on CT for aortic root abscess.     - Septic shock secondary to Staph aureus bacteremia (MSSA), now requiring pressor support in the ICU. ID and Cardiology following. Last positive blood culture was drawn on 12/10.  - Transferred to ICU and intubated 12/20 for worsening respiratory status and hypotension, he is now intubated.  - Acute encephalopathy, meningitis, and multiple embolic strokes, Neurology Following.  - Small left frontal SAH, Neuro-critical care following.  - Bioprosthetic AV endocarditis.   - ECHO 12/20; severe MAC with moderate MI and gradient 12 mmHg, bioprosthetic AVR w normal function, moderate TR, Pulm HTN.   - CT Chest Angio 12/19; AVR leaflet thickening, calcification/thickening of aorta mitral curtain, severe MAC    Surgeon: Dr Renée Singleton   Surgical Plan: to be determined  Primary care provider: Megha Cevallos     STS Scoring for isolated AVR  Risk of Mortality:   18.132%  Renal Failure:  68.191%  Permanent Stroke: 8.552%  Prolonged Ventilation: 73.965%  DSW Infection: 0.101%  Reoperation:  12.293%  Morbidity or Mortality: 85.672%  Short Length of Stay: 2.878%  Long Length of Stay: 67.334%    Parker Hahn PA-C  Cardiothoracic Surgery  Pager [phone]    1:35 PM   December 22,  2022    * a total of 60 minutes was spent on this consult, including coordination of care, review of lab and  imaging results, patient communication and education, and discussion of care plan with primary  team and surgeon.         History of Present Illness:     Eloy Fletcher is a 67 year old male with a history of HTN, HLD, CAD (Hx PCI), acquired aplenia, S/P tissue AVR, testicular cancer, Hodgkins Disease (in remission), and admitted 12/4/22 with concerns for stroke. Found to have sepsis with encephalopathy, bacteremia, and small multifocal cerebral infarcts. Intubated 12/20 for respiratory insufficiency and transferred to ICU. CVTS consulted for bioprosthetic AV endocarditis and concern on CT for aortic root abscess.       *Pt intubated and sedated. History per his daughter and chart*  He had overall done very well with his AVR in 2014. He was active and worked out.   He originally presented this admission with 2-3 days of nausea and then acutely altered mental status on day of presentation with R sided weakness and slurred speech.       Home Meds:  Medications Prior to Admission   Medication Sig Dispense Refill Last Dose     albuterol (PROVENTIL HFA;VENTOLIN HFA) 90 mcg/actuation inhaler Inhale 2-4 puffs into the lungs every 4 hours as needed (cough)   Unknown     aspirin 81 mg chewable tablet Take 81 mg by mouth daily   Unknown     clopidogrel (PLAVIX) 75 mg tablet Take 75 mg by mouth daily   Unknown     metoprolol (LOPRESSOR) 50 MG tablet Take 50 mg by mouth 2 times daily   Unknown     pantoprazole (PROTONIX) 40 MG EC tablet Take 40 mg by mouth daily   Unknown     PRALUENT 150 MG/ML injectable pen Inject 150 mg Subcutaneous every 14 days   Unknown     acetaminophen (TYLENOL) 500 MG tablet [ACETAMINOPHEN (TYLENOL) 500 MG TABLET] Take 500 mg by mouth every 6 (six) hours as needed for pain (2 tabs).        calcium-vitamin D 500 mg(1,250mg) -200 unit per tablet [CALCIUM-VITAMIN D 500 MG(1,250MG) -200 UNIT  "PER TABLET] Take 1 tablet by mouth 2 (two) times a day with meals.          Allergies:  Allergies   Allergen Reactions     Crestor [Rosuvastatin] Other (See Comments)     \"Funny feeling in both legs\" all statins       PMH:  No past medical history on file.    PSH:  No past surgical history on file.    FH:  No family history on file.    SH:  Social History     Socioeconomic History     Marital status:    Tobacco Use     Smoking status: Never     Smokeless tobacco: Never   Substance and Sexual Activity     Alcohol use: Not Currently     Drug use: Never            Review of Systems:     Sedated and Intubated  ROS in HPI, otherwise negative.          Physical Exam:   Temp:  [98.6  F (37  C)-100.6  F (38.1  C)] 99  F (37.2  C)  Pulse:  [115-169] 131  Resp:  [28] 28  BP: ()/(55-67) 95/59  MAP:  [44 mmHg-299 mmHg] 74 mmHg  Arterial Line BP: ()/() 98/56  FiO2 (%):  [30 %-40 %] 30 %  SpO2:  [94 %-100 %] 96 %  Gen: NAD, sedated but arouses to stimuli  Skin: distal arms and legs with purple purpura.  Diffuse anasarca.  HEENT: normocephalic, atraumatic cranium, sclerae anicteric. Breathing tube in place.  Lungs: stable on vent  CV: tachy, S1S2 normal, systolic murmur. Diffuse dependent edema.   Abd: well healed laparotomy scar inferior to umbilicus, positive normal pitched bowel sounds, overall soft and non distended   Musculoskeletal: grossly intact  Neuro: sedated on vent  Mental: sedated on vent           LABS:     BMP  Recent Labs   Lab 12/22/22  1143 12/22/22  1012 12/22/22  0928 12/22/22  0807 12/22/22  0312 12/22/22  0301 12/21/22  1944 12/21/22  1939 12/21/22  1535 12/21/22  1529 12/21/22  1337 12/21/22  1135   NA  --   --   --   --   --  144  --  143  --  142  --  143   POTASSIUM  --   --   --   --   --  5.1  --  5.4*  --  5.3  --  5.6*   CHLORIDE  --   --   --   --   --  99  --  100  --  98  --  99   YUNIOR  --   --   --   --   --  7.7*  --  6.8*  --  6.7*  --  7.1*   CO2  --   --   --   --   -- "  26  --  20*  --  21*  --  21*   BUN  --   --   --   --   --  121.0*  --  123.0*  --  112.0*  --  114.0*   CR  --   --   --   --   --  2.73*  --  2.88*  --  2.85*  --  2.93*   * 101* 107* 105*   < > 129*   < > 178*   < > 237*   < > 261*    < > = values in this interval not displayed.     CBC  Recent Labs   Lab 12/22/22  0301 12/21/22  1529 12/21/22  1135 12/21/22  0345   WBC 10.8 12.8* 13.5* 15.8*   RBC 2.52* 2.34* 2.34* 2.43*   HGB 7.3* 6.9* 6.9* 7.0*   HCT 21.6* 20.7* 20.6* 22.5*   MCV 86 89 88 93   MCH 29.0 29.5 29.5 28.8   MCHC 33.8 33.3 33.5 31.1*   RDW 16.7* 17.5* 18.1* 19.3*   PLT 71* 84* 87* 91*     INR  Recent Labs   Lab 12/21/22  1811   INR 2.23*      Hepatic Panel  Recent Labs   Lab 12/22/22  0301 12/21/22  1939 12/21/22  1529 12/21/22  1135   AST 6,998* 9,158* 10,283* >35,000*   ALT 2,373* 2,817* 2,846* >7,000*   ALKPHOS 241* 253* 242* 237*   BILITOTAL 2.0* 1.9* 1.9* 1.8*   ALBUMIN 2.1* 2.2* 2.2* 2.2*            Imaging:     TTE 12/20-  Global and regional left ventricular function is hyperkinetic with an EF >70%.  Global right ventricular function is normal.     Severe mitral annular calcification is present. Moderate mitral insufficiency is present. transmitral valve  gradient is elevated at 12 mmHg (mean).     Post bioprosthetic AVR (23 mm Cordell-Hubbard Perimount Magna/Magna Ease pericardial  prosthesis). Doppler interrogation of the aortic valve is normal.     Tricuspid regurgitation is atleast moderate. There is systolic flow reversal in the hepatic veins.     Pulmonary hypertension is present. Estimated pulmonary artery systolic pressure is 52 mmHg plus  right atrial pressure. Patient on BiPAP, IVC is dilated, cannot assess RA pressure.     No pericardial effusion is present.  ______________________________________________________________________________  Left Ventricle  Left ventricular size is normal. Global and regional left ventricular function is hyperkinetic with an EF >70%.      Right Ventricle   The right ventricle is normal size. Global right ventricular function is normal.     Atria  The atria cannot be assessed.     Mitral Valve  Severe mitral annular calcification is present. Moderate mitral insufficiency  is present. The mean mitral valve gradient is 12.5 mmHg.     Aortic Valve  The valve leaflets are not well visualized. A bioprosthetic aortic valve is present. Post bioprosthetic AVR  (23 mm Cordell-Hubbard Perimount Magna/Magna Ease pericardial prosthesis). Doppler  interrogation of the aortic valve is normal. The mean gradient is 10 mmHg.     Tricuspid Valve  Pulmonary hypertension is present. Estimated pulmonary artery systolic pressure is 52 mmHg plus  right atrial pressure. Tricuspid regurgitation is atleast moderate. There is systolic flow reversal in the  hepatic veins.     Pulmonic Valve  The pulmonic valve cannot be assessed.     Vessels  Patient on BiPAP, IVC is dilated.     Pericardium  No pericardial effusion is present.     Compared to Previous Study  This study was compared with the study from 12.5.22 . Biventricular function is stable. The multivalvular  dysfunction are unchanged. PH is now evident.      CT Angiogram 12/19/22-  1. Bioprosthetic AVR (23 mm Cordell-Hubbard Perimount Magna/Magna Ease pericardial  prosthesis) placed in 2014. Bioprosthetic aortic valve with diffuse leaflet thickening without calcification.  No distinct mobile masses noted. Wall thickening at the aortic root, with calcification and thickening of  the aorta mitral curtain. Compared to CTA done at [facility] on 11/11/2022, aortic valve leaflet and  aortic root thickening is more prominent now. These findings are suspicious for aortic prosthesis  endocarditis with aortic root abscess. There is no associated pseudoaneurysm or fistula.  2. Known radiation induced valve disease with severe calcific mitral disease. Severe mitral annular  calcification is present with multiple calcific  nodules. There is also extensive irregular noncalcified  thickening, with a distinct mass along the posterior annulus. This mass also appears new compared to  recent CTA, and is suspicious for a vegetation.  3. The aortic root is normal in size. The ascending aorta and descending aorta are normal in diameter.  Severe calcification of the ascending aorta and aortic arch.  4. The main and proximal branch pulmonary arteries are normal in size. The systemic venous connections are normal.   5. The cardiac chambers demonstrate normal atrioventricular and ventriculoarterial concordance.  6. Coronary arteries originate from their respective cusps. Severe diffuse coronary artery calcifications.  7. The pericardium is unremarkable. There is no pericardial effusion.           Problem List:     Patient Active Problem List   Diagnosis     Acquired asplenia     Aortic valve replaced     Atherosclerosis of both carotid arteries     Chemotherapy-induced peripheral neuropathy (H)     Essential hypertension     History of Hodgkin's disease     History of testicular cancer     Hyperlipidemia     Prostate cancer (H)     Stented coronary artery     Endocarditis     Acute kidney failure, unspecified (H)     Cerebrovascular accident (CVA) due to embolism of cerebral artery (H)     Thank you for the opportunity to participate in the care of Eloy Fletcher.  Patient and plan discussed with attending.

## 2022-12-22 NOTE — PROGRESS NOTES
[facility] Progress Note     Patient:  Eloy Fletcher   YOB: 1955, MRN: [id number]  Date of Visit: 12/22/2022  Date of Admission: 12/4/2022  Consult Requester: Kavin Smith MD     Assessment:   1. MSSA bacteremia Last positive culture 12/10  2. 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 Suspected infective endocarditis- bioprosthetic aortic valve ILDEFONSO without vegetations, but multiple CNS septic emboli  4. Pneumonia- worsening L>R infiltrates- meropenem had been added 12/14  5. Drug rash- suspect beta-lactam allergy though exposed to many antibiotics before onset on 12/14.   6. Asplenic   7. Increased ectopy on telemetry   8. JILLIAN  9. Hepatitis- evidence of cholestasis improved with stopping nafcillin on 12/10, now significantly elevated transaminases most concerning for shock liver. Will also evaluate for hepatitis with no recent labs.    Admitted 12/4 with MSSA bacteremia with CNS emboli, has bioprosthetic aortic valve but without vegetations on ILDEFONSO, overall still concerning for MSSA endocarditis. Had been on linezolid/meropenem and clinically worsened on 12/19-12/20 with increasing pressor requirements, febrile, elevated lactate, most consistent with septic shock with elevated mixed venous O2, and increasing work of breathing on BIPAP/altered mental status prompting intubation. Repeat CT CAP with worsening pulmonary infiltrates, no obvious intraabdominal pathology. Repeat cultures pending.     Overall clinical worsening from likely infection with increased ectopy/rhythm disturbances most concerning for possible aortic root abscess. CSI favor against repeat ILDEFONSO given clinical instability and high probability of AV abscess based on prior imaging. Will be important to restart nafcillin or cefazolin for MSSA bacteremia. Surgical intervention on abscess will also be appropriate, although CT surgery not currently planning procedure given septic state. No evidence of cardiogenic shock at  this time (high CVO2 and no significant valve dysfunction on echo).    Other sepsis etiologies considered. Respiratory cultures from bronchoscopy 12/20/2022 with budding yeast, possibly consistent with candida species after discussion with microbiology lab, cultures still  pending. Less likely disseminated histoplasmosis/blastomyces but will evaluate serum antigens. Also less likely but possible breakthrough carbapenem resistant bloodstream infection. If cultures positive for gram negative bacteria, recommend covering for carbapenem resistance by switching meropenem to ceftazidime and avibactam with gentamycin. At this point, linezolid not adding additional benefit so will discontinue. Hepatitis and VLDR labs unrevealing.    Recommendations:  1. Recommend asking dermatology/immunology if safe to resume nafcillin (preferred) or cefazolin. Ideally would use one of these agents for MSSA bacteremia so long as leukocytoclastic vasculitis is not life-threatening per their opinion.  2. Rec CT surgery consult for suspected aortic root abscess  3. If approved by dermatology, restart nafcillin  4. Stop linezolid  5. Continue micafungin IV 100mg daily, can likely discontinue tomorrow  6. Continue meropenem 1g q12hr   7. If cultures growing gram negative bacteria, switch meropenem to ceftazidime/avibactam 2g q8hr with gentamycin 5mg/kg daily  8. Follow up HIV ag/ab (added on)    This patient was discussed with Dr. Alexander, who agrees with the above assessment and plan. Thank you for the consult. ID will continue to follow with you.    Anitra Hager MD  Internal Medicine PGY1         Interval History and Events:   Blood cultures NGTD since 12/10. Per nursing, intermittently more arousable but still non-participatory. Now on 2 pressors.         Antimicrobial Treatment:     Meropenem (12/14- **)  Linezolid (12/09-12/22)    Prior to current treatment:  Metronidazole 12/13-12/14   Ceftriaxone 12/13  Cefazolin 12/10->12/13  Ciproflox  "400mg QDay 12/10 ->12/12  Nafcillin 12/5-12/10 stopped due to possible cholestatic hepatitis   Received one dose Gentamicin (5mg/kg) on 12/5 follow up level 12/8: 0.4           Review of Systems:   Targeted 4 point ROS was completed with pertinent positives and negatives are detailed above.         HPI:   Adopted from initial consult note:    Summary:       66 year-old male with PMHx notable for Aortic stenosis s/p bioprosthetic AVR 2014, CAD s/p stent 2012, moderate mitral regurgitation/stenosis on ILDEFONSO 11/2022, with prior chemotherapy/radiation with Hodgkin's Lymphoma 1979, testicular cancer and prostate cancer admitted overnight on 12/04 for sepsis, encephalopathy, and acute embolic CVA. Evaluated for possible endocarditis MSSA Bacteremia      \"History obtained from review of chart and discussion with patient/nursing staff. Eloy Fletcher is a 66 year old male who presented with new left lateral ocular gaze and right sided hemineglect, weakness and fevers onset 12/02/2022.  PMHx notable for Aortic Stenosis s/p bioprosthetic AVR 2014, CAD s/p PCI and stent 2012, moderate mitral regurgitation/stenosis on ILDEFONSO 11/2022, with prior chemotherapy/radiation with Hodgkin's Lymphoma 1979, testicular cancer and prostate cancer admitted overnight on 12/04 for sepsis, encephalopathy, and acute embolic CVA. Evaluated for possible endocarditis.     Course: Initial presentation to OSH ED with confusion and unresponsiveness 12/4, discussion with his ex-wife,  initial onset of symptoms 12/1/22 multiple episodes of vomiting with a fever. Improved overnight, recurred and worsened 12/3.  He presented with left gaze preference, right-sided neglect, weakness, and fevers. Initial findings significant for showing tachycardia, tachypnea, and fevers, initial eukocytosis of 11.4, lactic acid 4.2. At that time he was confused and not responding appropriately.   He was evaluated by neurology and found to have MRI findings with non-specific " "small multifocal CVA suspected embolic in origin. He was provided empiric antibiotics, Blood cultures were drawn.    Due to concern for CNS infection, a LP was performed at the [facility] and was further transferred to [facility] ICU for work-up of endocarditis in the setting of hypotension. Initially provided   Acyclovir, Ampicillin, Ceftriaxone, Piperacillin Tazobactam and Vancomycin.   CT Chest also showing GGO in the MIGUEL ANGEL and LLL with tree-in-bud findings concerning for pneumonia (CAP vs. Aspiration). LP performed showing WBC of 230 with elevated protein at 59 and glucose at 82.                 Today, the patient was seen and examined at bedside, mild aphasia/ difficult word finding on exam. Patient is able to answer yes/no questions. Interval changes,  appears improved since 12/4.  Associated symptoms at this time positive for cough, with green/yellow sputum. No shortness of breath. Denies CV complaints of chest pain, diaphoresis, no abdominal pain. Denies new rashes or lesions. Denies new numbness or subjective weakness.      Pertinent Cardiac History: Aortic stenosis S/P bioprosthetic AVR in 2014, and CAD s/p PCI   2012.  Of note, recently evaluated with Cardiology at [facility]. Possible planned early next year, \"redo median sternotomy and mitral valve replacement with a tissue valve. We may have to replace the ascending aorta at the same time. The patient has right bundle branch block and a left bob fascicular block\" per evaluation with Dr. Gurrola 11/16/2022.  Last ILDEFONSO 11/11/2022, with LVEF 65%, Moderate-severe mitral valve regurgitation with moderate mitral stenosis and severe annular mitral calcification, demonstrated normal aortic valve tissue prosthesis.   Pertinent Oncology History: Prostate cancer ([place] 4+3, TNM: cT1c) treated with chemo/radiation (last dose 3/30/2022), good response at that time. Past history in remission: Testicular cancer (30 years prior  s/p chemo & Left orchiectomy) " "and Hodgkins lymphoma (1970s treated with MOPP & radiation)\"           Physical Examination:   Temp:  [99.1  F (37.3  C)-102.2  F (39  C)] 99.1  F (37.3  C)  Pulse:  [101-169] 132  Resp:  [28] 28  BP: ()/(55-67) 95/59  MAP:  [44 mmHg-299 mmHg] 78 mmHg  Arterial Line BP: ()/() 114/61  FiO2 (%):  [30 %-40 %] 30 %  SpO2:  [95 %-100 %] 95 %    I/O last 3 completed shifts:  In: 4721.67 [I.V.:4135.67; NG/GT:295]  Out: 2100 [Urine:2100]    Vitals:    12/19/22 0600 12/19/22 1745 12/20/22 0200 12/21/22 0400   Weight: 76.1 kg (167 lb 12.3 oz) 81.8 kg (180 lb 5.4 oz) 80.9 kg (178 lb 5.6 oz) 89.2 kg (196 lb 10.4 oz)    12/22/22 0000   Weight: 90.5 kg (199 lb 8.3 oz)       Constitutional: intubated, sedated  Respiratory: Coarse breath sounds throughout  Cardiovascular: tachycardic, unable to appreciate heart sounds with coarse breath sounds and mechanical ventilation   GI: Normal bowel sounds, soft  Skin: Warm, dry, well-perfused. Non-blanching rash worst on hands and feet, 3+ pitting edema bilateral upper and lower extremities          Medications:       guaiFENesin  15 mL Oral 4x Daily     [Held by provider] heparin ANTICOAGULANT  5,000 Units Subcutaneous Q12H     hydrocortisone sodium succinate PF  50 mg Intravenous Q6H     linezolid  600 mg Intravenous Q12H     micafungin  100 mg Intravenous Q24H     pantoprazole  40 mg Per Feeding Tube QAM AC    Or     pantoprazole  40 mg Intravenous QAM AC     sodium chloride (PF)  3 mL Intracatheter Q8H     triamcinolone   Topical BID       Antiinfectives:  Anti-infectives (From now, onward)    Start     Dose/Rate Route Frequency Ordered Stop    12/21/22 1300  micafungin (MYCAMINE) 100 mg in sodium chloride 0.9 % 100 mL intermittent infusion         100 mg  100 mL/hr over 60 Minutes Intravenous EVERY 24 HOURS 12/21/22 1200      12/09/22 1600  linezolid (ZYVOX) infusion 600 mg         600 mg  over 60 Minutes Intravenous EVERY 12 HOURS 12/09/22 1529      "       Infusions/Drips:    dextrose       EPINEPHrine 0.1 mcg/kg/min (12/22/22 0700)     fentaNYL Stopped (12/21/22 1929)     insulin regular Stopped (12/22/22 0700)     midazolam Stopped (12/21/22 0115)     - MEDICATION INSTRUCTIONS -       norepinephrine Stopped (12/21/22 2145)     - MEDICATION INSTRUCTIONS -       sodium chloride 150 mL/hr (12/05/22 1330)     vasopressin 3.2 Units/hr (12/22/22 0700)            Laboratory Data:   No results found for: ACD4    Microbiology:  All cultures:  Recent Labs   Lab 12/21/22  1301 12/21/22  1138 12/21/22  1132 12/20/22  1440 12/20/22  1436 12/19/22  1729 12/19/22  1026   CULTURE No growth after 12 hours No growth after 12 hours No growth after 12 hours No growth after 1 day No anaerobic organisms isolated after 1 day  Culture in progress No growth after 2 days No growth after 2 days     Inflammatory Markers    Recent Labs   Lab Test 12/19/22  1026 12/17/22  0600 12/16/22  0511   .00* 139.00* 179.00*       Metabolic Studies     Recent Labs   Lab Test 12/22/22  0658 12/22/22  0312 12/22/22  0301 12/21/22  1944 12/21/22  1939 12/21/22  1535 12/21/22  1529 12/07/22  1650 12/07/22  1359   NA  --   --  144  --  143  --  142   < >  --    POTASSIUM  --   --  5.1  --  5.4*  --  5.3   < >  --    CHLORIDE  --   --  99  --  100  --  98   < >  --    CO2  --   --  26  --  20*  --  21*   < >  --    ANIONGAP  --   --  19*  --  23*  --  23*   < >  --    BUN  --   --  121.0*  --  123.0*  --  112.0*   < >  --    CR  --   --  2.73*  --  2.88*  --  2.85*   < >  --    GFRESTIMATED  --   --  25*  --  23*  --  23*   < >  --    GLC 93   < > 129*   < > 178*   < > 237*   < >  --    A1C  --   --   --   --   --   --   --   --  6.6*   YUNIOR  --   --  7.7*  --  6.8*  --  6.7*   < >  --    PHOS  --   --  7.7*  --   --   --   --    < >  --    MAG  --   --  2.3  --   --   --   --    < >  --    LACT  --   --  6.1*  --  8.5*  --  9.9*   < >  --     < > = values in this interval not displayed.        Hepatic Studies    Recent Labs   Lab Test 12/22/22  0301 12/21/22  1939 12/21/22  1529 12/06/22  0422 12/05/22  1008   BILITOTAL 2.0* 1.9* 1.9*   < > 1.1   ALKPHOS 241* 253* 242*   < > 58   ALBUMIN 2.1* 2.2* 2.2*   < > 2.9*   AST 6,998* 9,158* 10,283*   < > 131*   ALT 2,373* 2,817* 2,846*   < > 61*   LDH  --   --   --   --  468*    < > = values in this interval not displayed.     Hematology Studies      Recent Labs   Lab Test 12/22/22  0301 12/21/22  1529 12/21/22  1135 12/21/22  0345 12/20/22  2252   WBC 10.8 12.8* 13.5*   < > 14.6*   ANEU  --   --   --   --  14.2*   ALYM  --   --   --   --  0.3*   LATONYA  --   --   --   --  0.1   AEOS  --   --   --   --  0.0   HGB 7.3* 6.9* 6.9*   < > 7.2*   HCT 21.6* 20.7* 20.6*   < > 23.3*   PLT 71* 84* 87*   < > 106*    < > = values in this interval not displayed.     Arterial Blood Gas Testing    Recent Labs   Lab Test 12/22/22  0301   PH 7.52*   PCO2 36   PO2 134*   HCO3 29*   O2PER 40  40      Urine Studies     Recent Labs   Lab Test 12/15/22  1748 12/07/22  1509 12/05/22  0538 12/04/22  1534   URINEPH 5.0 5.5 5.5 5.0   NITRITE Negative Negative Negative Negative   LEUKEST Moderate* Trace* Negative Negative   WBCU  --  6* 8* 3       Vancomycin Levels     No lab results found.    Tobramycin levels     No lab results found.    Gentamicin levels    Recent Labs   Lab Test 12/08/22  0812   GENT 0.4       CSF testing     Recent Labs   Lab Test 12/04/22  1738   CWBC 230*   CNEU 86   CLYM 1   CMONO 4   COTH 9   CRBC 78*   CGLU 82*   CTP 59.7*       Last check of C difficile  C Difficile Toxin B by PCR   Date Value Ref Range Status   12/05/2022 Negative Negative Final     Comment:     A negative result does not exclude actual disease due to C. difficile and may be due to improper collection, handling and storage of the specimen or the number of organisms in the specimen is below the detection limit of the assay.            Imaging:     Recent Results (from the past 48 hour(s))    Echo Limited   Result Value    LVEF  70%    [facility]    [id number]  [id number]  [id number]  [id number]^[id number]^CORINNE     [facility],[place]  Echocardiography Laboratory  [address] [place] [address]     Name: SO AVINA  MRN: [id number]  : 1955  Study Date: 2022 07:47 AM  Age: 67 yrs  Gender: Male  [place]: [facility] [place]  Reason For Study: Shock  Ordering Physician: JONEL GERBER  Referring Physician: MARCEL JEAN  Performed By: Rebecca Staley RDCS     BSA: 1.9 m2  Height: 68 in  Weight: 178 lb  HR: 110  BP: 93/58 mmHg  ______________________________________________________________________________  Procedure  Limited Portable Echo Adult. Technically difficult study.  ______________________________________________________________________________  Interpretation Summary  Global and regional left ventricular function is hyperkinetic with an EF >70%.  Global right ventricular function is normal.     Severe mitral annular calcification is present. Moderate mitral insufficiency  is present. transmitral valve gradient is elevated at 12 mmHg (mean).     Post bioprosthetic AVR (23 mm Cordell-Hubbard Perimount Magna/Magna Ease  pericardial prosthesis). Doppler interrogation of the aortic valve is normal.     Tricuspid regurgitation is atleast moderate. There is systolic flow reversal  in the hepatic veins.     Pulmonary hypertension is present. Estimated pulmonary artery systolic  pressure is 52 mmHg plus right atrial pressure. Patient on BiPAP, IVC is  dilated, cannot assess RA pressure.     No pericardial effusion is present.  ______________________________________________________________________________  Left Ventricle  Left ventricular size is normal. Global and regional left ventricular function  is hyperkinetic with an EF >70%.     Right Ventricle  The right ventricle is normal size. Global right ventricular function is  normal.     Atria  The atria  cannot be assessed.     Mitral Valve  Severe mitral annular calcification is present. Moderate mitral insufficiency  is present. The mean mitral valve gradient is 12.5 mmHg.     Aortic Valve  The valve leaflets are not well visualized. A bioprosthetic aortic valve is  present. Post bioprosthetic AVR (23 mm Cordell-Hubbard Perimount  Magna/Magna Ease pericardial prosthesis). Doppler interrogation of the aortic  valve is normal. The mean gradient is 10 mmHg.     Tricuspid Valve  Pulmonary hypertension is present. Estimated pulmonary artery systolic  pressure is 52 mmHg plus right atrial pressure. Tricuspid regurgitation is  atleast moderate. There is systolic flow reversal in the hepatic veins.     Pulmonic Valve  The pulmonic valve cannot be assessed.     Vessels  Patient on BiPAP, IVC is dilated.     Pericardium  No pericardial effusion is present.     Compared to Previous Study  This study was compared with the study from 22 . Biventricular function  is stable. The multivalvular dysfunction are unchanged. PH is now evident.  ______________________________________________________________________________  MMode/2D Measurements & Calculations  LVOT diam: 2.1 cm  LVOT area: 3.4 cm2     Doppler Measurements & Calculations  MV max P.8 mmHg  MV mean P.5 mmHg  MV V2 VTI: 70.8 cm  MVA(VTI): 0.83 cm2  MV P1/2t max suhas: 284.0 cm/sec  MV P1/2t: 74.8 msec  MVA(P1/2t): 2.9 cm2  MV dec slope: 1113 cm/sec2  Ao V2 max: 254.3 cm/sec  Ao max P.0 mmHg  Ao V2 mean: 145.5 cm/sec  Ao mean PG: 10.3 mmHg  Ao V2 VTI: 35.1 cm  CARLITO(I,D): 1.7 cm2  CARLITO(V,D): 1.5 cm2  LV V1 max P.3 mmHg  LV V1 max: 115.3 cm/sec  LV V1 VTI: 17.4 cm  SV(LVOT): 58.6 ml  SI(LVOT): 30.1 ml/m2  TR max suhas: 360.9 cm/sec  TR max P.1 mmHg  AV Suhas Ratio (DI): 0.45  CARLITO Index (cm2/m2): 0.86     ______________________________________________________________________________  Report approved by: Akanksha Todd 2022 09:44 AM         XR  Chest Port 1 View    Narrative    EXAM: XR CHEST PORT 1 VIEW  12/20/2022 2:35 PM     HISTORY:  Endotracheal tube positioning       COMPARISON:  Chest x-ray 12/19/2022 and CT chest of the pelvis  12/14/2022.    FINDINGS:   AP portable chest, 30 degrees. Interval placement of endotracheal tube  with tip projecting over the upper/proximal mid thoracic trachea.  Right IJ catheter tip in similar position over the SVC-atrial  junction. Enteric tube course inferior to the diaphragm with tip out  of view. Postoperative changes of the chest with fractured upper most  median sternotomy wire. Stable appearance of the cardiomediastinal  silhouette. Stable mixed interstitial and airspace opacities within  the left lung diffusely, and to a lesser degree involvement of the  right lung. Postoperative changes of prior right lung wedge resection.  No appreciable pneumothorax or significant pleural effusion. Surgical  clips over the epigastrium and left upper quadrant. Chronic left  clavicle fracture deformity.      Impression    IMPRESSION:  1. Interval endotracheal tube placement with tip in the upper to mid  thoracic trachea.  2. Stable mixed interstitial/airspace opacities, left lung  predominant.    I have personally reviewed the examination and initial interpretation  and I agree with the findings.    PABLO ROD DO         SYSTEM ID:  [id number]   XR Abdomen Port 1 View    Narrative    EXAM: XR ABDOMEN PORT 1 VIEW  12/20/2022 2:35 PM     HISTORY:  OG placement       TECHNIQUE: Single frontal radiograph of the abdomen    COMPARISON:  CT chest abdomen and pelvis 12/14/2022.    FINDINGS:   AP portable supine radiograph, 30 degrees. Surgical clips over the  epigastric and left upper quadrant regions. Partial visualization  right IJ catheter tip projects over the SVC-atrial junction. Partial  visualization of left prominent mixed interstitial/airspace opacities.  Interval placement of enteric tube with tip and sidehole  projecting  over the stomach. Nonobstructive bowel gas pattern without pneumatosis  or portal venous gas seen.      Impression    IMPRESSION:   Enteric tube courses inferior to the diaphragm with tip and side hole  both projecting over the stomach fundus.    I have personally reviewed the examination and initial interpretation  and I agree with the findings.    PABLO ROD DO         SYSTEM ID:  [id number]   CT Head w/o Contrast   Result Value    Radiologist flags Acute subarachnoid hemorrhage (AA)    Narrative    CT HEAD W/O CONTRAST 12/20/2022 9:11 PM    History: altered mental status, refractory shock with prior embolic  strokes   ICD-10:    Comparison: MR and CT 12/4/2022    Technique: Using multidetector thin collimation helical acquisition  technique, axial, coronal and sagittal CT images from the skull base  to the vertex were obtained without intravenous contrast.   (topogram) image(s) also obtained and reviewed.    Findings: There is a small amount of subarachnoid hemorrhage in the  left superior frontal sulcus. There is no mass effect or midline  shift. Gray/white matter differentiation in both cerebral hemispheres  is preserved. Ventricles are proportionate to the cerebral sulci. The  basal cisterns are clear.    The bony calvaria and the bones of the skull base are normal. The  visualized portions of the paranasal sinuses and mastoid air cells are  clear.      Impression    Impression:  Small amount of acute subarachnoid hemorrhage over the posterior left  frontal lobe. No CT evidence of acute infarct.     [Critical Result: Acute subarachnoid hemorrhage]    Finding was identified on 12/20/2022 9:30 PM.     Dr. Smith was contacted by Dr. Herbert at 12/20/2022 9:43 PM and  verbalized understanding of the urgent finding.     I have personally reviewed the examination and initial interpretation  and I agree with the findings.    MARYBEL POSEY MD         SYSTEM ID:  [id number]   CT Chest Abdomen  Pelvis w/o Contrast    Narrative    EXAMINATION: CT CHEST ABDOMEN PELVIS W/O CONTRAST, 12/20/2022 9:12 PM    TECHNIQUE: Helical CT images from the thoracic inlet through the  symphysis pubis were obtained without intravenous contrast.     COMPARISON: Same day chest x-ray, CT 12/19/2022    HISTORY: refractory shock, concern for embolic infarct    FINDINGS:    Lines/tubes: Endotracheal tube tip in the mid thoracic trachea. Right  IJ central venous catheter tip in the right atrium. Gastric tube tip  in the stomach. Medina catheter.    Chest: The central tracheobronchial tree is patent. There is increased  consolidative appearance of the mixed opacities throughout the left  greater than right lungs compared to 12/19/2022. Small left and trace  right pleural effusions are unchanged. No pneumothorax.    Heterogenous appearance of the thyroid similar to previous. Heart size  is normal. Prosthetic aortic valve and mitral annular calcifications.  Extensive atherosclerotic calcification of the aorta and major  branching vessels. Mediastinal lymphadenopathy appears slightly  improved compared to previous. For example right paratracheal lymph  node measuring 9 mm in short axis, previously 12 mm (series 3 image  64).    Abdomen and pelvis: Noncontrast appearance of the liver is  unremarkable. Vicarious excretion of contrast within the gallbladder  lumen. The pancreas is unremarkable. The spleen is surgically absent.  The adrenal glands are normal. Hyperdense appearance of the renal  parenchyma with multiple exophytic renal cysts similar to previous  suggesting delayed nephrograms. No hydronephrosis. Retroaortic left  renal vein. The urinary bladder is decompressed with contrast in the  lumen and Medina catheter in place. The prostate is normal.    Normal caliber small and large bowel. No intra-abdominal free air or  free fluid. No lymphadenopathy. Presacral edema.    Bones and soft tissues: Median sternotomy wires appear intact.  No  acute or aggressive osseous lesions. Mild diffuse anasarca.      Impression    IMPRESSION:   1. Extensive pulmonary opacities which appear similar to 12/19/2022.  2. Stable small left and trace right pleural effusions.  3. Limited evaluation of the abdomen and pelvis due to lack of  contrast. No definite acute pathology.     I have personally reviewed the examination and initial interpretation  and I agree with the findings.    JAMEE ALVARADO MD         SYSTEM ID:  [id number]   CT Head w/o Contrast    Narrative    EXAM: CT HEAD W/O CONTRAST  12/21/2022 6:48 PM     HISTORY:  SAH noted on prior CT, dual energy scan requested by Neuro  Crit       COMPARISON:  CT head 12/20/2022 and MRI 12/4/2022.    TECHNIQUE: Using multidetector thin collimation helical acquisition  technique, axial, coronal and sagittal CT images from the skull base  to the vertex were obtained without intravenous contrast.   (topogram) image(s) also obtained and reviewed.    FINDINGS:  There is mild/minimal residual subarachnoid hemorrhagic product about  the posterior left frontal lobe. No mass effect or midline shift. The  ventricles are proportionate to the overlying sulci. Basal cisterns  are clear. No acute loss of gray-white matter differentiation in the  cerebral ulcers.    The bony calvaria and the bones of the skull base are normal. The  visualized portions of the paranasal sinuses and mastoid air cells are  clear. Grossly normal orbits.       Impression    IMPRESSION:  Mild/minimal residual subarachnoid hemorrhagic products about the  posterior left frontal lobe. No additional interval significant or new  intracranial findings.    I have personally reviewed the examination and initial interpretation  and I agree with the findings.    DIGNA ANGELA MD         SYSTEM ID:  [id number]

## 2022-12-22 NOTE — PROGRESS NOTES
"CLINICAL NUTRITION SERVICES - BRIEF NOTE     Reason for RD note: Provider order - \"Would like to start trickle tube feeds if possible\"    New Findings/Chart Review:  -Pt intubated 12/20  -Pt transferred to the cardiac ICU for evaluation of suspected endocarditis with conduction system disturbance and severe shock.    Interventions:  -Initiate trophic feeds via OGT: Novasource Renal @ 20 ml/hr  - Do not start until lytes (Mg++,K+) WNL and phos>2.0  - Recommend 30 ml q4hr fluid flushes for tube patency. Additional fluids and/or adjustments per MD.    - Order multivitamin/mineral (15 ml/day via FT) to help ensure micronutrient needs being met with suspected hypermetabolic demands and potential interruptions to TF infusions.  - Elevated HOB with gastric feeds     -GOAL TF: Novasource Renal @ 50 ml/hr (1200 ml) provides 2400 kcal (30 Kcal/kg), 109 g pro (1.3), 220 g CHO, 860 ml free water, and 0 g fiber daily.     Nutrition will follow per protocol or sooner if consulted.    Analy Villa, MS, RD, LD  4E (CVICU) RD pager: [phone]  Ascom: [phone]  Weekend/Holiday RD pager: [phone]  "

## 2022-12-23 NOTE — DEATH PRONOUNCEMENT
MD DEATH PRONOUNCEMENT    Called to pronounce Eloy Fletcher dead.    Physical Exam: Unresponsive to noxious stimuli, Spontaneous respirations absent, Breath sounds absent, Carotid pulse absent, Heart sounds absent, Pupillary light reflex absent and Corneal blink reflex absent    Patient was pronounced dead at 4:56 AM, 2022.    Preliminary Cause of Death: Septic Shock     Principal Problem:    Endocarditis  Active Problems:    Acute kidney failure, unspecified (H)    Cerebrovascular accident (CVA) due to embolism of cerebral artery (H)       Infectious disease present?: YES    Communicable disease present? (examples: HIV, chicken pox, TB, Ebola, CJD) :  NO    Multi-drug resistant organism present? (example: MRSA): NO    Please consider an autopsy if any of the following exist:  NO Unexpected or unexplained death during or following any dental, medical, or surgical diagnostic treatment procedures.   NO Death of mother at or up to seven days after delivery.     NO All  and pediatric deaths.     NO Death where the cause is sufficiently obscure to delay completion of the death certificate.   NO Deaths in which autopsy would confirm a suspected illness/condition that would affect surviving family members or recipients of transplanted organs.     The following deaths must be reported to the 's Office:  NO A death that may be due entirely or in part to any factors other than natural disease (recent surgery, recent trauma, suspected abuse/neglect).   NO A death that may be an accident, suicide, or homicide.     NO Any sudden, unexpected death in which there is no prior history of significant heart disease or any other condition associated with sudden death.   NO A death under suspicious, unusual, or unexpected circumstances.    NO Any death which is apparently due to natural causes but in which the  does not have a personal physician familiar with the patient s medical history,  social, or environmental situation or the circumstances of the terminal event.   NO Any death apparently due to Sudden Infant Death Syndrome.     NO Deaths that occur during, in association with, or as consequences of a diagnostic, therapeutic, or anesthetic procedure.   NO Any death in which a fracture of a major bone has occurred within the past (6) six months.   NO A death of persons note seen by their physician within 120 days of demise.     NO Any death in which the  was an inmate of a public institution or was in the custody of Law Enforcement personnel.   NO  All unexpected deaths of children   NO Solid organ donors   NO Unidentified bodies   NO Deaths of persons whose bodies are to be cremated or otherwise disposed of so that the bodies will later be unavailable for examination;   NO Deaths unattended by a physician outside of a licensed healthcare facility or licensed residential hospice program   NO Deaths occurring within 24 hours of arrival to a health care facility if death is unexpected.    NO Deaths associated with the decedent s employment.   NO Deaths attributed to acts of terrorism.   NO Any death in which there is uncertainty as to whether it is a medical examiner s care should be discussed with the medical investigator.        Body disposition: Autopsy was discussed with family member:  Family members in person.  Permission for autopsy was declined.

## 2022-12-23 NOTE — PROVIDER NOTIFICATION
Patient continuously declining since beginning of shift. Vaso @4, epi @0.24, levo restarted, currently @0.12.  Lactic up to 21, K 6.3 after shifting K is 6.5. Cr increased, very minimal UOP. PH down to 7.17 after 1 amp bicarb, 2nd amp given and gtt restarted. Family called to bedside and provider updated on status, family pursuing comfort care measures.

## 2022-12-23 NOTE — PLAN OF CARE
ICU End of Shift Summary. See flowsheets for vital signs and detailed assessment.    Changes this shift: Pt grimacing and restless. Fentanyl turned back on to 25 and again bought up to 50. Pt had good urine output throughout shift. Vaso weaned to 2 and Epi weaned down to .08 to meet MAP goal of >65. ILDEFONSO done by provider in the shift. Lactic levels trending upward. 1L of LR given in shift and a second liter of LR given now.   PIV was removed and vascular access attempted to replace with no success. Insulin drip discontinued. Family at bedside providing support.    Plan: Continue to wean pressors and closely monitor.        Goal Outcome Evaluation:      Plan of Care Reviewed With: family    Overall Patient Progress: no changeOverall Patient Progress: no change    Outcome Evaluation: Able to wean pressors with family at bedside. Inrease wakefulness.

## 2022-12-23 NOTE — DISCHARGE SUMMARY
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   - Multifocal Embolic Cerebrovascular Accident due to infective endocarditis  - Subarachnoid Hemorrhage due to Hemorrhagic Conversion of Embolic CVA  - Bacterial Meningitis  - Community-Acquired Pneumonia   - Acute Hypoxic Respiratory Failure due to pneumonia   - MSSA Bacteremia  - Mitral Valve Insufficiency secondary to Infective Endocarditis  - Severe Sepsis  - Distributive Shock, Severe  - Anion Gap Metabolic Acidosis, Severe   - Lactic Acidosis  - Acute Kidney Injury complicated by Acute Tubular Necrosis  - Hyperkalemia due to Acute Kidney Injury  - Leukocytosis  - 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  - Ischemic Hepatitis due to Severe Sepsis and Distributive Shock  - First degree atrioventricular block  - Premature atrial complexes  - Thrombocytopenia due to critical illness  - Normocytic Anemia of critical illness  - Coronary Artery Disease with prior Percutaneous Coronary Intervention  - History of Hodgkin Lymphoma with prior Radiation Therapy  - History of Testicular Cancer with prior Orchiectomy  - Prostate Cancer    Admission Condition: poor    Discharged Condition:     Indication for Admission:   Mr Eloy Fletcher is a 67 year old gentleman with a history of Hodgkin Lymphoma s/p radiation (in remission) with presumed radiation-induced cardiovascular disease c/b CAD s/p PCI to  LAD in 2012 and aortic stenosis s/p AVR in 2014 who presented to outside hospital 12/04 with neurologic changes and found to have multifocal CVAs and blood cultures positive for MSSA. He was transferred to [facility] with septic shock, pneumonia, and infective endocarditis.      Hospital Course:   Mr Eloy Fletcher was admitted with septic shock in the setting of pneumonia and infective endocarditis c/b multifocal CVAs. The patient was briefly admitted to the MICU for vasopressor support with rapid transition to armijo status after initiation of antibiotics. Subsequent cardiac evaluations demonstrated progressive infective endocarditis with aortic root abscess. The patient was transferred to the cardiac ICU on 12/19 with worsening distributive shock in setting of severe pneumonia and lack of infectious source control with severe infective endocarditis. He treated for severe sepsis with maximum medical therapies and was evaluated for valve replacement and aortic root reconstruction by CT surgery. The patient was not a surgical candidate due to his surgical risk in setting of severe sepsis and his condition worsened despite ongoing critical care measures. After discussions with the patient's family, he was transitioned to comfort cares 12/23 AM. Details of his course below.     ===Neuro===  #Multifocal Emboli  #Meningitis  Presented with left gaze deviation and right-sided weakness. MR brain 12/04 demonstrating 5 foci of restricted diffusion involving bilateral anterior and posterior cerebral artery distributions. Concerning for embolic phenomena in setting of multiple vascular distributions and known MSSA bacteremia. CSF cultures 12/04 also positive for MSSA.      #SAH  Small left frontal SAH noted on CT scan 12/20/22 likely representing hemorrhagic conversion of prior embolic CVA. Neurology Critical Care assisted with management.       ===Cardiovascular===  #Shock, Distributive   Suspected distributive in setting of  MSSA bacteremia without source control given IE and aortic root abscess. Considered component of cardiogenic shock however CVO2 elevated and no significant change in valve pathology on repeat echocardiogram evaluations. Treated with broad spectrum antibiotics given severity of shock, stress dose steroids, and aggressive fluid resuscitation with CVP monitoring.       #Infective Endocarditis c/b  #Septic Emboli  #Aortic Root Abscess  #First-Degree AV Block  #Premature Atrial Comlexes  MSSA bacteremia on presentation, multifocal CVA as above, and scattered pulmonary findings concerning for septic emboli. Valvular evaluation complicated in setting of known radiation-induced valve disease (prior AVR and severe MAC) however on review of his prior echocardiogram at [facility] in 11/2022, there was significant change in valve appearance suggestive of infective endocarditis and abscess. There was evidence of conduction system disturbance with prolonging TX interval and increased ectopy. Treated with broad spectrum antibiotics and was evaluated by CT surgery however the patient was not a surgical candidate.      #MSSA Bacteremia c/b  #Shock, distributive  Blood cultures positive for MSSA on admission 12/04 and remained positive through 12/13/22. Progression to profound distributive shock 12/19 in setting of inadequate source control with aortic root abscess.       #Aortic Stenosis s/p AVR 2014  #Radiation-induced valve disease   #CAD s/p PCI to LAD 2012  #Essential Hypertension     ===Pulmonary===  #Acute hypercarbic hypoxic respiratory failure   #Multifocal Pneumonia, left-lung predominant  #Septic Emboli  Transferred to cardiac ICU 12/19 on BiPAP for mild CO2 retention and hypoxia in the setting of dense left lung and RUL consolidations. Procalcitonin to 54.2 on arrival with down-trend to 2.45. Intubated 12/21 for airway protection in setting of altered mental status.      ===Renal===  #Acute Kidney Injury  c/f  #ATN  #Hyperkalemia  Baseline creatinine 0.9, peak 12/08 4.16. Pre-renal etiology in setting of bacteremia and severe distributive shock as above. UOP sustained at low rate during hospital course and did not require CRRT.      ===Infectious Disease===  #Drug Reaction   Onset 12/14 with eruptions concerning for acute drug reaction to cephalosporins. Consistent with leukocytoclastic vasculitis per dermatology.      ===Gastroenterology===  #Shock Liver  In setting of MSSA bacteremia and profound distributive shock.     ===Hematology===  #Thrombocytopenia  In setting of critical illness, trended and no transfusions required.      #Anemia  In setting of critical illness, required 1u PRBC during admission.      #Hodgkin's lymphoma status post radiation and MOPP chemotherapy, in remission  #Testicular cancer status post a left orchiectomy and chemotherapy, in remission  # Prostate cancer ([place] 4+3, diagnosed April 2021) s/p radiation and Lupron      Consults: ID, neurology and dermatology    Significant Diagnostic Studies:     Microbiology   12/04/2022 1601 12/07/2022 0644 Blood Culture Peripheral Blood [[id number]]   (Abnormal)   Peripheral Blood    Final result Component Value   Culture Positive on the 1st day of incubation Abnormal     Staphylococcus aureus Panic     2 of 2 bottles  Susceptibilities done on previous cultures             12/04/2022 1534 12/07/2022 0815 Blood Culture Line, venous [[id number]]    (Abnormal)   Blood from Line, venous    Final result Component Value   Culture Positive on the 1st day of incubation Abnormal     Staphylococcus aureus Panic     2 of 2 bottles       Susceptibility     Staphylococcus aureus     DEJA     Clindamycin <=0.25 ug/mL Susceptible     Erythromycin <=0.25 ug/mL Susceptible     Gentamicin <=0.5 ug/mL Susceptible     Oxacillin 0.5 ug/mL Susceptible     Tetracycline <=1 ug/mL Susceptible     Trimethoprim/Sulfamethoxazole <=0.5/9.5 u... Susceptible     Vancomycin  <=0.5 ug/mL Susceptible                    Imaging  MRI Brain 12/04/22  FINDINGS:  INTRACRANIAL CONTENTS: There are 5 small foci of restricted diffusion representing acute lacunar infarcts. The largest focus is at the left posterior frontal centrum semiovale measuring 10 x 5 mm. Additional foci are noted at the bilateral posterior   frontal gyri, left periatrial white matter, and right cerebellar hemisphere. No mass, acute hemorrhage, or extra-axial fluid collections. Normal ventricles and sulci. Normal position of the cerebellar tonsils.   SELLA: No abnormality accounting for technique.  OSSEOUS STRUCTURES/SOFT TISSUES: Normal marrow signal. The major intracranial vascular flow voids are maintained.   ORBITS: No abnormality accounting for technique.   SINUSES/MASTOIDS: No paranasal sinus mucosal disease. No middle ear or mastoid effusion.                                                               IMPRESSION:  1.  5 scattered foci of acute ischemia involving the bilateral anterior and posterior cerebral artery distributions. Constellation of findings is suspicious for central embolic phenomena.      CT Chest/Abdomen/Pelvis 12/04/22  FINDINGS:   LUNGS AND PLEURA: Trace left pleural effusion. Respiratory motion limits evaluation of the lungs. There is mild interstitial edema. Cluster of tree-in-bud nodularity in the left upper lobe. Patchy groundglass opacities at the left lung apex and in the   superior segment left lower lobe.  MEDIASTINUM/AXILLAE: Aortic valve replacement. No lymphadenopathy.  CORONARY ARTERY CALCIFICATION: Previous intervention (stents or CABG).  HEPATOBILIARY: Normal.  PANCREAS: Normal.  SPLEEN: Splenectomy  ADRENAL GLANDS: Normal.  KIDNEYS/BLADDER: No significant mass, stones, or hydronephrosis. There are simple or benign cysts. No follow up is needed.  BOWEL: No obstruction or inflammatory change. Scattered surgical clips throughout the abdomen and pelvis.  LYMPH NODES: Normal.  VASCULATURE:  Aortobiiliac calcific atherosclerosis. No abdominal aortic aneurysm.  PELVIC ORGANS: Normal.  MUSCULOSKELETAL: Old ununited left clavicle fracture. Median sternotomy. No suspicious osseous lesions.                                                              IMPRESSION:  1.  Patchy groundglass opacities in the left upper and lower lobes, and cluster of tree-in-bud nodularity in the left upper lobe, likely infectious or inflammatory.  2.  Interstitial pulmonary edema.  3.  Trace left pleural effusion.  4.  No acute findings in the abdomen or pelvis.      Transesophageal Echocardiogram 12/04/22  Interpretation Summary  s/p bioprosthetic AVR (23 mm Cordell-Hubbard Perimount Magna/Magna Ease  pericardial prosthesis). Aortic valve leaflets are thickened with linear  mobile echo density on the aortic side (1.6 cm).  Aorta-mitral curtain is thickened concerning for infection.  No significant AI or AS.  Severe calcification noted along the aorta mitral curtain and posterior  leaflet annulus (Likely radiation induced heart disease).  Severe thickening of anterior and posterior mitral leaflets with multiple  mobile echodensities on the atrial aspect of the mitral valve. These are new  compared to prior ILDEFONSO on 12/5. Thickened leaflets and vegetations lead to  severe mitral stenosis. MG is 13 mHg.  Moderate mitral insufficiency is present.  The aortic root is thickened concerning for aortic root absceess.  Moderate TR. Pulmonary HTN is present. PAP is 58+RA pressure.  This study was compared with the study from 12/5/2022 .Echodensities likely  vegetation on aortic and mitral valves are new.  Global and regional left ventricular function is hyperkinetic with an EF >70%.  Global right ventricular function is normal.    CT Coronary Angiogram 12/19/22  IMPRESSIONS:  1.  Bioprosthetic aortic valve with diffuse leaflet thickening without  calcification. No distinct masses noted. Wall thickening at the aortic  root, with  calcification and thickening of the aorta mitral curtain.  Compared to CTA done at [facility] on 11/11/2022, aortic valve  leaflet and aortic root thickening is more prominent now. 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  2.  Known severe mitral annular calcification with calcific nodules.  There is also extensive irregular noncalcified thickening, with a  distinct mass along the posterior annulus. This mass also appears new  compared to recent CTA, and is suspicious for a vegetation.  3.  Please review Radiology report for incidental noncardiac findings  that will follow separately.      Transesophageal Echocardiogram 12/05/22  Left Ventricle  Mild concentric wall thickening consistent with left ventricular hypertrophy  is present. Global and regional left ventricular function is normal with an EF  of 60-65%. Left ventricular size is normal.  Right Ventricle  The right ventricle is normal size. Global right ventricular function is  normal.  Atria  The left atrial appendage is normal. It is free of spontaneous echo contrast  and thrombus. Moderate to severe left atrial enlargement is present. The  atrial septum is intact as assessed by color Doppler .  Mitral Valve  Severe mitral annular calcification is present. Mild to moderate mitral  insufficiency is present. The mean gradient across the mitral valve is 13  mmHg. The mitral valve area is 1.4 cm^2. Severe mitral stenosis is present.  Aortic Valve  S/p bioprosthetic AVR. Valve appears well seated with good leaflet opening. No  vegetations visualized. Doppler interrogation of the aortic valve is normal.  Tricuspid Valve  The tricuspid valve is normal. Mild tricuspid insufficiency is present.  Pulmonic Valve  The pulmonic valve is normal.  Vessels  The aorta root is normal. The thoracic aorta is normal. Ascending aorta and  aortic arch free of atheroma and vegetation. The inferior vena cava is normal.  The  superior vena cava is normal. The RUPV Doppler shows normal velocity  waveform . The RLPV Doppler shows systolic dominance . The LUPV Doppler shows  normal velocity waveform . The LLPV Doppler shows normal velocity waveform   Pericardium  No pericardial effusion is present.    Treatments: IV hydration, antibiotics: Meropenem Linezolid Micafungin, analgesia: acetaminophen and fentanyl infusion, respiratory therapy: mechanical ventilation, therapies: PT and OT and procedures: arterial line, internal jugular line and lumbar puncture.    Discharge Exam:  Exam  PM:  Constitutional: Intubated, sedated, spontaneous movement in all extremities.   Head: Normocephalic, no lesions, ETT in place  ENT: External ears and nose dressed 2/2 pressure wound  Cardiovascular: Tachycardic and irregular, well-perfused, no murmur, intact radial pulses   Respiratory: Mechanical breath sounds, no wheeze  Musculoskeletal: Spontaneously moving all extremities, no deformity   Skin: no suspicious lesions or rashes  Neurologic: Sedated, not following commands, moves all extremities spontaneously       Disposition:     Patient Instructions:   Current Discharge Medication List      CONTINUE these medications which have NOT CHANGED    Details   albuterol (PROVENTIL HFA;VENTOLIN HFA) 90 mcg/actuation inhaler Inhale 2-4 puffs into the lungs every 4 hours as needed (cough)      aspirin 81 mg chewable tablet Take 81 mg by mouth daily      clopidogrel (PLAVIX) 75 mg tablet Take 75 mg by mouth daily      metoprolol (LOPRESSOR) 50 MG tablet Take 50 mg by mouth 2 times daily      pantoprazole (PROTONIX) 40 MG EC tablet Take 40 mg by mouth daily      PRALUENT 150 MG/ML injectable pen Inject 150 mg Subcutaneous every 14 days      acetaminophen (TYLENOL) 500 MG tablet [ACETAMINOPHEN (TYLENOL) 500 MG TABLET] Take 500 mg by mouth every 6 (six) hours as needed for pain (2 tabs).      calcium-vitamin D 500 mg(1,250mg) -200 unit per tablet  [CALCIUM-VITAMIN D 500 MG(1,250MG) -200 UNIT PER TABLET] Take 1 tablet by mouth 2 (two) times a day with meals.           Signed:  Param Jeronimo MD [facility], PGY-4  Fellow, Cardiovascular Disease  12/23/2022  6:33 AM

## 2022-12-23 NOTE — PROGRESS NOTES
Patient noted for worsening acid base and end organ status throughout night with increasing pressor requirements. In light of this, discussion had with family at bedside regarding the trajectory for patient. After discussion, they wish to transition to comfort care measures only. Patient made DNR/DNI and medications for end of life ordered. Will transition to comfort measures only once medications available.

## 2022-12-23 NOTE — CONSULTS
"SPIRITUAL HEALTH SERVICES Progress Note  [facility] ([place]) 4C    Saw pt Eloy Georgesudiirwin per consult request for impending death support.      Patient/Family Understanding of Illness and Goals of Care - Family described Eloy being sick for a few weeks but haven suddenly \"taken a turn\" the evening of 12/22. They have decided to transition to comfort measures only.      Distress and Loss - Family is grieving Eloy's death.      Strengths, Coping, and Resources - Family is grieving appropriately, reflecting on Eloy's life, speaking to him, and supporting one another.       Meaning, Beliefs, and Spirituality - Eloy identifies as Alevism and is appreciative of prayer.      Plan of Care - I offered family supportive presence and prayer per their request. I have contracted with them as they identified no further [facility] needs while Eloy is transitioned to comfort measures only. I remain available if there is further care I can provide.    Pranay Benton  Chaplain Resident  Pager [phone]    * [facility] remains available 24/7 for emergent requests/referrals, either by having the switchboard page the on-call  or by entering an ASAP/STAT consult in Epic (this will also page the on-call ). Routine Epic consults receive an initial response within 24 hours.*    "

## 2022-12-23 NOTE — PROGRESS NOTES
Pt compassionately extubated at 0433 after discussion with family, pressors discontinued.  at bedside with family prior to. All vital signs absent at 0440, MD called to pronounce death.

## 2022-12-24 LAB
BACTERIA BLD CULT: NO GROWTH
BACTERIA BLD CULT: NO GROWTH

## 2022-12-26 LAB
BACTERIA BLD CULT: NO GROWTH
BACTERIA BLD CULT: NO GROWTH
SCANNED LAB RESULT: NORMAL

## 2022-12-27 LAB — BACTERIA ASPIRATE CULT: NORMAL

## 2023-01-04 LAB — BACTERIA BLD CULT: NO GROWTH

## 2023-01-17 LAB — BACTERIA ASPIRATE CULT: ABNORMAL

## (undated) RX ORDER — LIDOCAINE HYDROCHLORIDE 20 MG/ML
SOLUTION OROPHARYNGEAL
Status: DISPENSED
Start: 2022-01-01

## (undated) RX ORDER — FENTANYL CITRATE 50 UG/ML
INJECTION, SOLUTION INTRAMUSCULAR; INTRAVENOUS
Status: DISPENSED
Start: 2022-01-01

## (undated) RX ORDER — LIDOCAINE HYDROCHLORIDE 10 MG/ML
INJECTION, SOLUTION EPIDURAL; INFILTRATION; INTRACAUDAL; PERINEURAL
Status: DISPENSED
Start: 2022-01-01